# Patient Record
Sex: FEMALE | Race: WHITE | NOT HISPANIC OR LATINO | Employment: PART TIME | URBAN - METROPOLITAN AREA
[De-identification: names, ages, dates, MRNs, and addresses within clinical notes are randomized per-mention and may not be internally consistent; named-entity substitution may affect disease eponyms.]

---

## 2019-10-11 ENCOUNTER — AMBULATORY - HEALTHEAST (OUTPATIENT)
Dept: MULTI SPECIALTY CLINIC | Facility: CLINIC | Age: 54
End: 2019-10-11

## 2019-10-11 ENCOUNTER — RECORDS - HEALTHEAST (OUTPATIENT)
Dept: ADMINISTRATIVE | Facility: OTHER | Age: 54
End: 2019-10-11

## 2019-10-15 ENCOUNTER — RECORDS - HEALTHEAST (OUTPATIENT)
Dept: ADMINISTRATIVE | Facility: OTHER | Age: 54
End: 2019-10-15

## 2020-06-04 ENCOUNTER — RECORDS - HEALTHEAST (OUTPATIENT)
Dept: ADMINISTRATIVE | Facility: OTHER | Age: 55
End: 2020-06-04

## 2020-06-05 ENCOUNTER — RECORDS - HEALTHEAST (OUTPATIENT)
Dept: ADMINISTRATIVE | Facility: OTHER | Age: 55
End: 2020-06-05

## 2020-06-09 ENCOUNTER — OFFICE VISIT - HEALTHEAST (OUTPATIENT)
Dept: INTERNAL MEDICINE | Facility: CLINIC | Age: 55
End: 2020-06-09

## 2020-06-09 DIAGNOSIS — N63.22 BREAST LUMP ON LEFT SIDE AT 11 O'CLOCK POSITION: ICD-10-CM

## 2020-06-09 DIAGNOSIS — N63.20 LEFT BREAST MASS: ICD-10-CM

## 2020-06-15 ENCOUNTER — HOSPITAL ENCOUNTER (OUTPATIENT)
Dept: MAMMOGRAPHY | Facility: CLINIC | Age: 55
Discharge: HOME | End: 2020-06-15

## 2020-06-15 ENCOUNTER — AMBULATORY - HEALTHEAST (OUTPATIENT)
Dept: MAMMOGRAPHY | Facility: CLINIC | Age: 55
End: 2020-06-15

## 2020-06-15 DIAGNOSIS — N63.20 LEFT BREAST MASS: ICD-10-CM

## 2020-06-15 DIAGNOSIS — Z11.59 ENCOUNTER FOR SCREENING FOR OTHER VIRAL DISEASES: ICD-10-CM

## 2020-06-18 ENCOUNTER — RECORDS - HEALTHEAST (OUTPATIENT)
Dept: RADIOLOGY | Facility: CLINIC | Age: 55
End: 2020-06-18

## 2020-06-18 ENCOUNTER — RECORDS - HEALTHEAST (OUTPATIENT)
Dept: ADMINISTRATIVE | Facility: OTHER | Age: 55
End: 2020-06-18

## 2020-06-19 ENCOUNTER — AMBULATORY - HEALTHEAST (OUTPATIENT)
Dept: FAMILY MEDICINE | Facility: CLINIC | Age: 55
End: 2020-06-19

## 2020-06-19 DIAGNOSIS — Z11.59 ENCOUNTER FOR SCREENING FOR OTHER VIRAL DISEASES: ICD-10-CM

## 2020-06-20 ENCOUNTER — COMMUNICATION - HEALTHEAST (OUTPATIENT)
Dept: FAMILY MEDICINE | Facility: CLINIC | Age: 55
End: 2020-06-20

## 2020-06-22 ENCOUNTER — HOSPITAL ENCOUNTER (OUTPATIENT)
Dept: MAMMOGRAPHY | Facility: CLINIC | Age: 55
Discharge: HOME | End: 2020-06-22

## 2020-06-22 ENCOUNTER — AMBULATORY - HEALTHEAST (OUTPATIENT)
Dept: SURGERY | Facility: CLINIC | Age: 55
End: 2020-06-22

## 2020-06-22 DIAGNOSIS — N63.20 LEFT BREAST MASS: ICD-10-CM

## 2020-06-23 ENCOUNTER — COMMUNICATION - HEALTHEAST (OUTPATIENT)
Dept: ONCOLOGY | Facility: HOSPITAL | Age: 55
End: 2020-06-23

## 2020-06-24 ENCOUNTER — HOSPITAL ENCOUNTER (OUTPATIENT)
Dept: SURGERY | Facility: CLINIC | Age: 55
Discharge: HOME | End: 2020-06-24
Attending: SURGERY

## 2020-06-24 ENCOUNTER — COMMUNICATION - HEALTHEAST (OUTPATIENT)
Dept: ONCOLOGY | Facility: HOSPITAL | Age: 55
End: 2020-06-24

## 2020-06-24 ENCOUNTER — SURGERY - HEALTHEAST (OUTPATIENT)
Dept: SURGERY | Facility: CLINIC | Age: 55
End: 2020-06-24

## 2020-06-24 DIAGNOSIS — C50.912 INVASIVE DUCTAL CARCINOMA OF BREAST, LEFT (H): ICD-10-CM

## 2020-06-24 ASSESSMENT — MIFFLIN-ST. JEOR: SCORE: 1563.17

## 2020-06-25 ENCOUNTER — AMBULATORY - HEALTHEAST (OUTPATIENT)
Dept: SURGERY | Facility: CLINIC | Age: 55
End: 2020-06-25

## 2020-06-25 ENCOUNTER — COMMUNICATION - HEALTHEAST (OUTPATIENT)
Dept: SURGERY | Facility: CLINIC | Age: 55
End: 2020-06-25

## 2020-06-25 ENCOUNTER — COMMUNICATION - HEALTHEAST (OUTPATIENT)
Dept: ADMINISTRATIVE | Facility: HOSPITAL | Age: 55
End: 2020-06-25

## 2020-06-25 DIAGNOSIS — Z11.59 ENCOUNTER FOR SCREENING FOR OTHER VIRAL DISEASES: ICD-10-CM

## 2020-06-26 ENCOUNTER — COMMUNICATION - HEALTHEAST (OUTPATIENT)
Dept: ONCOLOGY | Facility: HOSPITAL | Age: 55
End: 2020-06-26

## 2020-06-26 ASSESSMENT — MIFFLIN-ST. JEOR: SCORE: 1563.17

## 2020-06-28 ENCOUNTER — AMBULATORY - HEALTHEAST (OUTPATIENT)
Dept: FAMILY MEDICINE | Facility: CLINIC | Age: 55
End: 2020-06-28

## 2020-06-28 DIAGNOSIS — Z11.59 ENCOUNTER FOR SCREENING FOR OTHER VIRAL DISEASES: ICD-10-CM

## 2020-06-29 ENCOUNTER — ANESTHESIA - HEALTHEAST (OUTPATIENT)
Dept: SURGERY | Facility: AMBULATORY SURGERY CENTER | Age: 55
End: 2020-06-29

## 2020-06-30 ENCOUNTER — COMMUNICATION - HEALTHEAST (OUTPATIENT)
Dept: ONCOLOGY | Facility: HOSPITAL | Age: 55
End: 2020-06-30

## 2020-06-30 ENCOUNTER — SURGERY - HEALTHEAST (OUTPATIENT)
Dept: SURGERY | Facility: AMBULATORY SURGERY CENTER | Age: 55
End: 2020-06-30

## 2020-06-30 ASSESSMENT — MIFFLIN-ST. JEOR: SCORE: 1563.17

## 2020-07-01 ENCOUNTER — AMBULATORY - HEALTHEAST (OUTPATIENT)
Dept: INFUSION THERAPY | Facility: HOSPITAL | Age: 55
End: 2020-07-01

## 2020-07-01 ENCOUNTER — AMBULATORY - HEALTHEAST (OUTPATIENT)
Dept: ONCOLOGY | Facility: HOSPITAL | Age: 55
End: 2020-07-01

## 2020-07-01 ENCOUNTER — OFFICE VISIT - HEALTHEAST (OUTPATIENT)
Dept: ONCOLOGY | Facility: HOSPITAL | Age: 55
End: 2020-07-01

## 2020-07-01 DIAGNOSIS — C50.912 INVASIVE DUCTAL CARCINOMA OF BREAST, LEFT (H): ICD-10-CM

## 2020-07-01 DIAGNOSIS — T45.1X5A CHEMOTHERAPY-INDUCED NEUTROPENIA (H): ICD-10-CM

## 2020-07-01 DIAGNOSIS — D70.1 CHEMOTHERAPY-INDUCED NEUTROPENIA (H): ICD-10-CM

## 2020-07-01 DIAGNOSIS — R79.89 ELEVATED LIVER FUNCTION TESTS: ICD-10-CM

## 2020-07-01 DIAGNOSIS — Z51.12 ENCOUNTER FOR ANTINEOPLASTIC IMMUNOTHERAPY: ICD-10-CM

## 2020-07-01 LAB
ALBUMIN SERPL-MCNC: 3.9 G/DL (ref 3.5–5)
ALP SERPL-CCNC: 104 U/L (ref 45–120)
ALT SERPL W P-5'-P-CCNC: 350 U/L (ref 0–45)
ANION GAP SERPL CALCULATED.3IONS-SCNC: 7 MMOL/L (ref 5–18)
AST SERPL W P-5'-P-CCNC: 225 U/L (ref 0–40)
BASOPHILS # BLD AUTO: 0 THOU/UL (ref 0–0.2)
BASOPHILS NFR BLD AUTO: 1 % (ref 0–2)
BILIRUB SERPL-MCNC: 0.2 MG/DL (ref 0–1)
BUN SERPL-MCNC: 12 MG/DL (ref 8–22)
CALCIUM SERPL-MCNC: 9.1 MG/DL (ref 8.5–10.5)
CEA SERPL-MCNC: 2.8 NG/ML (ref 0–3)
CHLORIDE BLD-SCNC: 104 MMOL/L (ref 98–107)
CO2 SERPL-SCNC: 31 MMOL/L (ref 22–31)
CREAT SERPL-MCNC: 0.72 MG/DL (ref 0.6–1.1)
EOSINOPHIL # BLD AUTO: 0.1 THOU/UL (ref 0–0.4)
EOSINOPHIL NFR BLD AUTO: 1 % (ref 0–6)
ERYTHROCYTE [DISTWIDTH] IN BLOOD BY AUTOMATED COUNT: 12.5 % (ref 11–14.5)
GFR SERPL CREATININE-BSD FRML MDRD: >60 ML/MIN/1.73M2
GLUCOSE BLD-MCNC: 88 MG/DL (ref 70–125)
HCT VFR BLD AUTO: 40.4 % (ref 35–47)
HGB BLD-MCNC: 13.1 G/DL (ref 12–16)
LDH SERPL L TO P-CCNC: 219 U/L (ref 125–220)
LYMPHOCYTES # BLD AUTO: 3.2 THOU/UL (ref 0.8–4.4)
LYMPHOCYTES NFR BLD AUTO: 38 % (ref 20–40)
MCH RBC QN AUTO: 31.1 PG (ref 27–34)
MCHC RBC AUTO-ENTMCNC: 32.4 G/DL (ref 32–36)
MCV RBC AUTO: 96 FL (ref 80–100)
MONOCYTES # BLD AUTO: 0.7 THOU/UL (ref 0–0.9)
MONOCYTES NFR BLD AUTO: 8 % (ref 2–10)
NEUTROPHILS # BLD AUTO: 4.4 THOU/UL (ref 2–7.7)
NEUTROPHILS NFR BLD AUTO: 53 % (ref 50–70)
PLATELET # BLD AUTO: 180 THOU/UL (ref 140–440)
PMV BLD AUTO: 10 FL (ref 8.5–12.5)
POTASSIUM BLD-SCNC: 4 MMOL/L (ref 3.5–5)
PROT SERPL-MCNC: 6.8 G/DL (ref 6–8)
RBC # BLD AUTO: 4.21 MILL/UL (ref 3.8–5.4)
SODIUM SERPL-SCNC: 142 MMOL/L (ref 136–145)
WBC: 8.4 THOU/UL (ref 4–11)

## 2020-07-02 ENCOUNTER — COMMUNICATION - HEALTHEAST (OUTPATIENT)
Dept: ONCOLOGY | Facility: HOSPITAL | Age: 55
End: 2020-07-02

## 2020-07-02 ENCOUNTER — HOSPITAL ENCOUNTER (OUTPATIENT)
Dept: ULTRASOUND IMAGING | Facility: HOSPITAL | Age: 55
Setting detail: RADIATION/ONCOLOGY SERIES
Discharge: STILL ADMITTED OR ADMINISTRATIVE | End: 2020-07-02
Attending: INTERNAL MEDICINE

## 2020-07-02 DIAGNOSIS — C50.912 INVASIVE DUCTAL CARCINOMA OF BREAST, LEFT (H): ICD-10-CM

## 2020-07-02 DIAGNOSIS — R79.89 ELEVATED LIVER FUNCTION TESTS: ICD-10-CM

## 2020-07-02 LAB
CANCER AG27-29 SERPL-ACNC: 20 U/ML (ref 0–39)
LAB AP CHARGES (HE HISTORICAL CONVERSION): ABNORMAL
LAB AP FISH HER2/NEU REPORT,ADDENDUM (HE HISTORICAL CONVERSION): ABNORMAL
LAB AP IHC ER/PR AND HER2/NEU REPORT,ADDENDUM (HE HISTORICAL CONVERSION): ABNORMAL
PATH REPORT.COMMENTS IMP SPEC: ABNORMAL
PATH REPORT.COMMENTS IMP SPEC: ABNORMAL
PATH REPORT.FINAL DX SPEC: ABNORMAL
PATH REPORT.GROSS SPEC: ABNORMAL
PATH REPORT.MICROSCOPIC SPEC OTHER STN: ABNORMAL
PATH REPORT.MICROSCOPIC SPEC OTHER STN: ABNORMAL
PATH REPORT.RELEVANT HX SPEC: ABNORMAL
RESULT FLAG (HE HISTORICAL CONVERSION): ABNORMAL

## 2020-07-07 ENCOUNTER — HOSPITAL ENCOUNTER (OUTPATIENT)
Dept: NUCLEAR MEDICINE | Facility: HOSPITAL | Age: 55
Setting detail: RADIATION/ONCOLOGY SERIES
Discharge: STILL ADMITTED OR ADMINISTRATIVE | End: 2020-07-07
Attending: INTERNAL MEDICINE

## 2020-07-07 DIAGNOSIS — T45.1X5A CHEMOTHERAPY-INDUCED NEUTROPENIA (H): ICD-10-CM

## 2020-07-07 DIAGNOSIS — C50.912 INVASIVE DUCTAL CARCINOMA OF BREAST, LEFT (H): ICD-10-CM

## 2020-07-07 DIAGNOSIS — D70.1 CHEMOTHERAPY-INDUCED NEUTROPENIA (H): ICD-10-CM

## 2020-07-07 DIAGNOSIS — Z51.12 ENCOUNTER FOR ANTINEOPLASTIC IMMUNOTHERAPY: ICD-10-CM

## 2020-07-07 LAB — MUGA LV EJECTION FRACTION: 70.8 %

## 2020-07-07 ASSESSMENT — MIFFLIN-ST. JEOR: SCORE: 1549.56

## 2020-07-09 ENCOUNTER — AMBULATORY - HEALTHEAST (OUTPATIENT)
Dept: INFUSION THERAPY | Facility: HOSPITAL | Age: 55
End: 2020-07-09

## 2020-07-09 ENCOUNTER — INFUSION - HEALTHEAST (OUTPATIENT)
Dept: INFUSION THERAPY | Facility: HOSPITAL | Age: 55
End: 2020-07-09

## 2020-07-09 ENCOUNTER — OFFICE VISIT - HEALTHEAST (OUTPATIENT)
Dept: ONCOLOGY | Facility: HOSPITAL | Age: 55
End: 2020-07-09

## 2020-07-09 DIAGNOSIS — T45.1X5A CHEMOTHERAPY-INDUCED NEUTROPENIA (H): ICD-10-CM

## 2020-07-09 DIAGNOSIS — D70.1 CHEMOTHERAPY-INDUCED NEUTROPENIA (H): ICD-10-CM

## 2020-07-09 DIAGNOSIS — Z51.12 ENCOUNTER FOR ANTINEOPLASTIC IMMUNOTHERAPY: ICD-10-CM

## 2020-07-09 DIAGNOSIS — R79.89 ELEVATED LFTS: ICD-10-CM

## 2020-07-09 DIAGNOSIS — C50.912 INVASIVE DUCTAL CARCINOMA OF BREAST, LEFT (H): ICD-10-CM

## 2020-07-09 LAB
ALBUMIN SERPL-MCNC: 4.1 G/DL (ref 3.5–5)
ALP SERPL-CCNC: 102 U/L (ref 45–120)
ALT SERPL W P-5'-P-CCNC: 113 U/L (ref 0–45)
AST SERPL W P-5'-P-CCNC: 51 U/L (ref 0–40)
BILIRUB DIRECT SERPL-MCNC: 0.2 MG/DL
BILIRUB SERPL-MCNC: 0.3 MG/DL (ref 0–1)
PROT SERPL-MCNC: 6.8 G/DL (ref 6–8)

## 2020-07-09 ASSESSMENT — MIFFLIN-ST. JEOR: SCORE: 1550.92

## 2020-07-13 ENCOUNTER — COMMUNICATION - HEALTHEAST (OUTPATIENT)
Dept: ONCOLOGY | Facility: HOSPITAL | Age: 55
End: 2020-07-13

## 2020-07-13 ENCOUNTER — OFFICE VISIT - HEALTHEAST (OUTPATIENT)
Dept: ONCOLOGY | Facility: HOSPITAL | Age: 55
End: 2020-07-13

## 2020-07-13 ENCOUNTER — RECORDS - HEALTHEAST (OUTPATIENT)
Dept: ADMINISTRATIVE | Facility: OTHER | Age: 55
End: 2020-07-13

## 2020-07-13 DIAGNOSIS — C50.912 INVASIVE DUCTAL CARCINOMA OF BREAST, LEFT (H): ICD-10-CM

## 2020-07-13 DIAGNOSIS — Z71.83 ENCOUNTER FOR NONPROCREATIVE GENETIC COUNSELING: ICD-10-CM

## 2020-07-13 DIAGNOSIS — Z80.3 FAMILY HISTORY OF MALIGNANT NEOPLASM OF BREAST: ICD-10-CM

## 2020-07-16 ENCOUNTER — COMMUNICATION - HEALTHEAST (OUTPATIENT)
Dept: ONCOLOGY | Facility: HOSPITAL | Age: 55
End: 2020-07-16

## 2020-07-23 ENCOUNTER — RECORDS - HEALTHEAST (OUTPATIENT)
Dept: ADMINISTRATIVE | Facility: OTHER | Age: 55
End: 2020-07-23

## 2020-07-23 ENCOUNTER — INFUSION - HEALTHEAST (OUTPATIENT)
Dept: INFUSION THERAPY | Facility: HOSPITAL | Age: 55
End: 2020-07-23

## 2020-07-23 ENCOUNTER — AMBULATORY - HEALTHEAST (OUTPATIENT)
Dept: INFUSION THERAPY | Facility: HOSPITAL | Age: 55
End: 2020-07-23

## 2020-07-23 ENCOUNTER — OFFICE VISIT - HEALTHEAST (OUTPATIENT)
Dept: ONCOLOGY | Facility: HOSPITAL | Age: 55
End: 2020-07-23

## 2020-07-23 DIAGNOSIS — T45.1X5A CHEMOTHERAPY-INDUCED NEUTROPENIA (H): ICD-10-CM

## 2020-07-23 DIAGNOSIS — D70.1 CHEMOTHERAPY-INDUCED NEUTROPENIA (H): ICD-10-CM

## 2020-07-23 DIAGNOSIS — C50.912 INVASIVE DUCTAL CARCINOMA OF BREAST, LEFT (H): ICD-10-CM

## 2020-07-23 DIAGNOSIS — Z51.12 ENCOUNTER FOR ANTINEOPLASTIC IMMUNOTHERAPY: ICD-10-CM

## 2020-07-23 LAB
ALBUMIN SERPL-MCNC: 3.9 G/DL (ref 3.5–5)
ALP SERPL-CCNC: 89 U/L (ref 45–120)
ALT SERPL W P-5'-P-CCNC: 46 U/L (ref 0–45)
ANION GAP SERPL CALCULATED.3IONS-SCNC: 8 MMOL/L (ref 5–18)
AST SERPL W P-5'-P-CCNC: 26 U/L (ref 0–40)
BASOPHILS # BLD AUTO: 0 THOU/UL (ref 0–0.2)
BASOPHILS NFR BLD AUTO: 0 % (ref 0–2)
BILIRUB SERPL-MCNC: 0.1 MG/DL (ref 0–1)
BUN SERPL-MCNC: 12 MG/DL (ref 8–22)
CALCIUM SERPL-MCNC: 9.1 MG/DL (ref 8.5–10.5)
CHLORIDE BLD-SCNC: 106 MMOL/L (ref 98–107)
CO2 SERPL-SCNC: 27 MMOL/L (ref 22–31)
CREAT SERPL-MCNC: 0.7 MG/DL (ref 0.6–1.1)
EOSINOPHIL COUNT (ABSOLUTE): 0.1 THOU/UL (ref 0–0.4)
EOSINOPHIL NFR BLD AUTO: 1 % (ref 0–6)
ERYTHROCYTE [DISTWIDTH] IN BLOOD BY AUTOMATED COUNT: 12 % (ref 11–14.5)
GFR SERPL CREATININE-BSD FRML MDRD: >60 ML/MIN/1.73M2
GLUCOSE BLD-MCNC: 81 MG/DL (ref 70–125)
HCT VFR BLD AUTO: 38.7 % (ref 35–47)
HGB BLD-MCNC: 12.7 G/DL (ref 12–16)
LYMPHOCYTES # BLD AUTO: 1.6 THOU/UL (ref 0.8–4.4)
LYMPHOCYTES NFR BLD AUTO: 24 % (ref 20–40)
MCH RBC QN AUTO: 31.8 PG (ref 27–34)
MCHC RBC AUTO-ENTMCNC: 32.8 G/DL (ref 32–36)
MCV RBC AUTO: 97 FL (ref 80–100)
METAMYELOCYTES (ABSOLUTE): 0.2 THOU/UL
METAMYELOCYTES NFR BLD MANUAL: 3 %
MONOCYTES # BLD AUTO: 0.9 THOU/UL (ref 0–0.9)
MONOCYTES NFR BLD AUTO: 13 % (ref 2–10)
PLAT MORPH BLD: NORMAL
PLATELET # BLD AUTO: 188 THOU/UL (ref 140–440)
PMV BLD AUTO: 9.5 FL (ref 8.5–12.5)
POTASSIUM BLD-SCNC: 4 MMOL/L (ref 3.5–5)
PROT SERPL-MCNC: 6.5 G/DL (ref 6–8)
RBC # BLD AUTO: 3.99 MILL/UL (ref 3.8–5.4)
SODIUM SERPL-SCNC: 141 MMOL/L (ref 136–145)
TOTAL NEUTROPHILS-ABS(DIFF): 4 THOU/UL (ref 2–7.7)
TOTAL NEUTROPHILS-REL(DIFF): 59 % (ref 50–70)
TOXIC GRANULATION: ABNORMAL
WBC: 6.8 THOU/UL (ref 4–11)

## 2020-08-03 ENCOUNTER — COMMUNICATION - HEALTHEAST (OUTPATIENT)
Dept: ONCOLOGY | Facility: HOSPITAL | Age: 55
End: 2020-08-03

## 2020-08-04 ENCOUNTER — COMMUNICATION - HEALTHEAST (OUTPATIENT)
Dept: ONCOLOGY | Facility: HOSPITAL | Age: 55
End: 2020-08-04

## 2020-08-06 ENCOUNTER — OFFICE VISIT - HEALTHEAST (OUTPATIENT)
Dept: ONCOLOGY | Facility: HOSPITAL | Age: 55
End: 2020-08-06

## 2020-08-06 ENCOUNTER — INFUSION - HEALTHEAST (OUTPATIENT)
Dept: INFUSION THERAPY | Facility: HOSPITAL | Age: 55
End: 2020-08-06

## 2020-08-06 ENCOUNTER — AMBULATORY - HEALTHEAST (OUTPATIENT)
Dept: INFUSION THERAPY | Facility: HOSPITAL | Age: 55
End: 2020-08-06

## 2020-08-06 DIAGNOSIS — Z51.12 ENCOUNTER FOR ANTINEOPLASTIC IMMUNOTHERAPY: ICD-10-CM

## 2020-08-06 DIAGNOSIS — D70.1 CHEMOTHERAPY-INDUCED NEUTROPENIA (H): ICD-10-CM

## 2020-08-06 DIAGNOSIS — C50.912 INVASIVE DUCTAL CARCINOMA OF BREAST, LEFT (H): ICD-10-CM

## 2020-08-06 DIAGNOSIS — T45.1X5A CHEMOTHERAPY-INDUCED NEUTROPENIA (H): ICD-10-CM

## 2020-08-06 LAB
ALBUMIN SERPL-MCNC: 4 G/DL (ref 3.5–5)
ALP SERPL-CCNC: 86 U/L (ref 45–120)
ALT SERPL W P-5'-P-CCNC: 35 U/L (ref 0–45)
ANION GAP SERPL CALCULATED.3IONS-SCNC: 6 MMOL/L (ref 5–18)
AST SERPL W P-5'-P-CCNC: 21 U/L (ref 0–40)
BASOPHILS # BLD AUTO: 0.1 THOU/UL (ref 0–0.2)
BASOPHILS NFR BLD AUTO: 1 % (ref 0–2)
BILIRUB SERPL-MCNC: 0.2 MG/DL (ref 0–1)
BUN SERPL-MCNC: 12 MG/DL (ref 8–22)
CALCIUM SERPL-MCNC: 9.2 MG/DL (ref 8.5–10.5)
CHLORIDE BLD-SCNC: 104 MMOL/L (ref 98–107)
CO2 SERPL-SCNC: 30 MMOL/L (ref 22–31)
CREAT SERPL-MCNC: 0.67 MG/DL (ref 0.6–1.1)
EOSINOPHIL # BLD AUTO: 0 THOU/UL (ref 0–0.4)
EOSINOPHIL NFR BLD AUTO: 0 % (ref 0–6)
ERYTHROCYTE [DISTWIDTH] IN BLOOD BY AUTOMATED COUNT: 13 % (ref 11–14.5)
GFR SERPL CREATININE-BSD FRML MDRD: >60 ML/MIN/1.73M2
GLUCOSE BLD-MCNC: 87 MG/DL (ref 70–125)
HCT VFR BLD AUTO: 39.1 % (ref 35–47)
HGB BLD-MCNC: 12.7 G/DL (ref 12–16)
LYMPHOCYTES # BLD AUTO: 1.4 THOU/UL (ref 0.8–4.4)
LYMPHOCYTES NFR BLD AUTO: 25 % (ref 20–40)
MCH RBC QN AUTO: 31.3 PG (ref 27–34)
MCHC RBC AUTO-ENTMCNC: 32.5 G/DL (ref 32–36)
MCV RBC AUTO: 96 FL (ref 80–100)
MONOCYTES # BLD AUTO: 0.8 THOU/UL (ref 0–0.9)
MONOCYTES NFR BLD AUTO: 14 % (ref 2–10)
NEUTROPHILS # BLD AUTO: 3.1 THOU/UL (ref 2–7.7)
NEUTROPHILS NFR BLD AUTO: 56 % (ref 50–70)
PLATELET # BLD AUTO: 199 THOU/UL (ref 140–440)
PMV BLD AUTO: 10.2 FL (ref 8.5–12.5)
POTASSIUM BLD-SCNC: 3.8 MMOL/L (ref 3.5–5)
PROT SERPL-MCNC: 6.6 G/DL (ref 6–8)
RBC # BLD AUTO: 4.06 MILL/UL (ref 3.8–5.4)
SODIUM SERPL-SCNC: 140 MMOL/L (ref 136–145)
WBC: 5.5 THOU/UL (ref 4–11)

## 2020-08-19 ENCOUNTER — AMBULATORY - HEALTHEAST (OUTPATIENT)
Dept: INFUSION THERAPY | Facility: HOSPITAL | Age: 55
End: 2020-08-19

## 2020-08-19 ENCOUNTER — INFUSION - HEALTHEAST (OUTPATIENT)
Dept: INFUSION THERAPY | Facility: HOSPITAL | Age: 55
End: 2020-08-19

## 2020-08-19 ENCOUNTER — OFFICE VISIT - HEALTHEAST (OUTPATIENT)
Dept: ONCOLOGY | Facility: HOSPITAL | Age: 55
End: 2020-08-19

## 2020-08-19 DIAGNOSIS — T45.1X5A CHEMOTHERAPY-INDUCED NEUTROPENIA (H): ICD-10-CM

## 2020-08-19 DIAGNOSIS — Z51.12 ENCOUNTER FOR ANTINEOPLASTIC IMMUNOTHERAPY: ICD-10-CM

## 2020-08-19 DIAGNOSIS — D70.1 CHEMOTHERAPY-INDUCED NEUTROPENIA (H): ICD-10-CM

## 2020-08-19 DIAGNOSIS — C50.912 INVASIVE DUCTAL CARCINOMA OF BREAST, LEFT (H): ICD-10-CM

## 2020-08-19 LAB
ALBUMIN SERPL-MCNC: 3.9 G/DL (ref 3.5–5)
ALP SERPL-CCNC: 84 U/L (ref 45–120)
ALT SERPL W P-5'-P-CCNC: 37 U/L (ref 0–45)
ANION GAP SERPL CALCULATED.3IONS-SCNC: 5 MMOL/L (ref 5–18)
AST SERPL W P-5'-P-CCNC: 26 U/L (ref 0–40)
BASOPHILS # BLD AUTO: 0 THOU/UL (ref 0–0.2)
BASOPHILS NFR BLD AUTO: 1 % (ref 0–2)
BILIRUB SERPL-MCNC: 0.2 MG/DL (ref 0–1)
BUN SERPL-MCNC: 10 MG/DL (ref 8–22)
CALCIUM SERPL-MCNC: 9.3 MG/DL (ref 8.5–10.5)
CHLORIDE BLD-SCNC: 106 MMOL/L (ref 98–107)
CO2 SERPL-SCNC: 30 MMOL/L (ref 22–31)
CREAT SERPL-MCNC: 0.69 MG/DL (ref 0.6–1.1)
EOSINOPHIL # BLD AUTO: 0 THOU/UL (ref 0–0.4)
EOSINOPHIL NFR BLD AUTO: 0 % (ref 0–6)
ERYTHROCYTE [DISTWIDTH] IN BLOOD BY AUTOMATED COUNT: 13.8 % (ref 11–14.5)
GFR SERPL CREATININE-BSD FRML MDRD: >60 ML/MIN/1.73M2
GLUCOSE BLD-MCNC: 87 MG/DL (ref 70–125)
HCT VFR BLD AUTO: 36.9 % (ref 35–47)
HGB BLD-MCNC: 12.2 G/DL (ref 12–16)
IMM GRANULOCYTES # BLD: 0.1 THOU/UL
IMM GRANULOCYTES NFR BLD: 2 %
LYMPHOCYTES # BLD AUTO: 1 THOU/UL (ref 0.8–4.4)
LYMPHOCYTES NFR BLD AUTO: 25 % (ref 20–40)
MCH RBC QN AUTO: 32.2 PG (ref 27–34)
MCHC RBC AUTO-ENTMCNC: 33.1 G/DL (ref 32–36)
MCV RBC AUTO: 97 FL (ref 80–100)
MONOCYTES # BLD AUTO: 0.7 THOU/UL (ref 0–0.9)
MONOCYTES NFR BLD AUTO: 17 % (ref 2–10)
NEUTROPHILS # BLD AUTO: 2.3 THOU/UL (ref 2–7.7)
NEUTROPHILS NFR BLD AUTO: 56 % (ref 50–70)
PLATELET # BLD AUTO: 160 THOU/UL (ref 140–440)
PMV BLD AUTO: 9.7 FL (ref 8.5–12.5)
POTASSIUM BLD-SCNC: 3.8 MMOL/L (ref 3.5–5)
PROT SERPL-MCNC: 6.5 G/DL (ref 6–8)
RBC # BLD AUTO: 3.79 MILL/UL (ref 3.8–5.4)
SODIUM SERPL-SCNC: 141 MMOL/L (ref 136–145)
WBC: 4.1 THOU/UL (ref 4–11)

## 2020-09-02 ENCOUNTER — OFFICE VISIT - HEALTHEAST (OUTPATIENT)
Dept: ONCOLOGY | Facility: HOSPITAL | Age: 55
End: 2020-09-02

## 2020-09-02 ENCOUNTER — AMBULATORY - HEALTHEAST (OUTPATIENT)
Dept: INFUSION THERAPY | Facility: HOSPITAL | Age: 55
End: 2020-09-02

## 2020-09-02 DIAGNOSIS — T45.1X5A CHEMOTHERAPY-INDUCED NEUTROPENIA (H): ICD-10-CM

## 2020-09-02 DIAGNOSIS — D70.1 CHEMOTHERAPY-INDUCED NEUTROPENIA (H): ICD-10-CM

## 2020-09-02 DIAGNOSIS — Z51.12 ENCOUNTER FOR ANTINEOPLASTIC IMMUNOTHERAPY: ICD-10-CM

## 2020-09-02 DIAGNOSIS — C50.912 INVASIVE DUCTAL CARCINOMA OF BREAST, LEFT (H): ICD-10-CM

## 2020-09-02 LAB
ALBUMIN SERPL-MCNC: 3.7 G/DL (ref 3.5–5)
ALP SERPL-CCNC: 87 U/L (ref 45–120)
ALT SERPL W P-5'-P-CCNC: 49 U/L (ref 0–45)
ANION GAP SERPL CALCULATED.3IONS-SCNC: 7 MMOL/L (ref 5–18)
AST SERPL W P-5'-P-CCNC: 30 U/L (ref 0–40)
BASOPHILS # BLD AUTO: 0.1 THOU/UL (ref 0–0.2)
BASOPHILS NFR BLD AUTO: 1 % (ref 0–2)
BILIRUB SERPL-MCNC: 0.2 MG/DL (ref 0–1)
BUN SERPL-MCNC: 7 MG/DL (ref 8–22)
CALCIUM SERPL-MCNC: 9.4 MG/DL (ref 8.5–10.5)
CHLORIDE BLD-SCNC: 103 MMOL/L (ref 98–107)
CO2 SERPL-SCNC: 30 MMOL/L (ref 22–31)
CREAT SERPL-MCNC: 0.64 MG/DL (ref 0.6–1.1)
EOSINOPHIL # BLD AUTO: 0 THOU/UL (ref 0–0.4)
EOSINOPHIL NFR BLD AUTO: 0 % (ref 0–6)
ERYTHROCYTE [DISTWIDTH] IN BLOOD BY AUTOMATED COUNT: 14.2 % (ref 11–14.5)
GFR SERPL CREATININE-BSD FRML MDRD: >60 ML/MIN/1.73M2
GLUCOSE BLD-MCNC: 76 MG/DL (ref 70–125)
HCT VFR BLD AUTO: 36.5 % (ref 35–47)
HGB BLD-MCNC: 11.9 G/DL (ref 12–16)
IMM GRANULOCYTES # BLD: 0.1 THOU/UL
IMM GRANULOCYTES NFR BLD: 3 %
LYMPHOCYTES # BLD AUTO: 1 THOU/UL (ref 0.8–4.4)
LYMPHOCYTES NFR BLD AUTO: 23 % (ref 20–40)
MCH RBC QN AUTO: 31.5 PG (ref 27–34)
MCHC RBC AUTO-ENTMCNC: 32.6 G/DL (ref 32–36)
MCV RBC AUTO: 97 FL (ref 80–100)
MONOCYTES # BLD AUTO: 0.8 THOU/UL (ref 0–0.9)
MONOCYTES NFR BLD AUTO: 18 % (ref 2–10)
NEUTROPHILS # BLD AUTO: 2.3 THOU/UL (ref 2–7.7)
NEUTROPHILS NFR BLD AUTO: 55 % (ref 50–70)
PLATELET # BLD AUTO: 163 THOU/UL (ref 140–440)
PMV BLD AUTO: 9.7 FL (ref 8.5–12.5)
POTASSIUM BLD-SCNC: 3.8 MMOL/L (ref 3.5–5)
PROT SERPL-MCNC: 6.3 G/DL (ref 6–8)
RBC # BLD AUTO: 3.78 MILL/UL (ref 3.8–5.4)
SODIUM SERPL-SCNC: 140 MMOL/L (ref 136–145)
WBC: 4.2 THOU/UL (ref 4–11)

## 2020-09-03 ENCOUNTER — INFUSION - HEALTHEAST (OUTPATIENT)
Dept: INFUSION THERAPY | Facility: HOSPITAL | Age: 55
End: 2020-09-03

## 2020-09-03 DIAGNOSIS — T45.1X5A CHEMOTHERAPY-INDUCED NEUTROPENIA (H): ICD-10-CM

## 2020-09-03 DIAGNOSIS — Z51.12 ENCOUNTER FOR ANTINEOPLASTIC IMMUNOTHERAPY: ICD-10-CM

## 2020-09-03 DIAGNOSIS — D70.1 CHEMOTHERAPY-INDUCED NEUTROPENIA (H): ICD-10-CM

## 2020-09-03 DIAGNOSIS — C50.912 INVASIVE DUCTAL CARCINOMA OF BREAST, LEFT (H): ICD-10-CM

## 2020-09-10 ENCOUNTER — COMMUNICATION - HEALTHEAST (OUTPATIENT)
Dept: ADMINISTRATIVE | Facility: HOSPITAL | Age: 55
End: 2020-09-10

## 2020-09-10 ENCOUNTER — INFUSION - HEALTHEAST (OUTPATIENT)
Dept: INFUSION THERAPY | Facility: HOSPITAL | Age: 55
End: 2020-09-10

## 2020-09-10 DIAGNOSIS — C50.912 INVASIVE DUCTAL CARCINOMA OF BREAST, LEFT (H): ICD-10-CM

## 2020-09-10 DIAGNOSIS — Z51.12 ENCOUNTER FOR ANTINEOPLASTIC IMMUNOTHERAPY: ICD-10-CM

## 2020-09-10 DIAGNOSIS — T45.1X5A CHEMOTHERAPY-INDUCED NEUTROPENIA (H): ICD-10-CM

## 2020-09-10 DIAGNOSIS — D70.1 CHEMOTHERAPY-INDUCED NEUTROPENIA (H): ICD-10-CM

## 2020-09-10 LAB
BASOPHILS # BLD AUTO: 0.1 THOU/UL (ref 0–0.2)
BASOPHILS NFR BLD AUTO: 1 % (ref 0–2)
EOSINOPHIL # BLD AUTO: 0 THOU/UL (ref 0–0.4)
EOSINOPHIL NFR BLD AUTO: 0 % (ref 0–6)
ERYTHROCYTE [DISTWIDTH] IN BLOOD BY AUTOMATED COUNT: 13.6 % (ref 11–14.5)
HCT VFR BLD AUTO: 35.5 % (ref 35–47)
HGB BLD-MCNC: 11.7 G/DL (ref 12–16)
IMM GRANULOCYTES # BLD: 0 THOU/UL
IMM GRANULOCYTES NFR BLD: 1 %
LYMPHOCYTES # BLD AUTO: 1 THOU/UL (ref 0.8–4.4)
LYMPHOCYTES NFR BLD AUTO: 21 % (ref 20–40)
MCH RBC QN AUTO: 31.9 PG (ref 27–34)
MCHC RBC AUTO-ENTMCNC: 33 G/DL (ref 32–36)
MCV RBC AUTO: 97 FL (ref 80–100)
MONOCYTES # BLD AUTO: 0.5 THOU/UL (ref 0–0.9)
MONOCYTES NFR BLD AUTO: 10 % (ref 2–10)
NEUTROPHILS # BLD AUTO: 3.2 THOU/UL (ref 2–7.7)
NEUTROPHILS NFR BLD AUTO: 67 % (ref 50–70)
PLATELET # BLD AUTO: 191 THOU/UL (ref 140–440)
PMV BLD AUTO: 9.5 FL (ref 8.5–12.5)
RBC # BLD AUTO: 3.67 MILL/UL (ref 3.8–5.4)
WBC: 4.7 THOU/UL (ref 4–11)

## 2020-09-14 ENCOUNTER — OFFICE VISIT - HEALTHEAST (OUTPATIENT)
Dept: ONCOLOGY | Facility: HOSPITAL | Age: 55
End: 2020-09-14

## 2020-09-14 DIAGNOSIS — Z71.83 ENCOUNTER FOR NONPROCREATIVE GENETIC COUNSELING: ICD-10-CM

## 2020-09-14 DIAGNOSIS — Z80.3 FAMILY HISTORY OF MALIGNANT NEOPLASM OF BREAST: ICD-10-CM

## 2020-09-14 DIAGNOSIS — C50.912 INVASIVE DUCTAL CARCINOMA OF BREAST, LEFT (H): ICD-10-CM

## 2020-09-17 ENCOUNTER — INFUSION - HEALTHEAST (OUTPATIENT)
Dept: INFUSION THERAPY | Facility: HOSPITAL | Age: 55
End: 2020-09-17

## 2020-09-17 DIAGNOSIS — T45.1X5A CHEMOTHERAPY-INDUCED NEUTROPENIA (H): ICD-10-CM

## 2020-09-17 DIAGNOSIS — C50.912 INVASIVE DUCTAL CARCINOMA OF BREAST, LEFT (H): ICD-10-CM

## 2020-09-17 DIAGNOSIS — Z51.12 ENCOUNTER FOR ANTINEOPLASTIC IMMUNOTHERAPY: ICD-10-CM

## 2020-09-17 DIAGNOSIS — D70.1 CHEMOTHERAPY-INDUCED NEUTROPENIA (H): ICD-10-CM

## 2020-09-17 LAB
BASOPHILS # BLD AUTO: 0 THOU/UL (ref 0–0.2)
BASOPHILS NFR BLD AUTO: 1 % (ref 0–2)
EOSINOPHIL # BLD AUTO: 0 THOU/UL (ref 0–0.4)
EOSINOPHIL NFR BLD AUTO: 1 % (ref 0–6)
ERYTHROCYTE [DISTWIDTH] IN BLOOD BY AUTOMATED COUNT: 14 % (ref 11–14.5)
HCT VFR BLD AUTO: 34.1 % (ref 35–47)
HGB BLD-MCNC: 11.1 G/DL (ref 12–16)
IMM GRANULOCYTES # BLD: 0 THOU/UL
IMM GRANULOCYTES NFR BLD: 1 %
LYMPHOCYTES # BLD AUTO: 0.8 THOU/UL (ref 0.8–4.4)
LYMPHOCYTES NFR BLD AUTO: 19 % (ref 20–40)
MCH RBC QN AUTO: 31.8 PG (ref 27–34)
MCHC RBC AUTO-ENTMCNC: 32.6 G/DL (ref 32–36)
MCV RBC AUTO: 98 FL (ref 80–100)
MONOCYTES # BLD AUTO: 0.3 THOU/UL (ref 0–0.9)
MONOCYTES NFR BLD AUTO: 9 % (ref 2–10)
NEUTROPHILS # BLD AUTO: 2.8 THOU/UL (ref 2–7.7)
NEUTROPHILS NFR BLD AUTO: 70 % (ref 50–70)
PLATELET # BLD AUTO: 200 THOU/UL (ref 140–440)
PMV BLD AUTO: 9.9 FL (ref 8.5–12.5)
RBC # BLD AUTO: 3.49 MILL/UL (ref 3.8–5.4)
WBC: 4 THOU/UL (ref 4–11)

## 2020-09-18 ENCOUNTER — AMBULATORY - HEALTHEAST (OUTPATIENT)
Dept: LAB | Facility: CLINIC | Age: 55
End: 2020-09-18

## 2020-09-18 DIAGNOSIS — Z98.84 S/P LAPAROSCOPIC SLEEVE GASTRECTOMY: ICD-10-CM

## 2020-09-24 ENCOUNTER — INFUSION - HEALTHEAST (OUTPATIENT)
Dept: INFUSION THERAPY | Facility: HOSPITAL | Age: 55
End: 2020-09-24

## 2020-09-24 ENCOUNTER — AMBULATORY - HEALTHEAST (OUTPATIENT)
Dept: INFUSION THERAPY | Facility: HOSPITAL | Age: 55
End: 2020-09-24

## 2020-09-24 ENCOUNTER — OFFICE VISIT - HEALTHEAST (OUTPATIENT)
Dept: ONCOLOGY | Facility: HOSPITAL | Age: 55
End: 2020-09-24

## 2020-09-24 DIAGNOSIS — D70.1 CHEMOTHERAPY-INDUCED NEUTROPENIA (H): ICD-10-CM

## 2020-09-24 DIAGNOSIS — Z51.12 ENCOUNTER FOR ANTINEOPLASTIC IMMUNOTHERAPY: ICD-10-CM

## 2020-09-24 DIAGNOSIS — T45.1X5A CHEMOTHERAPY-INDUCED NEUTROPENIA (H): ICD-10-CM

## 2020-09-24 DIAGNOSIS — C50.912 INVASIVE DUCTAL CARCINOMA OF BREAST, LEFT (H): ICD-10-CM

## 2020-09-24 DIAGNOSIS — Z98.84 S/P LAPAROSCOPIC SLEEVE GASTRECTOMY: ICD-10-CM

## 2020-09-24 LAB
ALBUMIN SERPL-MCNC: 3.8 G/DL (ref 3.5–5)
ALP SERPL-CCNC: 85 U/L (ref 45–120)
ALT SERPL W P-5'-P-CCNC: 45 U/L (ref 0–45)
ANION GAP SERPL CALCULATED.3IONS-SCNC: 9 MMOL/L (ref 5–18)
AST SERPL W P-5'-P-CCNC: 28 U/L (ref 0–40)
BASOPHILS # BLD AUTO: 0 THOU/UL (ref 0–0.2)
BASOPHILS NFR BLD AUTO: 1 % (ref 0–2)
BILIRUB SERPL-MCNC: 0.3 MG/DL (ref 0–1)
BUN SERPL-MCNC: 9 MG/DL (ref 8–22)
CALCIUM SERPL-MCNC: 9 MG/DL (ref 8.5–10.5)
CHLORIDE BLD-SCNC: 107 MMOL/L (ref 98–107)
CO2 SERPL-SCNC: 27 MMOL/L (ref 22–31)
CREAT SERPL-MCNC: 0.7 MG/DL (ref 0.6–1.1)
EOSINOPHIL # BLD AUTO: 0.1 THOU/UL (ref 0–0.4)
EOSINOPHIL NFR BLD AUTO: 1 % (ref 0–6)
ERYTHROCYTE [DISTWIDTH] IN BLOOD BY AUTOMATED COUNT: 14.3 % (ref 11–14.5)
GFR SERPL CREATININE-BSD FRML MDRD: >60 ML/MIN/1.73M2
GLUCOSE BLD-MCNC: 99 MG/DL (ref 70–125)
HCT VFR BLD AUTO: 35.6 % (ref 35–47)
HGB BLD-MCNC: 11.8 G/DL (ref 12–16)
IMM GRANULOCYTES # BLD: 0 THOU/UL
IMM GRANULOCYTES NFR BLD: 1 %
LYMPHOCYTES # BLD AUTO: 0.8 THOU/UL (ref 0.8–4.4)
LYMPHOCYTES NFR BLD AUTO: 22 % (ref 20–40)
MCH RBC QN AUTO: 32.2 PG (ref 27–34)
MCHC RBC AUTO-ENTMCNC: 33.1 G/DL (ref 32–36)
MCV RBC AUTO: 97 FL (ref 80–100)
MONOCYTES # BLD AUTO: 0.4 THOU/UL (ref 0–0.9)
MONOCYTES NFR BLD AUTO: 10 % (ref 2–10)
NEUTROPHILS # BLD AUTO: 2.4 THOU/UL (ref 2–7.7)
NEUTROPHILS NFR BLD AUTO: 64 % (ref 50–70)
PLATELET # BLD AUTO: 222 THOU/UL (ref 140–440)
PMV BLD AUTO: 9.7 FL (ref 8.5–12.5)
POTASSIUM BLD-SCNC: 3.7 MMOL/L (ref 3.5–5)
PROT SERPL-MCNC: 6.8 G/DL (ref 6–8)
RBC # BLD AUTO: 3.67 MILL/UL (ref 3.8–5.4)
SODIUM SERPL-SCNC: 143 MMOL/L (ref 136–145)
VIT B12 SERPL-MCNC: 1879 PG/ML (ref 213–816)
WBC: 3.7 THOU/UL (ref 4–11)

## 2020-09-28 LAB — VIT B1 PYROPHOSHATE BLD-SCNC: 116 NMOL/L (ref 70–180)

## 2020-10-01 ENCOUNTER — INFUSION - HEALTHEAST (OUTPATIENT)
Dept: INFUSION THERAPY | Facility: HOSPITAL | Age: 55
End: 2020-10-01

## 2020-10-01 DIAGNOSIS — C50.912 INVASIVE DUCTAL CARCINOMA OF BREAST, LEFT (H): ICD-10-CM

## 2020-10-01 DIAGNOSIS — Z51.12 ENCOUNTER FOR ANTINEOPLASTIC IMMUNOTHERAPY: ICD-10-CM

## 2020-10-01 DIAGNOSIS — T45.1X5A CHEMOTHERAPY-INDUCED NEUTROPENIA (H): ICD-10-CM

## 2020-10-01 DIAGNOSIS — D70.1 CHEMOTHERAPY-INDUCED NEUTROPENIA (H): ICD-10-CM

## 2020-10-01 LAB
BASOPHILS # BLD AUTO: 0 THOU/UL (ref 0–0.2)
BASOPHILS NFR BLD AUTO: 1 % (ref 0–2)
EOSINOPHIL # BLD AUTO: 0.1 THOU/UL (ref 0–0.4)
EOSINOPHIL NFR BLD AUTO: 2 % (ref 0–6)
ERYTHROCYTE [DISTWIDTH] IN BLOOD BY AUTOMATED COUNT: 14.1 % (ref 11–14.5)
HCT VFR BLD AUTO: 35.3 % (ref 35–47)
HGB BLD-MCNC: 11.5 G/DL (ref 12–16)
IMM GRANULOCYTES # BLD: 0 THOU/UL
IMM GRANULOCYTES NFR BLD: 1 %
LYMPHOCYTES # BLD AUTO: 1 THOU/UL (ref 0.8–4.4)
LYMPHOCYTES NFR BLD AUTO: 24 % (ref 20–40)
MCH RBC QN AUTO: 32.5 PG (ref 27–34)
MCHC RBC AUTO-ENTMCNC: 32.6 G/DL (ref 32–36)
MCV RBC AUTO: 100 FL (ref 80–100)
MONOCYTES # BLD AUTO: 0.3 THOU/UL (ref 0–0.9)
MONOCYTES NFR BLD AUTO: 8 % (ref 2–10)
NEUTROPHILS # BLD AUTO: 2.6 THOU/UL (ref 2–7.7)
NEUTROPHILS NFR BLD AUTO: 65 % (ref 50–70)
PLATELET # BLD AUTO: 201 THOU/UL (ref 140–440)
PMV BLD AUTO: 9.9 FL (ref 8.5–12.5)
RBC # BLD AUTO: 3.54 MILL/UL (ref 3.8–5.4)
WBC: 4 THOU/UL (ref 4–11)

## 2020-10-08 ENCOUNTER — INFUSION - HEALTHEAST (OUTPATIENT)
Dept: INFUSION THERAPY | Facility: HOSPITAL | Age: 55
End: 2020-10-08

## 2020-10-08 DIAGNOSIS — D70.1 CHEMOTHERAPY-INDUCED NEUTROPENIA (H): ICD-10-CM

## 2020-10-08 DIAGNOSIS — Z51.12 ENCOUNTER FOR ANTINEOPLASTIC IMMUNOTHERAPY: ICD-10-CM

## 2020-10-08 DIAGNOSIS — C50.912 INVASIVE DUCTAL CARCINOMA OF BREAST, LEFT (H): ICD-10-CM

## 2020-10-08 DIAGNOSIS — T45.1X5A CHEMOTHERAPY-INDUCED NEUTROPENIA (H): ICD-10-CM

## 2020-10-08 LAB
BASOPHILS # BLD AUTO: 0 THOU/UL (ref 0–0.2)
BASOPHILS NFR BLD AUTO: 1 % (ref 0–2)
EOSINOPHIL # BLD AUTO: 0.1 THOU/UL (ref 0–0.4)
EOSINOPHIL NFR BLD AUTO: 1 % (ref 0–6)
ERYTHROCYTE [DISTWIDTH] IN BLOOD BY AUTOMATED COUNT: 14 % (ref 11–14.5)
HCT VFR BLD AUTO: 34.8 % (ref 35–47)
HGB BLD-MCNC: 11.3 G/DL (ref 12–16)
IMM GRANULOCYTES # BLD: 0 THOU/UL
IMM GRANULOCYTES NFR BLD: 1 %
LYMPHOCYTES # BLD AUTO: 0.9 THOU/UL (ref 0.8–4.4)
LYMPHOCYTES NFR BLD AUTO: 23 % (ref 20–40)
MCH RBC QN AUTO: 32.2 PG (ref 27–34)
MCHC RBC AUTO-ENTMCNC: 32.5 G/DL (ref 32–36)
MCV RBC AUTO: 99 FL (ref 80–100)
MONOCYTES # BLD AUTO: 0.3 THOU/UL (ref 0–0.9)
MONOCYTES NFR BLD AUTO: 9 % (ref 2–10)
NEUTROPHILS # BLD AUTO: 2.5 THOU/UL (ref 2–7.7)
NEUTROPHILS NFR BLD AUTO: 65 % (ref 50–70)
PLATELET # BLD AUTO: 195 THOU/UL (ref 140–440)
PMV BLD AUTO: 9.5 FL (ref 8.5–12.5)
RBC # BLD AUTO: 3.51 MILL/UL (ref 3.8–5.4)
WBC: 3.8 THOU/UL (ref 4–11)

## 2020-10-15 ENCOUNTER — INFUSION - HEALTHEAST (OUTPATIENT)
Dept: INFUSION THERAPY | Facility: HOSPITAL | Age: 55
End: 2020-10-15

## 2020-10-15 ENCOUNTER — AMBULATORY - HEALTHEAST (OUTPATIENT)
Dept: INFUSION THERAPY | Facility: HOSPITAL | Age: 55
End: 2020-10-15

## 2020-10-15 ENCOUNTER — OFFICE VISIT - HEALTHEAST (OUTPATIENT)
Dept: ONCOLOGY | Facility: HOSPITAL | Age: 55
End: 2020-10-15

## 2020-10-15 ENCOUNTER — COMMUNICATION - HEALTHEAST (OUTPATIENT)
Dept: SURGERY | Facility: CLINIC | Age: 55
End: 2020-10-15

## 2020-10-15 DIAGNOSIS — Z51.12 ENCOUNTER FOR ANTINEOPLASTIC IMMUNOTHERAPY: ICD-10-CM

## 2020-10-15 DIAGNOSIS — T45.1X5A CHEMOTHERAPY-INDUCED NEUTROPENIA (H): ICD-10-CM

## 2020-10-15 DIAGNOSIS — D70.1 CHEMOTHERAPY-INDUCED NEUTROPENIA (H): ICD-10-CM

## 2020-10-15 DIAGNOSIS — C50.912 INVASIVE DUCTAL CARCINOMA OF BREAST, LEFT (H): ICD-10-CM

## 2020-10-15 LAB
ALBUMIN SERPL-MCNC: 3.8 G/DL (ref 3.5–5)
ALP SERPL-CCNC: 75 U/L (ref 45–120)
ALT SERPL W P-5'-P-CCNC: 34 U/L (ref 0–45)
ANION GAP SERPL CALCULATED.3IONS-SCNC: 8 MMOL/L (ref 5–18)
AST SERPL W P-5'-P-CCNC: 24 U/L (ref 0–40)
BASOPHILS # BLD AUTO: 0 THOU/UL (ref 0–0.2)
BASOPHILS NFR BLD AUTO: 1 % (ref 0–2)
BILIRUB SERPL-MCNC: 0.2 MG/DL (ref 0–1)
BUN SERPL-MCNC: 14 MG/DL (ref 8–22)
CALCIUM SERPL-MCNC: 9 MG/DL (ref 8.5–10.5)
CHLORIDE BLD-SCNC: 107 MMOL/L (ref 98–107)
CO2 SERPL-SCNC: 28 MMOL/L (ref 22–31)
CREAT SERPL-MCNC: 0.68 MG/DL (ref 0.6–1.1)
EOSINOPHIL # BLD AUTO: 0.1 THOU/UL (ref 0–0.4)
EOSINOPHIL NFR BLD AUTO: 1 % (ref 0–6)
ERYTHROCYTE [DISTWIDTH] IN BLOOD BY AUTOMATED COUNT: 14 % (ref 11–14.5)
GFR SERPL CREATININE-BSD FRML MDRD: >60 ML/MIN/1.73M2
GLUCOSE BLD-MCNC: 79 MG/DL (ref 70–125)
HCT VFR BLD AUTO: 35 % (ref 35–47)
HGB BLD-MCNC: 11.4 G/DL (ref 12–16)
IMM GRANULOCYTES # BLD: 0 THOU/UL
IMM GRANULOCYTES NFR BLD: 1 %
LYMPHOCYTES # BLD AUTO: 0.9 THOU/UL (ref 0.8–4.4)
LYMPHOCYTES NFR BLD AUTO: 22 % (ref 20–40)
MCH RBC QN AUTO: 32.6 PG (ref 27–34)
MCHC RBC AUTO-ENTMCNC: 32.6 G/DL (ref 32–36)
MCV RBC AUTO: 100 FL (ref 80–100)
MONOCYTES # BLD AUTO: 0.4 THOU/UL (ref 0–0.9)
MONOCYTES NFR BLD AUTO: 9 % (ref 2–10)
NEUTROPHILS # BLD AUTO: 2.8 THOU/UL (ref 2–7.7)
NEUTROPHILS NFR BLD AUTO: 67 % (ref 50–70)
PLATELET # BLD AUTO: 208 THOU/UL (ref 140–440)
PMV BLD AUTO: 9.4 FL (ref 8.5–12.5)
POTASSIUM BLD-SCNC: 3.9 MMOL/L (ref 3.5–5)
PROT SERPL-MCNC: 6.6 G/DL (ref 6–8)
RBC # BLD AUTO: 3.5 MILL/UL (ref 3.8–5.4)
SODIUM SERPL-SCNC: 143 MMOL/L (ref 136–145)
WBC: 4.2 THOU/UL (ref 4–11)

## 2020-10-15 ASSESSMENT — MIFFLIN-ST. JEOR: SCORE: 1575.41

## 2020-10-22 ENCOUNTER — INFUSION - HEALTHEAST (OUTPATIENT)
Dept: INFUSION THERAPY | Facility: HOSPITAL | Age: 55
End: 2020-10-22

## 2020-10-22 DIAGNOSIS — T45.1X5A CHEMOTHERAPY-INDUCED NEUTROPENIA (H): ICD-10-CM

## 2020-10-22 DIAGNOSIS — D70.1 CHEMOTHERAPY-INDUCED NEUTROPENIA (H): ICD-10-CM

## 2020-10-22 DIAGNOSIS — C50.912 INVASIVE DUCTAL CARCINOMA OF BREAST, LEFT (H): ICD-10-CM

## 2020-10-22 DIAGNOSIS — Z51.12 ENCOUNTER FOR ANTINEOPLASTIC IMMUNOTHERAPY: ICD-10-CM

## 2020-10-22 LAB
BASOPHILS # BLD AUTO: 0 THOU/UL (ref 0–0.2)
BASOPHILS NFR BLD AUTO: 1 % (ref 0–2)
EOSINOPHIL # BLD AUTO: 0 THOU/UL (ref 0–0.4)
EOSINOPHIL NFR BLD AUTO: 1 % (ref 0–6)
ERYTHROCYTE [DISTWIDTH] IN BLOOD BY AUTOMATED COUNT: 13.6 % (ref 11–14.5)
HCT VFR BLD AUTO: 34.1 % (ref 35–47)
HGB BLD-MCNC: 11 G/DL (ref 12–16)
IMM GRANULOCYTES # BLD: 0 THOU/UL
IMM GRANULOCYTES NFR BLD: 1 %
LYMPHOCYTES # BLD AUTO: 1 THOU/UL (ref 0.8–4.4)
LYMPHOCYTES NFR BLD AUTO: 25 % (ref 20–40)
MCH RBC QN AUTO: 32 PG (ref 27–34)
MCHC RBC AUTO-ENTMCNC: 32.3 G/DL (ref 32–36)
MCV RBC AUTO: 99 FL (ref 80–100)
MONOCYTES # BLD AUTO: 0.3 THOU/UL (ref 0–0.9)
MONOCYTES NFR BLD AUTO: 7 % (ref 2–10)
NEUTROPHILS # BLD AUTO: 2.6 THOU/UL (ref 2–7.7)
NEUTROPHILS NFR BLD AUTO: 66 % (ref 50–70)
PLATELET # BLD AUTO: 208 THOU/UL (ref 140–440)
PMV BLD AUTO: 9.8 FL (ref 8.5–12.5)
RBC # BLD AUTO: 3.44 MILL/UL (ref 3.8–5.4)
WBC: 3.9 THOU/UL (ref 4–11)

## 2020-10-29 ENCOUNTER — INFUSION - HEALTHEAST (OUTPATIENT)
Dept: INFUSION THERAPY | Facility: HOSPITAL | Age: 55
End: 2020-10-29

## 2020-10-29 DIAGNOSIS — Z51.12 ENCOUNTER FOR ANTINEOPLASTIC IMMUNOTHERAPY: ICD-10-CM

## 2020-10-29 DIAGNOSIS — C50.912 INVASIVE DUCTAL CARCINOMA OF BREAST, LEFT (H): ICD-10-CM

## 2020-10-29 DIAGNOSIS — T45.1X5A CHEMOTHERAPY-INDUCED NEUTROPENIA (H): ICD-10-CM

## 2020-10-29 DIAGNOSIS — D70.1 CHEMOTHERAPY-INDUCED NEUTROPENIA (H): ICD-10-CM

## 2020-10-29 LAB
BASOPHILS # BLD AUTO: 0.1 THOU/UL (ref 0–0.2)
BASOPHILS NFR BLD AUTO: 1 % (ref 0–2)
EOSINOPHIL # BLD AUTO: 0 THOU/UL (ref 0–0.4)
EOSINOPHIL NFR BLD AUTO: 1 % (ref 0–6)
ERYTHROCYTE [DISTWIDTH] IN BLOOD BY AUTOMATED COUNT: 13.3 % (ref 11–14.5)
HCT VFR BLD AUTO: 35.7 % (ref 35–47)
HGB BLD-MCNC: 11.6 G/DL (ref 12–16)
IMM GRANULOCYTES # BLD: 0 THOU/UL
IMM GRANULOCYTES NFR BLD: 1 %
LYMPHOCYTES # BLD AUTO: 1.1 THOU/UL (ref 0.8–4.4)
LYMPHOCYTES NFR BLD AUTO: 28 % (ref 20–40)
MCH RBC QN AUTO: 32.5 PG (ref 27–34)
MCHC RBC AUTO-ENTMCNC: 32.5 G/DL (ref 32–36)
MCV RBC AUTO: 100 FL (ref 80–100)
MONOCYTES # BLD AUTO: 0.4 THOU/UL (ref 0–0.9)
MONOCYTES NFR BLD AUTO: 9 % (ref 2–10)
NEUTROPHILS # BLD AUTO: 2.5 THOU/UL (ref 2–7.7)
NEUTROPHILS NFR BLD AUTO: 61 % (ref 50–70)
PLATELET # BLD AUTO: 204 THOU/UL (ref 140–440)
PMV BLD AUTO: 9.5 FL (ref 8.5–12.5)
RBC # BLD AUTO: 3.57 MILL/UL (ref 3.8–5.4)
WBC: 4.1 THOU/UL (ref 4–11)

## 2020-11-05 ENCOUNTER — INFUSION - HEALTHEAST (OUTPATIENT)
Dept: INFUSION THERAPY | Facility: HOSPITAL | Age: 55
End: 2020-11-05

## 2020-11-05 ENCOUNTER — COMMUNICATION - HEALTHEAST (OUTPATIENT)
Dept: ONCOLOGY | Facility: HOSPITAL | Age: 55
End: 2020-11-05

## 2020-11-05 ENCOUNTER — AMBULATORY - HEALTHEAST (OUTPATIENT)
Dept: INFUSION THERAPY | Facility: HOSPITAL | Age: 55
End: 2020-11-05

## 2020-11-05 ENCOUNTER — OFFICE VISIT - HEALTHEAST (OUTPATIENT)
Dept: ONCOLOGY | Facility: HOSPITAL | Age: 55
End: 2020-11-05

## 2020-11-05 DIAGNOSIS — D70.1 CHEMOTHERAPY-INDUCED NEUTROPENIA (H): ICD-10-CM

## 2020-11-05 DIAGNOSIS — Z51.12 ENCOUNTER FOR ANTINEOPLASTIC IMMUNOTHERAPY: ICD-10-CM

## 2020-11-05 DIAGNOSIS — C50.912 INVASIVE DUCTAL CARCINOMA OF BREAST, LEFT (H): ICD-10-CM

## 2020-11-05 DIAGNOSIS — T45.1X5A CHEMOTHERAPY-INDUCED NEUTROPENIA (H): ICD-10-CM

## 2020-11-05 LAB
ALBUMIN SERPL-MCNC: 3.7 G/DL (ref 3.5–5)
ALP SERPL-CCNC: 66 U/L (ref 45–120)
ALT SERPL W P-5'-P-CCNC: 24 U/L (ref 0–45)
ANION GAP SERPL CALCULATED.3IONS-SCNC: 6 MMOL/L (ref 5–18)
AST SERPL W P-5'-P-CCNC: 19 U/L (ref 0–40)
BASOPHILS # BLD AUTO: 0 THOU/UL (ref 0–0.2)
BASOPHILS NFR BLD AUTO: 1 % (ref 0–2)
BILIRUB SERPL-MCNC: 0.3 MG/DL (ref 0–1)
BUN SERPL-MCNC: 11 MG/DL (ref 8–22)
CALCIUM SERPL-MCNC: 8.8 MG/DL (ref 8.5–10.5)
CHLORIDE BLD-SCNC: 107 MMOL/L (ref 98–107)
CO2 SERPL-SCNC: 30 MMOL/L (ref 22–31)
CREAT SERPL-MCNC: 0.65 MG/DL (ref 0.6–1.1)
EOSINOPHIL # BLD AUTO: 0 THOU/UL (ref 0–0.4)
EOSINOPHIL NFR BLD AUTO: 1 % (ref 0–6)
ERYTHROCYTE [DISTWIDTH] IN BLOOD BY AUTOMATED COUNT: 13.2 % (ref 11–14.5)
GFR SERPL CREATININE-BSD FRML MDRD: >60 ML/MIN/1.73M2
GLUCOSE BLD-MCNC: 79 MG/DL (ref 70–125)
HCT VFR BLD AUTO: 36.4 % (ref 35–47)
HGB BLD-MCNC: 11.7 G/DL (ref 12–16)
IMM GRANULOCYTES # BLD: 0 THOU/UL
IMM GRANULOCYTES NFR BLD: 1 %
LYMPHOCYTES # BLD AUTO: 1 THOU/UL (ref 0.8–4.4)
LYMPHOCYTES NFR BLD AUTO: 29 % (ref 20–40)
MCH RBC QN AUTO: 32.5 PG (ref 27–34)
MCHC RBC AUTO-ENTMCNC: 32.1 G/DL (ref 32–36)
MCV RBC AUTO: 101 FL (ref 80–100)
MONOCYTES # BLD AUTO: 0.2 THOU/UL (ref 0–0.9)
MONOCYTES NFR BLD AUTO: 7 % (ref 2–10)
NEUTROPHILS # BLD AUTO: 2.2 THOU/UL (ref 2–7.7)
NEUTROPHILS NFR BLD AUTO: 62 % (ref 50–70)
PLATELET # BLD AUTO: 205 THOU/UL (ref 140–440)
PMV BLD AUTO: 10 FL (ref 8.5–12.5)
POTASSIUM BLD-SCNC: 4 MMOL/L (ref 3.5–5)
PROT SERPL-MCNC: 6.3 G/DL (ref 6–8)
RBC # BLD AUTO: 3.6 MILL/UL (ref 3.8–5.4)
SODIUM SERPL-SCNC: 143 MMOL/L (ref 136–145)
WBC: 3.5 THOU/UL (ref 4–11)

## 2020-11-10 ENCOUNTER — COMMUNICATION - HEALTHEAST (OUTPATIENT)
Dept: ONCOLOGY | Facility: HOSPITAL | Age: 55
End: 2020-11-10

## 2020-11-11 ENCOUNTER — HOSPITAL ENCOUNTER (OUTPATIENT)
Dept: SURGERY | Facility: CLINIC | Age: 55
Discharge: HOME | End: 2020-11-11
Attending: SURGERY

## 2020-11-11 ENCOUNTER — OFFICE VISIT - HEALTHEAST (OUTPATIENT)
Dept: RADIATION ONCOLOGY | Facility: HOSPITAL | Age: 55
End: 2020-11-11

## 2020-11-11 ENCOUNTER — COMMUNICATION - HEALTHEAST (OUTPATIENT)
Dept: SURGERY | Facility: CLINIC | Age: 55
End: 2020-11-11

## 2020-11-11 DIAGNOSIS — C50.912 INVASIVE DUCTAL CARCINOMA OF BREAST, LEFT (H): ICD-10-CM

## 2020-11-11 ASSESSMENT — MIFFLIN-ST. JEOR: SCORE: 1586.98

## 2020-11-12 ENCOUNTER — INFUSION - HEALTHEAST (OUTPATIENT)
Dept: INFUSION THERAPY | Facility: HOSPITAL | Age: 55
End: 2020-11-12

## 2020-11-12 ENCOUNTER — AMBULATORY - HEALTHEAST (OUTPATIENT)
Dept: SURGERY | Facility: AMBULATORY SURGERY CENTER | Age: 55
End: 2020-11-12

## 2020-11-12 ENCOUNTER — SURGERY - HEALTHEAST (OUTPATIENT)
Dept: SURGERY | Facility: CLINIC | Age: 55
End: 2020-11-12

## 2020-11-12 DIAGNOSIS — Z51.12 ENCOUNTER FOR ANTINEOPLASTIC IMMUNOTHERAPY: ICD-10-CM

## 2020-11-12 DIAGNOSIS — D70.1 CHEMOTHERAPY-INDUCED NEUTROPENIA (H): ICD-10-CM

## 2020-11-12 DIAGNOSIS — C50.912 INVASIVE DUCTAL CARCINOMA OF BREAST, LEFT (H): ICD-10-CM

## 2020-11-12 DIAGNOSIS — Z11.59 ENCOUNTER FOR SCREENING FOR OTHER VIRAL DISEASES: ICD-10-CM

## 2020-11-12 DIAGNOSIS — T45.1X5A CHEMOTHERAPY-INDUCED NEUTROPENIA (H): ICD-10-CM

## 2020-11-12 LAB
BASOPHILS # BLD AUTO: 0.1 THOU/UL (ref 0–0.2)
BASOPHILS NFR BLD AUTO: 1 % (ref 0–2)
EOSINOPHIL # BLD AUTO: 0.1 THOU/UL (ref 0–0.4)
EOSINOPHIL NFR BLD AUTO: 1 % (ref 0–6)
ERYTHROCYTE [DISTWIDTH] IN BLOOD BY AUTOMATED COUNT: 12.7 % (ref 11–14.5)
HCT VFR BLD AUTO: 36.5 % (ref 35–47)
HGB BLD-MCNC: 11.8 G/DL (ref 12–16)
IMM GRANULOCYTES # BLD: 0 THOU/UL
IMM GRANULOCYTES NFR BLD: 1 %
LYMPHOCYTES # BLD AUTO: 1.3 THOU/UL (ref 0.8–4.4)
LYMPHOCYTES NFR BLD AUTO: 29 % (ref 20–40)
MCH RBC QN AUTO: 33.1 PG (ref 27–34)
MCHC RBC AUTO-ENTMCNC: 32.3 G/DL (ref 32–36)
MCV RBC AUTO: 102 FL (ref 80–100)
MONOCYTES # BLD AUTO: 0.3 THOU/UL (ref 0–0.9)
MONOCYTES NFR BLD AUTO: 7 % (ref 2–10)
NEUTROPHILS # BLD AUTO: 2.7 THOU/UL (ref 2–7.7)
NEUTROPHILS NFR BLD AUTO: 61 % (ref 50–70)
PLATELET # BLD AUTO: 209 THOU/UL (ref 140–440)
PMV BLD AUTO: 9.8 FL (ref 8.5–12.5)
RBC # BLD AUTO: 3.57 MILL/UL (ref 3.8–5.4)
WBC: 4.4 THOU/UL (ref 4–11)

## 2020-11-18 ENCOUNTER — INFUSION - HEALTHEAST (OUTPATIENT)
Dept: INFUSION THERAPY | Facility: HOSPITAL | Age: 55
End: 2020-11-18

## 2020-11-18 DIAGNOSIS — Z51.12 ENCOUNTER FOR ANTINEOPLASTIC IMMUNOTHERAPY: ICD-10-CM

## 2020-11-18 DIAGNOSIS — D70.1 CHEMOTHERAPY-INDUCED NEUTROPENIA (H): ICD-10-CM

## 2020-11-18 DIAGNOSIS — T45.1X5A CHEMOTHERAPY-INDUCED NEUTROPENIA (H): ICD-10-CM

## 2020-11-18 DIAGNOSIS — C50.912 INVASIVE DUCTAL CARCINOMA OF BREAST, LEFT (H): ICD-10-CM

## 2020-11-18 LAB
BASOPHILS # BLD AUTO: 0.1 THOU/UL (ref 0–0.2)
BASOPHILS NFR BLD AUTO: 1 % (ref 0–2)
EOSINOPHIL # BLD AUTO: 0 THOU/UL (ref 0–0.4)
EOSINOPHIL NFR BLD AUTO: 0 % (ref 0–6)
ERYTHROCYTE [DISTWIDTH] IN BLOOD BY AUTOMATED COUNT: 12.7 % (ref 11–14.5)
HCT VFR BLD AUTO: 36.2 % (ref 35–47)
HGB BLD-MCNC: 11.8 G/DL (ref 12–16)
IMM GRANULOCYTES # BLD: 0 THOU/UL
IMM GRANULOCYTES NFR BLD: 0 %
LYMPHOCYTES # BLD AUTO: 1.2 THOU/UL (ref 0.8–4.4)
LYMPHOCYTES NFR BLD AUTO: 24 % (ref 20–40)
MCH RBC QN AUTO: 33.2 PG (ref 27–34)
MCHC RBC AUTO-ENTMCNC: 32.6 G/DL (ref 32–36)
MCV RBC AUTO: 102 FL (ref 80–100)
MONOCYTES # BLD AUTO: 0.3 THOU/UL (ref 0–0.9)
MONOCYTES NFR BLD AUTO: 6 % (ref 2–10)
NEUTROPHILS # BLD AUTO: 3.4 THOU/UL (ref 2–7.7)
NEUTROPHILS NFR BLD AUTO: 68 % (ref 50–70)
PLATELET # BLD AUTO: 196 THOU/UL (ref 140–440)
PMV BLD AUTO: 9.7 FL (ref 8.5–12.5)
RBC # BLD AUTO: 3.55 MILL/UL (ref 3.8–5.4)
WBC: 5 THOU/UL (ref 4–11)

## 2020-11-19 ENCOUNTER — COMMUNICATION - HEALTHEAST (OUTPATIENT)
Dept: ONCOLOGY | Facility: HOSPITAL | Age: 55
End: 2020-11-19

## 2020-11-20 ENCOUNTER — COMMUNICATION - HEALTHEAST (OUTPATIENT)
Dept: ADMINISTRATIVE | Facility: CLINIC | Age: 55
End: 2020-11-20

## 2020-11-20 DIAGNOSIS — Z20.822 EXPOSURE TO COVID-19 VIRUS: ICD-10-CM

## 2020-11-24 ENCOUNTER — AMBULATORY - HEALTHEAST (OUTPATIENT)
Dept: FAMILY MEDICINE | Facility: CLINIC | Age: 55
End: 2020-11-24

## 2020-11-24 DIAGNOSIS — Z20.822 EXPOSURE TO COVID-19 VIRUS: ICD-10-CM

## 2020-11-25 ENCOUNTER — COMMUNICATION - HEALTHEAST (OUTPATIENT)
Dept: SCHEDULING | Facility: CLINIC | Age: 55
End: 2020-11-25

## 2020-12-07 ENCOUNTER — OFFICE VISIT - HEALTHEAST (OUTPATIENT)
Dept: INTERNAL MEDICINE | Facility: CLINIC | Age: 55
End: 2020-12-07

## 2020-12-07 ENCOUNTER — AMBULATORY - HEALTHEAST (OUTPATIENT)
Dept: LAB | Facility: CLINIC | Age: 55
End: 2020-12-07

## 2020-12-07 DIAGNOSIS — G47.33 OSA (OBSTRUCTIVE SLEEP APNEA): ICD-10-CM

## 2020-12-07 DIAGNOSIS — R73.03 PREDIABETES: ICD-10-CM

## 2020-12-07 DIAGNOSIS — Z98.84 BARIATRIC SURGERY STATUS: ICD-10-CM

## 2020-12-07 DIAGNOSIS — C50.912 INVASIVE DUCTAL CARCINOMA OF BREAST, LEFT (H): ICD-10-CM

## 2020-12-07 DIAGNOSIS — F33.2 SEVERE RECURRENT MAJOR DEPRESSION WITHOUT PSYCHOTIC FEATURES (H): ICD-10-CM

## 2020-12-07 DIAGNOSIS — Z11.59 ENCOUNTER FOR SCREENING FOR OTHER VIRAL DISEASES: ICD-10-CM

## 2020-12-07 DIAGNOSIS — E66.9 OBESITY (BMI 30-39.9): ICD-10-CM

## 2020-12-07 DIAGNOSIS — Z01.818 PRE-OPERATIVE GENERAL PHYSICAL EXAMINATION: ICD-10-CM

## 2020-12-07 LAB
ANION GAP SERPL CALCULATED.3IONS-SCNC: 11 MMOL/L (ref 5–18)
ATRIAL RATE - MUSE: 74 BPM
BUN SERPL-MCNC: 15 MG/DL (ref 8–22)
CALCIUM SERPL-MCNC: 9.1 MG/DL (ref 8.5–10.5)
CHLORIDE BLD-SCNC: 105 MMOL/L (ref 98–107)
CHOLEST SERPL-MCNC: 121 MG/DL
CO2 SERPL-SCNC: 26 MMOL/L (ref 22–31)
CREAT SERPL-MCNC: 0.67 MG/DL (ref 0.6–1.1)
DIASTOLIC BLOOD PRESSURE - MUSE: NORMAL
ERYTHROCYTE [DISTWIDTH] IN BLOOD BY AUTOMATED COUNT: 11.2 % (ref 11–14.5)
FASTING STATUS PATIENT QL REPORTED: ABNORMAL
GFR SERPL CREATININE-BSD FRML MDRD: >60 ML/MIN/1.73M2
GLUCOSE BLD-MCNC: 90 MG/DL (ref 70–125)
HCT VFR BLD AUTO: 39.5 % (ref 35–47)
HDLC SERPL-MCNC: 47 MG/DL
HGB BLD-MCNC: 13.2 G/DL (ref 12–16)
INTERPRETATION ECG - MUSE: NORMAL
LDLC SERPL CALC-MCNC: 55 MG/DL
MCH RBC QN AUTO: 32.9 PG (ref 27–34)
MCHC RBC AUTO-ENTMCNC: 33.6 G/DL (ref 32–36)
MCV RBC AUTO: 98 FL (ref 80–100)
P AXIS - MUSE: 61 DEGREES
PLATELET # BLD AUTO: 243 THOU/UL (ref 140–440)
PMV BLD AUTO: 8.5 FL (ref 7–10)
POTASSIUM BLD-SCNC: 4.4 MMOL/L (ref 3.5–5)
PR INTERVAL - MUSE: 132 MS
QRS DURATION - MUSE: 86 MS
QT - MUSE: 398 MS
QTC - MUSE: 441 MS
R AXIS - MUSE: 50 DEGREES
RBC # BLD AUTO: 4.03 MILL/UL (ref 3.8–5.4)
SODIUM SERPL-SCNC: 142 MMOL/L (ref 136–145)
SYSTOLIC BLOOD PRESSURE - MUSE: NORMAL
T AXIS - MUSE: 17 DEGREES
TRIGL SERPL-MCNC: 95 MG/DL
VENTRICULAR RATE- MUSE: 74 BPM
WBC: 5.6 THOU/UL (ref 4–11)

## 2020-12-12 ENCOUNTER — AMBULATORY - HEALTHEAST (OUTPATIENT)
Dept: FAMILY MEDICINE | Facility: CLINIC | Age: 55
End: 2020-12-12

## 2020-12-12 DIAGNOSIS — Z11.59 ENCOUNTER FOR SCREENING FOR OTHER VIRAL DISEASES: ICD-10-CM

## 2020-12-13 ENCOUNTER — COMMUNICATION - HEALTHEAST (OUTPATIENT)
Dept: SCHEDULING | Facility: CLINIC | Age: 55
End: 2020-12-13

## 2020-12-14 ENCOUNTER — ANESTHESIA - HEALTHEAST (OUTPATIENT)
Dept: SURGERY | Facility: AMBULATORY SURGERY CENTER | Age: 55
End: 2020-12-14

## 2020-12-14 ASSESSMENT — MIFFLIN-ST. JEOR: SCORE: 1572.24

## 2020-12-15 ENCOUNTER — HOSPITAL ENCOUNTER (OUTPATIENT)
Dept: MAMMOGRAPHY | Facility: CLINIC | Age: 55
Discharge: HOME | End: 2020-12-15
Attending: SURGERY

## 2020-12-15 ENCOUNTER — AMBULATORY - HEALTHEAST (OUTPATIENT)
Dept: SURGERY | Facility: CLINIC | Age: 55
End: 2020-12-15

## 2020-12-15 ENCOUNTER — HOSPITAL ENCOUNTER (OUTPATIENT)
Dept: NUCLEAR MEDICINE | Facility: HOSPITAL | Age: 55
Discharge: HOME | End: 2020-12-15
Attending: SURGERY

## 2020-12-15 ENCOUNTER — SURGERY - HEALTHEAST (OUTPATIENT)
Dept: SURGERY | Facility: AMBULATORY SURGERY CENTER | Age: 55
End: 2020-12-15

## 2020-12-15 DIAGNOSIS — C50.912 INVASIVE DUCTAL CARCINOMA OF BREAST, LEFT (H): ICD-10-CM

## 2020-12-16 ENCOUNTER — AMBULATORY - HEALTHEAST (OUTPATIENT)
Dept: OTHER | Facility: CLINIC | Age: 55
End: 2020-12-16

## 2020-12-17 ENCOUNTER — COMMUNICATION - HEALTHEAST (OUTPATIENT)
Dept: SURGERY | Facility: CLINIC | Age: 55
End: 2020-12-17

## 2020-12-18 ENCOUNTER — HOSPITAL ENCOUNTER (OUTPATIENT)
Dept: SURGERY | Facility: CLINIC | Age: 55
Discharge: HOME | End: 2020-12-18

## 2020-12-18 DIAGNOSIS — C50.912 INVASIVE DUCTAL CARCINOMA OF BREAST, LEFT (H): ICD-10-CM

## 2020-12-21 ENCOUNTER — HOSPITAL ENCOUNTER (OUTPATIENT)
Dept: SURGERY | Facility: CLINIC | Age: 55
Discharge: HOME | End: 2020-12-21
Attending: SURGERY

## 2020-12-21 DIAGNOSIS — Z48.89 POSTOPERATIVE VISIT: ICD-10-CM

## 2020-12-23 ENCOUNTER — COMMUNICATION - HEALTHEAST (OUTPATIENT)
Dept: SURGERY | Facility: CLINIC | Age: 55
End: 2020-12-23

## 2020-12-23 ENCOUNTER — COMMUNICATION - HEALTHEAST (OUTPATIENT)
Dept: ONCOLOGY | Facility: HOSPITAL | Age: 55
End: 2020-12-23

## 2020-12-28 ENCOUNTER — HOSPITAL ENCOUNTER (OUTPATIENT)
Dept: SURGERY | Facility: CLINIC | Age: 55
Discharge: HOME | End: 2020-12-28

## 2020-12-28 DIAGNOSIS — C50.912 INVASIVE DUCTAL CARCINOMA OF BREAST, LEFT (H): ICD-10-CM

## 2021-01-04 ENCOUNTER — OFFICE VISIT - HEALTHEAST (OUTPATIENT)
Dept: ONCOLOGY | Facility: HOSPITAL | Age: 56
End: 2021-01-04

## 2021-01-04 DIAGNOSIS — C50.912 INVASIVE DUCTAL CARCINOMA OF BREAST, LEFT (H): ICD-10-CM

## 2021-01-12 ENCOUNTER — AMBULATORY - HEALTHEAST (OUTPATIENT)
Dept: OTHER | Facility: CLINIC | Age: 56
End: 2021-01-12

## 2021-01-15 ENCOUNTER — RECORDS - HEALTHEAST (OUTPATIENT)
Dept: ADMINISTRATIVE | Facility: OTHER | Age: 56
End: 2021-01-15

## 2021-02-10 ENCOUNTER — OFFICE VISIT - HEALTHEAST (OUTPATIENT)
Dept: INTERNAL MEDICINE | Facility: CLINIC | Age: 56
End: 2021-02-10

## 2021-02-10 DIAGNOSIS — M20.41 HAMMER TOE OF RIGHT FOOT: ICD-10-CM

## 2021-02-10 DIAGNOSIS — R73.03 PREDIABETES: ICD-10-CM

## 2021-02-10 DIAGNOSIS — Z12.11 ENCOUNTER FOR SCREENING COLONOSCOPY: ICD-10-CM

## 2021-02-10 DIAGNOSIS — E66.2 CLASS 1 OBESITY WITH ALVEOLAR HYPOVENTILATION, SERIOUS COMORBIDITY, AND BODY MASS INDEX (BMI) OF 33.0 TO 33.9 IN ADULT (H): ICD-10-CM

## 2021-02-10 DIAGNOSIS — E66.811 CLASS 1 OBESITY WITH ALVEOLAR HYPOVENTILATION, SERIOUS COMORBIDITY, AND BODY MASS INDEX (BMI) OF 33.0 TO 33.9 IN ADULT (H): ICD-10-CM

## 2021-02-10 DIAGNOSIS — F33.2 SEVERE RECURRENT MAJOR DEPRESSION WITHOUT PSYCHOTIC FEATURES (H): ICD-10-CM

## 2021-02-10 DIAGNOSIS — G47.33 OSA (OBSTRUCTIVE SLEEP APNEA): ICD-10-CM

## 2021-03-01 ENCOUNTER — AMBULATORY - HEALTHEAST (OUTPATIENT)
Dept: PODIATRY | Facility: CLINIC | Age: 56
End: 2021-03-01

## 2021-03-01 ENCOUNTER — OFFICE VISIT - HEALTHEAST (OUTPATIENT)
Dept: PODIATRY | Facility: CLINIC | Age: 56
End: 2021-03-01

## 2021-03-01 ENCOUNTER — SURGERY - HEALTHEAST (OUTPATIENT)
Dept: PODIATRY | Facility: CLINIC | Age: 56
End: 2021-03-01

## 2021-03-01 DIAGNOSIS — M20.42 HAMMER TOES OF BOTH FEET: ICD-10-CM

## 2021-03-01 DIAGNOSIS — M20.11 VALGUS DEFORMITY OF BOTH GREAT TOES: ICD-10-CM

## 2021-03-01 DIAGNOSIS — M20.41 HAMMERTOE OF RIGHT FOOT: ICD-10-CM

## 2021-03-01 DIAGNOSIS — M20.41 HAMMER TOES OF BOTH FEET: ICD-10-CM

## 2021-03-01 DIAGNOSIS — M20.12 VALGUS DEFORMITY OF BOTH GREAT TOES: ICD-10-CM

## 2021-03-01 DIAGNOSIS — M20.11 HALLUX ABDUCTO VALGUS, RIGHT: ICD-10-CM

## 2021-03-01 ASSESSMENT — MIFFLIN-ST. JEOR: SCORE: 1605.13

## 2021-03-03 ENCOUNTER — COMMUNICATION - HEALTHEAST (OUTPATIENT)
Dept: PODIATRY | Facility: CLINIC | Age: 56
End: 2021-03-03

## 2021-03-04 ENCOUNTER — AMBULATORY - HEALTHEAST (OUTPATIENT)
Dept: VASCULAR SURGERY | Facility: CLINIC | Age: 56
End: 2021-03-04

## 2021-03-04 DIAGNOSIS — Z20.822 COVID-19 RULED OUT: ICD-10-CM

## 2021-03-09 ENCOUNTER — COMMUNICATION - HEALTHEAST (OUTPATIENT)
Dept: INTERNAL MEDICINE | Facility: CLINIC | Age: 56
End: 2021-03-09

## 2021-03-10 ENCOUNTER — AMBULATORY - HEALTHEAST (OUTPATIENT)
Dept: NURSING | Facility: CLINIC | Age: 56
End: 2021-03-10

## 2021-03-10 ENCOUNTER — OFFICE VISIT - HEALTHEAST (OUTPATIENT)
Dept: INTERNAL MEDICINE | Facility: CLINIC | Age: 56
End: 2021-03-10

## 2021-03-10 DIAGNOSIS — M20.11 HALLUX ABDUCTO VALGUS, RIGHT: ICD-10-CM

## 2021-03-10 DIAGNOSIS — M20.41 HAMMERTOE OF RIGHT FOOT: ICD-10-CM

## 2021-03-10 DIAGNOSIS — Z01.818 PREOPERATIVE EXAMINATION: ICD-10-CM

## 2021-03-10 DIAGNOSIS — G47.33 OSA (OBSTRUCTIVE SLEEP APNEA): ICD-10-CM

## 2021-03-10 DIAGNOSIS — F33.2 SEVERE RECURRENT MAJOR DEPRESSION WITHOUT PSYCHOTIC FEATURES (H): ICD-10-CM

## 2021-03-10 LAB
ANION GAP SERPL CALCULATED.3IONS-SCNC: 8 MMOL/L (ref 5–18)
BUN SERPL-MCNC: 17 MG/DL (ref 8–22)
CALCIUM SERPL-MCNC: 9.2 MG/DL (ref 8.5–10.5)
CHLORIDE BLD-SCNC: 105 MMOL/L (ref 98–107)
CO2 SERPL-SCNC: 30 MMOL/L (ref 22–31)
CREAT SERPL-MCNC: 0.72 MG/DL (ref 0.6–1.1)
ERYTHROCYTE [DISTWIDTH] IN BLOOD BY AUTOMATED COUNT: 12.6 % (ref 11–14.5)
GFR SERPL CREATININE-BSD FRML MDRD: >60 ML/MIN/1.73M2
GLUCOSE BLD-MCNC: 85 MG/DL (ref 70–125)
HCT VFR BLD AUTO: 42.2 % (ref 35–47)
HGB BLD-MCNC: 13.7 G/DL (ref 12–16)
MCH RBC QN AUTO: 30.1 PG (ref 27–34)
MCHC RBC AUTO-ENTMCNC: 32.5 G/DL (ref 32–36)
MCV RBC AUTO: 93 FL (ref 80–100)
PLATELET # BLD AUTO: 190 THOU/UL (ref 140–440)
PMV BLD AUTO: 10 FL (ref 7–10)
POTASSIUM BLD-SCNC: 4.2 MMOL/L (ref 3.5–5)
RBC # BLD AUTO: 4.55 MILL/UL (ref 3.8–5.4)
SODIUM SERPL-SCNC: 143 MMOL/L (ref 136–145)
WBC: 6.5 THOU/UL (ref 4–11)

## 2021-03-10 ASSESSMENT — MIFFLIN-ST. JEOR: SCORE: 1600.59

## 2021-03-17 ASSESSMENT — MIFFLIN-ST. JEOR: SCORE: 1586.98

## 2021-03-18 ENCOUNTER — AMBULATORY - HEALTHEAST (OUTPATIENT)
Dept: VASCULAR SURGERY | Facility: CLINIC | Age: 56
End: 2021-03-18

## 2021-03-18 ENCOUNTER — COMMUNICATION - HEALTHEAST (OUTPATIENT)
Dept: PODIATRY | Facility: CLINIC | Age: 56
End: 2021-03-18

## 2021-03-18 DIAGNOSIS — Z98.890 STATUS POST BUNIONECTOMY: ICD-10-CM

## 2021-03-19 ENCOUNTER — ANESTHESIA - HEALTHEAST (OUTPATIENT)
Dept: SURGERY | Facility: AMBULATORY SURGERY CENTER | Age: 56
End: 2021-03-19

## 2021-03-19 ENCOUNTER — AMBULATORY - HEALTHEAST (OUTPATIENT)
Dept: LAB | Facility: CLINIC | Age: 56
End: 2021-03-19

## 2021-03-19 DIAGNOSIS — Z20.822 COVID-19 RULED OUT: ICD-10-CM

## 2021-03-20 LAB
SARS-COV-2 PCR COMMENT: NORMAL
SARS-COV-2 RNA SPEC QL NAA+PROBE: NEGATIVE
SARS-COV-2 VIRUS SPECIMEN SOURCE: NORMAL

## 2021-03-21 ENCOUNTER — COMMUNICATION - HEALTHEAST (OUTPATIENT)
Dept: SCHEDULING | Facility: CLINIC | Age: 56
End: 2021-03-21

## 2021-03-22 ENCOUNTER — SURGERY - HEALTHEAST (OUTPATIENT)
Dept: SURGERY | Facility: AMBULATORY SURGERY CENTER | Age: 56
End: 2021-03-22

## 2021-03-22 ASSESSMENT — MIFFLIN-ST. JEOR: SCORE: 1586.98

## 2021-03-23 ENCOUNTER — COMMUNICATION - HEALTHEAST (OUTPATIENT)
Dept: VASCULAR SURGERY | Facility: CLINIC | Age: 56
End: 2021-03-23

## 2021-03-23 DIAGNOSIS — Z98.890 STATUS POST BUNIONECTOMY: ICD-10-CM

## 2021-03-24 VITALS — HEIGHT: 66 IN | BODY MASS INDEX: 34.07 KG/M2 | WEIGHT: 212 LBS

## 2021-03-24 PROBLEM — D70.1 CHEMOTHERAPY-INDUCED NEUTROPENIA (H): Status: ACTIVE | Noted: 2020-07-01

## 2021-03-24 PROBLEM — L30.9 ECZEMA: Status: ACTIVE | Noted: 2021-03-01

## 2021-03-24 PROBLEM — D12.6 ADENOMATOUS POLYP OF COLON: Status: ACTIVE | Noted: 2018-08-17

## 2021-03-24 PROBLEM — H40.033 NARROW ANGLE GLAUCOMA SUSPECT OF BOTH EYES: Status: ACTIVE | Noted: 2020-02-25

## 2021-03-24 PROBLEM — C50.912 INVASIVE DUCTAL CARCINOMA OF BREAST, LEFT (H): Status: ACTIVE | Noted: 2020-06-25

## 2021-03-24 PROBLEM — T45.1X5A CHEMOTHERAPY-INDUCED NEUTROPENIA (H): Status: ACTIVE | Noted: 2020-07-01

## 2021-03-24 PROBLEM — E66.9 OBESITY: Status: ACTIVE | Noted: 2021-03-24

## 2021-03-24 RX ORDER — DESVENLAFAXINE 50 MG/1
100 TABLET, FILM COATED, EXTENDED RELEASE ORAL
COMMUNITY
Start: 2020-07-17 | End: 2024-07-24

## 2021-03-24 RX ORDER — ARIPIPRAZOLE 5 MG/1
5 TABLET ORAL DAILY
COMMUNITY
Start: 2020-01-03

## 2021-03-24 RX ORDER — TRAZODONE HYDROCHLORIDE 100 MG/1
100 TABLET ORAL
COMMUNITY
Start: 2019-06-17 | End: 2022-03-15 | Stop reason: DRUGHIGH

## 2021-03-24 RX ORDER — CALCIUM CITRATE 1040MG
1 TABLET ORAL 3 TIMES DAILY
COMMUNITY

## 2021-03-24 ASSESSMENT — MIFFLIN-ST. JEOR: SCORE: 1573.38

## 2021-03-24 NOTE — PROGRESS NOTES
Monika is a 55 year old who is being evaluated via a billable video visit.      How would you like to obtain your AVS? MyChart  If the video visit is dropped, the invitation should be resent by: Text to cell phone: [phone]   Will anyone else be joining your video visit? No      Jolly Oconnor, PARMINDER on 3/24/2021 at 3:41 PM    Video Start Time: 11:02AM     [facility]   Outpatient Sleep Medicine Consultation  March 25, 2021      Name: Monika Franz MRN# [id number]   Age: 55 year old YOB: 1965     Date of Consultation: March 25, 2021  Consultation is requested by: No referring provider defined for this encounter. No ref. provider found  Primary care provider: Tanya Morley         Assessment and Plan:   1. REBECCA (obstructive sleep apnea)  2. Bariatric surgery status  3. Obesity (BMI 30.0-34.9)  4. Severe recurrent major depression without psychotic features (H)    Patient presents to clinic today to establish care of her severe obstructive sleep apnea treated with CPAP.  Sleep study completed in 2012 showing AHI of 36 events per hour.  Since her last study, patient has lost ~60 pounds (status post bariatric surgery that was completed in June 2019).  She is curious if she requires continued treatment with CPAP following this weight loss.  Symptoms have improved with weight loss but have not completely resolved.  Continues to snore, have gasp arousals, and daytime tiredness without use of CPAP.  After discussion of options we have agreed that given the significant weight loss we will pursue updated sleep study to determine current disease severity to help us determine if continuation of CPAP is required.  Patient would prefer not to be on CPAP if she does not need it, though if sleep apnea is still seen on repeat sleep study which is somewhat suspected given symptoms, she is happy to continue CPAP therapy.  Orders were placed today for home sleep apnea testing.  Patient knows  "to stop use of her CPAP for 2 or 3 days prior to home test.   - HST-Home Sleep Apnea Test; Future    Follow up 1-2 weeks following her study for review of results and to expedite care. Educational materials provided in instructions.       Chief Complaint / Reason for Sleep Consult:     Chief Complaint   Patient presents with     Consult     wants to know if she can discontinue CPAP due to weight loss          History of Present Illness:     Monika Franz is a 55 year old female who presents to the clinic to establish care of her obstructive sleep apnea. Other past medical history significant for breast cancer, depression, and obesity s/p bariatric surgery.     Patient was previously followed through [facility] system.  Reviewed records from [facility] including split-night sleep study completed 2/9/2012 (269lbs) revealing severe obstructive sleep apnea with an AHI of 36.6, RDI 78.8, lowest oxygen saturation 84%.  Presenting symptoms included snoring, nonrestorative sleep, excessive daytime sleepiness (baseline [place] Sleepiness Scale was 16/24).  She was started on CPAP following this study and has been using ever since.    Primary reason for today's visit is to establish care through [facility] following insurance change and to see if she needs to continue CPAP therapy.  Patient has lost approximately 80 pounds since bariatric surgery 6/24/2019 and questions if she needs to continue on CPAP. Following this significant weight loss she stopped using her CPAP because she felt it was not needed. She was off of CPAP for ~8 months before she re-started regular use last summer due to return of snoring, gasp arousals, and tiredness. Unsure at that time if the tiredness she was experiencing was due to chemotherapy from breast cancer or from return of sleep apnea. Has no problem using the CPAP at night but \"I don't want it if I don't need it\".     Patient uses a full face mask. Mask is comfortable. Pressures are " comfortable. Reviewed download as follows:   Respironics Auto-PAP 13-20 cmH2O download (2/23/2021-3/24/2021):  26 total days of use. 4 nonuse days.  Average use 6 hours 21 minutes per day.  70% days with >4 hours use.  Large leak 0 sec per day average. CPAP 90% pressure 15.9 cm. AHI 5.7    Current sleep schedule includes bedtime around 11:00PM and wake time 6:30AM. On weekends she does delay her sleep schedule with bedtime typically 1:00AM and wake time between 8-8:30AM. Falls asleep in 10 minutes or so. Has sleep number bed that rates her sleep and tells her she tosses and turns a lot but wakes only once that she recalls around 4:00AM most mornings, wakes for unknown reason. Takes trazodone 100mg at bedtime 4-5 days per week, typically on work days, does feel this is helpful.     Naps on weekends for ~1 hour, +/- if feels refreshed on waking. Denies signficant daytime sleepiness. She had a total [place] Sleepiness Scale of 1 (03/24/21 1500), with scores of 10 or higher indicating abnormal levels of sleepiness. Denies any history of falling asleep or dozing while driving. No accidents or near accidents.     Does wear guard at night because pushes her tongue against her teeth at night, no bruxism. Otherwise no other sleep concerns. Denies punching/kicking/dream enactment. No restless legs syndrome symptoms.  No somniloquy.  No somnambulism.  No sleep related eating.    SCALES       INSOMNIA:  Insomnia severity score: 5/28   absence of insomnia (0-7); sub-threshold insomnia (8-14); moderate insomnia (15-21); and severe insomnia (22-28)       SLEEPINESS: [place] sleepiness scale: 1/24 [normal < 11]          Medications:     Current Outpatient Medications   Medication Sig     ARIPiprazole (ABILIFY) 5 MG tablet      Calcium Citrate 1040 MG TABS 1200mg-1500mg daily (separate doses 2-3 times daily)     cholecalciferol 25 MCG (1000 UT) TABS Take 5,000 Units by mouth     desvenlafaxine (PRISTIQ) 50 MG 24 hr tablet Take 100  "mg by mouth     Multiple Vitamins-Minerals (MULTIVITAL PO) Take  by mouth daily.     Pediatric Multiple Vitamins (MULTIVITAMIN CHILDRENS PO) Take 1 tablet by mouth     traZODone (DESYREL) 100 MG tablet Take 100 mg by mouth     venlafaxine (EFFEXOR-ER) 75 MG TB24 Take 75 mg by mouth daily (with breakfast).     vitamin B-12 (CYANOCOBALAMIN) 1000 MCG tablet Take 1,000 mcg by mouth     No current facility-administered medications for this visit.              Allergies:     No Known Allergies         Past Medical History:     Patient Active Problem List   Diagnosis     REBECCA (obstructive sleep apnea)     Bariatric surgery status     Eczema     Drug overdose, intentional (H)     Invasive ductal carcinoma of breast, left (H)     Narrow angle glaucoma suspect of both eyes     Severe recurrent major depression without psychotic features (H)     Adenomatous polyp of colon     Chemotherapy-induced neutropenia (H)     Obesity          Past Surgical History:    Previous upper airway surgery: denies  Past Surgical History:   Procedure Laterality Date     BARIATRIC SURGERY  2019      SECTION      x 3     LAPAROSCOPIC ABLATION ENDOMETRIOSIS       LAPAROSCOPIC HYSTERECTOMY TOTAL            Social History:     Social History     Tobacco Use     Smoking status: Never Smoker     Smokeless tobacco: Never Used   Substance Use Topics     Alcohol use: Yes     Chemical History:  Alcohol use: Denies current use  Tobacco use: Denies  Illicit substances: Denies   Caffeine intake: Drinks 24oz diet Mountain Dew per day. Last caffeine intake is usually before 2:00PM.          Family History:     Family History   Problem Relation Age of Onset     Sleep Apnea Sister      Sleep Family Hx: Sister with REBECCA treated with CPAP. Patient denies any known family history of restless legs syndrome, narcolepsy or other sleep disorders.          Physical Examination:   Ht 1.676 m (5' 6\")   Wt 96.2 kg (212 lb)   BMI 34.22 kg/m    General " appearance: Awake, alert, cooperative. Well groomed. Sitting comfortably in chair. In no apparent distress.  HEENT: Head: Normocephalic, atraumatic. Eyes:Conjunctiva clear. Sclera normal. Nose: External appearance without deformity.   Pulmonary:  Able to speak easily in full sentences. No cough or wheeze.   Skin:  No rashes or significant lesions on visible skin.   Neurologic: Alert, oriented x3.   Psychiatric: Mood euthymic. Affect congruent with full range and intensity.         Data: All pertinent previous laboratory data reviewed     No results found for: PH, PHARTERIAL, PO2, [id number], SAT, PCO2, HCO3, BASEEXCESS, IDA, BEB  No results found for: TSH  No results found for: GLC  No results found for: HGB  No results found for: BUN, CR  No results found for: AST, ALT, GGT, ALKPHOS, BILITOTAL, BILICONJ, BILIDIRECT, MILENA  No results found for: UAMP, UBARB, BENZODIAZEUR, UCANN, UCOC, OPIT, UPCP      Copy to: Tanya Morley PA-C  Mar 25, 2021     [facility]  [address] [place] [address]     [facility]  [address] [address]    Chart documentation was completed, in part, with [employer] voice-recognition software. Even though reviewed, some grammatical, spelling, and word errors may remain.    Video-Visit Details    Type of service:  Video Visit    Video End Time:11:40PM    Originating Location (pt. Location): Home    Distant Location (provider location):  [facility]     Platform used for Video Visit: ServiceMax  56 minutes spent on day of encounter doing chart review, history and exam, documentation, and further activities as noted above

## 2021-03-24 NOTE — PATIENT INSTRUCTIONS
"MY TREATMENT INFORMATION FOR SLEEP APNEA-  Monika Franz    DOCTOR : Ivet Zeng PA-C      Am I having a home sleep study?  --->Watch the video for the device you are using:/drop off device-   https://www.Splice Machine.com/watch?v=yGGFBdELGhk      Frequently asked questions:  1. What is Obstructive Sleep Apnea (REBECCA)? REBECCA is the most common type of sleep apnea. Apnea means, \"without breath.\"  Apnea is most often caused by narrowing or collapse of the upper airway as muscles relax during sleep.   Almost everyone has occasional apneas. Most people with sleep apnea have had brief interruptions at night frequently for many years.  The severity of sleep apnea is related to how frequent and severe the events are.   2. What are the consequences of REBECCA? Symptoms include: feeling sleepy during the day, snoring loudly, gasping or stopping of breathing, trouble sleeping, and occasionally morning headaches or heartburn at night.  Sleepiness can be serious and even increase the risk of falling asleep while driving. Other health consequences may include development of high blood pressure and other cardiovascular disease in persons who are susceptible. Untreated REBECCA  can contribute to heart disease, stroke and diabetes.   3. What are the treatment options? In most situations, sleep apnea is a lifelong disease that must be managed with daily therapy. Medications are not effective for sleep apnea and surgery is generally not considered until other therapies have been tried. Your treatment is your choice . Continuous Positive Airway (CPAP) works right away and is the therapy that is effective in nearly everyone. An oral device to hold your jaw forward is usually the next most reliable option. Other options include postioning devices (to keep you off your back), weight loss, and surgery including a tongue pacing device. There is more detail about some of these options below.  4. Are my sleep studies covered by insurance? " Although we will request verification of coverage, we advise you also check in advance of the study to ensure there is coverage.    Important tips for those choosing CPAP and similar devices   Know your equipment:  CPAP is continuous positive airway pressure that prevents obstructive sleep apnea by keeping the throat from collapsing while you are sleeping. In most cases, the device is  smart  and can slowly self-adjusts if your throat collapses and keeps a record every day of how well you are treated-this information is available to you and your care team.  BPAP is bilevel positive airway pressure that keeps your throat open and also assists each breath with a pressure boost to maintain adequate breathing.  Special kinds of BPAP are used in patients who have inadequate breathing from lung or heart disease. In most cases, the device is  smart  and can slowly self-adjusts to assist breathing. Like CPAP, the device keeps a record of how well you are treated.  Your mask is your connection to the device. You get to choose what feels most comfortable and the staff will help to make sure if fits. Here: are some examples of the different masks that are available:       Key points to remember on your journey with sleep apnea:  1. Sleep study.  PAP devices often need to be adjusted during a sleep study to show that they are effective and adjusted right.  2. Good tips to remember: Try wearing just the mask during a quiet time during the day so your body adapts to wearing it. A humidifier is recommended for comfort in most cases to prevent drying of your nose and throat. Allergy medication from your provider may help you if you are having nasal congestion.  3. Getting settled-in. It takes more than one night for most of us to get used to wearing a mask. Try wearing just the mask during a quiet time during the day so your body adapts to wearing it. A humidifier is recommended for comfort in most cases. Our team will work with  you carefully on the first day and will be in contact within 4 days and again at 2 and 4 weeks for advice and remote device adjustments. Your therapy is evaluated by the device each day.   4. Use it every night. The more you are able to sleep naturally for 7-8 hours, the more likely you will have good sleep and to prevent health risks or symptoms from sleep apnea. Even if you use it 4 hours it helps. Occasionally all of us are unable to use a medical therapy, in sleep apnea, it is not dangerous to miss one night.   5. Communicate. Call our skilled team on the number provided on the first day if your visit for problems that make it difficult to wear the device. Over 2 out of 3 patients can learn to wear the device long-term with help from our team. Remember to call our team or your sleep providers if you are unable to wear the device as we may have other solutions for those who cannot adapt to mask CPAP therapy. It is recommended that you sleep your sleep provider within the first 3 months and yearly after that if you are not having problems.   6. Use it for your health. We encourage use of CPAP masks during daytime quiet periods to allow your face and brain to adapt to the sensation of CPAP so that it will be a more natural sensation to awaken to at night or during naps. This can be very useful during the first few weeks or months of adapting to CPAP though it does not help medically to wear CPAP during wakefulness and  should not be used as a strategy just to meet guidelines.  7. Take care of your equipment. Make sure you clean your mask and tubing using directions every day and that your filter and mask are replaced as recommended or if they are not working.     BESIDES CPAP, WHAT OTHER THERAPIES ARE THERE?    Positioning Device  Positioning devices are generally used when sleep apnea is mild and only occurs on your back.This example shows a pillow that straps around the waist. It may be appropriate for those  whose sleep study shows milder sleep apnea that occurs primarily when lying flat on one's back. Preliminary studies have shown benefit but effectiveness at home may need to be verified by a home sleep test. These devices are generally not covered by medical insurance.  Examples of devices that maintain sleeping on the back to prevent snoring and mild sleep apnea.    Belt type body positioner  http://QA on Request.Invoice2go/    Electronic reminder  http://nightshifttherapy.com/  http://www.Oravel.Invoice2go.au/      Oral Appliance  What is oral appliance therapy?  An oral appliance device fits on your teeth at night like a retainer used after having braces. The device is made by a specialized dentist and requires several visits over 1-2 months before a manufactured device is made to fit your teeth and is adjusted to prevent your sleep apnea. Once an oral device is working properly, snoring should be improved. A home sleep test may be recommended at that time if to determine whether the sleep apnea is adequately treated.       Some things to remember:  -Oral devices are often, but not always, covered by your medical insurance. Be sure to check with your insurance provider.   -If you are referred for oral therapy, you will be given a list of specialized dentists to consider or you may choose to visit the Web site of the American Academy of Dental Sleep Medicine  -Oral devices are less likely to work if you have severe sleep apnea or are extremely overweight.     More detailed information  An oral appliance is a small acrylic device that fits over the upper and lower teeth  (similar to a retainer or a mouth guard). This device slightly moves jaw forward, which moves the base of the tongue forward, opens the airway, improves breathing for effective treat snoring and obstructive sleep apnea in perhaps 7 out of 10 people .  The best working devices are custom-made by a dental device  after a mold is made of the teeth 1, 2,  3.  When is an oral appliance indicated?  Oral appliance therapy is recommended as a first-line treatment for patients with primary snoring, mild sleep apnea, and for patients with moderate sleep apnea who prefer appliance therapy to use of CPAP4, 5. Severity of sleep apnea is determined by sleep testing and is based on the number of respiratory events per hour of sleep.   How successful is oral appliance therapy?  The success rate of oral appliance therapy in patients with mild sleep apnea is 75-80% while in patients with moderate sleep apnea it is 50-70%. The chance of success in patients with severe sleep apnea is 40-50%. The research also shows that oral appliances have a beneficial effect on the cardiovascular health of REBECCA patients at the same magnitude as CPAP therapy7.  Oral appliances should be a second-line treatment in cases of severe sleep apnea, but if not completely successful then a combination therapy utilizing CPAP plus oral appliance therapy may be effective. Oral appliances tend to be effective in a broad range of patients although studies show that the patients who have the highest success are females, younger patients, those with milder disease, and less severe obesity. 3, 6.   Finding a dentist that practices dental sleep medicine  Specific training is available through the American Academy of Dental Sleep Medicine for dentists interested in working in the field of sleep. To find a dentist who is educated in the field of sleep and the use of oral appliances, near you, visit the Web site of the American Academy of Dental Sleep Medicine.    References  1. Christy et al. Objectively measured vs self-reported compliance during oral appliance therapy for sleep-disordered breathing. Chest 2013; 144(5): 3928-7488.  2. Yenni et al. Objective measurement of compliance during oral appliance therapy for sleep-disordered breathing. Thorax 2013; 68(1): 91-96.  3. Rakesh et al. Mandibular  advancement devices in 620 men and women with REBECCA and snoring: tolerability and predictors of treatment success. Chest 2004; 125: 6560-3277.  4. Isabella et al. Oral appliances for snoring and REBECCA: a review. Sleep 2006; 29: 244-262.  5. Bashir et al. Oral appliance treatment for REBECCA: an update. J Clin Sleep Med 2014; 10(2): 215-227.  6. Jb et al. Predictors of OSAH treatment outcome. J Dent Res 2007; 86: 2386-3893.      Weight Loss:    Weight loss is a long-term strategy that may improve sleep apnea in some patients.    Weight management is a personal decision and the decision should be based on your interest and the potential benefits.  If you are interested in exploring weight loss strategies, the following discussion covers the impact on weight loss on sleep apnea and the approaches that may be successful.    Being overweight does not necessarily mean you will have health consequences.  Those who have BMI over 35 or over 27 with existing medical conditions carries greater risk.   Weight loss decreases severity of sleep apnea in most people with obesity. For those with mild obesity who have developed snoring with weight gain, even 15-30 pound weight loss can improve and occasionally eliminate sleep apnea.  Structured and life-long dietary and health habits are necessary to lose weight and keep healthier weight levels.     Though there may be significant health benefits from weight loss, long-term weight loss is very difficult to achieve- studies show success with dietary management in less than 10% of people. In addition, substantial weight loss may require years of dietary control and may be difficult if patients have severe obesity. In these cases, surgical management may be considered.  Finally, older individuals who have tolerated obesity without health complications may be less likely to benefit from weight loss strategies.        Your BMI is Body mass index is 34.22 kg/m .  Weight management is a  personal decision.  If you are interested in exploring weight loss strategies, the following discussion covers the approaches that may be successful. Body mass index (BMI) is one way to tell whether you are at a healthy weight, overweight, or obese. It measures your weight in relation to your height.  A BMI of 18.5 to 24.9 is in the healthy range. A person with a BMI of 25 to 29.9 is considered overweight, and someone with a BMI of 30 or greater is considered obese. More than two-thirds of American adults are considered overweight or obese.  Being overweight or obese increases the risk for further weight gain. Excess weight may lead to heart disease and diabetes.  Creating and following plans for healthy eating and physical activity may help you improve your health.  Weight control is part of healthy lifestyle and includes exercise, emotional health, and healthy eating habits. Careful eating habits lifelong are the mainstay of weight control. Though there are significant health benefits from weight loss, long-term weight loss with diet alone may be very difficult to achieve- studies show long-term success with dietary management in less than 10% of people. Attaining a healthy weight may be especially difficult to achieve in those with severe obesity. In some cases, medications, devices and surgical management might be considered.  What can you do?  If you are overweight or obese and are interested in methods for weight loss, you should discuss this with your provider.     Consider reducing daily calorie intake by 500 calories.     Keep a food journal.     Avoiding skipping meals, consider cutting portions instead.    Diet combined with exercise helps maintain muscle while optimizing fat loss. Strength training is particularly important for building and maintaining muscle mass. Exercise helps reduce stress, increase energy, and improves fitness. Increasing exercise without diet control, however, may not burn enough  calories to loose weight.       Start walking three days a week 10-20 minutes at a time    Work towards walking thirty minutes five days a week     Eventually, increase the speed of your walking for 1-2 minutes at time    In addition, we recommend that you review healthy lifestyles and methods for weight loss available through the National Institutes of Health patient information sites:  http://win.niddk.nih.gov/publications/index.htm    And look into health and wellness programs that may be available through your health insurance provider, employer, local community center, or leonie club.    Weight management plan: Patient was referred to their PCP to discuss a diet and exercise plan.      Surgery:    Surgery for obstructive sleep apnea is considered generally only when other therapies fail to work. Surgery may be discussed with you if you are having a difficult time tolerating CPAP and or when there is an abnormal structure that requires surgical correction.  Nose and throat surgeries often enlarge the airway to prevent collapse.  Most of these surgeries create pain for 1-2 weeks and up to half of the most common surgeries are not effective throughout life.  You should carefully discuss the benefits and drawbacks to surgery with your sleep provider and surgeon to determine if it is the best solution for you.   More information  Surgery for REBECCA is directed at areas that are responsible for narrowing or complete obstruction of the airway during sleep.  There are a wide range of procedures available to enlarge and/or stabilize the airway to prevent blockage of breathing in the three major areas where it can occur: the palate, tongue, and nasal regions.  Successful surgical treatment depends on the accurate identification of the factors responsible for obstructive sleep apnea in each person.  A personalized approach is required because there is no single treatment that works well for everyone.  Because of anatomic  variation, consultation with an examination by a sleep surgeon is a critical first step in determining what surgical options are best for each patient.  In some cases, examination during sedation may be recommended in order to guide the selection of procedures.  Patients will be counseled about risks and benefits as well as the typical recovery course after surgery. Surgery is typically not a cure for a person s REBECCA.  However, surgery will often significantly improve one s REBECCA severity (termed  success rate ).  Even in the absence of a cure, surgery will decrease the cardiovascular risk associated with OSA7; improve overall quality of life8 (sleepiness, functionality, sleep quality, etc).      Palate Procedures:  Patients with REBECCA often have narrowing of their airway in the region of their tonsils and uvula.  The goals of palate procedures are to widen the airway in this region as well as to help the tissues resist collapse.  Modern palate procedure techniques focus on tissue conservation and soft tissue rearrangement, rather than tissue removal.  Often the uvula is preserved in this procedure. Residual sleep apnea is common in patient after pharyngoplasty with an average reduction in sleep apnea events of 33%2.      Tongue Procedures:  ExamWhile patients are awake, the muscles that surround the throat are active and keep this region open for breathing. These muscles relax during sleep, allowing the tongue and other structures to collapse and block breathing.  There are several different tongue procedures available.  Selection of a tongue base procedure depends on characteristics seen on physical exam.  Generally, procedures are aimed at removing bulky tissues in this area or preventing the back of the tongue from falling back during sleep.  Success rates for tongue surgery range from 50-62%3.    Hypoglossal Nerve Stimulation:  Hypoglossal nerve stimulation has recently received approval from the [place] Food  and Drug Administration for the treatment of obstructive sleep apnea.  This is based on research showing that the system was safe and effective in treating sleep apnea6.  Results showed that the median AHI score decreased 68%, from 29.3 to 9.0. This therapy uses an implant system that senses breathing patterns and delivers mild stimulation to airway muscles, which keeps the airway open during sleep.  The system consists of three fully implanted components: a small generator (similar in size to a pacemaker), a breathing sensor, and a stimulation lead.  Using a small handheld remote, a patient turns the therapy on before bed and off upon awakening.    Candidates for this device must be greater than 22 years of age, have moderate to severe REBECCA (AHI between 20-65), BMI less than 32, have tried CPAP/oral appliance without success, and have appropriate upper airway anatomy (determined by a sleep endoscopy performed by Dr. Delgadillo).    Hypoglossal Nerve Stimulation Pathway:    The sleep surgeon s office will work with the patient through the insurance prior-authorization process (including communications and appeals).    Nasal Procedures:  Nasal obstruction can interfere with nasal breathing during the day and night.  Studies have shown that relief of nasal obstruction can improve the ability of some patients to tolerate positive airway pressure therapy for obstructive sleep apnea1.  Treatment options include medications such as nasal saline, topical corticosteroid and antihistamine sprays, and oral medications such as antihistamines or decongestants. Non-surgical treatments can include external nasal dilators for selected patients. If these are not successful by themselves, surgery can improve the nasal airway either alone or in combination with these other options.      Combination Procedures:  Combination of surgical procedures and other treatments may be recommended, particularly if patients have more than one area of  narrowing or persistent positional disease.  The success rate of combination surgery ranges from 66-80%2,3.    References  1. Kerwin BHAKTA. The Role of the Nose in Snoring and Obstructive Sleep Apnoea: An Update.  Eur Arch Otorhinolaryngol. 2011; 268: 1365-73.  2.  Dillan SM; Mila JA; Brett JR; Pallanch JF; Deborah MB; Ashvin SG; Jose EPPERSON. Surgical modifications of the upper airway for obstructive sleep apnea in adults: a systematic review and meta-analysis. SLEEP 2010;33(10):0732-3459. Khai WATERS. Hypopharyngeal surgery in obstructive sleep apnea: an evidence-based medicine review.  Arch Otolaryngol Head Neck Surg. 2006 Feb;132(2):206-13.  3. Jeremias YH1, Joselin Y, Geovany HOWARD. The efficacy of anatomically based multilevel surgery for obstructive sleep apnea. Otolaryngol Head Neck Surg. 2003 Oct;129(4):327-35.  4. Khai WATERS, Goldberg A. Hypopharyngeal Surgery in Obstructive Sleep Apnea: An Evidence-Based Medicine Review. Arch Otolaryngol Head Neck Surg. 2006 Feb;132(2):206-13.  5. Asifo PJ et al. Upper-Airway Stimulation for Obstructive Sleep Apnea.  N Engl J Med. 2014 Jan 9;370(2):139-49.  6. Luna Y et al. Increased Incidence of Cardiovascular Disease in Middle-aged Men with Obstructive Sleep Apnea. Am J Respir Crit Care Med; 2002 166: 159-165  7. Leonidas EM et al. Studying Life Effects and Effectiveness of Palatopharyngoplasty (SLEEP) study: Subjective Outcomes of Isolated Uvulopalatopharyngoplasty. Otolaryngol Head Neck Surg. 2011; 144: 623-631.

## 2021-03-25 ENCOUNTER — AMBULATORY - HEALTHEAST (OUTPATIENT)
Dept: VASCULAR SURGERY | Facility: CLINIC | Age: 56
End: 2021-03-25

## 2021-03-25 ENCOUNTER — VIRTUAL VISIT (OUTPATIENT)
Dept: SLEEP MEDICINE | Facility: CLINIC | Age: 56
End: 2021-03-25
Payer: COMMERCIAL

## 2021-03-25 DIAGNOSIS — Z98.84 BARIATRIC SURGERY STATUS: ICD-10-CM

## 2021-03-25 DIAGNOSIS — G47.33 OSA (OBSTRUCTIVE SLEEP APNEA): Primary | ICD-10-CM

## 2021-03-25 DIAGNOSIS — Z98.890 STATUS POST BUNIONECTOMY: ICD-10-CM

## 2021-03-25 DIAGNOSIS — F33.2 SEVERE RECURRENT MAJOR DEPRESSION WITHOUT PSYCHOTIC FEATURES (H): ICD-10-CM

## 2021-03-25 DIAGNOSIS — E66.811 OBESITY (BMI 30.0-34.9): ICD-10-CM

## 2021-03-25 PROCEDURE — 99204 OFFICE O/P NEW MOD 45 MIN: CPT | Mod: GT | Performed by: PHYSICIAN ASSISTANT

## 2021-03-27 ENCOUNTER — HEALTH MAINTENANCE LETTER (OUTPATIENT)
Age: 56
End: 2021-03-27

## 2021-03-29 ENCOUNTER — OFFICE VISIT - HEALTHEAST (OUTPATIENT)
Dept: PODIATRY | Facility: CLINIC | Age: 56
End: 2021-03-29

## 2021-03-29 DIAGNOSIS — M20.11 HALLUX ABDUCTO VALGUS, RIGHT: ICD-10-CM

## 2021-03-29 DIAGNOSIS — Z98.890 POSTOPERATIVE STATE: ICD-10-CM

## 2021-03-29 DIAGNOSIS — M20.41 HAMMERTOE OF RIGHT FOOT: ICD-10-CM

## 2021-03-29 ASSESSMENT — MIFFLIN-ST. JEOR: SCORE: 1586.98

## 2021-03-31 ENCOUNTER — AMBULATORY - HEALTHEAST (OUTPATIENT)
Dept: NURSING | Facility: CLINIC | Age: 56
End: 2021-03-31

## 2021-04-01 ENCOUNTER — TELEPHONE (OUTPATIENT)
Dept: SLEEP MEDICINE | Facility: CLINIC | Age: 56
End: 2021-04-01

## 2021-04-01 NOTE — TELEPHONE ENCOUNTER
Reason for Call:  Other appointment    Detailed comments: Patient had a consult and now calling to set up her at home sleep study. Please give patient a call to schedule. Thank you.    Phone Number Patient can be reached at: Home number on file [phone] (home)    Best Time: Anytime    Can we leave a detailed message on this number? YES    Call taken on 4/1/2021 at 11:06 AM by Baldomero Byrd     Called pt and scheduled for sleep study  Jolly Oconnor CMA on 4/1/2021 at 1:55 PM

## 2021-04-05 ENCOUNTER — RECORDS - HEALTHEAST (OUTPATIENT)
Dept: ADMINISTRATIVE | Facility: OTHER | Age: 56
End: 2021-04-05

## 2021-04-05 ENCOUNTER — AMBULATORY - HEALTHEAST (OUTPATIENT)
Dept: INFUSION THERAPY | Facility: HOSPITAL | Age: 56
End: 2021-04-05

## 2021-04-05 ENCOUNTER — OFFICE VISIT - HEALTHEAST (OUTPATIENT)
Dept: ONCOLOGY | Facility: HOSPITAL | Age: 56
End: 2021-04-05

## 2021-04-05 DIAGNOSIS — C50.912 INVASIVE DUCTAL CARCINOMA OF BREAST, LEFT (H): ICD-10-CM

## 2021-04-05 LAB
ALBUMIN SERPL-MCNC: 4 G/DL (ref 3.5–5)
ALP SERPL-CCNC: 141 U/L (ref 45–120)
ALT SERPL W P-5'-P-CCNC: 49 U/L (ref 0–45)
ANION GAP SERPL CALCULATED.3IONS-SCNC: 7 MMOL/L (ref 5–18)
AST SERPL W P-5'-P-CCNC: 27 U/L (ref 0–40)
BASOPHILS # BLD AUTO: 0 THOU/UL (ref 0–0.2)
BASOPHILS NFR BLD AUTO: 0 % (ref 0–2)
BILIRUB SERPL-MCNC: 0.2 MG/DL (ref 0–1)
BUN SERPL-MCNC: 16 MG/DL (ref 8–22)
CALCIUM SERPL-MCNC: 9.3 MG/DL (ref 8.5–10.5)
CHLORIDE BLD-SCNC: 103 MMOL/L (ref 98–107)
CO2 SERPL-SCNC: 32 MMOL/L (ref 22–31)
CREAT SERPL-MCNC: 0.72 MG/DL (ref 0.6–1.1)
EOSINOPHIL # BLD AUTO: 0.1 THOU/UL (ref 0–0.4)
EOSINOPHIL NFR BLD AUTO: 2 % (ref 0–6)
ERYTHROCYTE [DISTWIDTH] IN BLOOD BY AUTOMATED COUNT: 12.7 % (ref 11–14.5)
GFR SERPL CREATININE-BSD FRML MDRD: >60 ML/MIN/1.73M2
GLUCOSE BLD-MCNC: 86 MG/DL (ref 70–125)
HCT VFR BLD AUTO: 41.8 % (ref 35–47)
HGB BLD-MCNC: 13.5 G/DL (ref 12–16)
IMM GRANULOCYTES # BLD: 0 THOU/UL
IMM GRANULOCYTES NFR BLD: 0 %
LDH SERPL L TO P-CCNC: 142 U/L (ref 125–220)
LYMPHOCYTES # BLD AUTO: 2 THOU/UL (ref 0.8–4.4)
LYMPHOCYTES NFR BLD AUTO: 29 % (ref 20–40)
MCH RBC QN AUTO: 30.3 PG (ref 27–34)
MCHC RBC AUTO-ENTMCNC: 32.3 G/DL (ref 32–36)
MCV RBC AUTO: 94 FL (ref 80–100)
MONOCYTES # BLD AUTO: 0.6 THOU/UL (ref 0–0.9)
MONOCYTES NFR BLD AUTO: 9 % (ref 2–10)
NEUTROPHILS # BLD AUTO: 4.3 THOU/UL (ref 2–7.7)
NEUTROPHILS NFR BLD AUTO: 60 % (ref 50–70)
PLATELET # BLD AUTO: 269 THOU/UL (ref 140–440)
PMV BLD AUTO: 9.5 FL (ref 8.5–12.5)
POTASSIUM BLD-SCNC: 3.8 MMOL/L (ref 3.5–5)
PROT SERPL-MCNC: 7.2 G/DL (ref 6–8)
RBC # BLD AUTO: 4.45 MILL/UL (ref 3.8–5.4)
SODIUM SERPL-SCNC: 142 MMOL/L (ref 136–145)
WBC: 7.1 THOU/UL (ref 4–11)

## 2021-04-05 ASSESSMENT — MIFFLIN-ST. JEOR: SCORE: 1603.54

## 2021-04-12 ENCOUNTER — AMBULATORY - HEALTHEAST (OUTPATIENT)
Dept: VASCULAR SURGERY | Facility: CLINIC | Age: 56
End: 2021-04-12

## 2021-04-12 ENCOUNTER — COMMUNICATION - HEALTHEAST (OUTPATIENT)
Dept: VASCULAR SURGERY | Facility: CLINIC | Age: 56
End: 2021-04-12

## 2021-04-12 ENCOUNTER — OFFICE VISIT - HEALTHEAST (OUTPATIENT)
Dept: PODIATRY | Facility: CLINIC | Age: 56
End: 2021-04-12

## 2021-04-12 DIAGNOSIS — Z98.890 STATUS POST BUNIONECTOMY: ICD-10-CM

## 2021-04-12 DIAGNOSIS — M20.41 HAMMERTOE OF RIGHT FOOT: ICD-10-CM

## 2021-04-12 DIAGNOSIS — M20.11 HALLUX ABDUCTO VALGUS, RIGHT: ICD-10-CM

## 2021-04-23 ENCOUNTER — OFFICE VISIT (OUTPATIENT)
Dept: SLEEP MEDICINE | Facility: CLINIC | Age: 56
End: 2021-04-23
Payer: COMMERCIAL

## 2021-04-23 DIAGNOSIS — G47.33 OSA (OBSTRUCTIVE SLEEP APNEA): ICD-10-CM

## 2021-04-23 DIAGNOSIS — F33.2 SEVERE RECURRENT MAJOR DEPRESSION WITHOUT PSYCHOTIC FEATURES (H): ICD-10-CM

## 2021-04-23 DIAGNOSIS — Z98.84 BARIATRIC SURGERY STATUS: ICD-10-CM

## 2021-04-23 DIAGNOSIS — E66.811 OBESITY (BMI 30.0-34.9): ICD-10-CM

## 2021-04-23 PROCEDURE — 95800 SLP STDY UNATTENDED: CPT | Performed by: INTERNAL MEDICINE

## 2021-04-26 NOTE — NURSING NOTE
Device has been registered and shipped via [employer] on 4/26/21. Patient was notified that package was mailed out.   Cintia Mcbride on 4/26/2021 at 1:31 PM

## 2021-04-26 NOTE — PROGRESS NOTES
Device has been registered and shipped via [employer] on 4/26/21. Patient was notified that package was mailed out.   Cintia Mcbride on 4/26/2021 at 1:33 PM

## 2021-04-30 NOTE — PROGRESS NOTES
WATCHPAT was scored using 1B 4% hypopnea rule and it is ready for interpretation.     PAT AHI: 6.4.     Pt will follow up with sleep provider to determine appropriate therapy.     Ordering Provider, Ivet Zeng PA-C Charles O., BA, RPSGT, RST 4/30/2021

## 2021-05-04 ENCOUNTER — OFFICE VISIT - HEALTHEAST (OUTPATIENT)
Dept: PODIATRY | Facility: CLINIC | Age: 56
End: 2021-05-04

## 2021-05-04 ENCOUNTER — RECORDS - HEALTHEAST (OUTPATIENT)
Dept: GENERAL RADIOLOGY | Facility: CLINIC | Age: 56
End: 2021-05-04

## 2021-05-04 DIAGNOSIS — Z98.890 OTHER SPECIFIED POSTPROCEDURAL STATES: ICD-10-CM

## 2021-05-04 DIAGNOSIS — M20.11 HALLUX ABDUCTO VALGUS, RIGHT: ICD-10-CM

## 2021-05-05 NOTE — PROCEDURES
"WatchPAT - HOME SLEEP STUDY INTERPRETATION    Patient: Monika Franz  MRN: [id number]  YOB: 1965  Study Date: 4/23/2021  Referring Provider: Donna Holt;   Ordering Provider: FRANCISCO Robertson       Indications for Home Study: Monika Franz is a 55 year old female with history of obstructive sleep apnea with subsequent substantial weight loss who presents for evaluation of current severity of sleep apnea-she had been instructed not to use CPAP for 2 to 3 days prior to testing.    Estimated body mass index is 34.22 kg/m  as calculated from the following:    Height as of 3/24/21: 1.676 m (5' 6\").    Weight as of 3/24/21: 96.2 kg (212 lb).  Total score - [place]: 1 (3/24/2021  3:00 PM)  STOP-BANG: NA    Data: A full night home sleep study was performed recording the standard physiologic parameters including peripheral arterial tonometry (PAT), sound/snoring, body position,  movement, sound, and oxygen saturation by pulse oximetry. Pulse rate was estimated by oximetry recording. Sleep staging (wake, REM, light, and deep sleep) was derived from PAT signal.  This study was considered adequate based on > 4 hours of quality oximetry and respiratory recording. As specified by the AASM Manual for the Scoring of Sleep and Associated events, version 2.3, Rule VIII.D 1B, 4% oxygen desaturation scoring for hypopneas is used as a standard of care on all home sleep apnea testing.    Total Recording Time: 7 hrs, 27 min  Total Sleep Time: 6 hrs, 47 min  % of Sleep Time REM: 22%    Respiratory:  Snoring: Snoring was present for 13 minutes greater than 45 dB.  Respiratory events: The PAT respiratory disturbance index [pRDI] was 11.3 events per hour.  The PAT apnea/hypopnea index [pAHI] was 6.4 events per hour.  DEON was 4.9 events per hour.  During REM sleep the pAHI was 16.7.  Sleep Associated Hypoxemia: sustained hypoxemia was not present. Mean oxygen saturation was 93%.  Minimum was 89%.  Time with saturation " less than 88% was 0 minutes.    Heart Rate: By pulse oximetry normal rate was noted.     Position: Percent of time spent: supine -100%, prone -0%, on right -0%, on left -0%.  pAHI was 6.4 per hour supine, - per hour prone, - per hour on right side, and - per hour on left side.     Assessment:   Mild obstructive sleep apnea during overnight supine recording.  Sleep associated hypoxemia was not present.    Recommendations:  The decision to treat snoring with mild obstructive sleep apnea is based on clinical symptoms and risks and may include the use of mandibular advancement device or continuation of CPAP based on patient preference.  Suggest optimizing sleep hygiene and avoiding sleep deprivation.      Diagnosis Code(s): Obstructive Sleep Apnea G47.33, Snoring R06.83    ANDREA PLASENCIA MD, May 5, 2021   Diplomate, American Board of Internal Medicine, Sleep Medicine

## 2021-05-10 ENCOUNTER — VIRTUAL VISIT (OUTPATIENT)
Dept: SLEEP MEDICINE | Facility: CLINIC | Age: 56
End: 2021-05-10
Payer: COMMERCIAL

## 2021-05-10 VITALS — HEIGHT: 66 IN | WEIGHT: 212 LBS | BODY MASS INDEX: 34.07 KG/M2

## 2021-05-10 DIAGNOSIS — G47.33 OSA (OBSTRUCTIVE SLEEP APNEA): Primary | ICD-10-CM

## 2021-05-10 PROCEDURE — 99213 OFFICE O/P EST LOW 20 MIN: CPT | Mod: GT | Performed by: PHYSICIAN ASSISTANT

## 2021-05-10 ASSESSMENT — MIFFLIN-ST. JEOR: SCORE: 1573.38

## 2021-05-10 NOTE — PROGRESS NOTES
Monika is a 55 year old who is being evaluated via a billable video visit.      How would you like to obtain your AVS? MyChart  If the video visit is dropped, the invitation should be resent by: Text to cell phone: [phone]  Will anyone else be joining your video visit? No      Kassy Harp MA on 5/10/2021 at 9:46 AM    Video Start Time: 10:30 AM     [facility]   Outpatient Sleep Medicine  May 10, 2021       Name: Monika Franz MRN# [id number]   Age: 55 year old YOB: 1965            Assessment and Plan:   1. REBECCA (obstructive sleep apnea)  During this visit, we reviewed the summary of the study including stage, position, event distribution, oximetry and heart rate. Mild obstructive sleep apnea was seen with pAHI 6.4. Sleep associated hypoxemia was not present.    Discussed significant improvement in sleep apnea severity since her 2012 study(AHI was 36.6). Discussed that the decision to treat snoring with mild obstructive sleep apnea is based on clinical symptoms and risks and may include the use of mandibular advancement device or continuation of CPAP based on patient preference. Patient has elected to continue treatment with CPAP. No changes to pressure settings made today but prescription was written for mask/supplies order.    - Comprehensive DME    Patient will follow-up in approximately 1 year for annual CPAP visit, or sooner as needed.        Chief Complaint      Chief Complaint   Patient presents with     Study Results            History of Present Illness:   Monika Franz is a 55 year old female who presents to the clinic for results of recent home sleep study completed on 4/23/2021. Study was completed due to history of obstructive sleep apnea with subsequent substantial weight loss s/p bariatric surgery to determine current severity of sleep apnea-she had been instructed not to use CPAP for 2 to 3 days prior to testing.    Reviewed results of sleep  "study with patient as follows:  Total Recording Time: 7 hrs, 27 min  Total Sleep Time: 6 hrs, 47 min  % of Sleep Time REM: 22%     Respiratory:  Snoring: Snoring was present for 13 minutes greater than 45 dB.  Respiratory events: The PAT respiratory disturbance index [pRDI] was 11.3 events per hour.  The PAT apnea/hypopnea index [pAHI] was 6.4 events per hour.  DEON was 4.9 events per hour.  During REM sleep the pAHI was 16.7.  Sleep Associated Hypoxemia: sustained hypoxemia was not present. Mean oxygen saturation was 93%.  Minimum was 89%.  Time with saturation less than 88% was 0 minutes.     Heart Rate: By pulse oximetry normal rate was noted.      Position: Percent of time spent: supine -100%, prone -0%, on right -0%, on left -0%.  pAHI was 6.4 per hour supine, - per hour prone, - per hour on right side, and - per hour on left side.      Past medical/surgical history, family history, social history, medications and allergies were reviewed.           Physical Examination:   Ht 1.676 m (5' 6\")   Wt 96.2 kg (212 lb)   BMI 34.22 kg/m    General appearance: Awake, alert, cooperative. Well groomed. Sitting comfortably in chair. In no apparent distress.  HEENT: Head: Normocephalic, atraumatic. Eyes:Conjunctiva clear. Sclera normal. Nose: External appearance without deformity.   Pulmonary:  Able to speak easily in full sentences. No cough or wheeze.   Skin:  No rashes or significant lesions on visible skin.   Neurologic: Alert, oriented x3.   Psychiatric: Mood euthymic. Affect congruent with full range and intensity.    CC:  Donna Holt PA-C  May 10, 2021     [facility]  [address] [place] [address]     [facility]  [address] [address]    Chart documentation was completed, in part, with [employer] voice-recognition software. Even though reviewed, some grammatical, spelling, and word errors may " remain.    Video-Visit Details    Type of service:  Video Visit    Video End Time:10:42AM    Originating Location (pt. Location): Home    Distant Location (provider location):  [facility]     Platform used for Video Visit: Aprilage     26 minutes spent on day of encounter doing chart review, history and exam, documentation, and further activities as noted above

## 2021-05-10 NOTE — PATIENT INSTRUCTIONS
"WatchPAT - HOME SLEEP STUDY INTERPRETATION     Patient: Monika Franz  MRN: [id number]  YOB: 1965  Study Date: 4/23/2021  Referring Provider: Donna Holt;   Ordering Provider: FRANCISCO Robertson        Indications for Home Study: Monika Franz is a 55 year old female with history of obstructive sleep apnea with subsequent substantial weight loss who presents for evaluation of current severity of sleep apnea-she had been instructed not to use CPAP for 2 to 3 days prior to testing.     Estimated body mass index is 34.22 kg/m  as calculated from the following:    Height as of 3/24/21: 1.676 m (5' 6\").    Weight as of 3/24/21: 96.2 kg (212 lb).  Total score - [place]: 1 (3/24/2021  3:00 PM)  STOP-BANG: NA     Data: A full night home sleep study was performed recording the standard physiologic parameters including peripheral arterial tonometry (PAT), sound/snoring, body position,  movement, sound, and oxygen saturation by pulse oximetry. Pulse rate was estimated by oximetry recording. Sleep staging (wake, REM, light, and deep sleep) was derived from PAT signal.  This study was considered adequate based on > 4 hours of quality oximetry and respiratory recording. As specified by the AASM Manual for the Scoring of Sleep and Associated events, version 2.3, Rule VIII.D 1B, 4% oxygen desaturation scoring for hypopneas is used as a standard of care on all home sleep apnea testing.     Total Recording Time: 7 hrs, 27 min  Total Sleep Time: 6 hrs, 47 min  % of Sleep Time REM: 22%     Respiratory:  Snoring: Snoring was present for 13 minutes greater than 45 dB.  Respiratory events: The PAT respiratory disturbance index [pRDI] was 11.3 events per hour.  The PAT apnea/hypopnea index [pAHI] was 6.4 events per hour.  DEON was 4.9 events per hour.  During REM sleep the pAHI was 16.7.  Sleep Associated Hypoxemia: sustained hypoxemia was not present. Mean oxygen saturation was 93%.  Minimum was 89%.  Time with " saturation less than 88% was 0 minutes.     Heart Rate: By pulse oximetry normal rate was noted.      Position: Percent of time spent: supine -100%, prone -0%, on right -0%, on left -0%.  pAHI was 6.4 per hour supine, - per hour prone, - per hour on right side, and - per hour on left side.      Assessment:   Mild obstructive sleep apnea during overnight supine recording.  Sleep associated hypoxemia was not present.     Recommendations:  The decision to treat snoring with mild obstructive sleep apnea is based on clinical symptoms and risks and may include the use of mandibular advancement device or continuation of CPAP based on patient preference.  Suggest optimizing sleep hygiene and avoiding sleep deprivation.        Diagnosis Code(s): Obstructive Sleep Apnea G47.33, Snoring R06.83     ANDREA PLASENCIA MD, May 5, 2021   Diplomate, American Board of Internal Medicine, Sleep Medicine  Your BMI is Body mass index is 34.22 kg/m .  Weight management is a personal decision.  If you are interested in exploring weight loss strategies, the following discussion covers the approaches that may be successful. Body mass index (BMI) is one way to tell whether you are at a healthy weight, overweight, or obese. It measures your weight in relation to your height.  A BMI of 18.5 to 24.9 is in the healthy range. A person with a BMI of 25 to 29.9 is considered overweight, and someone with a BMI of 30 or greater is considered obese. More than two-thirds of American adults are considered overweight or obese.  Being overweight or obese increases the risk for further weight gain. Excess weight may lead to heart disease and diabetes.  Creating and following plans for healthy eating and physical activity may help you improve your health.  Weight control is part of healthy lifestyle and includes exercise, emotional health, and healthy eating habits. Careful eating habits lifelong are the mainstay of weight control. Though there are significant  health benefits from weight loss, long-term weight loss with diet alone may be very difficult to achieve- studies show long-term success with dietary management in less than 10% of people. Attaining a healthy weight may be especially difficult to achieve in those with severe obesity. In some cases, medications, devices and surgical management might be considered.  What can you do?  If you are overweight or obese and are interested in methods for weight loss, you should discuss this with your provider.     Consider reducing daily calorie intake by 500 calories.     Keep a food journal.     Avoiding skipping meals, consider cutting portions instead.    Diet combined with exercise helps maintain muscle while optimizing fat loss. Strength training is particularly important for building and maintaining muscle mass. Exercise helps reduce stress, increase energy, and improves fitness. Increasing exercise without diet control, however, may not burn enough calories to loose weight.       Start walking three days a week 10-20 minutes at a time    Work towards walking thirty minutes five days a week     Eventually, increase the speed of your walking for 1-2 minutes at time    In addition, we recommend that you review healthy lifestyles and methods for weight loss available through the National Institutes of Health patient information sites:  http://win.niddk.nih.gov/publications/index.htm    And look into health and wellness programs that may be available through your health insurance provider, employer, local community center, or leonie club.    Weight management plan: Patient was referred to their PCP to discuss a diet and exercise plan.

## 2021-05-13 ENCOUNTER — COMMUNICATION - HEALTHEAST (OUTPATIENT)
Dept: ONCOLOGY | Facility: HOSPITAL | Age: 56
End: 2021-05-13

## 2021-05-19 ENCOUNTER — OFFICE VISIT - HEALTHEAST (OUTPATIENT)
Dept: INTERNAL MEDICINE | Facility: CLINIC | Age: 56
End: 2021-05-19

## 2021-05-19 DIAGNOSIS — E66.01 MORBID OBESITY (H): ICD-10-CM

## 2021-05-19 DIAGNOSIS — Z98.84 BARIATRIC SURGERY STATUS: ICD-10-CM

## 2021-05-24 ENCOUNTER — OFFICE VISIT - HEALTHEAST (OUTPATIENT)
Dept: PODIATRY | Facility: CLINIC | Age: 56
End: 2021-05-24

## 2021-05-24 DIAGNOSIS — M20.41 HAMMERTOE OF RIGHT FOOT: ICD-10-CM

## 2021-05-24 DIAGNOSIS — M20.11 HALLUX ABDUCTO VALGUS, RIGHT: ICD-10-CM

## 2021-05-24 ASSESSMENT — MIFFLIN-ST. JEOR: SCORE: 1609.66

## 2021-05-26 VITALS
DIASTOLIC BLOOD PRESSURE: 57 MMHG | TEMPERATURE: 98.2 F | OXYGEN SATURATION: 95 % | SYSTOLIC BLOOD PRESSURE: 106 MMHG | HEART RATE: 79 BPM

## 2021-05-26 VITALS
HEART RATE: 68 BPM | TEMPERATURE: 98.4 F | DIASTOLIC BLOOD PRESSURE: 64 MMHG | OXYGEN SATURATION: 96 % | SYSTOLIC BLOOD PRESSURE: 116 MMHG

## 2021-05-26 VITALS
TEMPERATURE: 98.5 F | HEART RATE: 77 BPM | SYSTOLIC BLOOD PRESSURE: 118 MMHG | DIASTOLIC BLOOD PRESSURE: 58 MMHG | OXYGEN SATURATION: 96 %

## 2021-05-27 VITALS
HEART RATE: 78 BPM | DIASTOLIC BLOOD PRESSURE: 62 MMHG | TEMPERATURE: 98.6 F | RESPIRATION RATE: 16 BRPM | SYSTOLIC BLOOD PRESSURE: 110 MMHG

## 2021-05-27 VITALS
TEMPERATURE: 98.2 F | OXYGEN SATURATION: 96 % | HEART RATE: 71 BPM | DIASTOLIC BLOOD PRESSURE: 55 MMHG | SYSTOLIC BLOOD PRESSURE: 107 MMHG

## 2021-05-27 VITALS
OXYGEN SATURATION: 96 % | DIASTOLIC BLOOD PRESSURE: 68 MMHG | DIASTOLIC BLOOD PRESSURE: 58 MMHG | HEART RATE: 64 BPM | SYSTOLIC BLOOD PRESSURE: 118 MMHG | TEMPERATURE: 98.1 F | HEART RATE: 70 BPM | SYSTOLIC BLOOD PRESSURE: 113 MMHG | RESPIRATION RATE: 16 BRPM | TEMPERATURE: 98.6 F

## 2021-05-27 VITALS
OXYGEN SATURATION: 98 % | DIASTOLIC BLOOD PRESSURE: 65 MMHG | HEART RATE: 85 BPM | SYSTOLIC BLOOD PRESSURE: 128 MMHG | TEMPERATURE: 98.1 F

## 2021-05-27 VITALS
HEART RATE: 85 BPM | SYSTOLIC BLOOD PRESSURE: 106 MMHG | DIASTOLIC BLOOD PRESSURE: 53 MMHG | OXYGEN SATURATION: 97 % | TEMPERATURE: 98 F

## 2021-05-27 VITALS
RESPIRATION RATE: 18 BRPM | SYSTOLIC BLOOD PRESSURE: 108 MMHG | HEART RATE: 76 BPM | DIASTOLIC BLOOD PRESSURE: 60 MMHG | TEMPERATURE: 97.8 F

## 2021-05-27 VITALS
OXYGEN SATURATION: 95 % | DIASTOLIC BLOOD PRESSURE: 58 MMHG | TEMPERATURE: 98.3 F | SYSTOLIC BLOOD PRESSURE: 123 MMHG | HEART RATE: 79 BPM

## 2021-05-28 ENCOUNTER — COMMUNICATION - HEALTHEAST (OUTPATIENT)
Dept: ONCOLOGY | Facility: HOSPITAL | Age: 56
End: 2021-05-28

## 2021-06-04 VITALS
SYSTOLIC BLOOD PRESSURE: 117 MMHG | DIASTOLIC BLOOD PRESSURE: 68 MMHG | WEIGHT: 214.2 LBS | OXYGEN SATURATION: 95 % | HEART RATE: 81 BPM | HEIGHT: 66 IN | BODY MASS INDEX: 34.42 KG/M2 | TEMPERATURE: 98.2 F

## 2021-06-04 VITALS
BODY MASS INDEX: 32.59 KG/M2 | DIASTOLIC BLOOD PRESSURE: 60 MMHG | SYSTOLIC BLOOD PRESSURE: 110 MMHG | HEART RATE: 82 BPM | WEIGHT: 205 LBS

## 2021-06-04 VITALS
DIASTOLIC BLOOD PRESSURE: 67 MMHG | OXYGEN SATURATION: 95 % | BODY MASS INDEX: 33.97 KG/M2 | TEMPERATURE: 98.1 F | HEART RATE: 76 BPM | WEIGHT: 207.3 LBS | SYSTOLIC BLOOD PRESSURE: 114 MMHG

## 2021-06-04 VITALS
HEART RATE: 86 BPM | OXYGEN SATURATION: 97 % | BODY MASS INDEX: 34.22 KG/M2 | TEMPERATURE: 98.2 F | DIASTOLIC BLOOD PRESSURE: 59 MMHG | SYSTOLIC BLOOD PRESSURE: 127 MMHG | WEIGHT: 208.8 LBS

## 2021-06-04 VITALS
HEIGHT: 66 IN | BODY MASS INDEX: 35.44 KG/M2 | SYSTOLIC BLOOD PRESSURE: 116 MMHG | WEIGHT: 219.6 LBS | DIASTOLIC BLOOD PRESSURE: 55 MMHG | OXYGEN SATURATION: 96 % | TEMPERATURE: 98.6 F | WEIGHT: 215 LBS | HEART RATE: 78 BPM | BODY MASS INDEX: 34.55 KG/M2

## 2021-06-04 VITALS
OXYGEN SATURATION: 97 % | HEART RATE: 65 BPM | WEIGHT: 206.6 LBS | TEMPERATURE: 97.9 F | DIASTOLIC BLOOD PRESSURE: 60 MMHG | SYSTOLIC BLOOD PRESSURE: 118 MMHG | BODY MASS INDEX: 33.86 KG/M2

## 2021-06-04 VITALS
BODY MASS INDEX: 33.69 KG/M2 | TEMPERATURE: 98.6 F | SYSTOLIC BLOOD PRESSURE: 99 MMHG | OXYGEN SATURATION: 96 % | WEIGHT: 205.6 LBS | DIASTOLIC BLOOD PRESSURE: 52 MMHG | HEART RATE: 70 BPM

## 2021-06-04 VITALS
OXYGEN SATURATION: 96 % | HEART RATE: 80 BPM | SYSTOLIC BLOOD PRESSURE: 117 MMHG | TEMPERATURE: 98.1 F | WEIGHT: 206.5 LBS | DIASTOLIC BLOOD PRESSURE: 59 MMHG | BODY MASS INDEX: 33.84 KG/M2

## 2021-06-04 VITALS — HEIGHT: 67 IN | BODY MASS INDEX: 32.65 KG/M2 | WEIGHT: 208 LBS

## 2021-06-04 VITALS
SYSTOLIC BLOOD PRESSURE: 119 MMHG | DIASTOLIC BLOOD PRESSURE: 63 MMHG | BODY MASS INDEX: 33.56 KG/M2 | HEART RATE: 81 BPM | HEIGHT: 66 IN | TEMPERATURE: 98.2 F | OXYGEN SATURATION: 99 % | WEIGHT: 208.8 LBS

## 2021-06-04 VITALS
SYSTOLIC BLOOD PRESSURE: 116 MMHG | OXYGEN SATURATION: 97 % | HEART RATE: 68 BPM | BODY MASS INDEX: 35.28 KG/M2 | DIASTOLIC BLOOD PRESSURE: 81 MMHG | WEIGHT: 215.3 LBS | TEMPERATURE: 98.3 F

## 2021-06-04 VITALS — WEIGHT: 208 LBS | BODY MASS INDEX: 32.65 KG/M2 | HEIGHT: 67 IN

## 2021-06-04 VITALS — HEIGHT: 67 IN | WEIGHT: 205 LBS | BODY MASS INDEX: 32.18 KG/M2

## 2021-06-04 VITALS
SYSTOLIC BLOOD PRESSURE: 120 MMHG | WEIGHT: 213 LBS | BODY MASS INDEX: 33.86 KG/M2 | DIASTOLIC BLOOD PRESSURE: 59 MMHG | HEART RATE: 83 BPM | TEMPERATURE: 98.4 F

## 2021-06-05 VITALS
DIASTOLIC BLOOD PRESSURE: 64 MMHG | BODY MASS INDEX: 35.19 KG/M2 | SYSTOLIC BLOOD PRESSURE: 104 MMHG | WEIGHT: 218 LBS | HEART RATE: 60 BPM

## 2021-06-05 VITALS
BODY MASS INDEX: 35.2 KG/M2 | HEART RATE: 80 BPM | RESPIRATION RATE: 17 BRPM | DIASTOLIC BLOOD PRESSURE: 62 MMHG | TEMPERATURE: 97.9 F | SYSTOLIC BLOOD PRESSURE: 110 MMHG | HEIGHT: 66 IN | WEIGHT: 219 LBS

## 2021-06-05 VITALS
HEART RATE: 79 BPM | WEIGHT: 215 LBS | HEIGHT: 66 IN | BODY MASS INDEX: 34.55 KG/M2 | DIASTOLIC BLOOD PRESSURE: 70 MMHG | SYSTOLIC BLOOD PRESSURE: 116 MMHG | OXYGEN SATURATION: 93 %

## 2021-06-05 VITALS
SYSTOLIC BLOOD PRESSURE: 130 MMHG | TEMPERATURE: 98.7 F | WEIGHT: 214.8 LBS | DIASTOLIC BLOOD PRESSURE: 62 MMHG | HEART RATE: 79 BPM | BODY MASS INDEX: 34.15 KG/M2

## 2021-06-05 VITALS — BODY MASS INDEX: 34.55 KG/M2 | WEIGHT: 215 LBS | HEIGHT: 66 IN

## 2021-06-05 VITALS
DIASTOLIC BLOOD PRESSURE: 61 MMHG | OXYGEN SATURATION: 96 % | TEMPERATURE: 98.2 F | HEART RATE: 78 BPM | WEIGHT: 220.4 LBS | SYSTOLIC BLOOD PRESSURE: 129 MMHG | BODY MASS INDEX: 35.42 KG/M2 | HEIGHT: 66 IN

## 2021-06-05 VITALS
DIASTOLIC BLOOD PRESSURE: 78 MMHG | TEMPERATURE: 97.7 F | HEART RATE: 72 BPM | BODY MASS INDEX: 33.83 KG/M2 | WEIGHT: 212.8 LBS | SYSTOLIC BLOOD PRESSURE: 124 MMHG

## 2021-06-05 VITALS
HEART RATE: 64 BPM | HEIGHT: 66 IN | BODY MASS INDEX: 35.03 KG/M2 | SYSTOLIC BLOOD PRESSURE: 112 MMHG | WEIGHT: 218 LBS | DIASTOLIC BLOOD PRESSURE: 62 MMHG

## 2021-06-05 VITALS — BODY MASS INDEX: 32.96 KG/M2 | HEIGHT: 67 IN | WEIGHT: 210 LBS

## 2021-06-08 NOTE — PROGRESS NOTES
Internal Medicine Office Visit  [facility]   Patient Name: Monika Franz  Patient Age: 54 y.o.  YOB: 1965  MRN: [id number]    Date of Visit: 2020  Reason for Office Visit:   Chief Complaint   Patient presents with     Breast Mass     Lt breast           Assessment / Plan / Medical Decision Makin. Left breast mass  Will order diagnostic mammogram and targeted ultrasound for further evaluation   - Mammo Diagnostic Bilateral; Future  - US Breast Left Limited 1-3 Quadrants; Future        Health Maintenance Review  Health Maintenance   Topic Date Due     HEPATITIS C SCREENING  1965     PREVENTIVE CARE VISIT  1965     MAMMOGRAM  1965     HIV SCREENING  10/10/1980     TD 18+ HE  10/10/1983     TDAP ADULT ONE TIME DOSE  10/10/1983     ADVANCE CARE PLANNING  10/10/1983     COLORECTAL CANCER SCREENING  10/10/1983     PAP SMEAR  10/10/1986     LIPID  10/10/2010     ZOSTER VACCINES (1 of 2) 10/10/2015     INFLUENZA VACCINE RULE BASED  Completed         I am having oMnika Franz maintain her levomilnacipran, ALPRAZolam, busPIRone, and traZODone.      HPI:  Monika Franz is a 54 y.o. year old who presents to the office today for evaluation of a left breast lump. She performs monthly breast exams and last Thursday noted this lump. There is some mild discomfort if she presses directly over the breast lump. No family or personal history of breast cancer. Her last mammogram was in October and normal. There is no nipple discharge, skin changes that she has noticed in the left breast.     Review of Systems:  Pertinent findings as noted in HPI      Current Scheduled Meds:  Outpatient Encounter Medications as of 2020   Medication Sig Dispense Refill     ALPRAZolam (XANAX) 0.5 MG tablet Take 0.5 mg by mouth daily as needed for sleep.       busPIRone (BUSPAR) 15 MG tablet Take 15 mg by mouth 2 (two) times a day.       levomilnacipran (FETZIMA) 80 mg Cs24 ER  capsule Take 1 capsule by mouth.       traZODone (DESYREL) 150 MG tablet Take 150 mg by mouth at bedtime.       No facility-administered encounter medications on file as of 6/9/2020.          History reviewed. No pertinent past medical history.  History reviewed. No pertinent surgical history.  Social History     Tobacco Use     Smoking status: Not on file   Substance Use Topics     Alcohol use: Not on file     Drug use: Not on file       Objective / Physical Examination:  Vitals:    06/09/20 1312   BP: 110/60   Pulse: 82   Weight: 205 lb (93 kg)     Wt Readings from Last 3 Encounters:   06/09/20 205 lb (93 kg)     There is no height or weight on file to calculate BMI.     Constitutional: In no apparent distress  Breasts: non-tender, symmetric appearance, no skin changes, nipple discharge, or axillary LAD. Left breast with a mobile, firm 1 cm mass with well circumscribed borders approximately 6 cm from the areola. No lymphadenopathy bilaterally       Orders Placed This Encounter   Procedures     Mammo Diagnostic Bilateral     US Breast Left Limited 1-3 Quadrants   Followup: Return if symptoms worsen or fail to improve. earlier if needed.        Donna Holt, CNP

## 2021-06-08 NOTE — PROGRESS NOTES
"Monika Franz is a 54 y.o. female who is being evaluated via a billable video visit.      The patient has been notified of following:     \"This video visit will be conducted via a call between you and your physician/provider. We have found that certain health care needs can be provided without the need for an in-person physical exam.  This service lets us provide the care you need with a video conversation.  If a prescription is necessary we can send it directly to your pharmacy.  If lab work is needed we can place an order for that and you can then stop by our lab to have the test done at a later time.    Video visits are billed at different rates depending on your insurance coverage. Please reach out to your insurance provider with any questions.    If during the course of the call the physician/provider feels a video visit is not appropriate, you will not be charged for this service.\"    Patient has given verbal consent to a Video visit? Yes    Patient would like to receive their AVS by AVS Preference: Elinor.    Patient would like the video invitation sent by: Text to cell phone: [phone]    Will anyone else be joining your video visit? No        Video Start Time:8:30am    Additional provider notes:     Internal Medicine Tele Visit  [facility]  Patient Name: Monika Franz  Patient Age: 54 y.o.  YOB: 1965  MRN: [id number]    Date of Visit: 2020  Reason for TeleVisit:   Chief Complaint   Patient presents with     Breast Mass     left breast mass, noticed a week ago. about the size of a marble. No pain or discharge. No redness in site. No change in size. last mammo was 10/2019 done with [facility].            Assessment / Plan / Medical Decision Makin. Breast lump on left side at 11 o'clock position  Patient will be seen in clinic today by Donna Holt CNP for breast exam and possible schedule of imaging.       No orders of the " defined types were placed in this encounter.  Followup: Return in about 1 day (around 6/10/2020). earlier if needed.    Health Maintenance Review  Health Maintenance   Topic Date Due     HEPATITIS C SCREENING  1965     PREVENTIVE CARE VISIT  1965     MAMMOGRAM  1965     HIV SCREENING  10/10/1980     TD 18+ HE  10/10/1983     TDAP ADULT ONE TIME DOSE  10/10/1983     ADVANCE CARE PLANNING  10/10/1983     COLORECTAL CANCER SCREENING  10/10/1983     PAP SMEAR  10/10/1986     LIPID  10/10/2010     ZOSTER VACCINES (1 of 2) 10/10/2015     INFLUENZA VACCINE RULE BASED  Completed   3      I am having Monika Franz maintain her levomilnacipran, ALPRAZolam, busPIRone, and traZODone.      HPI:  Monika Franz is a 54 y.o. year old who presents to virtual visit today.  Patient is new to me.  She presents with a chief concern for left breast mass which she notice approximately one week ago.  Patient reports it is the size of a marble located at 11:00 and denies any pain, redness or discharge.  Patient states her last mammogram was in October 2019 which was at [facility]. She denies any family history breast cancer.     Review of Systems- pertinent positive in bold:  Constitutional: Fever, chills, night sweats, fainting, weight change, fatigue, dizziness, sleeping difficulties, loud snoring/pauses in breathing  Eyes: change in vision, blurred or double vision, redness/eye pain  Ears, nose, mouth, throat: change in hearing, ear pain, hoarseness, difficulty swallowing, sores in the mouth or throat  Respiratory: shortness of breath, cough, bloody sputum, wheezing  Cardiovascular: chest pain, palpitations   Gastrointestinal: abdominal pain, heartburn/indigestion, nausea/vomiting, change in appetite, change in bowel habits, constipation or diarrhea, rectal bleeding/dark stools, difficulty swallowing  Urinary: painful urination, frequent urination, urinary urgency/incontinence, blood in urine/dark urine,  nocturia (new or increasing), muscle cramps, swelling of hands, feet, ankles, leg pain/redness  Skin: change in moles/freckles, rash, nodules  Hematologic/lymphatic: swollen lymph glands, abnormal bruising/bleeding  Endocrine: excessive thirst/urination, cold or heat intolerance  Neurologic/emotional: worrisome memory change, numbness/tingling, anxiety, mood swings      Current Scheduled Meds:  Outpatient Encounter Medications as of 6/9/2020   Medication Sig Dispense Refill     ALPRAZolam (XANAX) 0.5 MG tablet Take 0.5 mg by mouth daily as needed for sleep.       busPIRone (BUSPAR) 15 MG tablet Take 15 mg by mouth 2 (two) times a day.       levomilnacipran (FETZIMA) 80 mg Cs24 ER capsule Take 1 capsule by mouth.       traZODone (DESYREL) 150 MG tablet Take 150 mg by mouth at bedtime.       No facility-administered encounter medications on file as of 6/9/2020.      No past medical history on file.  No past surgical history on file.  Social History     Tobacco Use     Smoking status: Not on file   Substance Use Topics     Alcohol use: Not on file     Drug use: Not on file     No family history on file.  Social History     Social History Narrative     Not on file       Objective / Physical Examination:  There were no vitals filed for this visit.  Wt Readings from Last 3 Encounters:   No data found for Wt     There is no height or weight on file to calculate BMI.     General Appearance: Alert and oriented, cooperative, affect appropriate, speech clear, in no apparent distress  Head: Normocephalic, atraumatic  Eyes:   Conjunctivae clear and sclerae non-icteric  Lungs:  Normal inspiratory and expiratory effort  Neuro: Alert and oriented, follows commands appropriately.     No results found.  No results found for this or any previous visit (from the past 240 hour(s)).        Disha Jade CNP      Video-Visit Details    Type of service:  Video Visit    Video End Time (time video stopped): 8:36 AM  Originating  Location (pt. Location): Home    Distant Location (provider location):  [facility]     Platform used for Video Visit: Kelsy Jade CNP

## 2021-06-09 NOTE — TELEPHONE ENCOUNTER
Telephoned and left generic voice message for Monika at [phone] returning her call from earlier this afternoon. This appears to be her work number.  Amy Palumbo RN

## 2021-06-09 NOTE — PROGRESS NOTES
Per Radiologist request, helped Omnika schedule a surgical consult following her breast biopsy today.  Scheduled her to see Dr. Douglas on Monday, 6-29-20 at 0900/0845 check in at the [facility].  Support provided, invited calls.

## 2021-06-09 NOTE — TELEPHONE ENCOUNTER
Telephoned and spoke with Monika to inform her of HER2 by FISH from her US-guided core biopsies of mass in left breast performed on 6/22/20 at [facility].  This was HER2 NEGATIVE and we discussed the ramifications of that in reference to her discussion with Dr. Monson yesterday.  She was relieved to learn Herceptin would NOT be needed in her treatment.     She recognizes alopecia is an expected side effect of the chemotherapy regimen Dr. Monson has ordered.  Her brother has offered to purchase a wig for her and a wig resources folder will be mailed to her today (and [employer] by [facility] will be e-mailed).  I mentioned internet resources and YouTube videos about scarf tieing as well as Look Good Feel Better virtual classes.      Monika's  is connecting her with a breast cancer survivor from their [employer].  She expressed an interest in connecting and will consider reaching out to [employer] to be connected with a guide.    Emotional support and encouragement provided and calls welcomed. Amy Palumbo RN

## 2021-06-09 NOTE — TELEPHONE ENCOUNTER
Called patient to see how she is doing post chemo treatment. Patient reports she is doing quite well except some constipation. Her last bowel movement was 7/8/2020. I questioned what medications she had tried to help with it and she has been trying to increase her intake of high fiber foods and using Benefiber with no results. I advised patient to go to pharmacy and  a bottle of magnesium citrate and to take 1/2 bottle and see if that helps give some relief, if no relief within 4-6 hours then repeat with the remaining 1/2 bottle of magnesium citrate. Patient verbalized understanding. Patient will call our office tomorrow if she had no success with a bowel movement for further instructions. Patient verbalized appreciation of phone call and check in. Tosha Marie, CMA

## 2021-06-09 NOTE — PROGRESS NOTES
[facility] Hematology and Oncology Progress Note    Patient: Monika Franz  MRN: [id number]  Date of Service: 07/09/2020        Reason for Visit    Chief Complaint   Patient presents with     HE Cancer     Invasive ductal carcinoma of breast, left       Assessment and Plan  Cancer Staging  Invasive ductal carcinoma of breast, left (H)  Staging form: Breast, AJCC 8th Edition  - Clinical stage from 6/22/2020: Stage IIB (cT2, cN0, cM0, G3, ER-, VA-, HER2: Equivocal) - Signed by Sue Dougherty CNP on 7/9/2020    1. Breast cancer, Triple negative, Stage IIB: pt is here today to start neoadjuvant chemo. She is nervous to start. She will get dose dense AC every 2 weeks followed by weekly Taxol for 12 weeks. This is being done before surgery. She can palpate tumor. We signed a consent today. They felt educated. They understands the risks and benefits of treatment.  they understand how to use the post medications for nausea.  they know the side effects include, but are not limited to: alopecia, diarrhea/constipation, nausea, vomiting, fatigue/weakness, myelosuppression, cold sensitivities, peripheral neuropathy, taste alterations, poor appetite. They understand this is with curable intent. She will return in 2 weeks for cycle 2. MUGa normal.     2. Triple negative cancer, some family history: meeting with genetic counselor next week    3. Elevated liver function tests: likely from medication. She is weaning off buspar, which was the most likely medication per pharmacy. Her lft's are better today. She will get full dose today. We will continue to monitor and make adjustments as necessary.       ECOG Performance   ECOG Performance Status: 0     Distress Assessment  Distress Assessment Score: 6(unknowns with first chemo tx): anxiety related to diagnosis and the unknown side effect of treatment. She declined any further assistance at this time and thinks it will get better with chemo. Continue to monitor and refer to  psychologist if needed.       Pain  Currently in Pain: No/denies      Problem List    1. Encounter for antineoplastic immunotherapy  prochlorperazine (COMPAZINE) 10 MG tablet    dexAMETHasone (DECADRON) 4 MG tablet    lidocaine-prilocaine (EMLA) cream   2. Chemotherapy-induced neutropenia (H)  prochlorperazine (COMPAZINE) 10 MG tablet    dexAMETHasone (DECADRON) 4 MG tablet    lidocaine-prilocaine (EMLA) cream   3. Invasive ductal carcinoma of breast, left (H)  prochlorperazine (COMPAZINE) 10 MG tablet    dexAMETHasone (DECADRON) 4 MG tablet    lidocaine-prilocaine (EMLA) cream   4. Elevated LFTs  Hepatic Profile        ______________________________________________________________________________    History of Present Illness    Patient is a very pleasant 54-year-old woman who has been referred to me by Dr. Allyson Douglas for evaluation and treatment of a newly diagnosed breast cancer.  The patient found this lump located in her left breast measuring approximately 2 cm in size presenting with some stretching-like symptoms in her breast in the month of June.  She was then seen by her primary care physician and had a mammogram done which was very suspicious.  A biopsy confirmed the presence of invasive ductal carcinoma ER negative OK negative HER-2/joann 2+ on immunohistochemistry.  It has high-grade features.  The ultrasound measures this mass to be 1.6 cm in size.  It is located 11 cm from the nipple.  No nipple inversion or any other symptoms.     The patient has been seen by Dr. Allyson Douglas.  She had a Port-A-Cath placed as she is a candidate for neoadjuvant chemotherapy.     She has past medical history of gastric sleeve surgery about a year ago.  She is taking some vitamin B12 and other supplements.     She had a hysterectomy few years ago.  She is a little anxious to get started. Was found to have elevated lft's last week. Liver US was negative. Is weaning off buspar, which is what the pharmacist thought would  "be the most likely culprit. Having some headaches from weaning off that. Meeting with psychiatrist.        Treatment: starting neoadjuvant dose dense AC today.         Review of Systems    Oncology Nurse Assessment/CMA Intake: Constitutional  Constitutional (WDL): All constitutional elements are within defined limits  Neurosensory  Neurosensory (WDL): All neurosensory elements are within defined limits  Eye   Eye Disorder (WDL): Exceptions to WDL(glasses; being monitored for neuroangle glaucoma)  Ear  Ear Disorder (WDL): All ear disorder elements are within defined limits  Cardiovascular  Cardiovascular (WDL): All cardiovascular elements are within defined limits  Pulmonary  Respiratory (WDL): Within Defined Limits  Gastrointestinal  Gastrointestinal (WDL): All gastrointestinal elements are within defined limits  Genitourinary  Genitourinary (WDL): All genitourinary elements are within defined limits  Lymphatic  Lymph (WDL): All lymph disorder elements are within defined limits  Musculoskeletal and Connective Tissue  Musculoskeletal and Connetive Tissue Disorders (WDL): Exceptions to WDL  Myalgia: Concerns(occ)  Integumentary  Integumentary (WDL): All integumentary elements are within defined limits  Patient Coping  Patient Coping: Accepting;Open/discussion  Accompanied by  Accompanied by: Alone  Oral Chemo Adherence         Past History  Past Medical History:   Diagnosis Date     Anxiety      Depression      Sleep apnea     No longer use CPAP after Wt loss       PHYSICAL EXAM  /63   Pulse 81   Temp 98.2  F (36.8  C) (Oral)   Ht 5' 5.5\" (1.664 m)   Wt 208 lb 12.8 oz (94.7 kg)   LMP 06/15/2012   SpO2 99%   BMI 34.22 kg/m      GENERAL: no acute distress. Cooperative in conversation. Here alone due to visitor restrictions. Mask on  RESP: Regular respiratory rate. No expiratory wheezes   MUSCULOSKELETAL: no bilateral leg swelling  NEURO: non focal. Alert and oriented x3.   PSYCH: within normal limits. No " depression or anxiety.  SKIN: exposed skin is dry intact. New port is CDI  BREAST: left breast nodule in 10-11 o'clock position. Mobile. Round. Size of olive    Lab Results    Recent Results (from the past 168 hour(s))   NM Muga   Result Value Ref Range    MUGA EF 70.8 %       Imaging    Nm Muga    Result Date: 7/7/2020    1.The left ventricular ejection fraction is 70.8%.   2.Normal study.   3.No prior assessment of left ventricular wall motion available.      Mammo Diagnostic Bilateral    Result Date: 6/15/2020  EXAM: MAMMO DIAGNOSTIC 3D BILATERAL, US BREAST LEFT LIMITED 1-3 QUADRANTS LOCATION: [facility] DATE/TIME: 6/15/2020 2:19 PM INDICATION: 54-year-old female presents with a palpable left breast lump for approximately 1.5 weeks. Patient denies any significant change in size of this palpable lump. Patient denies any associated left nipple discharge. No family history of breast cancer. COMPARISON: Outside prior examinations were unavailable at the time of this interpretation. MAMMOGRAPHIC FINDINGS: Bilateral full-field digital diagnostic mammograms performed. The breasts are heterogeneously dense, which may obscure small masses. Images evaluated with the assistance of CAD. Breast tomosynthesis was used in interpretation. A BB marker was placed on the skin overlying the area of palpable concern within the left breast. There is an underlying, partially-imaged, irregular mass with obscured margins measuring at least 1.2 cm at the 10:00 position, posterior depth. Recommend targeted left breast ultrasound of this palpable mass for further evaluation. There are associated fine, linear and amorphous calcifications in a segmental distribution within the anterior aspect of this mass and extending anteriorly beyond the margins of the mass towards the nipple. These associated calcifications measure approximately 1.0 x 2.1 x 1.3 cm. The anteriormost aspect of these calcifications is located  approximately 1.9 cm anterior to the palpable mass. Additionally, there are multiple oval masses within the left subareolar region. Recommend additional sonographic evaluation of the left subareolar region for further evaluation. There are no suspicious masses, calcifications or architectural distortion within the right breast. ULTRASOUND FINDINGS: Patient directed physical examination of the palpable left breast lump demonstrates an approximately 1.5 cm oval, somewhat firm, fixed mass. Sonographic evaluation of the left breast was performed by both the technologist and the radiologist. Targeted left breast ultrasound at the area of palpable concern at the 10:00 position, 11 cm from the nipple, demonstrates a 1.6 x 1.0 x 1.2 cm irregular, hypoechoic mass with circumscribed margins and associated increased internal vascularity. Tiny echogenic foci within this mass are consistent with associated microcalcifications as demonstrated on mammography. Additional sonographic evaluation of the left subareolar region was performed. Multiple focally dilated ducts are demonstrated within the left subareolar region, correlating with the mammographic findings. There is no associated intraductal mass.     Suspicious, hypoechoic mass within the left breast at the 10:00 position correlates with the palpable area of concern. Recommend ultrasound-guided biopsy of this mass for definitive pathologic diagnosis. Associated suspicious microcalcifications in a segmental distribution extend approximately 1.9 cm beyond the anterior aspect of the palpable mass. If the results of the ultrasound-guided biopsy of the palpable mass are malignant, these suspicious calcifications would likely be included if breast conservation was desired, and therefore a stereotactic-guided biopsy of these calcifications is deferred at this time. Multiple benign, focally dilated ducts without intraductal mass within the left subareolar region. Outside prior  examinations were unavailable at the time of this interpretation. Once outside prior examinations are made available, a comparison read will be performed and an addendum will be issued. ACR BI-RADS Category 5: Highly Suggestive of Malignancy. I personally scanned and discussed the findings and recommendations with the patient at the conclusion of the examination.    Mammo Post Clip Placement Left    Addendum Date: 6/23/2020    Pathology shows invasive ductal carcinoma. This is consistent with imaging findings. A verbal report was given to the patient. Surgical consultation is recommended.    Result Date: 7/7/2020  US Breast Core Biopsy Left Mammo Post Clip Placement Left INDICATION: Left breast mass. PROCEDURE: Informed consent was obtained from the patient. Ultrasound was used to localize the mass at the 10 o'clock position of the left breast, zone 3, 11 cm from the nipple.  The skin was marked, then prepped and draped in a sterile fashion. 1% lidocaine was used for local anesthesia and additional deep anesthesia achieved using lidocaine with epinephrine.  Under direct sonographic guidance, a 14-gauge coaxial needle was used to obtain 4 core biopsies. A biopsy marking clip was then placed. Digital mammograms performed in a separate room, using separate equipment, to document clip placement, demonstrate the clip in appropriate position. The patient tolerated this well; there are no immediate complications. IMPRESSION: 1. Ultrasound-guided biopsy of mass in the left breast. Pathology pending. 2. Post procedure mammogram for marker placement     Us Breast Core Biopsy Left    Addendum Date: 6/23/2020    Pathology shows invasive ductal carcinoma. This is consistent with imaging findings. A verbal report was given to the patient. Surgical consultation is recommended.    Result Date: 6/22/2020  US Breast Core Biopsy Left Mammo Post Clip Placement Left INDICATION: Left breast mass. PROCEDURE: Informed consent was obtained  from the patient. Ultrasound was used to localize the mass at the 10 o'clock position of the left breast, zone 3, 11 cm from the nipple.  The skin was marked, then prepped and draped in a sterile fashion. 1% lidocaine was used for local anesthesia and additional deep anesthesia achieved using lidocaine with epinephrine.  Under direct sonographic guidance, a 14-gauge coaxial needle was used to obtain 4 core biopsies. A biopsy marking clip was then placed. Digital mammograms performed in a separate room, using separate equipment, to document clip placement, demonstrate the clip in appropriate position. The patient tolerated this well; there are no immediate complications. IMPRESSION: 1. Ultrasound-guided biopsy of mass in the left breast. Pathology pending. 2. Post procedure mammogram for marker placement     Us Liver    Result Date: 7/2/2020  EXAM: ULTRASOUND LIVER LOCATION: [facility] DATE/TIME: 7/2/2020 10:35 AM INDICATION: Malignant neoplasm of unspecified site of left female breast. Elevated hepatic enzymes. TECHNIQUE: Limited abdomen, liver ultrasound. COMPARISON: 02/04/2020 ultrasound FINDINGS: Ultrasound visualization score: A: No or minimal limitations. Homogeneous hepatic parenchymal echotexture. Smooth hepatic contour. No significant focal hepatic mass. No ascites. Absent gallbladder. Normal extrahepatic bile duct diameter of 7-10 mm. Antegrade portal venous flow. No ascites.     1.  Normal-appearing liver. 2.  Normal-appearing bile ducts.     Us Breast Left Limited 1-3 Quadrants    Result Date: 6/23/2020  EXAM: MAMMO DIAGNOSTIC 3D BILATERAL, US BREAST LEFT LIMITED 1-3 QUADRANTS LOCATION: [facility] DATE/TIME: 6/15/2020 2:19 PM INDICATION: 54-year-old female presents with a palpable left breast lump for approximately 1.5 weeks. Patient denies any significant change in size of this palpable lump. Patient denies any associated left nipple discharge. No family history of  breast cancer. COMPARISON: Outside prior examinations were unavailable at the time of this interpretation. MAMMOGRAPHIC FINDINGS: Bilateral full-field digital diagnostic mammograms performed. The breasts are heterogeneously dense, which may obscure small masses. Images evaluated with the assistance of CAD. Breast tomosynthesis was used in interpretation. A BB marker was placed on the skin overlying the area of palpable concern within the left breast. There is an underlying, partially-imaged, irregular mass with obscured margins measuring at least 1.2 cm at the 10:00 position, posterior depth. Recommend targeted left breast ultrasound of this palpable mass for further evaluation. There are associated fine, linear and amorphous calcifications in a segmental distribution within the anterior aspect of this mass and extending anteriorly beyond the margins of the mass towards the nipple. These associated calcifications measure approximately 1.0 x 2.1 x 1.3 cm. The anteriormost aspect of these calcifications is located approximately 1.9 cm anterior to the palpable mass. Additionally, there are multiple oval masses within the left subareolar region. Recommend additional sonographic evaluation of the left subareolar region for further evaluation. There are no suspicious masses, calcifications or architectural distortion within the right breast. ULTRASOUND FINDINGS: Patient directed physical examination of the palpable left breast lump demonstrates an approximately 1.5 cm oval, somewhat firm, fixed mass. Sonographic evaluation of the left breast was performed by both the technologist and the radiologist. Targeted left breast ultrasound at the area of palpable concern at the 10:00 position, 11 cm from the nipple, demonstrates a 1.6 x 1.0 x 1.2 cm irregular, hypoechoic mass with circumscribed margins and associated increased internal vascularity. Tiny echogenic foci within this mass are consistent with associated  microcalcifications as demonstrated on mammography. Additional sonographic evaluation of the left subareolar region was performed. Multiple focally dilated ducts are demonstrated within the left subareolar region, correlating with the mammographic findings. There is no associated intraductal mass.     Suspicious, hypoechoic mass within the left breast at the 10:00 position correlates with the palpable area of concern. Recommend ultrasound-guided biopsy of this mass for definitive pathologic diagnosis. Associated suspicious microcalcifications in a segmental distribution extend approximately 1.9 cm beyond the anterior aspect of the palpable mass. If the results of the ultrasound-guided biopsy of the palpable mass are malignant, these suspicious calcifications would likely be included if breast conservation was desired, and therefore a stereotactic-guided biopsy of these calcifications is deferred at this time. Multiple benign, focally dilated ducts without intraductal mass within the left subareolar region. Outside prior examinations were unavailable at the time of this interpretation. Once outside prior examinations are made available, a comparison read will be performed and an addendum will be issued. ACR BI-RADS Category 5: Highly Suggestive of Malignancy. I personally scanned and discussed the findings and recommendations with the patient at the conclusion of the examination.      Total time spent with patient was 40 minutes.  Greater than 50% of that was in counseling and care coordination.    Signed by: Sue Dougherty CNP

## 2021-06-09 NOTE — PROGRESS NOTES
[facility] Cancer Care Progress Note    Patient: Monika Franz  MRN: [id number]  Date of Service: 7/23/2020        Reason for visit      1. Encounter for antineoplastic immunotherapy    2. Chemotherapy-induced neutropenia (H)    3. Invasive ductal carcinoma of breast, left (H)        Assessment     1.  A very pleasant 54-year-old woman with left-sided breast cancer at least T2 N0 M0 ER MD negative HER-2/joann negative. Started on sreekanth adjuvant chemotherapy.  2.  Slightly overweight.  3.  Status post Port-A-Cath placement.  4.  Family history of breast cancer in a maternal cousin.  Mother had lymphoma.  5.  No other risk factors for breast cancer.  6.  Status post gastric sleeve about a year ago.      Plan     1.  Proceed with neck cycle of dose dense Adriamycin and cyclophosphamide with Neulasta support.  2. Symptom management with constipation prophylaxis.  3.  Advised the patient that she might be losing some here after this treatment.  4.  Breast examination before the neck cycle.  We should see some response.    Clinical stage      Cancer Staging  Invasive ductal carcinoma of breast, left (H)  Staging form: Breast, AJCC 8th Edition  - Clinical stage from 6/22/2020: Stage IIB (cT2, cN0, cM0, G3, ER-, MD-, HER2: Equivocal) - Signed by Sue Dougherty CNP on 7/9/2020      History     Moinka Franz is a very pleasant 54 y.o. old female with a history of triple negative left breast cancer.  The patient found this lump located in her left breast measuring approximately 2 cm in size, at 10 o'clock position, presenting with some stretching-like symptoms in her breast in the month of June 2020.  She was then seen by her primary care physician and had a mammogram done which was very suspicious.  A biopsy confirmed the presence of invasive ductal carcinoma ER negative MD negative HER-2/joann 2+ on immunohistochemistry negative on FISH.  It has high-grade features.  The ultrasound measures this mass to be 1.6 cm  in size.  It is located 11 cm from the nipple.  No nipple inversion or any other symptoms.     The patient has been seen by Dr. Allyson Douglas.  She had a Port-A-Cath placed for neoadjuvant chemotherapy.     She has past medical history of gastric sleeve surgery about a year ago.  She is taking some vitamin B12 and other supplements.     She had a hysterectomy few years ago.     As for the breast cancer risk factors are concerned she is G4, P3.  First child at age 29.  Breast-feeding for all 3 of them.  No prior breast biopsies.  No significant family history.    She has been started on neoadjuvant chemotherapy on 9 July 2020 with dose dense adriamysin cyclophosphamide followed by paclitaxel.  She is here for her next treatment.  So far she seems to be doing okay.  Has not noticed any significant response on the breast lump.  She did have some constipation.    Past Medical History     Past Medical History:   Diagnosis Date     Anxiety      Depression      Sleep apnea     No longer use CPAP after Wt loss           Review of Systems   Constitutional  Constitutional (WDL): All constitutional elements are within defined limits  Neurosensory  Neurosensory (WDL): All neurosensory elements are within defined limits  Cardiovascular  Cardiovascular (WDL): All cardiovascular elements are within defined limits  Pulmonary  Respiratory (WDL): Within Defined Limits  Gastrointestinal  Gastrointestinal (WDL): Exceptions to WDL  Constipation: Occasional or intermittent symptoms, occasional use of stool softeners, laxatives, dietary modification, or enema  Genitourinary  Genitourinary (WDL): All genitourinary elements are within defined limits  Integumentary  Integumentary (WDL): All integumentary elements are within defined limits  Patient Coping  Patient Coping: Accepting  Accompanied by  Accompanied by: Alone    ECOG performance status and Distress Assessment      ECOG Performance:    ECOG Performance Status: 0    Distress  Assessment  Distress Assessment Score: No distress:     Pain Status  Currently in Pain: No/denies        Vital Signs     Vitals:    07/23/20 0850   BP: 114/67   Pulse: 76   Temp: 98.1  F (36.7  C)   SpO2: 95%       Physical Exam     GENERAL: No acute distress. Cooperative in conversation.   HEENT: Pupils are equal, round and reactive. Oral mucosa is clean and intact. No ulcerations or mucositis noted. No bleeding noted.  RESP:Chest symmetric lungs are clear bilaterally per auscultation. Regular respiratory rate. No wheezes or rhonchi.  CV: Normal S1 S2 Regular, rate and rhythm. No murmurs.  ABD: Nondistended, soft, nontender. Positive bowel sounds. No organomegaly.   EXTREMITIES: No lower extremity edema.   NEURO: Non- focal. Alert and oriented x3.  Cranial nerves appear intact.  PSYCH: Within normal limits. No depression or anxiety.  SKIN: Warm dry intact.    LYMPH NODES: Bilateral cervical, supraclavicular, axillary lymph node examination was done.  Negative for any palpable adenopathy.      Lab Results     Results for orders placed or performed in visit on 07/23/20   Comprehensive Metabolic Panel   Result Value Ref Range    Sodium 141 136 - 145 mmol/L    Potassium 4.0 3.5 - 5.0 mmol/L    Chloride 106 98 - 107 mmol/L    CO2 27 22 - 31 mmol/L    Anion Gap, Calculation 8 5 - 18 mmol/L    Glucose 81 70 - 125 mg/dL    BUN 12 8 - 22 mg/dL    Creatinine 0.70 0.60 - 1.10 mg/dL    GFR MDRD Af Amer >60 >60 mL/min/1.73m2    GFR MDRD Non Af Amer >60 >60 mL/min/1.73m2    Bilirubin, Total 0.1 0.0 - 1.0 mg/dL    Calcium 9.1 8.5 - 10.5 mg/dL    Protein, Total 6.5 6.0 - 8.0 g/dL    Albumin 3.9 3.5 - 5.0 g/dL    Alkaline Phosphatase 89 45 - 120 U/L    AST 26 0 - 40 U/L    ALT 46 (H) 0 - 45 U/L   HM1 (CBC with Diff)   Result Value Ref Range    WBC 6.8 4.0 - 11.0 thou/uL    RBC 3.99 3.80 - 5.40 mill/uL    Hemoglobin 12.7 12.0 - 16.0 g/dL    Hematocrit 38.7 35.0 - 47.0 %    MCV 97 80 - 100 fL    MCH 31.8 27.0 - 34.0 pg    MCHC 32.8  32.0 - 36.0 g/dL    RDW 12.0 11.0 - 14.5 %    Platelets 188 140 - 440 thou/uL    MPV 9.5 8.5 - 12.5 fL         Imaging Results     Nm Muga    Result Date: 7/7/2020    1.The left ventricular ejection fraction is 70.8%.   2.Normal study.   3.No prior assessment of left ventricular wall motion available.      Us Liver    Result Date: 7/2/2020  EXAM: ULTRASOUND LIVER LOCATION: [facility] DATE/TIME: 7/2/2020 10:35 AM INDICATION: Malignant neoplasm of unspecified site of left female breast. Elevated hepatic enzymes. TECHNIQUE: Limited abdomen, liver ultrasound. COMPARISON: 02/04/2020 ultrasound FINDINGS: Ultrasound visualization score: A: No or minimal limitations. Homogeneous hepatic parenchymal echotexture. Smooth hepatic contour. No significant focal hepatic mass. No ascites. Absent gallbladder. Normal extrahepatic bile duct diameter of 7-10 mm. Antegrade portal venous flow. No ascites.     1.  Normal-appearing liver. 2.  Normal-appearing bile ducts.         Amando Monson MD

## 2021-06-09 NOTE — PROGRESS NOTES
"7/13/2020     Monika Franz is a 54 y.o. female who is being evaluated via a billable telephone visit.      The patient has been notified of following:     \"This telephone visit will be conducted via a call between you and your physician/provider. We have found that certain health care needs can be provided without the need for a physical exam.  This service lets us provide the care you need with a short phone conversation. If lab work is needed we can place an order for that and you can then stop by our lab to have the test done at a later time.    Telephone visits are billed at different rates depending on your insurance coverage. During this emergency period, for some insurers they may be billed the same as an in-person visit.  Please reach out to your insurance provider with any questions.    If during the course of the call the physician/provider feels a telephone visit is not appropriate, you will not be charged for this service.\"    Patient has given verbal consent to a Telephone visit? Yes    What phone number would you like to be contacted at? [phone]    Patient would like to receive their AVS by AVS Preference: Elinor.      Referring Provider: Allyson Douglas DO    Present for Today's Visit: Monika     Presenting Information:   I spoke with Monika Franz over the phone today for genetic counseling to discuss her personal and family history of breast cancer.  She is here today to review this history, cancer screening recommendations, and available genetic testing options.    Personal History:  Monika is a 54 y.o. female. She was diagnosed with a left breast cancer; triple negative in June 2020. She began neoadjuvant chemotherapy on 7/9 and is being followed by Dr. Monson and Sue Dougherty CNP.     She is s/p gastric sleeve surgery, laminectomy, and cholecystectomy.       She had her first menstrual period at age 14, her first child at age 29, and reports that she is premenopausal.  Monika " has her ovaries and fallopian tubes in place and she has had no ovarian cancer screening to date. Monika had a hysterectomy in 2012 due to menstrual issues and anemia.  She reports a 1-year history of oral contraceptive use from ages [age] and that she has never been on hormone replacement therapy.      Her most recent OB-GYN exam and Pap smear on 10/11/2019 were normal.  Her most recent mammogram prior to her diagnosis was on 10/15/2019 and was normal with breast density reported as heterogeneously dense.  She began having colonoscopies at the age of 46. She reports that one colon polyp was removed on her first colonoscopy in 2012. Her most recent colonoscopy in 2016 resulted in the removal of 1 serrated adenoma and follow-up was recommended in 5 years.  She does not regularly do any other cancer screening at this time.  Monika reported no tobacco use, and no alcohol use.    Family History: (Please see scanned pedigree for detailed family history information)  Children/Siblings    Monika has a daughter, age 25, who is reportedly healthy with no cancer history.    Monika has a son, age 22, who is reportedly healthy with no cancer history.     Monika has a son, age 16, who is reportedly healthy with no cancer history.    Monika has a sister, age 59, has a history of an unknown number of colon polyps and no reported cancer history.    Monika has a brother, age 55, with a history of an unknown number of colon polyps and no reported cancer history.  Maternal    Monika's mother, age 79, was recently diagnosed at age 79 and underwent chemotherapy and radiation.    Monika's maternal uncle passed away at age 25 from Hodgkin's lymphoma.     Monika's maternal uncle passed away at age 82 from lung cancer diagnosed at age 77. He was a smoker. This uncle has a daughter who passed away at age 46 from breast cancer diagnosed at age 44.     Monika's maternal aunt passed away in her 80's from a stroke with no reported  cancer history.     Monika's maternal grandmother passed away in her 40's from an unknown chronic disease that was reportedly no cancer-related.     Monika's maternal grandfather passed away at age 65 from a heart attack with no reported cancer history.   Paternal     Monika's father, age 81, has a history of prostate cancer diagnosed at age 67. She reports that she is unsure of the aggressiveness of his prostate cancer. She also reports that her father has a history of an unknown number of colon polyps.     Monika's paternal uncle passed away at age 82 from brain cancer diagnosed at age 82. This uncle had a son who passed away at age 45 from a leukemia.     Monika's paternal uncle passed away at age 81 from Alzheimer's disease with no reported cancer history.    Monika's paternal grandmother passed away at an unknown age from an unknown cause.     Monika's paternal grandfather passed away in his early 80's from strokes with no reported cancer history.     Her maternal ethnicity is [place]. Her paternal ethnicity is [place].  There is no known Ashkenazi Muslim ancestry on either side of her family. There is no reported consanguinity.    Discussion:    Monika's personal and family history of breast cancer is suggestive of a hereditary cancer syndrome.    We reviewed the features of sporadic, familial, and hereditary cancers. In looking at Monika's family history, it is possible that a cancer susceptibility gene is present due to her triple negative breast cancer diagnosed at age 54 and her maternal first-cousin's early-onset breast cancer.    We discussed the natural history and genetics of hereditary breast cancers. A detailed handout regarding the information we discussed will be sent to Monika via [employer]. Topics included: inheritance pattern, cancer risks, cancer screening recommendations, and also risks, benefits and limitations of testing.    We reviewed that the most common cause of hereditary breast  cancer is Hereditary Breast and Ovarian Cancer (HBOC) syndrome, which is caused by mutations in the genes BRCA1 and BRCA2. BRCA1 and BRCA2 are two genes that increase the risk for breast and ovarian cancers, among others. Women who inherit a BRCA mutation have a 50 to 85% lifetime risk of breast cancer and up to a 40% lifetime risk of ovarian cancer. This is higher than the general population lifetime risks of 12% for breast cancer and less than 2% for ovarian cancer. Men with BRCA gene mutations have up to a 7% risk of breast cancer and 20% risk of prostate cancer. Other cancers, such as pancreatic cancer and melanoma, have also been associated with BRCA mutations.    Based on her personal and family history, Monika meets current National Comprehensive Cancer Network (NCCN) criteria for genetic testing of high-penetrance breast and/or ovarian cancer susceptibility genes.      We discussed that there are additional genes that could cause increased risk for breast cancer. As many of these genes present with overlapping features in a family and accurate cancer risk cannot always be established based upon the pedigree analysis alone, it would be reasonable for Monika to consider panel genetic testing to analyze multiple genes at once.    We reviewed genetic testing options for hereditary breast and gynecologic cancers: actionable high/moderate breast and gynecologic cancer risk custom panel (CustomNext-Cancer, 19 genes, a combination of GynPlus and BRCAplus + NBN, STK11, and NF1) and expanded high and moderate risk panel (OvaNext, 25 genes).  Monika expressed an interest in learning as much information as possible from the testing. She opted for the OvaNext panel.  Genetic testing is available for 25 genes associated with hereditary gynecologic, breast, and related cancers: OvaNext (RAISA, BARD1, BRCA1, BRCA2, BRIP1, CDH1, CHEK2, DICER1, EPCAM, MLH1, MRE11A, MSH2, MSH6, MUTYH, NBN, NF1, PALB2, PMS2, PTEN, RAD50,  RAD51C, RAD51D, SMARCA4, STK11, and TP53).  We discussed that some of the genes in the OvaNext panel are associated with specific hereditary cancer syndromes and published management guidelines: Hereditary Breast and Ovarian Cancer syndrome (BRCA1, BRCA2), Campbell syndrome (MLH1, MSH2, MSH6, PMS2, EPCAM), Hereditary Diffuse Gastric Cancer (CDH1), Cowden syndrome (PTEN), Li Fraumeni syndrome (TP53), Peutz-Jeghers syndrome (STK11), MUTYH Associated Polyposis (MUTYH), and Neurofibromatosis type 1 (NF1).    Risk-reducing salpingo-oophorectomy can be considered in women with mutations in BRIP1, RAD51C, or RAD51D. Breast and/or other cancer risk management guidelines are available for RAISA, CHEK2, PALB2, NF1, and NBN.  The remaining genes (BARD1, DICER1, MRE11A, RAD50, and SMARCA4) are associated with increased cancer risk and may allow us to make medical recommendations when mutations are identified.    Consent was obtained over the phone with no witness required due to the current covid19 global pandemic.    Medical Management: For Monika, we reviewed that the information from genetic testing may determine:    surgery to treat Monika's active cancer diagnosis (i.e. lumpectomy versus bilateral mastectomy, partial versus total colectomy, etc.),    additional cancer screening for which Moinka may qualify (i.e. mammogram and breast MRI, more frequent colonoscopies, more frequent dermatologic exams, etc.),    options for risk reducing surgeries Monika could consider (i.e. bilateral mastectomy, surgery to remove her ovaries, etc.),      and targeted chemotherapies for Monika's active cancer, or if she were to develop certain cancers in the future (i.e. immunotherapy for individuals with Campbell syndrome, PARP inhibitors, etc.).     These recommendations and possible targeted chemotherapies will be discussed in detail once genetic testing is completed.    Wendy is scheduled to have an infusion on 7/23/2020 at  [facility]  [facility]). I shared that I will have an [employer] kit ready to go for her blood draw for genetic testing to be drawn during her upcoming infusion appointment.     Plan:  1) Today Monika  elected to proceed with OvaNext genetic testing (25 genes) through [employer]. Her blood will be drawn for this genetic testing at her upcoming infusion appointment on 7/23/2020.  2) This information should be available approximately 3-4 weeks from the time [employer] receives her blood sample.  3) Monika will be contacted by our scheduling department to set up a result disclosure appointment either in person or over the phone.     Time spend over the phone: 55 minutes    Kusum Barraza MS, [facility] [place]  Licensed Genetic Counselor  [facility]  [phone]

## 2021-06-09 NOTE — TELEPHONE ENCOUNTER
Monika telephoned about chemo start and what she can anticipate going forward.  Port was placed today.  She will meet with Dr Monson tomorrow.  Discussed cardiac functioning testing such as echo or MUGA, and chemo class, which is now incorporated into her first infusion.   HER2 by FISH is still pending but consult will take place whether final result it available or not.      She knows I will be touching base with her after her consult and we can discuss [facility] resources at that time. Amy Palumbo RN

## 2021-06-09 NOTE — PROGRESS NOTES
"Pt ambulates to infusion center for lab draw prior to MD visit.  IVAD accessed, labs drawn et sent.  Pt was seen by Dr. Monson and orders were approved.  Treatment administered without difficulty.  Cytoxan infused over 60 minutes due to patient c/o \"headache\" after last treatment.  Neulasta Onpro applied as ordered.  IVAD flushed w/NS et heparin and needle deaccessed.  Pt left clinic stable to lobby.  Plan RTC as scheduled.  "

## 2021-06-09 NOTE — CONSULTS
Consults   [facility] Hematology and Oncology Consult Note    Patient: Monika Franz  MRN: [id number]  Date of Service: 07/01/2020        Reason for Visit     1. Invasive ductal carcinoma of breast, left (H)         Assessment     1.  A very pleasant 54-year-old woman with left-sided breast cancer at least T2 N0 M0 ER IL negative HER-2/joann 2+ on immunohistochemistry.  FISH testing is pending.  2.  Slightly overweight.  3.  Status post Port-A-Cath placement.  4.  Family history of breast cancer in a maternal cousin.  Mother had lymphoma.  5.  No other risk factors for breast cancer.  6.  Status post gastric sleeve about a year ago.    Plan     1.  She needs HER-2/joann by FISH.  2.  Cardiac evaluation prior to chemotherapy.  3.  Neoadjuvant chemotherapy.  If she is HER-2/joann negative then she would be treated with dose dense actomyosin cyclophosphamide given every 2 weeks for 4 cycles.  Following that she will get weekly paclitaxel for total of 12 doses.  Postoperatively she would have radiation therapy.  If she is HER-2 positive then she will have Herceptin given with paclitaxel.  If she has residual disease postoperatively then she will get Kadcyla for 6 months.  If she has no residual disease then she will finish 1 year of Herceptin total.  4.  We will do biomarker testing in her blood for CA 27-29 as well as CEA.  5.  Follow-up with me after she has the blood test done.    Staging History     Cancer Staging  No matching staging information was found for the patient.    History     Patient is a very pleasant 54-year-old woman who has been referred to me by Dr. Allyson Douglas for evaluation and treatment of a newly diagnosed breast cancer.  The patient found this lump located in her left breast measuring approximately 2 cm in size presenting with some stretching-like symptoms in her breast in the month of June.  She was then seen by her primary care physician and had a mammogram done which was very suspicious.  A  biopsy confirmed the presence of invasive ductal carcinoma ER negative IA negative HER-2/joann 2+ on immunohistochemistry.  It has high-grade features.  The ultrasound measures this mass to be 1.6 cm in size.  It is located 11 cm from the nipple.  No nipple inversion or any other symptoms.    The patient has been seen by Dr. Allyson Douglas.  She had a Port-A-Cath placed as she is a candidate for neoadjuvant chemotherapy.    She has past medical history of gastric sleeve surgery about a year ago.  She is taking some vitamin B12 and other supplements.    She had a hysterectomy few years ago.    As for the breast cancer risk factors are concerned she is G4, P3.  First child at age 29.  Breast-feeding for all 3 of them.  No prior breast biopsies.  No significant family history.    Past Medical History     Past Medical History:   Diagnosis Date     Anxiety      Depression      Sleep apnea     No longer use CPAP after Wt loss       Past Social History     Social History     Socioeconomic History     Marital status:      Spouse name: Not on file     Number of children: Not on file     Years of education: Not on file     Highest education level: Not on file   Occupational History     Not on file   Social Needs     Financial resource strain: Not on file     Food insecurity     Worry: Not on file     Inability: Not on file     Transportation needs     Medical: Not on file     Non-medical: Not on file   Tobacco Use     Smoking status: Never Smoker     Smokeless tobacco: Never Used   Substance and Sexual Activity     Alcohol use: Never     Frequency: Never     Drug use: Never     Sexual activity: Never   Lifestyle     Physical activity     Days per week: Not on file     Minutes per session: Not on file     Stress: Not on file   Relationships     Social connections     Talks on phone: Not on file     Gets together: Not on file     Attends Buddhism service: Not on file     Active member of club or organization: Not on file      Attends meetings of clubs or organizations: Not on file     Relationship status: Not on file     Intimate partner violence     Fear of current or ex partner: Not on file     Emotionally abused: Not on file     Physically abused: Not on file     Forced sexual activity: Not on file   Other Topics Concern     Not on file   Social History Narrative     Not on file       Family History     Family History   Problem Relation Age of Onset     Lymphoma Mother 79        Non-Hodgkins     Prostate cancer Father 67     Hodgkin's lymphoma Maternal Uncle 20     Brain cancer Paternal Uncle 82     Lung cancer Maternal Uncle 77     Breast cancer Maternal cousin 46     Leukemia Paternal cousin 45       Medication List     Prior to Admission medications    Medication Sig Start Date End Date Taking? Authorizing Provider   calcium carbonate 1250 MG capsule Take 1,250 mg by mouth 3 (three) times a day.   Yes PROVIDER, HISTORICAL   cholecalciferol, vitamin D3, 1,000 unit (25 mcg) tablet Take 5,000 Units by mouth daily.   Yes PROVIDER, HISTORICAL   cyanocobalamin 1000 MCG tablet Take 1,000 mcg by mouth 3 (three) times a day.   Yes PROVIDER, HISTORICAL   multivitamin (CHILDREN'S VITAMIN ORAL) Take 1 tablet by mouth 2 (two) times a day.   Yes PROVIDER, HISTORICAL   ARIPiprazole (ABILIFY) 5 MG tablet  1/3/20   PROVIDER, HISTORICAL   busPIRone (BUSPAR) 15 MG tablet Take 15 mg by mouth 2 (two) times a day.    PROVIDER, HISTORICAL   levomilnacipran (FETZIMA) 80 mg Cs24 ER capsule Take 1 capsule by mouth. 5/8/20   PROVIDER, HISTORICAL   traMADoL (ULTRAM) 50 mg tablet Take 1 tablet (50 mg total) by mouth every 6 (six) hours as needed for pain. 6/30/20   Allyson Douglas DO   traZODone (DESYREL) 150 MG tablet Take 150 mg by mouth at bedtime.    PROVIDER, HISTORICAL       Allergies     Allergies   Allergen Reactions     Nsaids (Non-Steroidal Anti-Inflammatory Drug) Nausea And Vomiting     After surgery         Review of Systems       General  General (WDL): All general elements are within defined limits  ENT  ENT (WDL): Exceptions to WDL  Glasses or Contacts: Yes - Chronic (Greater than 3 months)  Respiratory  Respiratory (WDL): All respiratory elements are within defined limits  Cardiovascular  Cardiovascular (WDL): All cardiovascular elements are within defined limits  Endocrine  Endocrine (WDL): All endocrine elements are within defined limits  Gastrointestinal  Gastrointestinal (WDL): All gastrointestinal elements are within defined limits  Musculoskeletal  Musculoskeletal (WDL): All musculoskeletal elements are within defined limits  Neurological  Neurological (WDL): Exceptions to WDL  Dominant Hand: Right  Numbness and/or tingling: Yes - Chronic (Greater than 3 months)(wrists when sleeping)  Psychological/Emotional  Psychological/Emotional (WDL): Exceptions to WDL  Depression: Yes - Chronic (Greater than 3 months)  Anxiety: Yes - Chronic (Greater than 3 months)  Hematological/Lymphatic  Hematological/Lymphatic (WDL): All hematological/lymphatic elements are within defined limits  Dermatological  Dermatologic (WDL): Exceptions to WDL  Changes in moles: Yes - Recent (Less than 3 months)(1/8/20-precancerous mole removed)  Genitourinary/Reproductive  Genitourinary/Reproductive (WDL): All genitourinary/reproductive elements are within defined limits  Reproductive (Females only)  Menstrual irritation or increase in discharge: No  Age at start of periods: 14  Last menstural cycle: 11/2010  Number of pregnancies: 4  Age at first pregnancy: 29  Patient Pregnant?: No  Is the patient trying to get pregnant?: No  Is the patient on birth control: No  Pain  Currently in Pain: No/denies        Physical Exam     Recent Vitals 7/1/2020   Height -   Weight 213 lbs   BSA (m2) 2.13 m2   /59   Pulse 83   Temp 98.4   Temp src 1   SpO2 -   Some recent data might be hidden       Constitutional: Oriented to person, place, and time and appears  well-developed.   HEENT:  Normocephalic and atraumatic.  Eyes: Conjunctivae and EOM are normal. Pupils are equal, round, and reactive to light. No discharge.  No scleral icterus. Nose normal. Mouth/Throat: Oropharynx is clear and moist. No oropharyngeal exudate.    NECK: Normal range of motion. Neck supple. No JVD present. No tracheal deviation present. No thyromegaly present.   CARDIOVASCULAR: Normal rate, regular rhythm and intact distal pulses.  Exam reveals no gallop and no friction rub.  Systolic murmur present.  PULMONARY: Effort normal and breath sounds normal. No respiratory distress.No Wheezing or rales.  ABDOMEN: Soft. Bowel sounds are normal. No distension and no mass.  There is no tenderness. There is no rebound and no guarding. No HSM.  MUSCULOSKELETAL: Normal range of motion. No edema and no tenderness. Mild kyphosis, no tenderness.  LYMPH NODES: Has no cervical, supraclavicular, axillary and groin adenopathy.   NEUROLOGICAL: Alert and oriented to person, place, and time. No cranial nerve deficit.  Normal muscle tone. Coordination normal.   GENITOURINARY: Deferred exam.  SKIN: Skin is warm and dry. No rash noted. No erythema. No pallor.   EXTREMITIES: No cyanosis, no clubbing, no edema. No Deformity.  PSYCHIATRIC: Normal mood, affect and behavior.      Lab Results     Recent Results (from the past 240 hour(s))   Surgical Pathology Exam   Result Value Ref Range    Case Report       Surgical Pathology                                Case: [id number]                                    Authorizing Provider:  Donna Holt FNP   Collected:           06/22/2020 1025              Ordering Location:     [facility]   Received:            06/22/2020 7229                                     [place]                                                                    Pathologist:           Verenice Rodriguez MD                                                         Specimen:    Breast, Left                                                                                IHC ER/CA and HER2/joann Report, Addendum       ANCILLARY STUDIES, LEFT BREAST TUMOR, CORE BIOPSIES:     1) ESTROGEN RECEPTOR:  NEGATIVE (0%)    2) PROGESTERONE RECEPTOR:  NEGATIVE (0%)    3) HER2/JOANN:  INDETERMINATE; 2+.  PARAFFIN EMBEDDED TISSUE SUBMITTED FOR REFLEX HER2/JOANN ANALSYSIS BY FISH                                 (SEE SUPPLEMENTAL REPORT)     4) GATA3:  STRONGLY POSITIVE; CONSISTENT WITH BREAST PRIMARY SOURCE     [facility]    COMMENT:   Immunohistochemical controls stain appropriately. There is positive staining for estrogen and progesterone receptor within a few scattered benign breast ductules.     Method for evaluation:  Manual estimation. Dr. Vivi Soria concurs with the stain interpretation.     ADDITIONAL CPT:  89211       Final Diagnosis       BREAST, LEFT, 10 O'CLOCK, 11 CM FROM NIPPLE, ULTRASOUND CORE BIOPSIES OF MASS:    1) INVASIVE DUCTAL CARCINOMA:        a) GRADE: OSMEL GRADE III (of III)        b) SCORE: 8 (OF 9)        c) MAXIMUM LENGTH OF TUMOR WITHIN CORE BIOPSIES: 12 MM    2) DUCTAL CARCINOMA IN SITU (DCIS): NOT DEFINITIVELY IDENTIFIED     3) ADDITIONAL FINDINGS:         a) ASSOCIATED DESMOPLASTIC REACTION        b) PATCHY COMEDONECROSIS     4) ER/CA/HER2 IMMUNOHISTOCHEMISTRY IS PENDING AND WILL BE REPORTED IN AN ADDENDUM                [facility]    Comment       Dr. Vivi Soria has reviewed this case and concurs with the diagnosis.    Black ink is confirmed. Total formalin fixation time: 11:35    Microscopic Description       Microscopic examination performed, substantiating the above diagnosis.    Clinical Information       Clinical history:  Left breast mass  Reason for procedure:  Left breast mass    Core Breast Biopsy  Method: Ultrasound  Breast: Left  Site: 10 o'clock - 11 cm FN  Number of Cores: 4  Indication: Mass  Size:  1.6 cm  Pre-test Suspicion: High  Time of Collection: 10:25 am  Time Placed in Formalin:  10:25 am  Microcalcifications present on radiograph: No  Radiologist: Shayla Vicente MD    Gross Description       Received in formalin, labeled with the patient's name, Monika Franz and left breast core biopsy, 10:00, 11 cm FN, are four, cylindrical, white-pink to yellow-red cores of tissue ranging from 0.3 x 0.1 cm to 1.4 x 0.1 cm. The cores are inked black. TE-1C    NOTE:  The specimen is collected and placed into formalin at 10:25, 06/22/2020. [facility]    Special Stains       Note - Estrogen and Progesterone Receptor Immunoperoxidase Stains:  These stains were done using the following criteria:    Specimen fixative: Formalin-fixed paraffin-embedded sections    Detection system: Biotin-free multimer-based technology detection system ([employer])    Retrieval method: CC1 pretreatment; a blanche-based buffer with a slightly basic PH used at an elevated                                       temperature (95+5 degrees C)    Clone:  Estrogen receptor-SP1, Rabbit monoclonal ([place])     Progesterone receptor-1E2, Rabbit monoclonal ([place])        Scoring method (CAP/ASCO guidelines):                                       Intensity of nuclear stain: strong vs weak vs absent                                       Percentage of cells stained:                                         Indeterminate (Internal control cells present; no immunoreactivity of either                                         tumor cells or internal controls)                                          Negative (Percentage of cells with nuclear positivity is less than 1%)                                         Positive (Percentage of cells with nuclear positivity is equal to or greater than 1%)    REFERENCES:  1) Kylie ME, González JHAVERI, Fiorella M, et al. American Society of Clinical Oncology/College of American Pathologists guideline recommendations for immunohistochemical testing of estrogen and progesterone receptors in breast cancer. Arch Pathol Lab  Med. 2010;134(6):907-922     Note - HER-2/joann Immunoperoxidase Stain:  This stain was done using the following criteria:    Specimen fixative: Formalin-fixed paraffin-embedded sections    Detection system: Biotin-free multimer-based technology detection system ([employer])    Retrieval method: CC1 pretreatment; a blanche-based buffer with a slightly basic PH used at an elevated     temperature (95 plus or minus 5 degrees C)    Clone:  4B5, Rabbit monoclonal ([employer])    Scoring method: IHC 3+ - Positive (Circumferential membrane  staining that is complete, intense, and     within greater than 10% of tumor cells)       IHC 2+ - Equivocal (Circumferential Membrane staining that is incomplete and/or     weak/moderate and within greater than 10% of tumor cells or complete and     circumferential membrane staining that is intense and less than or equal to 10% of tumor     cells)       IHC1+ - Negative (Incomplete membrane staining that is faint/barely perceptible     and within greater than 10% of tumor cells       IHC 0 - Negative (No staining is Observed or Membrane staining that is incomplete and     is faint/barely perceptible and within less than or equal to 10% of tumor cells)    REFERENCES:  1) Debbie QUEZADA et al: Recommendations for Human Epidermal Growth Factor Receptor 2 Testing in Breast Cancer.      American Society of Clinical Oncology/College of American Pathologists Clinical Practice Guideline Update.      Arch Pathol Lab Med. 2014;138: 241-256     * HER-2/joann stain performed using the [employer]'s  FDA approved methodology.    Charges CPT: 45471, 88360 x3  ICD-10: C50.912     Result Flag Malignant (!) Normal   COVID-19 Virus PCR MRF    Specimen: Nasopharyngeal Swab; Respiratory   Result Value Ref Range    COVID-19 VIRUS SPECIMEN SOURCE Nasopharyngeal     2019-nCOV Not Detected            Imaging Results     Mammo Diagnostic Bilateral    Result Date: 6/15/2020  EXAM: MAMMO DIAGNOSTIC 3D BILATERAL, US  BREAST LEFT LIMITED 1-3 QUADRANTS LOCATION: [facility] DATE/TIME: 6/15/2020 2:19 PM INDICATION: 54-year-old female presents with a palpable left breast lump for approximately 1.5 weeks. Patient denies any significant change in size of this palpable lump. Patient denies any associated left nipple discharge. No family history of breast cancer. COMPARISON: Outside prior examinations were unavailable at the time of this interpretation. MAMMOGRAPHIC FINDINGS: Bilateral full-field digital diagnostic mammograms performed. The breasts are heterogeneously dense, which may obscure small masses. Images evaluated with the assistance of CAD. Breast tomosynthesis was used in interpretation. A BB marker was placed on the skin overlying the area of palpable concern within the left breast. There is an underlying, partially-imaged, irregular mass with obscured margins measuring at least 1.2 cm at the 10:00 position, posterior depth. Recommend targeted left breast ultrasound of this palpable mass for further evaluation. There are associated fine, linear and amorphous calcifications in a segmental distribution within the anterior aspect of this mass and extending anteriorly beyond the margins of the mass towards the nipple. These associated calcifications measure approximately 1.0 x 2.1 x 1.3 cm. The anteriormost aspect of these calcifications is located approximately 1.9 cm anterior to the palpable mass. Additionally, there are multiple oval masses within the left subareolar region. Recommend additional sonographic evaluation of the left subareolar region for further evaluation. There are no suspicious masses, calcifications or architectural distortion within the right breast. ULTRASOUND FINDINGS: Patient directed physical examination of the palpable left breast lump demonstrates an approximately 1.5 cm oval, somewhat firm, fixed mass. Sonographic evaluation of the left breast was performed by both the  technologist and the radiologist. Targeted left breast ultrasound at the area of palpable concern at the 10:00 position, 11 cm from the nipple, demonstrates a 1.6 x 1.0 x 1.2 cm irregular, hypoechoic mass with circumscribed margins and associated increased internal vascularity. Tiny echogenic foci within this mass are consistent with associated microcalcifications as demonstrated on mammography. Additional sonographic evaluation of the left subareolar region was performed. Multiple focally dilated ducts are demonstrated within the left subareolar region, correlating with the mammographic findings. There is no associated intraductal mass.     Suspicious, hypoechoic mass within the left breast at the 10:00 position correlates with the palpable area of concern. Recommend ultrasound-guided biopsy of this mass for definitive pathologic diagnosis. Associated suspicious microcalcifications in a segmental distribution extend approximately 1.9 cm beyond the anterior aspect of the palpable mass. If the results of the ultrasound-guided biopsy of the palpable mass are malignant, these suspicious calcifications would likely be included if breast conservation was desired, and therefore a stereotactic-guided biopsy of these calcifications is deferred at this time. Multiple benign, focally dilated ducts without intraductal mass within the left subareolar region. Outside prior examinations were unavailable at the time of this interpretation. Once outside prior examinations are made available, a comparison read will be performed and an addendum will be issued. ACR BI-RADS Category 5: Highly Suggestive of Malignancy. I personally scanned and discussed the findings and recommendations with the patient at the conclusion of the examination.    Mammo Post Clip Placement Left    Addendum Date: 6/23/2020    Pathology shows invasive ductal carcinoma. This is consistent with imaging findings. A verbal report was given to the patient. Surgical  consultation is recommended.    Result Date: 6/22/2020  US Breast Core Biopsy Left Mammo Post Clip Placement Left INDICATION: Left breast mass. PROCEDURE: Informed consent was obtained from the patient. Ultrasound was used to localize the mass at the 10 o'clock position of the left breast, zone 3, 11 cm from the nipple.  The skin was marked, then prepped and draped in a sterile fashion. 1% lidocaine was used for local anesthesia and additional deep anesthesia achieved using lidocaine with epinephrine.  Under direct sonographic guidance, a 14-gauge coaxial needle was used to obtain 4 core biopsies. A biopsy marking clip was then placed. Digital mammograms performed in a separate room, using separate equipment, to document clip placement, demonstrate the clip in appropriate position. The patient tolerated this well; there are no immediate complications. IMPRESSION: 1. Ultrasound-guided biopsy of mass in the left breast. Pathology pending. 2. Post procedure mammogram for marker placement     Us Breast Core Biopsy Left    Addendum Date: 6/23/2020    Pathology shows invasive ductal carcinoma. This is consistent with imaging findings. A verbal report was given to the patient. Surgical consultation is recommended.    Result Date: 6/22/2020  US Breast Core Biopsy Left Mammo Post Clip Placement Left INDICATION: Left breast mass. PROCEDURE: Informed consent was obtained from the patient. Ultrasound was used to localize the mass at the 10 o'clock position of the left breast, zone 3, 11 cm from the nipple.  The skin was marked, then prepped and draped in a sterile fashion. 1% lidocaine was used for local anesthesia and additional deep anesthesia achieved using lidocaine with epinephrine.  Under direct sonographic guidance, a 14-gauge coaxial needle was used to obtain 4 core biopsies. A biopsy marking clip was then placed. Digital mammograms performed in a separate room, using separate equipment, to document clip placement,  demonstrate the clip in appropriate position. The patient tolerated this well; there are no immediate complications. IMPRESSION: 1. Ultrasound-guided biopsy of mass in the left breast. Pathology pending. 2. Post procedure mammogram for marker placement     Us Breast Left Limited 1-3 Quadrants    Result Date: 6/23/2020  EXAM: MAMMO DIAGNOSTIC 3D BILATERAL, US BREAST LEFT LIMITED 1-3 QUADRANTS LOCATION: [facility] DATE/TIME: 6/15/2020 2:19 PM INDICATION: 54-year-old female presents with a palpable left breast lump for approximately 1.5 weeks. Patient denies any significant change in size of this palpable lump. Patient denies any associated left nipple discharge. No family history of breast cancer. COMPARISON: Outside prior examinations were unavailable at the time of this interpretation. MAMMOGRAPHIC FINDINGS: Bilateral full-field digital diagnostic mammograms performed. The breasts are heterogeneously dense, which may obscure small masses. Images evaluated with the assistance of CAD. Breast tomosynthesis was used in interpretation. A BB marker was placed on the skin overlying the area of palpable concern within the left breast. There is an underlying, partially-imaged, irregular mass with obscured margins measuring at least 1.2 cm at the 10:00 position, posterior depth. Recommend targeted left breast ultrasound of this palpable mass for further evaluation. There are associated fine, linear and amorphous calcifications in a segmental distribution within the anterior aspect of this mass and extending anteriorly beyond the margins of the mass towards the nipple. These associated calcifications measure approximately 1.0 x 2.1 x 1.3 cm. The anteriormost aspect of these calcifications is located approximately 1.9 cm anterior to the palpable mass. Additionally, there are multiple oval masses within the left subareolar region. Recommend additional sonographic evaluation of the left subareolar  region for further evaluation. There are no suspicious masses, calcifications or architectural distortion within the right breast. ULTRASOUND FINDINGS: Patient directed physical examination of the palpable left breast lump demonstrates an approximately 1.5 cm oval, somewhat firm, fixed mass. Sonographic evaluation of the left breast was performed by both the technologist and the radiologist. Targeted left breast ultrasound at the area of palpable concern at the 10:00 position, 11 cm from the nipple, demonstrates a 1.6 x 1.0 x 1.2 cm irregular, hypoechoic mass with circumscribed margins and associated increased internal vascularity. Tiny echogenic foci within this mass are consistent with associated microcalcifications as demonstrated on mammography. Additional sonographic evaluation of the left subareolar region was performed. Multiple focally dilated ducts are demonstrated within the left subareolar region, correlating with the mammographic findings. There is no associated intraductal mass.     Suspicious, hypoechoic mass within the left breast at the 10:00 position correlates with the palpable area of concern. Recommend ultrasound-guided biopsy of this mass for definitive pathologic diagnosis. Associated suspicious microcalcifications in a segmental distribution extend approximately 1.9 cm beyond the anterior aspect of the palpable mass. If the results of the ultrasound-guided biopsy of the palpable mass are malignant, these suspicious calcifications would likely be included if breast conservation was desired, and therefore a stereotactic-guided biopsy of these calcifications is deferred at this time. Multiple benign, focally dilated ducts without intraductal mass within the left subareolar region. Outside prior examinations were unavailable at the time of this interpretation. Once outside prior examinations are made available, a comparison read will be performed and an addendum will be issued. ACR BI-RADS  Category 5: Highly Suggestive of Malignancy. I personally scanned and discussed the findings and recommendations with the patient at the conclusion of the examination.      I have personally reviewed the images with the patient and compared them to the prior scans and appreciated the difference.  All the patient's questions were answered      Total time spent was 60 minutes, more than half of it was in face-to-face counseling regarding disease state, review of available images, laboratory values, pathological reports, treatment, options, their side effects and management.    Amando Monson MD

## 2021-06-09 NOTE — TELEPHONE ENCOUNTER
----- Message from Tosha Marie CMA sent at 7/9/2020 10:39 AM CDT -----  Regarding: Post chemo check in  Call patient after receiving first dose DD AC+T 7/9/2020

## 2021-06-09 NOTE — TELEPHONE ENCOUNTER
Monika called to report she had a dark colored stool today.  Per Dr. Monson, patient to continue to monitor and report back if this continues.  Patient has an appointment on 7/23/20.

## 2021-06-09 NOTE — TELEPHONE ENCOUNTER
All records/images in Epic/Nil.    Epic - Notes  6/9/20 - Progress note, BRODIE Delacruz. (new pt visit)  6/24/20 - Consult, Dr. Lechuga.    Pathology  6/22/20 - Breast core bx.    Radiology - Images in Nil.  6/15/20 - Mammo & U/S left breast.  6/22/20 - U/S Breast bx & clip placement.      [facility] Media or Care Everywhere/[facility]  6/4/2020 - Progress Note - Alena Scherer/Jammie Sawyer. (seen for breast lump)  10/15/2019 - Mammogram @ [facility].  Images in Nil.      Care Everywhere   [facility] - primary healthcare.  Patient transferring care to , insurance changed.

## 2021-06-09 NOTE — TELEPHONE ENCOUNTER
New Patient Oncology Nurse Navigator Note     Referring provider: Allyson Douglas MD     Referring Clinic/Organization: [facility]     Referred to (specialty): Medical Oncology    Requested provider (if applicable): NA     Date Referral Received: 6/24/2020     Evaluation for : Breast cancer      Clinical History (per Nurse review of records provided):    US-guided core biopsies of mass in left breast performed on 6/22/20 at [facility] showing   BREAST, LEFT, 10 O'CLOCK, 11 CM FROM NIPPLE, ULTRASOUND CORE BIOPSIES OF MASS:     1) INVASIVE DUCTAL CARCINOMA:         a) GRADE: OSMEL GRADE III (of III)         b) SCORE: 8 (OF 9)         c) MAXIMUM LENGTH OF TUMOR WITHIN CORE BIOPSIES: 12 MM     2) DUCTAL CARCINOMA IN SITU (DCIS): NOT DEFINITIVELY IDENTIFIED     3) ADDITIONAL FINDINGS:         a) ASSOCIATED DESMOPLASTIC REACTION         b) PATCHY COMEDONECROSIS   ANCILLARY STUDIES, LEFT BREAST TUMOR, CORE BIOPSIES:     1) ESTROGEN RECEPTOR:  NEGATIVE (0%)     2) PROGESTERONE RECEPTOR:  NEGATIVE (0%)     3) HER2/NIK:  INDETERMINATE; 2+.  PARAFFIN EMBEDDED TISSUE SUBMITTED FOR REFLEX HER2/NIK ANALSYSIS BY FISH     Patient met with Dr. Douglas on 6/24 and port placement is anticipated next week.      Clinical Assessment / Barriers to Care (Per Nurse): NA    Records Location (Care Everywhere, Media, etc.): All records are here in [facility]     Records Needed: HER2 by FISH results pending     Additional testing needed prior to consult: Results of HER2 by FISH    Telephoned and spoke with Monika to inform her of medical oncology consult with Dr. Monson on 7/1/2020 at 1:00pm (12:30 arrival).  Welcome letter and Patient History/Initial Nursing Assessment will be e-mailed to Monika today.  She was driving when we spoke and I invited her to call me at her convenience so I may answer her further questions. Amy Palumbo RN

## 2021-06-09 NOTE — ANESTHESIA CARE TRANSFER NOTE
Last vitals:   Vitals:    06/30/20 1032   BP: 101/58   Pulse: 80   Resp: 16   Temp: 36.9  C (98.4  F)   SpO2: 98%     Patient's level of consciousness is drowsy  Spontaneous respirations: yes  Maintains airway independently: yes  Dentition unchanged: yes  Oropharynx: oropharynx clear of all foreign objects    QCDR Measures:  ASA# 20 - Surgical Safety Checklist: WHO surgical safety checklist completed prior to induction    PQRS# 430 - Adult PONV Prevention: 4558F - Pt received => 2 anti-emetic agents (different classes) preop & intraop  ASA# 8 - Peds PONV Prevention: NA - Not pediatric patient, not GA or 2 or more risk factors NOT present  PQRS# 424 - Maritza-op Temp Management: 4559F - At least one body temp DOCUMENTED => 35.5C or 95.9F within required timeframe  PQRS# 426 - PACU Transfer Protocol: - Transfer of care checklist used  ASA# 14 - Acute Post-op Pain: ASA14B - Patient did NOT experience pain >= 7 out of 10

## 2021-06-09 NOTE — TELEPHONE ENCOUNTER
Telephoned and spoke with patient to share pathology results from US-guided core biopsies of mass in left breast performed on 6/22/20 at [facility].    We reviewed breast anatomy, histologic type, and next steps.  Prognostic markers are pending and Monika knows I will call her when those are available.     She is currently scheduled to meet with Dr. Allyson Douglas for surgical consult on 6/29/20 at 9:00am (8:40 arrival).  I let her know what to expect at that appointment and she verbalized understanding of appointment location and details.  We discussed the possibility of moving this appointment ahead if possible after ER/MT are known.      Monika gave permission for an e-mail including links to reputable websites about breast cancer be sent to her and that will go out today.    Emotional support and reassurance provided and calls welcomed.  I remain a resource to Monika as needed. Amy Palumbo RN

## 2021-06-09 NOTE — PROGRESS NOTES
"Monika presents to [facility] today for a surgical consult with Dr. Douglas regarding her newly diagnoses left breast cancer.  She is accompanied by her mother, Tanya.  RN assessment and EMR update. Resp 16   Ht 5' 6.5\" (1.689 m)   Wt 208 lb (94.3 kg)   LMP 06/15/2012   Breastfeeding No   BMI 33.07 kg/m  .  Patient given a breast cancer packet, contents reviewed. She met with Dr. Douglas, see dictation for details of visit. She will plan neoadjuvant chemotherapy.  She will plan to return to see Dr. Douglas towards the end of her chemo for surgery planning appointment.  Support and encouragement provided, invited calls.  RN time 15 mins  "

## 2021-06-09 NOTE — ANESTHESIA PREPROCEDURE EVALUATION
Anesthesia Evaluation      Patient summary reviewed   No history of anesthetic complications     Airway   Mallampati: II  Neck ROM: full   Pulmonary     breath sounds clear to auscultation  (+) sleep apnea on other, ,   (-) asthma, shortness of breath, not a smoker    ROS comment: No longer using CPAP, REBECCA resolved post weight loss                         Cardiovascular   Exercise tolerance: > or = 4 METS  (-) hypertension  Rhythm: regular  Rate: normal,         Neuro/Psych    (+) depression, anxiety/panic attacks,   (-) no seizures    Comments: Hx of lumbar laminectomy, denies any current symptoms of numbness, tingling, weakness in lower extremities     Endo/Other    (+) obesity,   (-) no diabetes, hypothyroidism     Comments: BMI 33    Left breast cancer    Endometriosis    GI/Hepatic/Renal    (-) GERD     Other findings:     Covid negative 6/28      Dental      Comment: Fair dentition, no loose or removable teeth                       Anesthesia Plan  Planned anesthetic: MAC    Of note, hx of easy airway, DL Mil 2, 7.0 ETT    Propofol gtt  Fentanyl/ketamine prn  Acetaminophen 1 g (pre-op)  Dexamethasone 4 mg, ondansetron 4 mg  ASA 3     Anesthetic plan and risks discussed with: patient  Anesthesia plan special considerations: antiemetics,   Post-op plan: routine recovery

## 2021-06-09 NOTE — ANESTHESIA POSTPROCEDURE EVALUATION
Patient: Monika Franz  Procedure(s):  INSERTION VASCULAR ACCESS PORT UNDER ULTRASOUND AND FLUOROSCOPIC GUIDANCE RIGHT JUGULAR (Right)  Anesthesia type: MAC    Patient location: PACU  Last vitals:   Vitals Value Taken Time   /64 6/30/2020 11:15 AM   Temp 36.9  C (98.4  F) 6/30/2020 10:32 AM   Pulse 87 6/30/2020 11:15 AM   Resp 16 6/30/2020 11:15 AM   SpO2 98 % 6/30/2020 11:15 AM     Post vital signs: stable  Level of consciousness: awake and responds to simple questions  Post-anesthesia pain: pain controlled  Post-anesthesia nausea and vomiting: no  Pulmonary: unassisted, return to baseline  Cardiovascular: stable and blood pressure at baseline  Hydration: adequate  Anesthetic events: no    QCDR Measures:  ASA# 11 - Maritza-op Cardiac Arrest: ASA11B - Patient did NOT experience unanticipated cardiac arrest  ASA# 12 - Maritza-op Mortality Rate: ASA12B - Patient did NOT die  ASA# 13 - PACU Re-Intubation Rate: ASA13B - Patient did NOT require a new airway mgmt  ASA# 10 - Composite Anes Safety: ASA10A - No serious adverse event    Additional Notes:

## 2021-06-09 NOTE — TELEPHONE ENCOUNTER
Per Dr. Monson spoke with pt yesterday to let her know her LFT's were elevated. Pt states she had this issue in the past but does not remember the reason. She says she does not drink ETOH. She has not had an alcoholic drink in over 7 years. Pt was informed that Dr. Monson would like her to have a liver ultrasound and that our pharmacist will be reviewing her meds to see if any of her mediations could be causing this. Pt states she uses OTC Tylenol occassionally (once every couple of weeks).   Pt was agreeable to this plan.    Called pt back today to let her know her per Dr. Monson, her liver ultrasound is normal. The pharmacist did find that Bupropion should be used with caution in pt's with hepatic impairment. Dr. Monson would like her to stop this medication. Pt was informed of this. Pt was told she may want to contact the prescribing MD to see if they want to taper this off or substitute a different medication. Pt verbalized understanding and said she will do this.   Pt also asked if she could work 7/10 (day after her 1st chemo). She works at the  in a dental office. Per  this is ok as long as pt is feeling well and takes extra precaution. Pt was informed of this.

## 2021-06-09 NOTE — PROGRESS NOTES
Pt here for D1C1 treatment after seeing NP. Port accessed easily and LFT rechecked. Pt received full dose without incident. Positive blood return throughout. neulasta injector reviewed and applied. Pt aware of care. Upon completion port flushed and deaccessed. Pt aware of future appointments.    Pt given new patient folder and thermometer and has phone numbers to call with questions.

## 2021-06-09 NOTE — TELEPHONE ENCOUNTER
Telephoned and left voice message for Monika requesting call back to share HER2 by FISH and check in after her consult with Dr. Monson yesterday. Amy Palumbo RN

## 2021-06-09 NOTE — TELEPHONE ENCOUNTER
Telephoned and spoke with patient to inform her we will have the estrogen and progesterone receptors tomorrow so her appointment can be moved to 6/24 at 3:10pm if she wishes.  She agreed to this and appointment has been moved up.  Amy Palumbo RN

## 2021-06-09 NOTE — PROGRESS NOTES
History:  This is a 54 y.o. female who I'm asked to see for evaluation of a left breast cancer.  She presents with her mother, Tanya.  Her , Khang, is also on the phone.  This was picked up by the patient about 3 weeks ago.  She was doing some stretching and all of a sudden felt a pulling sensation in her upper left breast.  She then went to palpate that area and noticed a mass.  Since she noticed the mass, she denies any changes to it.  Her last mammogram prior to her most recent work-up was October 2019.  She then underwent further imaging.  A needle biopsy was done which shows invasive ductal carcinoma.  It is estrogen receptor negative, progesterone receptor negative, and HER-2 pending FISH.    Past medical history:  Anxiety  Depression  Endometriosis    Past surgical history:  Hysterectomy.  Still has ovaries.  Laparoscopic sleeve gastrectomy  Cholecystectomy  Lumbar laminectomy  Laparoscopy for endometriosis    Medications:     ARIPiprazole (ABILIFY) 5 MG tablet, , Disp: , Rfl:      busPIRone (BUSPAR) 15 MG tablet, Take 15 mg by mouth 2 (two) times a day., Disp: , Rfl:      levomilnacipran (FETZIMA) 80 mg Cs24 ER capsule, Take 1 capsule by mouth., Disp: , Rfl:      traZODone (DESYREL) 150 MG tablet, Take 150 mg by mouth at bedtime., Disp: , Rfl:     Allergies:  NSAIDs    Social History:  Reports that she has never smoked. She has never used smokeless tobacco.  Denies alcohol and illicit drug use.    Family History:  She has no family history of breast cancer.    Review of systems:  General ROS: No complaints or constitutional symptoms  Skin: No complaints or symptoms   Hematologic/Lymphatic: No symptoms or complaints  Psychiatric: No symptoms or complaints  Endocrine: No excessive fatigue, no hypermetabolic symptoms reported  Respiratory ROS: No cough, shortness of breath, or wheezing  Cardiovascular ROS: No chest pain or dyspnea on exertion  Breast ROS: Palpable left breast mass.  Denies skin changes,  "nipple discharge, or skin retraction.  Gastrointestinal ROS: No abdominal pain, nausea, diarrhea, or constipation  Musculoskeletal ROS: No recent injuries reported  Neurological ROS: No focal neurologic defects reported.      Exam:  Resp 16   Ht 5' 6.5\" (1.689 m)   Wt 208 lb (94.3 kg)   LMP 06/15/2012   Breastfeeding No   BMI 33.07 kg/m    General: Alert, cooperative, appears stated age   Skin: Skin color, texture, turgor normal, no rashes or lesions   Lymphatic: No obvious adenopathy, no swelling   Eyes: No scleral icterus, pupils equal  HENT: No traumatic injury to the head or face, no gross abnormalities  Lungs: Normal respiratory effort, breath sounds equal bilaterally  Heart: Regular rate and rhythm  Breasts: When sitting, there is a visual fullness to the left upper breast.  When supine, there is no palpable abnormality on the right.  There is ecchymosis overlying the left breast biopsy site.  This is located within zone 3 at 11:00.  The palpable fullness is not horribly discrete, but measures 4 x 2 cm.  Abdomen: Soft, non-distended and non-tender to palpation  Neurologic: Grossly intact    Imaging:  Pertinent images personally reviewed by myself and discussed with the patient.  Radiology reports:    EXAM: MAMMO DIAGNOSTIC 3D BILATERAL, US BREAST LEFT LIMITED 1-3 QUADRANTS  LOCATION: [facility]  DATE/TIME: 6/15/2020 2:19 PM     INDICATION: 54-year-old female presents with a palpable left breast lump for approximately 1.5 weeks. Patient denies any significant change in size of this palpable lump. Patient denies any associated left nipple discharge. No family history of breast cancer.  COMPARISON: Outside prior examinations were unavailable at the time of this interpretation.     MAMMOGRAPHIC FINDINGS: Bilateral full-field digital diagnostic mammograms performed. The breasts are heterogeneously dense, which may obscure small masses. Images evaluated with the assistance of CAD. " Breast tomosynthesis was used in interpretation.      A BB marker was placed on the skin overlying the area of palpable concern within the left breast. There is an underlying, partially-imaged, irregular mass with obscured margins measuring at least 1.2 cm at the 10:00 position, posterior depth. Recommend targeted left breast ultrasound of this palpable mass for further evaluation. There are associated fine, linear and amorphous calcifications in a segmental distribution within the anterior aspect of this mass and extending anteriorly beyond the margins of the mass towards the nipple. These associated calcifications measure approximately 1.0 x 2.1 x 1.3 cm. The anteriormost aspect of these calcifications is located approximately 1.9 cm anterior to the palpable mass. Additionally, there are multiple oval masses within the left subareolar region. Recommend additional sonographic evaluation of the left subareolar region for further evaluation.     There are no suspicious masses, calcifications or architectural distortion within the right breast.     ULTRASOUND FINDINGS: Patient directed physical examination of the palpable left breast lump demonstrates an approximately 1.5 cm oval, somewhat firm, fixed mass.     Sonographic evaluation of the left breast was performed by both the technologist and the radiologist.     Targeted left breast ultrasound at the area of palpable concern at the 10:00 position, 11 cm from the nipple, demonstrates a 1.6 x 1.0 x 1.2 cm irregular, hypoechoic mass with circumscribed margins and associated increased internal vascularity. Tiny echogenic foci within this mass are consistent with associated microcalcifications as demonstrated on mammography.     Additional sonographic evaluation of the left subareolar region was performed. Multiple focally dilated ducts are demonstrated within the left subareolar region, correlating with the mammographic findings. There is no associated intraductal  mass.     IMPRESSION:   1. Suspicious, hypoechoic mass within the left breast at the 10:00 position correlates with the palpable area of concern. Recommend ultrasound-guided biopsy of this mass for definitive pathologic diagnosis.  2. Associated suspicious microcalcifications in a segmental distribution extend approximately 1.9 cm beyond the anterior aspect of the palpable mass. If the results of the ultrasound-guided biopsy of the palpable mass are malignant, these suspicious calcifications would likely be included if breast conservation was desired, and therefore a stereotactic-guided biopsy of these calcifications is deferred at this time.  3. Multiple benign, focally dilated ducts without intraductal mass within the left subareolar region.  4. Outside prior examinations were unavailable at the time of this interpretation. Once outside prior examinations are made available, a comparison read will be performed and an addendum will be issued.     ACR BI-RADS Category 5: Highly Suggestive of Malignancy.    Pathology:  BREAST, LEFT, 10 O'CLOCK, 11 CM FROM NIPPLE, ULTRASOUND CORE BIOPSIES OF MASS:     1) INVASIVE DUCTAL CARCINOMA:         a) GRADE: OSMEL GRADE III (of III)         b) SCORE: 8 (OF 9)         c) MAXIMUM LENGTH OF TUMOR WITHIN CORE BIOPSIES: 12 MM     2) DUCTAL CARCINOMA IN SITU (DCIS): NOT DEFINITIVELY IDENTIFIED     3) ADDITIONAL FINDINGS:         a) ASSOCIATED DESMOPLASTIC REACTION         b) PATCHY COMEDONECROSIS     4) ANCILLARY STUDIES, LEFT BREAST TUMOR, CORE BIOPSIES:        a) ESTROGEN RECEPTOR:  NEGATIVE (0%)        b) PROGESTERONE RECEPTOR:  NEGATIVE (0%)        c) HER2/NIK:  INDETERMINATE; 2+.  PARAFFIN EMBEDDED TISSUE SUBMITTED FOR REFLEX HER2/NIK ANALSYSIS BY FISH        d) GATA3:  STRONGLY POSITIVE; CONSISTENT WITH BREAST PRIMARY SOURCE     IMPRESSION:  Left breast grade III ER/DE negative invasive ductal carcinoma    - HER2 pending FISH     PLAN:   Discussed the surgical options for  treatment of breast cancer which generally are a lumpectomy (partial mastectomy) combined with radiation versus a mastectomy.  Explained that the survival benefit is the same for both.  The difference is in local recurrence risk.  The patient is a good candidate for a lumpectomy based off of the size of the tumor in relationship to her breast size.  Discussed SLN biopsy.  The procedure and rationale were explained.   Explained that lumpectomy is an outpatient procedure under MAC anesthetic.  Mastectomy (unilateral/bilateral w/wo reconstruction) is done in the hospital setting under general anesthesia.  Discussed common postoperative recovery and restrictions for both processes.     Since her tumor is estrogen and progesterone receptor negative, chemotherapy will also be part of her treatment plan.  Explained that chemotherapy can be administered in the neoadjuvant or adjuvant setting.  Chemotherapy tends to be administered through a port.  I explained the role of the port and how it is surgically placed.  Discussed that NAC has the benefit of seeing how her tumor responds to the treatment before surgery.  It can turn poor lumpectomy candidates into better lumpectomy candidates.  It also allows time for genetic testing to be performed if desired.  The genetic testing results could impact her ultimate surgical decision.  Lastly, we talked about the role of endocrine therapy in estrogen receptor positive tumors.  This will not be part of her treatment plan.     After our discussion, their questions were answered to satisfaction.  A referral has been placed for medical oncology to discuss neoadjuvant chemotherapy.  A spot is currently on hold for July 1 with Dr. Monson. A referral has also been placed for genetics.  We will also schedule her for a port placement at her earliest convenience.    We should be able to get this done next week.  She will need a negative COVID-19 test within 72 hours of the planned surgery  date.  [place] COVID team will notify her of her testing date.  Once she gets near the end of her neoadjuvant chemotherapy, she is to return back to the clinic to further discuss and plan the breast surgery.  At this time, she is strongly considering bilateral mastectomy without reconstruction.    Allyson Douglas DO  General Surgeon  [facility]  Breast [address] [address]  Office: [phone]  Employed by - [employer]

## 2021-06-10 NOTE — PROGRESS NOTES
[facility] Hematology and Oncology Progress Note    Patient: Monika Franz  MRN: [id number]  Date of Service: 08/19/2020        Reason for Visit    Chief Complaint   Patient presents with     HE Cancer       Assessment and Plan  Cancer Staging  Invasive ductal carcinoma of breast, left (H)  Staging form: Breast, AJCC 8th Edition  - Clinical stage from 6/22/2020: Stage IIB (cT2, cN0, cM0, G3, ER-, WI-, HER2: Equivocal) - Signed by Sue Dougherty CNP on 7/9/2020    1. Breast cancer, Triple negative, Stage IIB: pt has started neoadjuvant chemo. She is getting dose dense AC every 2 weeks. She will get cycle 4 today, which will be her final cycle. Will return in 2 weeks to start Taxol. Clinically, I can no longer feel tumor.     2. Triple negative cancer, some family history: met with genetic counselor. I do not see results yet    3. Mild mucositis: using salt/baking soda water rinses. Call if having issues with po intake and we will give magic mouthwash.       ECOG Performance   ECOG Performance Status: 0     Distress Assessment  Distress Assessment Score: 2      Pain  Currently in Pain: No/denies      Problem List    1. Encounter for antineoplastic immunotherapy  CC OFFICE VISIT LONG   2. Chemotherapy-induced neutropenia (H)  CC OFFICE VISIT LONG   3. Invasive ductal carcinoma of breast, left (H)  CC OFFICE VISIT LONG        ______________________________________________________________________________    History of Present Illness    Patient is a very pleasant 54-year-old woman who has been referred to me by Dr. Allyson Douglas for evaluation and treatment of a newly diagnosed breast cancer.  The patient found this lump located in her left breast measuring approximately 2 cm in size presenting with some stretching-like symptoms in her breast in the month of June.  She was then seen by her primary care physician and had a mammogram done which was very suspicious.  A biopsy confirmed the presence of invasive  ductal carcinoma ER negative [place] negative HER-2/joann 2+ on immunohistochemistry.  It has high-grade features.  The ultrasound measures this mass to be 1.6 cm in size.  It is located 11 cm from the nipple.  No nipple inversion or any other symptoms.     The patient has been seen by Dr. Allyson Douglas.  She had a Port-A-Cath placed as she is a candidate for neoadjuvant chemotherapy.     She has past medical history of gastric sleeve surgery about a year ago.  She is taking some vitamin B12 and other supplements.     She had a hysterectomy few years ago. Did have elevated LFT's initially and it was thought to be related to buspar so that has been stopped and her liver function has improved.   She started dose dense AC in July. She is doing well. Mucositis with her last cycle. Now healed. Appetite ok. No fevers, infectious complaints. Cannot feel tumor anymore.        Treatment: started neoadjuvant dose dense AC on 7/9/20. Today is cycle 4.         Review of Systems    Constitutional  Constitutional (WDL): Exceptions to WDL  Fatigue: Fatigue relieved by rest  Neurosensory  Neurosensory (WDL): All neurosensory elements are within defined limits  Eye   Eye Disorder (WDL): All eye disorder elements are within defined limits  Ear  Ear Disorder (WDL): All ear disorder elements are within defined limits  Cardiovascular  Cardiovascular (WDL): All cardiovascular elements are within defined limits  Pulmonary  Respiratory (WDL): Within Defined Limits  Gastrointestinal  Constipation: Occasional or intermittent symptoms, occasional use of stool softeners, laxatives, dietary modification, or enema  Dysgeusia: Altered taste but no change in diet  Genitourinary  Genitourinary (WDL): All genitourinary elements are within defined limits  Lymphatic  Lymph (WDL): All lymph disorder elements are within defined limits  Musculoskeletal and Connective Tissue  Musculoskeletal and Connetive Tissue Disorders (WDL): All Musculoskeletal and Connetive  Tissue Disorder elements are within defined limits  Integumentary  Integumentary (WDL): Exceptions to WDL  Alopecia: Hair loss of >50% normal for that individual that is readily apparent to others, a wig or hair piece is necessary if the patient desires to completely camouflage the hair loss, associated with psychosocial impact  Patient Coping  Patient Coping: Open/discussion  Distress Assessment  Distress Assessment Score: 2  Accompanied by  Accompanied by: Alone  Oral Chemo Adherence         Past History  Past Medical History:   Diagnosis Date     Anxiety      Breast cancer (H)      Depression      Sleep apnea     No longer use CPAP after Wt loss       PHYSICAL EXAM  /59   Pulse 80   Temp 98.1  F (36.7  C) (Oral)   Wt 206 lb 8 oz (93.7 kg)   LMP 06/15/2012   SpO2 96%   BMI 33.84 kg/m      GENERAL: no acute distress. Cooperative in conversation. Here alone due to visitor restrictions. Mask on  HEENT: I cannot see any mucocitis  RESP: Regular respiratory rate. No expiratory wheezes   MUSCULOSKELETAL: no bilateral leg swelling  NEURO: non focal. Alert and oriented x3.   PSYCH: within normal limits. No depression or anxiety.  SKIN: exposed skin is dry intact. New port is CDI  BREAST: can no longer feel tumor.     Lab Results    Recent Results (from the past 168 hour(s))   Comprehensive Metabolic Panel   Result Value Ref Range    Sodium 141 136 - 145 mmol/L    Potassium 3.8 3.5 - 5.0 mmol/L    Chloride 106 98 - 107 mmol/L    CO2 30 22 - 31 mmol/L    Anion Gap, Calculation 5 5 - 18 mmol/L    Glucose 87 70 - 125 mg/dL    BUN 10 8 - 22 mg/dL    Creatinine 0.69 0.60 - 1.10 mg/dL    GFR MDRD Af Amer >60 >60 mL/min/1.73m2    GFR MDRD Non Af Amer >60 >60 mL/min/1.73m2    Bilirubin, Total 0.2 0.0 - 1.0 mg/dL    Calcium 9.3 8.5 - 10.5 mg/dL    Protein, Total 6.5 6.0 - 8.0 g/dL    Albumin 3.9 3.5 - 5.0 g/dL    Alkaline Phosphatase 84 45 - 120 U/L    AST 26 0 - 40 U/L    ALT 37 0 - 45 U/L   HM1 (CBC with Diff)    Result Value Ref Range    WBC 4.1 4.0 - 11.0 thou/uL    RBC 3.79 (L) 3.80 - 5.40 mill/uL    Hemoglobin 12.2 12.0 - 16.0 g/dL    Hematocrit 36.9 35.0 - 47.0 %    MCV 97 80 - 100 fL    MCH 32.2 27.0 - 34.0 pg    MCHC 33.1 32.0 - 36.0 g/dL    RDW 13.8 11.0 - 14.5 %    Platelets 160 140 - 440 thou/uL    MPV 9.7 8.5 - 12.5 fL    Neutrophils % 56 50 - 70 %    Lymphocytes % 25 20 - 40 %    Monocytes % 17 (H) 2 - 10 %    Eosinophils % 0 0 - 6 %    Basophils % 1 0 - 2 %    Immature Granulocyte % 2 (H) <=0 %    Neutrophils Absolute 2.3 2.0 - 7.7 thou/uL    Lymphocytes Absolute 1.0 0.8 - 4.4 thou/uL    Monocytes Absolute 0.7 0.0 - 0.9 thou/uL    Eosinophils Absolute 0.0 0.0 - 0.4 thou/uL    Basophils Absolute 0.0 0.0 - 0.2 thou/uL    Immature Granulocyte Absolute 0.1 (H) <=0.0 thou/uL       Imaging    No results found.        Signed by: Sue Dougherty, CNP

## 2021-06-10 NOTE — TELEPHONE ENCOUNTER
Telephoned and spoke with Monika to check in and inquire about her needs.  She is tolerating treatment well so far, although has expected loss of appetite and nausea.  These side effects have lessened now, but she goes in for the next cycle in a day or two.  She has been using antiemetics as directed.  Encouraged her to continue communicating with the team if antiemetics were not sufficient in treating these side effects and additional measures could be added.    She is coping emotionally.  She has her wig but hasn't worn it much due to it feeling itchy.  She's hopeful that has decreased now, and she's utilizing other head coverings as well. Support and encouragement provided and calls welcomed. Amy Palumbo RN

## 2021-06-10 NOTE — PROGRESS NOTES
[facility] Cancer Care Progress Note    Patient: Monika Franz  MRN: [id number]  Date of Service: 8/6/2020        Reason for visit      1. Encounter for antineoplastic immunotherapy    2. Chemotherapy-induced neutropenia (H)    3. Invasive ductal carcinoma of breast, left (H)        Assessment     1.  A very pleasant 54-year-old woman with left-sided breast cancer at least T2 N0 M0 ER DC negative HER-2/joann negative. Started on sreekanth adjuvant chemotherapy. Responding to treatment.  2.  Slightly overweight.  3.  Status post gastric sleeve about a year ago.      Plan     1.  Proceed with neck cycle of dose dense Adriamycin and cyclophosphamide with Neulasta support.  2. Symptom management with constipation prophylaxis.  3.  Continue other supportive care.    Clinical stage      Cancer Staging  Invasive ductal carcinoma of breast, left (H)  Staging form: Breast, AJCC 8th Edition  - Clinical stage from 6/22/2020: Stage IIB (cT2, cN0, cM0, G3, ER-, DC-, HER2: Equivocal) - Signed by Sue Dougherty CNP on 7/9/2020      History     Monika Franz is a very pleasant 54 y.o. old female with a history of triple negative left breast cancer.  The patient found this lump located in her left breast measuring approximately 2 cm in size, at 10 o'clock position, presenting with some stretching-like symptoms in her breast in the month of June 2020.  She was then seen by her primary care physician and had a mammogram done which was very suspicious.  A biopsy confirmed the presence of invasive ductal carcinoma ER negative [place] negative HER-2/joann 2+ on immunohistochemistry negative on FISH.  It has high-grade features.  The ultrasound measures this mass to be 1.6 cm in size.  It is located 11 cm from the nipple.  No nipple inversion or any other symptoms.     The patient has been seen by Dr. Allyson Douglas.  She had a Port-A-Cath placed for neoadjuvant chemotherapy.     She has past medical history of gastric sleeve surgery  about a year ago.  She is taking some vitamin B12 and other supplements.     She had a hysterectomy few years ago.     As for the breast cancer risk factors are concerned she is G4, P3.  First child at age 29.  Breast-feeding for all 3 of them.  No prior breast biopsies.  No significant family history.    She has been started on neoadjuvant chemotherapy on 9 July 2020 with dose dense adriamysin cyclophosphamide followed by paclitaxel.  She is here for her next treatment.  After second cycle she noticed decrease in the size of the breast lesion.  It is hardly palpable.  Cascara side effects are concerned she did have some constipation.  Minimal nausea as well.  Otherwise doing quite well.    Past Medical History     Past Medical History:   Diagnosis Date     Anxiety      Breast cancer (H)      Depression      Sleep apnea     No longer use CPAP after Wt loss           Review of Systems   Constitutional  Constitutional (WDL): All constitutional elements are within defined limits  Neurosensory  Neurosensory (WDL): All neurosensory elements are within defined limits  Cardiovascular  Cardiovascular (WDL): All cardiovascular elements are within defined limits  Pulmonary  Respiratory (WDL): Within Defined Limits  Gastrointestinal  Gastrointestinal (WDL): Exceptions to WDL  Constipation: Occasional or intermittent symptoms, occasional use of stool softeners, laxatives, dietary modification, or enema  Dysgeusia: Altered taste but no change in diet  Genitourinary  Genitourinary (WDL): All genitourinary elements are within defined limits  Integumentary  Integumentary (WDL): Exceptions to WDL  Alopecia: Hair loss of >50% normal for that individual that is readily apparent to others, a wig or hair piece is necessary if the patient desires to completely camouflage the hair loss, associated with psychosocial impact  Patient Coping  Patient Coping: Accepting  Accompanied by  Accompanied by: Alone    ECOG performance status and  Distress Assessment      ECOG Performance:    ECOG Performance Status: 0    Distress Assessment  Distress Assessment Score: No distress:     Pain Status  Currently in Pain: No/denies        Vital Signs     Vitals:    08/06/20 0847   BP: 118/60   Pulse: 65   Temp: 97.9  F (36.6  C)   SpO2: 97%       Physical Exam     GENERAL: No acute distress. Cooperative in conversation.   HEENT: Pupils are equal, round and reactive. Oral mucosa is clean and intact. No ulcerations or mucositis noted. No bleeding noted.  RESP:Chest symmetric lungs are clear bilaterally per auscultation. Regular respiratory rate. No wheezes or rhonchi.  CV: Normal S1 S2 Regular, rate and rhythm. No murmurs.  ABD: Nondistended, soft, nontender. Positive bowel sounds. No organomegaly.   EXTREMITIES: No lower extremity edema.   NEURO: Non- focal. Alert and oriented x3.  Cranial nerves appear intact.  PSYCH: Within normal limits. No depression or anxiety.  SKIN: Warm dry intact.    LYMPH NODES: Bilateral cervical, supraclavicular, axillary lymph node examination was done.  Negative for any palpable adenopathy.    Lab Results     Results for orders placed or performed in visit on 07/23/20   Comprehensive Metabolic Panel   Result Value Ref Range    Sodium 141 136 - 145 mmol/L    Potassium 4.0 3.5 - 5.0 mmol/L    Chloride 106 98 - 107 mmol/L    CO2 27 22 - 31 mmol/L    Anion Gap, Calculation 8 5 - 18 mmol/L    Glucose 81 70 - 125 mg/dL    BUN 12 8 - 22 mg/dL    Creatinine 0.70 0.60 - 1.10 mg/dL    GFR MDRD Af Amer >60 >60 mL/min/1.73m2    GFR MDRD Non Af Amer >60 >60 mL/min/1.73m2    Bilirubin, Total 0.1 0.0 - 1.0 mg/dL    Calcium 9.1 8.5 - 10.5 mg/dL    Protein, Total 6.5 6.0 - 8.0 g/dL    Albumin 3.9 3.5 - 5.0 g/dL    Alkaline Phosphatase 89 45 - 120 U/L    AST 26 0 - 40 U/L    ALT 46 (H) 0 - 45 U/L   HM1 (CBC with Diff)   Result Value Ref Range    WBC 6.8 4.0 - 11.0 thou/uL    RBC 3.99 3.80 - 5.40 mill/uL    Hemoglobin 12.7 12.0 - 16.0 g/dL    Hematocrit  38.7 35.0 - 47.0 %    MCV 97 80 - 100 fL    MCH 31.8 27.0 - 34.0 pg    MCHC 32.8 32.0 - 36.0 g/dL    RDW 12.0 11.0 - 14.5 %    Platelets 188 140 - 440 thou/uL    MPV 9.5 8.5 - 12.5 fL   Manual Differential   Result Value Ref Range    Total Neutrophils % 59 50 - 70 %    Lymphocytes % 24 20 - 40 %    Monocytes % 13 (H) 2 - 10 %    Eosinophils %  1 0 - 6 %    Basophils % 0 0 - 2 %    Metamyelocytes % 3 (H) <=1 %    Total Neutrophils Absolute 4.0 2.0 - 7.7 thou/ul    Lymphocytes Absolute 1.6 0.8 - 4.4 thou/uL    Monocytes Absolute 0.9 0.0 - 0.9 thou/uL    Eosinophils Absolute 0.1 0.0 - 0.4 thou/uL    Basophils Absolute 0.0 0.0 - 0.2 thou/uL    Metamyelocytes Absolute 0.2 (H) <=0.1 thou/uL    Toxic Granulation 1+ (!) Negative    Platelet Estimate Normal Normal         Imaging Results     Nm Muga    Result Date: 7/7/2020    1.The left ventricular ejection fraction is 70.8%.   2.Normal study.   3.No prior assessment of left ventricular wall motion available.          Amando Monson MD

## 2021-06-10 NOTE — PROGRESS NOTES
Pt arrived for labs, visit with Dr Monson and for infusion. Labs within parameters for treatment,so D1 C3 infusions given. Pt requested to get her Cytoxan infusion over and hour instead of 30 mins as she got a headache when it was only 30 mins and when it was hour she did not get a headache.  I did give it over an hour today and she did not get a headache. Neulasta injector placed on her right upper abdomen. Pt did not have any questions on this as she has had this before.  Pt left ambulatory after infusions.

## 2021-06-10 NOTE — PROGRESS NOTES
Pt here for last AC after seeing NP. Port accessed easily and no problems with infusion as directed.  Positive blood return throughout. neulasta injector applied without incident and pt aware of care. Upon completion port flushed and deaccessed. Pt d/c ambulatory to lobby alone.

## 2021-06-11 NOTE — PROGRESS NOTES
"9/14/2020    Referring Provider: Dr. Douglas    Presenting Information:  I spoke to Monika by phone today to discuss her genetic testing results. Her blood was drawn on 7/23/2020. BRCANext-Expanded testing was ordered  from [employer]. This testing was done because of Monika's personal and family history of breast cancer.    Genetic Testing Result: NEGATIVE  Monika is negative for mutations in the RAISA, BARD1, BRCA1, BRCA2, BRIP1, CDH1, CHEK2, DICER1, EPCAM, MLH1, MSH2, MSH6, NBN, NF1, PALB2, PMS2, PTEN, RAD51C, RAD51D, RECQL, SMARCA4, STK11, and TP53 genes. No mutations were found in any of the 23 genes analyzed. This test involved sequencing and deletion/duplication analysis of all genes with the exceptions of EPCAM (deletions/duplications only).    Testing did not detect an identifiable mutation associated with Hereditary Breast and Ovarian Cancer syndrome (BRCA1, BRCA2), Campbell syndrome (MLH1, MSH2, MSH6, PMS2, EPCAM), Hereditary Diffuse Gastric Cancer (CDH1), Cowden syndrome (PTEN), Li Fraumeni syndrome (TP53), Peutz-Jeghers syndrome (STK11), or Neurofibromatosis type 1 (NF1).    A copy of the test report can be found in the Media tab and named \"Genetics Scan-[employer]\". The report is scanned in as a linked document.    Interpretation:  We discussed several different interpretations of this negative test result.    1. One explanation may be that there is a different gene or combination of genes and environment that are associated with the cancers in Monika and/or her relatives.    2. It is possible that her maternal first-cousins did have a mutation in one of the genes that Monika was tested for, and she did not inherit it.  3. There is also a small possibility that there is a mutation in one of these genes, and we could not find it with our current testing methods.       Screening:  Based on this negative test result, it is important for Monika and her relatives to refer back to the family history for " appropriate cancer screening.      Monika should continue to follow her oncology team's recommendations for the treatment and follow-up for her breast cancer.    Monika s close female relatives remain at increased risk for breast cancer given their family history. Breast cancer screening is generally recommended to begin approximately 10 years younger than the earliest age of breast cancer diagnosis in the family (out to second-degree relatives), or at age 40, whichever comes first. In this family, screening may begin at age 40. This would apply to Monika's daughter, sister, and niece. Breast screening options should be discussed with an individual's primary care provider and a genetic counselor, to determine at what age to begin screening, what screening is appropriate, and if additional screening (such as breast MRI) is necessary based on personal/family history factors.     Monika should continue to follow her GI providers' recommendations for colon cancer screening given her personal history of colon polyps. She is currently on a 5-year follow-up plan.  We discussed that Monika likely has some increased risk for lymphoma given her mother's and maternal aunt's histories, but routine screening is typically not recommended.  Monika is encouraged to discuss this history with her care providers. Monika should ensure that her primary care physician is aware of the family history and that lymph node examination and review of signs/symptoms is part of annual physicals.  I have encouraged Monika to report any persistent high fever, unexplained weight loss, night sweats, enlarged lymph nodes, or frequent infections to a healthcare provider.    Other population cancer screening options, such as those recommended by the American Cancer Society and the National Comprehensive Cancer Network (NCCN), are also appropriate for Monika and her family. These screening recommendations may change if there are changes to  Monika's personal and/or family history. Final screening recommendations should be made by each individual's managing physician.    Inheritance:  We reviewed the autosomal dominant inheritance of mutations in these 23 genes.  We discussed that Monika cannot/did not pass on an identifiable mutation in these genes to her children based on this test result.  Mutations in these genes do not skip generations.      Additional Testing Considerations:  No further genetic is recommended for Monika or her family at this time. Monika was encouraged to contact me should her personal or family history change as this may change genetic testing recommendations.    Summary:  We do not have an explanation for Monika's personal and family history of breast cancer.  Because of that, it is important that she continue with cancer screening based on her personal and family history as discussed above.    Genetic testing is rapidly advancing, and new cancer susceptibility genes will most likely be identified in the future.  Therefore, I encouraged Monika to contact me annually or if there are changes in her personal or family history.  This may change how we assess her cancer risk, screening, and the testing we would offer.    Plan:  1. A copy of the test results will be mailed to Monika.  2. She plans to follow-up with her oncology care team.  3. She should contact me annually, or sooner if her family history changes.    If Monika has any further questions, I encouraged her to contact me at [phone].    Time spent on the phone: 15 minutes.    Kusum Barraza MS, [facility] [place], [place]  Licensed Genetic Counselor  [facility]  [phone]

## 2021-06-11 NOTE — PROGRESS NOTES
Monika Franz, 54 y.o., female arrived ambulatory to clinic at 1255 for Cycle #5 Day #8 of her chemotherapy regimen. Port was accessed using aseptic technique without difficulties with excellent blood return. Lab drawn and reviewed. Administered premedications and chemotherapy per provider order. She tolerated infusion well, no s/s of infusion reaction. Port was flushed with NS and Heparin then de-accessed using 2x2 and papertape. Monika Franz verbalized understanding of plan of care and return to clinic. Discharged to [facility] at 1505 alert and ambulatory.

## 2021-06-11 NOTE — PROGRESS NOTES
Pt came into infusion clinic for her treatment. Labs Reviewed. Medications explained to pt who verbalized understanding. Port patent throughout infusion. Pt tolerated infusion with no complications. Pt left infusion clinic via ambulatory and will RTC as sched.

## 2021-06-11 NOTE — PROGRESS NOTES
"Monika Franz is a 54 y.o. female who is being evaluated via a billable video visit.      The patient has been notified of following:     \"This video visit will be conducted via a call between you and your physician/provider. We have found that certain health care needs can be provided without the need for an in-person physical exam.  This service lets us provide the care you need with a video conversation.  If a prescription is necessary we can send it directly to your pharmacy.  If lab work is needed we can place an order for that and you can then stop by our lab to have the test done at a later time.    Video visits are billed at different rates depending on your insurance coverage. Please reach out to your insurance provider with any questions.    If during the course of the call the physician/provider feels a video visit is not appropriate, you will not be charged for this service.\"    Patient has given verbal consent to a Video visit? Yes  How would you like to obtain your AVS? AVS Preference: MyChart.  If dropped by the video visit, the video invitation should be sent to: [phone]  Will anyone else be joining your video visit? No              Video-Visit Details    Type of service:  Video Visit    Start: 09/02/2020 04:29 pm   Stop: 09/02/2020 04:40 pm    Total time 15 minutes    Originating Location (pt. Location): Home    Distant Location (provider location):  [facility]     Platform used for Video Visit: [facility] Cancer Care Progress Note    Patient: Monika Franz  MRN: [id number]  Date of Service: 9/2/2020        Reason for visit      1. Encounter for antineoplastic immunotherapy    2. Chemotherapy-induced neutropenia (H)    3. Invasive ductal carcinoma of breast, left (H)        Assessment     1.  A very pleasant 54-year-old woman with left-sided breast cancer at least T2 N0 M0 ER [place] negative HER-2/joann negative. Started on sreekanth adjuvant chemotherapy. " Responding to treatment.  She has finished 4 cycles of dose dense Adriamycin Cytoxan.  4.  Minimal nausea.  2.  Slightly overweight.  3.  Status post gastric sleeve about a year ago.      Plan     1.  Proceed with next cycle paclitaxel weekly.  This is going to be her first paclitaxel tomorrow..  2.  Discussed with the patient about symptom management with weekly paclitaxel therapy.  3.  Continue other supportive care.    Clinical stage      Cancer Staging  Invasive ductal carcinoma of breast, left (H)  Staging form: Breast, AJCC 8th Edition  - Clinical stage from 6/22/2020: Stage IIB (cT2, cN0, cM0, G3, ER-, HI-, HER2: Equivocal) - Signed by Sue Dougherty CNP on 7/9/2020      History     Monika Franz is a very pleasant 54 y.o. old female with a history of triple negative left breast cancer.  The patient found this lump located in her left breast measuring approximately 2 cm in size, at 10 o'clock position, presenting with some stretching-like symptoms in her breast in the month of June 2020.  She was then seen by her primary care physician and had a mammogram done which was very suspicious.  A biopsy confirmed the presence of invasive ductal carcinoma ER negative [place] negative HER-2/joann 2+ on immunohistochemistry negative on FISH.  It has high-grade features.  The ultrasound measures this mass to be 1.6 cm in size.  It is located 11 cm from the nipple.  No nipple inversion or any other symptoms.     The patient has been seen by Dr. Allyson Douglas.  She had a Port-A-Cath placed for neoadjuvant chemotherapy.     She has past medical history of gastric sleeve surgery about a year ago.  She is taking some vitamin B12 and other supplements.     She had a hysterectomy few years ago.     As for the breast cancer risk factors are concerned she is G4, P3.  First child at age 29.  Breast-feeding for all 3 of them.  No prior breast biopsies.  No significant family history.    She has been started on neoadjuvant  chemotherapy on 9 July 2020 with dose dense adriamysin cyclophosphamide followed by paclitaxel.   After second cycle she noticed decrease in the size of the breast lesion.  Now it is not palpable.   Minimal nausea as well.  Otherwise doing quite well.    We did a virtual visit today.  She had labs drawn this morning.  Overall doing fine.    Past Medical History     Past Medical History:   Diagnosis Date     Anxiety      Breast cancer (H)      Depression      Sleep apnea     No longer use CPAP after Wt loss           Review of Systems   Constitutional  Constitutional (WDL): All constitutional elements are within defined limits  Neurosensory  Neurosensory (WDL): All neurosensory elements are within defined limits  Cardiovascular  Cardiovascular (WDL): All cardiovascular elements are within defined limits  Pulmonary  Respiratory (WDL): Within Defined Limits  Gastrointestinal  Gastrointestinal (WDL): Exceptions to WDL  Constipation: Occasional or intermittent symptoms, occasional use of stool softeners, laxatives, dietary modification, or enema  Dysgeusia: Altered taste but no change in diet  Genitourinary  Genitourinary (WDL): All genitourinary elements are within defined limits  Integumentary  Integumentary (WDL): Exceptions to WDL  Alopecia: Hair loss of >50% normal for that individual that is readily apparent to others, a wig or hair piece is necessary if the patient desires to completely camouflage the hair loss, associated with psychosocial impact  Pruritus: Mild or localized, topical intervention indicated  Urticaria: Urticarial lesions covering <10% BSA, topical intervention indicated  Patient Coping  Patient Coping: Accepting  Accompanied by       ECOG performance status and Distress Assessment      ECOG Performance:    ECOG Performance Status: 0    Distress Assessment  Distress Assessment Score: No distress:     Pain Status  Currently in Pain: No/denies        Vital Signs     There were no vitals filed for  this visit.    Physical Exam     GENERAL: no acute distress. Cooperative in conversation.   HEENT: Facial symmetry preserved.  Oral mucosa is moist and intact.  NECK: No visible thyromegaly.  No deformity.  RESP: Regular respiratory rate. No stridor.  No coughing.  EXTREMITIES: No visible upper extremity edema.   NEURO: non focal. Alert and oriented x3.  Cranial nerves II through XI appear intact clinically.  PSYCH: within normal limits.   SKIN: Facial skin appears warm dry intact       Lab Results     Results for orders placed or performed in visit on 09/02/20   Comprehensive Metabolic Panel   Result Value Ref Range    Sodium 140 136 - 145 mmol/L    Potassium 3.8 3.5 - 5.0 mmol/L    Chloride 103 98 - 107 mmol/L    CO2 30 22 - 31 mmol/L    Anion Gap, Calculation 7 5 - 18 mmol/L    Glucose 76 70 - 125 mg/dL    BUN 7 (L) 8 - 22 mg/dL    Creatinine 0.64 0.60 - 1.10 mg/dL    GFR MDRD Af Amer >60 >60 mL/min/1.73m2    GFR MDRD Non Af Amer >60 >60 mL/min/1.73m2    Bilirubin, Total 0.2 0.0 - 1.0 mg/dL    Calcium 9.4 8.5 - 10.5 mg/dL    Protein, Total 6.3 6.0 - 8.0 g/dL    Albumin 3.7 3.5 - 5.0 g/dL    Alkaline Phosphatase 87 45 - 120 U/L    AST 30 0 - 40 U/L    ALT 49 (H) 0 - 45 U/L   HM1 (CBC with Diff)   Result Value Ref Range    WBC 4.2 4.0 - 11.0 thou/uL    RBC 3.78 (L) 3.80 - 5.40 mill/uL    Hemoglobin 11.9 (L) 12.0 - 16.0 g/dL    Hematocrit 36.5 35.0 - 47.0 %    MCV 97 80 - 100 fL    MCH 31.5 27.0 - 34.0 pg    MCHC 32.6 32.0 - 36.0 g/dL    RDW 14.2 11.0 - 14.5 %    Platelets 163 140 - 440 thou/uL    MPV 9.7 8.5 - 12.5 fL    Neutrophils % 55 50 - 70 %    Lymphocytes % 23 20 - 40 %    Monocytes % 18 (H) 2 - 10 %    Eosinophils % 0 0 - 6 %    Basophils % 1 0 - 2 %    Immature Granulocyte % 3 (H) <=0 %    Neutrophils Absolute 2.3 2.0 - 7.7 thou/uL    Lymphocytes Absolute 1.0 0.8 - 4.4 thou/uL    Monocytes Absolute 0.8 0.0 - 0.9 thou/uL    Eosinophils Absolute 0.0 0.0 - 0.4 thou/uL    Basophils Absolute 0.1 0.0 - 0.2  thou/uL    Immature Granulocyte Absolute 0.1 (H) <=0.0 thou/uL         Imaging Results     No results found.      MD Amando Cardozo MD

## 2021-06-11 NOTE — PROGRESS NOTES
Pt here ambulatory for txt after exam with Np. Lab results approved for txt. txt reviewed and administered as ordered. PT tolerated txt without any problems. txt completed and port flushed/deaccessed with 2x2 to site. Follow up reviewed and pt dc'd steady gait

## 2021-06-11 NOTE — PROGRESS NOTES
[facility] Hematology and Oncology Progress Note    Patient: Monika Franz  MRN: [id number]  Date of Service: 09/24/2020        Reason for Visit    Chief Complaint   Patient presents with     HE Cancer       Assessment and Plan  Cancer Staging  Invasive ductal carcinoma of breast, left (H)  Staging form: Breast, AJCC 8th Edition  - Clinical stage from 6/22/2020: Stage IIB (cT2, cN0, cM0, G3, ER-, [place]-, HER2: Equivocal) - Signed by Sue Dougherty CNP on 7/9/2020    1. Breast cancer, Triple negative, Stage IIB: pt has started neoadjuvant chemo. She is getting dose dense AC every 2 weeks. She has completed 4 cycles of that. Has started weekly Taxol. Today is week 4. Doing well.  Clinically, I can no longer feel tumor.     2. Triple negative cancer, some family history: met with genetic counselor. Testing is negative.     3. Mild nail changes. Soak in epsom salt. Keep nails clean, short.     4. Anemia: chemo induced and usually cumulative. Better with Taxol. Overall asymptomatic except fatigue. Continue to monitor.         ECOG Performance   ECOG Performance Status: 1     Distress Assessment  Distress Assessment Score: No distress      Pain  Currently in Pain: No/denies      Problem List    1. Encounter for antineoplastic immunotherapy  CC OFFICE VISIT LONG    DISCONTINUED: dexAMETHasone injection 10 mg (DECADRON)   2. Chemotherapy-induced neutropenia (H)  CC OFFICE VISIT LONG    DISCONTINUED: dexAMETHasone injection 10 mg (DECADRON)   3. Invasive ductal carcinoma of breast, left (H)  CC OFFICE VISIT LONG    DISCONTINUED: dexAMETHasone injection 10 mg (DECADRON)        ______________________________________________________________________________    History of Present Illness    Patient is a very pleasant 54-year-old woman who has been referred to me by Dr. Alylson Douglas for evaluation and treatment of a newly diagnosed breast cancer.  The patient found this lump located in her left breast measuring  approximately 2 cm in size presenting with some stretching-like symptoms in her breast in the month of June.  She was then seen by her primary care physician and had a mammogram done which was very suspicious.  A biopsy confirmed the presence of invasive ductal carcinoma ER negative [place] negative HER-2/joann 2+ on immunohistochemistry.  It has high-grade features.  The ultrasound measures this mass to be 1.6 cm in size.  It is located 11 cm from the nipple.  No nipple inversion or any other symptoms.     The patient has been seen by Dr. Allyson Douglas.  She had a Port-A-Cath placed as she is a candidate for neoadjuvant chemotherapy.     She has past medical history of gastric sleeve surgery about a year ago.  She is taking some vitamin B12 and other supplements.     She had a hysterectomy few years ago. Did have elevated LFT's initially and it was thought to be related to buspar so that has been stopped and her liver function has improved.   She started dose dense AC in July. She is doing well. Mild nail changes.  Appetite ok. No fevers, infectious complaints. Cannot feel tumor anymore.        Treatment: started neoadjuvant dose dense AC on 7/9/20. Cycle 4 was on 8/19/20.   Started weekly Taxol 9/3/20. Today is week 4.         Review of Systems    Constitutional  Constitutional (WDL): Exceptions to WDL  Fatigue: Fatigue relieved by rest  Neurosensory  Neurosensory (WDL): All neurosensory elements are within defined limits  Eye   Eye Disorder (WDL): Assessment not pertinent to visit  Ear  Ear Disorder (WDL): Assessment not pertinent to visit  Cardiovascular  Cardiovascular (WDL): All cardiovascular elements are within defined limits  Pulmonary  Respiratory (WDL): Within Defined Limits  Gastrointestinal  Gastrointestinal (WDL): Exceptions to WDL  Constipation: Persistent symptoms with regular use of laxatives or enemas, limiting instrumental ADL  Genitourinary  Genitourinary (WDL): All genitourinary elements are within  defined limits  Lymphatic  Lymph (WDL): Assessment not pertinent to visit  Musculoskeletal and Connective Tissue  Musculoskeletal and Connetive Tissue Disorders (WDL): All Musculoskeletal and Connetive Tissue Disorder elements are within defined limits  Integumentary  Integumentary (WDL): Exceptions to WDL  Alopecia: Hair loss of >50% normal for that individual that is readily apparent to others, a wig or hair piece is necessary if the patient desires to completely camouflage the hair loss, associated with psychosocial impact  Patient Coping  Patient Coping: Accepting  Distress Assessment  Distress Assessment Score: No distress  Accompanied by  Accompanied by: Alone  Oral Chemo Adherence         Past History  Past Medical History:   Diagnosis Date     Anxiety      Breast cancer (H)      Depression      Sleep apnea     No longer use CPAP after Wt loss       PHYSICAL EXAM  /59   Pulse 86   Temp 98.2  F (36.8  C)   Wt 208 lb 12.8 oz (94.7 kg)   LMP 06/15/2012   SpO2 97%   BMI 34.22 kg/m      GENERAL: no acute distress. Cooperative in conversation. Here alone due to visitor restrictions. Mask on  HEENT: I cannot see any mucocitis  RESP: Regular respiratory rate. No expiratory wheezes   MUSCULOSKELETAL: no bilateral leg swelling  NEURO: non focal. Alert and oriented x3.   PSYCH: within normal limits. No depression or anxiety.  SKIN: exposed skin is dry intact. New port is CDI  BREAST: can no longer feel tumor.     Lab Results    Recent Results (from the past 168 hour(s))   Comprehensive Metabolic Panel   Result Value Ref Range    Sodium 143 136 - 145 mmol/L    Potassium 3.7 3.5 - 5.0 mmol/L    Chloride 107 98 - 107 mmol/L    CO2 27 22 - 31 mmol/L    Anion Gap, Calculation 9 5 - 18 mmol/L    Glucose 99 70 - 125 mg/dL    BUN 9 8 - 22 mg/dL    Creatinine 0.70 0.60 - 1.10 mg/dL    GFR MDRD Af Amer >60 >60 mL/min/1.73m2    GFR MDRD Non Af Amer >60 >60 mL/min/1.73m2    Bilirubin, Total 0.3 0.0 - 1.0 mg/dL     Calcium 9.0 8.5 - 10.5 mg/dL    Protein, Total 6.8 6.0 - 8.0 g/dL    Albumin 3.8 3.5 - 5.0 g/dL    Alkaline Phosphatase 85 45 - 120 U/L    AST 28 0 - 40 U/L    ALT 45 0 - 45 U/L   HM1 (CBC with Diff)   Result Value Ref Range    WBC 3.7 (L) 4.0 - 11.0 thou/uL    RBC 3.67 (L) 3.80 - 5.40 mill/uL    Hemoglobin 11.8 (L) 12.0 - 16.0 g/dL    Hematocrit 35.6 35.0 - 47.0 %    MCV 97 80 - 100 fL    MCH 32.2 27.0 - 34.0 pg    MCHC 33.1 32.0 - 36.0 g/dL    RDW 14.3 11.0 - 14.5 %    Platelets 222 140 - 440 thou/uL    MPV 9.7 8.5 - 12.5 fL    Neutrophils % 64 50 - 70 %    Lymphocytes % 22 20 - 40 %    Monocytes % 10 2 - 10 %    Eosinophils % 1 0 - 6 %    Basophils % 1 0 - 2 %    Immature Granulocyte % 1 (H) <=0 %    Neutrophils Absolute 2.4 2.0 - 7.7 thou/uL    Lymphocytes Absolute 0.8 0.8 - 4.4 thou/uL    Monocytes Absolute 0.4 0.0 - 0.9 thou/uL    Eosinophils Absolute 0.1 0.0 - 0.4 thou/uL    Basophils Absolute 0.0 0.0 - 0.2 thou/uL    Immature Granulocyte Absolute 0.0 <=0.0 thou/uL       Imaging    No results found.        Signed by: Sue Dougherty, CNP

## 2021-06-11 NOTE — PROGRESS NOTES
Pt ambulates to infusion center for treatment.  Labs were drawn on 9/2/2020 and patient had virtual visit with Dr. Monson who approved treatment orders.  Pt voices no new concerns today.  IVAD accessed, flushes easily w/good blood return noted.  Treatment administered as ordered without difficulty.  IVAD flushed w/NS et heparin and needle deaccessed.  Pt left clinic stable to lobby.  Plan RTC as scheduled.

## 2021-06-12 NOTE — PROGRESS NOTES
Monika came to chemo infusion following port lab draw and MD visit for start of cycle 8 using Paclitaxel.  She has been well educated on her treatment plan and each medication given today was reviewed prior to administration.  Monika received treatment as ordered and tolerated it well while in clinic today.  Port was flushed with ns, heparinized then de-accessed and site covered.  Monika left clinic ambulatory and unaccompanied.

## 2021-06-12 NOTE — PROGRESS NOTES
Patient is here for labs, provider, and treatment for Invasive ductal carcinoma of breast, left (H).

## 2021-06-12 NOTE — TELEPHONE ENCOUNTER
Ailyn telephoned to request list of plastic surgeons and I have e-mailed her that list.  Amy Palumbo RN

## 2021-06-12 NOTE — PROGRESS NOTES
"Pt arrives amb[facility] to [facility]. Pt in chair #4. Pt reports feeling \"good\". Port accessed with good blood return, labs drawn, and flushed. Labs within treatment parameters and proceeding with treatment. Pt given pre-medications, and tolerated Paclitaxel well. Port flushed, heparinized, de-accessed and covered. Pt aware of next appt and left dept ambulatory.   "

## 2021-06-12 NOTE — PROGRESS NOTES
[facility] Cancer Care Progress Note    Patient: Monika Franz  MRN: [id number]  Date of Service: 11/5/2020        Reason for visit      1. Invasive ductal carcinoma of breast, left (H)        Assessment     1.  A very pleasant 54-year-old woman with left-sided breast cancer at least T2 N0 M0 ER [place] negative HER-2/joann negative. Started on sreekanth adjuvant chemotherapy. Responding to treatment.  She has finished 4 cycles of dose dense Adriamycin Cytoxan and currently has finished 9 doses of weekly paclitaxel.  4.  Minimal nausea.  2.  Slightly overweight.  3.  Status post gastric sleeve about a year ago.      Plan     1.  Proceed with next cycle paclitaxel weekly.  This is her last treatment.  After that she will need to have surgery.  She will schedule with Dr. Allyson Douglas.  We will then see her back after the surgery.  I also discussed with her that since she received neoadjuvant chemotherapy she should consider postop radiation therapy.  The patient is contemplating bilateral mastectomy with reconstruction.  I suggested that she should meet with the radiation oncologist and the plastic surgeon in the next few weeks.  She is going to see Dr. Allyson Douglas soon.  I anticipate that Dr. Allyson Douglas will coordinate surgical care with the plastic surgeons.  2.  Discussed with the patient about symptom management with weekly paclitaxel therapy.  3.  Continue other supportive care.  4.  We will see her back after she has had surgery.    Clinical stage      Cancer Staging  Invasive ductal carcinoma of breast, left (H)  Staging form: Breast, AJCC 8th Edition  - Clinical stage from 6/22/2020: Stage IIB (cT2, cN0, cM0, G3, ER-, GA-, HER2: Equivocal) - Signed by Sue Dougherty CNP on 7/9/2020      History     Monika Franz is a very pleasant 55 y.o. old female with a history of triple negative left breast cancer.  The patient found this lump located in her left breast measuring approximately 2 cm in size, at  10 o'clock position, presenting with some stretching-like symptoms in her breast in the month of June 2020.  She was then seen by her primary care physician and had a mammogram done which was very suspicious.  A biopsy confirmed the presence of invasive ductal carcinoma ER negative [place] negative HER-2/joann 2+ on immunohistochemistry negative on FISH.  It has high-grade features.  The ultrasound measures this mass to be 1.6 cm in size.  It is located 11 cm from the nipple.  No nipple inversion or any other symptoms.    The patient was seen by Dr. Allyson Douglas.  She had a Port-A-Cath placed for neoadjuvant chemotherapy.     She has past medical history of gastric sleeve surgery about a year ago.  She is taking some vitamin B12 and other supplements.     She had a hysterectomy few years ago.     As for the breast cancer risk factors are concerned she is G4, P3.  First child at age 29.  Breast-feeding for all 3 of them.  No prior breast biopsies.  No significant family history.    She has been started on neoadjuvant chemotherapy on 9 July 2020 with dose dense adriamysin cyclophosphamide followed by paclitaxel.   After second cycle she noticed decrease in the size of the breast lesion.  In fact this is not palpable anymore.  She has finished 4 cycles of dose dense Adriamycin and cyclophosphamide.  Currently on weekly paclitaxel.      Past Medical History     Past Medical History:   Diagnosis Date     Anxiety      Breast cancer (H)      Depression      Sleep apnea     No longer use CPAP after Wt loss           Review of Systems   Constitutional  Constitutional (WDL): Exceptions to WDL  Fatigue: Fatigue not relieved by rest - Limiting instrumental ADL  Neurosensory  Neurosensory (WDL): All neurosensory elements are within defined limits  Cardiovascular  Cardiovascular (WDL): All cardiovascular elements are within defined limits  Pulmonary  Respiratory (WDL): Within Defined Limits  Gastrointestinal  Gastrointestinal (WDL):  Exceptions to WDL  Constipation: Persistent symptoms with regular use of laxatives or enemas, limiting instrumental ADL  Dysgeusia: Altered taste but no change in diet  Genitourinary  Genitourinary (WDL): All genitourinary elements are within defined limits  Integumentary  Integumentary (WDL): Exceptions to WDL(nailbeds tender and color change, right jaw swollen)  Alopecia: Hair loss of >50% normal for that individual that is readily apparent to others, a wig or hair piece is necessary if the patient desires to completely camouflage the hair loss, associated with psychosocial impact  Rash Maculo-Papular: Macules/papules covering <10% BSA with or without symptoms (e.g., pruritus, burning, tightness)(rash on right hand)  Pruritus: Mild or localized, topical intervention indicated  Patient Coping  Patient Coping: Accepting  Accompanied by  Accompanied by: Alone    ECOG performance status and Distress Assessment      ECOG Performance:    ECOG Performance Status: 1    Distress Assessment  Distress Assessment Score: No distress:     Pain Status  Currently in Pain: No/denies        Vital Signs     Vitals:    11/05/20 0928   BP: 116/81   Pulse: 68   Temp: 98.3  F (36.8  C)   SpO2: 97%       Physical Exam     HEENT: Examination reveals pupils are equal.Oral mucosa moist and intact.   Neck is supple, no adenopathy,  no JVD.  Chest is symmetrical. Good air entry bilaterally, no rhonchi. No wheezing.   Cardiac examination: Normal S1, S2. No S3. Very soft systolic murmur.   Abdomen: Soft, nontender. No palpable hepatosplenomegaly.  Extremities: No edema, cynosis,or clubbing.  Lymph node examination: Bilateral axillary lymph nodes are negative.  Supraclavicular as well as cervical lymph nodes are also examined and were negative.    Lab Results     Results for orders placed or performed in visit on 10/29/20   HM1 (CBC with Diff)   Result Value Ref Range    WBC 4.1 4.0 - 11.0 thou/uL    RBC 3.57 (L) 3.80 - 5.40 mill/uL     Hemoglobin 11.6 (L) 12.0 - 16.0 g/dL    Hematocrit 35.7 35.0 - 47.0 %     80 - 100 fL    MCH 32.5 27.0 - 34.0 pg    MCHC 32.5 32.0 - 36.0 g/dL    RDW 13.3 11.0 - 14.5 %    Platelets 204 140 - 440 thou/uL    MPV 9.5 8.5 - 12.5 fL    Neutrophils % 61 50 - 70 %    Lymphocytes % 28 20 - 40 %    Monocytes % 9 2 - 10 %    Eosinophils % 1 0 - 6 %    Basophils % 1 0 - 2 %    Immature Granulocyte % 1 (H) <=0 %    Neutrophils Absolute 2.5 2.0 - 7.7 thou/uL    Lymphocytes Absolute 1.1 0.8 - 4.4 thou/uL    Monocytes Absolute 0.4 0.0 - 0.9 thou/uL    Eosinophils Absolute 0.0 0.0 - 0.4 thou/uL    Basophils Absolute 0.1 0.0 - 0.2 thou/uL    Immature Granulocyte Absolute 0.0 <=0.0 thou/uL         Imaging Results     No results found.          Amando Monson MD

## 2021-06-12 NOTE — PROGRESS NOTES
Cycle 7 Day 8 -- Patient arrived ambulatory at 08:00 - seated in chair 4. Reviewed plan of care. Accessed the port a cath, and labs were done. The results were discussed with the patient, and a copy was given. Used NS as the primary IV solution - premeds were administered as ordered. Infused paclitaxel over 1 hour. At completion the IV line was flushed with NS 50 mls. The port a cath was flushed, heparinized, and deaccessed. Patient tolerated the treatment well. Left the unit ambulatory in stable condition at 11:25, and plans to return in one week. Instructed the patient to call with any questions or concerns.    Jolene Shin RN

## 2021-06-12 NOTE — PROGRESS NOTES
[facility] Hematology and Oncology Progress Note    Patient: Monika Franz  MRN: [id number]  Date of Service: 10/15/2020        Reason for Visit    Chief Complaint   Patient presents with     HE Cancer     Invasive ductal carcinoma of breast, left (H)       Assessment and Plan  Cancer Staging  Invasive ductal carcinoma of breast, left (H)  Staging form: Breast, AJCC 8th Edition  - Clinical stage from 6/22/2020: Stage IIB (cT2, cN0, cM0, G3, ER-, DE-, HER2: Equivocal) - Signed by Sue Dougherty CNP on 7/9/2020    1. Breast cancer, Triple negative, Stage IIB: pt has started neoadjuvant chemo. She received dose dense AC every 2 weeks. She has completed 4 cycles of that. Has started weekly Taxol. Today is week 7. Doing well.  Clinically, I can no longer feel tumor. She will continue weekly dosing. Will return to see Dr. rojo in 3 weeks. She will make an appointment to see Dr. Douglas around the middle of November to talk about surgery. Last dose of chemo should be around 11/19/20    2. Triple negative cancer, some family history: met with genetic counselor. Testing is negative.     3. Mild nail changes. Soak in epsom salt. Keep nails clean, short.     4. Anemia: chemo induced and usually cumulative. Better with Taxol. Overall asymptomatic except fatigue. Continue to monitor.         ECOG Performance   ECOG Performance Status: 1     Distress Assessment  Distress Assessment Score: No distress      Pain  Currently in Pain: No/denies      Problem List    1. Encounter for antineoplastic immunotherapy  CC OFFICE VISIT LONG    Infusion Appointment    Infusion Appointment    Infusion Appointment    CC OFFICE VISIT LONG   2. Chemotherapy-induced neutropenia (H)  CC OFFICE VISIT LONG    Infusion Appointment    Infusion Appointment    Infusion Appointment    CC OFFICE VISIT LONG   3. Invasive ductal carcinoma of breast, left (H)  CC OFFICE VISIT LONG    Infusion Appointment    Infusion Appointment    Infusion  Appointment    CC OFFICE VISIT LONG        ______________________________________________________________________________    History of Present Illness    Patient is a very pleasant 54-year-old woman who has been referred to me by Dr. Allyson Douglas for evaluation and treatment of a newly diagnosed breast cancer.  The patient found this lump located in her left breast measuring approximately 2 cm in size presenting with some stretching-like symptoms in her breast in the month of June.  She was then seen by her primary care physician and had a mammogram done which was very suspicious.  A biopsy confirmed the presence of invasive ductal carcinoma ER negative MS negative HER-2/joann 2+ on immunohistochemistry.  It has high-grade features.  The ultrasound measures this mass to be 1.6 cm in size.  It is located 11 cm from the nipple.  No nipple inversion or any other symptoms.     The patient has been seen by Dr. Allyson Douglas.  She had a Port-A-Cath placed as she is a candidate for neoadjuvant chemotherapy.     She has past medical history of gastric sleeve surgery about a year ago.  She is taking some vitamin B12 and other supplements.     She had a hysterectomy few years ago. Did have elevated LFT's initially and it was thought to be related to buspar so that has been stopped and her liver function has improved.   She started dose dense AC in July. Completed 4 cycles and then started weekly Taxol in September. She is doing well. Mild nail changes.  Appetite ok. No fevers, infectious complaints. Cannot feel tumor anymore.        Treatment: started neoadjuvant dose dense AC on 7/9/20. Cycle 4 was on 8/19/20.   Started weekly Taxol 9/3/20. Today is week 7        Review of Systems    Constitutional  Constitutional (WDL): Exceptions to WDL  Fatigue: Concerns(mildly improving)  Neurosensory  Neurosensory (WDL): All neurosensory elements are within defined limits  Eye   Eye Disorder (WDL): All eye disorder elements are within  "defined limits  Ear  Ear Disorder (WDL): All ear disorder elements are within defined limits  Cardiovascular  Cardiovascular (WDL): All cardiovascular elements are within defined limits  Pulmonary  Respiratory (WDL): Within Defined Limits  Gastrointestinal  Gastrointestinal (WDL): Exceptions to WDL  Constipation: Concerns(controlled with senna)  Genitourinary  Genitourinary (WDL): All genitourinary elements are within defined limits  Lymphatic  Lymph (WDL): All lymph disorder elements are within defined limits  Musculoskeletal and Connective Tissue  Musculoskeletal and Connetive Tissue Disorders (WDL): All Musculoskeletal and Connetive Tissue Disorder elements are within defined limits  Integumentary  Integumentary (WDL): Exceptions to WDL(rash on right hand; cortisone cream)  Patient Coping  Patient Coping: Accepting;Open/discussion  Accompanied by  Accompanied by: Alone  Oral Chemo Adherence           Past History  Past Medical History:   Diagnosis Date     Anxiety      Breast cancer (H)      Depression      Sleep apnea     No longer use CPAP after Wt loss       PHYSICAL EXAM  /68   Pulse 81   Temp 98.2  F (36.8  C) (Oral)   Ht 5' 5.5\" (1.664 m)   Wt 214 lb 3.2 oz (97.2 kg)   LMP 06/15/2012   SpO2 95%   BMI 35.10 kg/m      GENERAL: no acute distress. Cooperative in conversation. Here alone due to visitor restrictions. Mask on  RESP: Regular respiratory rate. No expiratory wheezes   MUSCULOSKELETAL: no bilateral leg swelling  NEURO: non focal. Alert and oriented x3.   PSYCH: within normal limits. No depression or anxiety.  SKIN: exposed skin is dry intact. New port is CDI  BREAST: can no longer feel tumor.     Lab Results    Recent Results (from the past 168 hour(s))   Comprehensive Metabolic Panel   Result Value Ref Range    Sodium 143 136 - 145 mmol/L    Potassium 3.9 3.5 - 5.0 mmol/L    Chloride 107 98 - 107 mmol/L    CO2 28 22 - 31 mmol/L    Anion Gap, Calculation 8 5 - 18 mmol/L    Glucose 79 70 - " 125 mg/dL    BUN 14 8 - 22 mg/dL    Creatinine 0.68 0.60 - 1.10 mg/dL    GFR MDRD Af Amer >60 >60 mL/min/1.73m2    GFR MDRD Non Af Amer >60 >60 mL/min/1.73m2    Bilirubin, Total 0.2 0.0 - 1.0 mg/dL    Calcium 9.0 8.5 - 10.5 mg/dL    Protein, Total 6.6 6.0 - 8.0 g/dL    Albumin 3.8 3.5 - 5.0 g/dL    Alkaline Phosphatase 75 45 - 120 U/L    AST 24 0 - 40 U/L    ALT 34 0 - 45 U/L   HM1 (CBC with Diff)   Result Value Ref Range    WBC 4.2 4.0 - 11.0 thou/uL    RBC 3.50 (L) 3.80 - 5.40 mill/uL    Hemoglobin 11.4 (L) 12.0 - 16.0 g/dL    Hematocrit 35.0 35.0 - 47.0 %     80 - 100 fL    MCH 32.6 27.0 - 34.0 pg    MCHC 32.6 32.0 - 36.0 g/dL    RDW 14.0 11.0 - 14.5 %    Platelets 208 140 - 440 thou/uL    MPV 9.4 8.5 - 12.5 fL    Neutrophils % 67 50 - 70 %    Lymphocytes % 22 20 - 40 %    Monocytes % 9 2 - 10 %    Eosinophils % 1 0 - 6 %    Basophils % 1 0 - 2 %    Immature Granulocyte % 1 (H) <=0 %    Neutrophils Absolute 2.8 2.0 - 7.7 thou/uL    Lymphocytes Absolute 0.9 0.8 - 4.4 thou/uL    Monocytes Absolute 0.4 0.0 - 0.9 thou/uL    Eosinophils Absolute 0.1 0.0 - 0.4 thou/uL    Basophils Absolute 0.0 0.0 - 0.2 thou/uL    Immature Granulocyte Absolute 0.0 <=0.0 thou/uL       Imaging    No results found.        Signed by: Sue Dougherty, CNP

## 2021-06-12 NOTE — TELEPHONE ENCOUNTER
Patient called, was told by Verenice to make a follow up appointment with Dr. Douglas to review surgery plan.  She has almost completed her NAC. Called her back, lmom for her with appointment to see Dr. Douglas on Weds, 11-11-20 at 1300/1245 check in at the [facility].  Asked her to call me back to confirm appointment.     yes/warm blanket given

## 2021-06-12 NOTE — PROGRESS NOTES
Pt [facility] for C7 D15 of treatment. Pt in chair #4. Port accessed with good blood return, labs drawn, and flushed. Labs within treatment parameters and proceeding with treatment. Pt given copy of lab results. Pt given pre-medications and tolerated Paclitaxel well. Pt was able to rest during most of infusion. Port shows good blood return, flushed, heparinized, de-accessed and covered. Pt left dept ambulatory.

## 2021-06-12 NOTE — PROGRESS NOTES
"History:  This is a 55 y.o. female who presents for follow-up of her breast cancer.  She has been undergoing neoadjuvant chemotherapy under the care of Dr. Monson for her triple negative invasive ductal carcinoma.  She is tolerating this remarkably well.  She has 2 rounds of Taxol left.  Her main side effect has been feeling really tired.  She also had genetic testing completed.  That came back negative.  She presents today to discuss the next step of surgery.    Physical exam:  Resp 16   Ht 5' 6\" (1.676 m)   Wt 215 lb (97.5 kg)   LMP 06/15/2012   BMI 34.70 kg/m    General: Alert, cooperative, appears stated age   Breasts: Pendulous without palpable lesions    IMPRESSION:  Left breast invasive ductal carcinoma.  Triple negative.    PLAN:   Discussed the surgical options for treatment of breast cancer which generally are a lumpectomy (partial mastectomy) combined with radiation versus a mastectomy.  The patient is a good candidate for a lumpectomy.  Since it is nonpalpable since her neoadjuvant chemotherapy, it would require wire localization.  Discussed SLN biopsy.  The procedure and rationale were explained.   Discussed that at this point we do not know yet whether or not she will need further chemotherapy.  This will be dependent upon the final pathologic report.    After our discussion, all questions were answered to satisfaction.  She is interested in pursuing bilateral mastectomy without reconstruction.  Therefore, we will plan to schedule a bilateral mastectomy with left sentinel lymph node biopsy and port removal.  This is becoming an outpatient procedure under general anesthesia.  The risks and benefits of surgery were explained.  Also talked about expected recovery time and drain management.  She would then follow-up with myself about 1 week after surgery to review final pathology and next steps.  Preoperative orders have been placed for bilateral mastectomy with left sentinel lymph node biopsy and port " placement at [facility].  We would schedule the surgery on a day where there was staff available for 23-hour observation.    Alylson Douglas DO  General Surgeon  [address] [address]  Office: [phone]  Employed by - [facility]

## 2021-06-12 NOTE — PROGRESS NOTES
Monika came to chemo infusion following port lab draw and NP visit for start of cycle 7 using Paclitaxel.  She has been well educated on her treatment plan and each medication given today was reviewed prior to administration.  Monika received treatment as ordered and tolerated it well while in clinic today.  Port was flushed with ns, heparinized then de-accessed and site covered.  Monika left clinic ambulatory and unaccompanied.

## 2021-06-13 NOTE — PROGRESS NOTES
"Monika presents to [facility] today for a surgical consult with Dr. Douglas  regarding her triple negative breast cancer.  She has been receiving Neoadjuvant chemo with Dr. Monson. She is accompanied by her mother for consult.  RN assessment and EMR update. Resp 16   Ht 5' 6\" (1.676 m)   Wt 215 lb (97.5 kg)   LMP 06/15/2012   BMI 34.70 kg/m    She met with Dr. Douglas  see dictation for details of visit. She will plan bilateral mastectomy with sentinel node biopsy.  Pre and post op teaching complete.  Walked her to [facility] for surgery scheduling.  Support provided, invited calls.  RN time 18 mins  "

## 2021-06-13 NOTE — ANESTHESIA PREPROCEDURE EVALUATION
Anesthesia Evaluation        Airway   Mallampati: II  Neck ROM: full   Pulmonary - normal exam   (+) sleep apnea,                          Cardiovascular - normal exam   Neuro/Psych    (+) depression,     Endo/Other    (+) obesity,      GI/Hepatic/Renal            Dental - normal exam                        Anesthesia Plan  Planned anesthetic: general LMA  ketamine  mag sulfate    ASA 3   Induction: intravenous   Anesthetic plan and risks discussed with: patient    Post-op plan: routine recovery

## 2021-06-13 NOTE — PROGRESS NOTES
[facility] Radiation Oncology Follow Up Note    Patient: Monika Franz  MRN: [id number]  Date of Service: 11/11/2020    Assessment / Impression     1. Invasive ductal carcinoma of breast, left (H)  Radiation oncology evaluation      Cancer Staging  Invasive ductal carcinoma of breast, left (H)  Staging form: Breast, AJCC 8th Edition  - Clinical stage from 6/22/2020: Stage IIB (cT2, cN0, cM0, G3, ER-, KS-, HER2: Equivocal) - Signed by Sue Dougherty CNP on 7/9/2020    ECOG Peformance Status  ECOG Performance Status: 1  Distress Assessment Score  Distress Assessment Score: 2  Interventions  Distress Assessment Intervention: Provider Notified  Body site: Breast     Plan:   Triple negative breast CR to chemotherapy, wants bilateral mastectomy without reconstruction.     F/u prn       Face to face time 10 minutes with > 75% spent on consultation, education and coordination of care.    Subjective:      HPI: Monika Franz is a 55 y.o. female with   Chief Complaint   Patient presents with     HE Cancer     Radiation   .    Current Outpatient Medications   Medication Sig Dispense Refill     ARIPiprazole (ABILIFY) 5 MG tablet        calcium carbonate 1250 MG capsule Take 1,250 mg by mouth 3 (three) times a day.       cholecalciferol, vitamin D3, 1,000 unit (25 mcg) tablet Take 5,000 Units by mouth daily.       cyanocobalamin 1000 MCG tablet Take 1,000 mcg by mouth 3 (three) times a day.       desvenlafaxine succinate (PRISTIQ) 50 MG 24 hr tablet Take 100 mg by mouth daily.        multivitamin (CHILDREN'S VITAMIN ORAL) Take 1 tablet by mouth 2 (two) times a day.       prochlorperazine (COMPAZINE) 10 MG tablet Take 1 tablet (10 mg total) by mouth every 6 (six) hours as needed (For breakthrough nausea/vomiting). 30 tablet 1     traZODone (DESYREL) 150 MG tablet Take 150 mg by mouth at bedtime.       No current facility-administered medications for this visit.        The following portions of the patient's  history were reviewed and updated as appropriate: allergies, current medications, past family history, past medical history, past social history, past surgical history and problem list.    Review of Systems    General  Constitutional (WDL): Exceptions to WDL  Fatigue: Fatigue not relieved by rest - Limiting instrumental ADL  EENT  Eye Disorder (WDL): All eye disorder elements are within defined limits  Ear Disorder (WDL): All ear disorder elements are within defined limits  Respiratory       Respiratory (WDL): Within Defined Limits  Cardiovascular  Cardiovascular (WDL): All cardiovascular elements are within defined limits  Endocrine     Gastrointestinal  Gastrointestinal (WDL): Exceptions to WDL  Constipation: Persistent symptoms with regular use of laxatives or enemas, limiting instrumental ADL  Musculoskeletal  Musculoskeletal and Connetive Tissue Disorders (WDL): Exceptions to WDL  Muscle Weakness : Symptomatic, perceived by patient but not evident on physical exam  Integumentary               Integumentary (WDL): Exceptions to WDL  Alopecia: Hair loss of >50% normal for that individual that is readily apparent to others, a wig or hair piece is necessary if the patient desires to completely camouflage the hair loss, associated with psychosocial impact  Rash Maculo-Papular: Macules/papules covering <10% BSA with or without symptoms (e.g., pruritus, burning, tightness)  Pruritus: Mild or localized, topical intervention indicated  Neurological  Neurosensory (WDL): All neurosensory elements are within defined limits  Psychological/Emotional   Patient Coping: Open/discussion  Hematological/Lymphatic  Lymph (WDL): All lymph disorder elements are within defined limits  Dermatologic     Genitourinary/Reproductive  Genitourinary (WDL): All genitourinary elements are within defined limits  Reproductive     Pain              Currently in Pain: No/denies  AUA Assessment                                  Accompanied  by  Accompanied by: Alone      Objective:     Physical Exam    Vitals:    11/11/20 1429   BP: 116/55   Pulse: 78   Temp: 98.6  F (37  C)   TempSrc: Oral   SpO2: 96%   Weight: 219 lb 9.6 oz (99.6 kg)        Mask on   No respiratory distress     Recent Labs:   Recent Results (from the past 168 hour(s))   HM1 (CBC with Diff)   Result Value Ref Range    WBC 4.4 4.0 - 11.0 thou/uL    RBC 3.57 (L) 3.80 - 5.40 mill/uL    Hemoglobin 11.8 (L) 12.0 - 16.0 g/dL    Hematocrit 36.5 35.0 - 47.0 %     (H) 80 - 100 fL    MCH 33.1 27.0 - 34.0 pg    MCHC 32.3 32.0 - 36.0 g/dL    RDW 12.7 11.0 - 14.5 %    Platelets 209 140 - 440 thou/uL    MPV 9.8 8.5 - 12.5 fL    Neutrophils % 61 50 - 70 %    Lymphocytes % 29 20 - 40 %    Monocytes % 7 2 - 10 %    Eosinophils % 1 0 - 6 %    Basophils % 1 0 - 2 %    Immature Granulocyte % 1 (H) <=0 %    Neutrophils Absolute 2.7 2.0 - 7.7 thou/uL    Lymphocytes Absolute 1.3 0.8 - 4.4 thou/uL    Monocytes Absolute 0.3 0.0 - 0.9 thou/uL    Eosinophils Absolute 0.1 0.0 - 0.4 thou/uL    Basophils Absolute 0.1 0.0 - 0.2 thou/uL    Immature Granulocyte Absolute 0.0 <=0.0 thou/uL       Signed by: Maria Elena Dolan MD

## 2021-06-13 NOTE — TELEPHONE ENCOUNTER
Let her know order is placed and she will receive a call to schedule. If she develops symptoms, I would like her to do a VV

## 2021-06-13 NOTE — PATIENT INSTRUCTIONS - HE
Procedure: Bilateral Mastectomies  Surgery Date: Tuesday December 15th    Location: [facility]                 3rd Floor, [facility]                  [address] [place] [id number]     Arrival Time: 7:15 AM to [address] (unless instructed otherwise by the pre-op nurse)    Prep:     1. Please schedule a preop physical with your primary care doctor. This may be virtual or face-to-face depending on your doctors preference. Call them right away to schedule this.    2. COVID19 testing is required within 4 days of surgery. We have [facility] scheduled [facility] at [facility], [address] [place] [address] at 9:30 AM on Saturday Dec. 12th. Follow the specific instructions you receive by Elinor. If your test is positive, your surgery will be canceled.     3. Nothing to eat or drink for 8 hours before surgery unless instructed differently by the preop nurse.    4. No blood thinners including aspirin for one week prior to surgery. Verify this is safe for you with your primary care doctor before stopping.     5. You need an adult to drive you home and stay with you 24 hours after surgery. Because of COVID19 related visitor restrictions, adult surgical patients are allowed one family member  (pediatric surgery patients are allowed two) in the waiting area.     6. When you arrive to the surgery center, you will be screened for COVID19 symptoms. If you screen positive, your surgery will need to be postponed for your safety.    7. If the community sees a new surge in COVID19 hospital admissions, your procedure may need to be postponed. We will contact you if this happens.    8. We always encourage you to notify your insurance any time you have something scheduled including surgery. The number is usually right on the back of your insurance card.     Post op appointment on Monday January 4th at 9:00 AM. [address] [place] [address]. [address].     Call our office if you  have any questions! Thank you!     Samina Minor  Surgery Scheduler  [facility]  Surgery [facility]  Direct line: [phone]   Main Line: [phone]

## 2021-06-13 NOTE — TELEPHONE ENCOUNTER
Donna, your patient is looking to get tested for COVID due to exposure, but is asymptomatic.  Her daughter tested positive for COVID recently.  Patient attempted a virtual visit with [facility] but was stopped due to her underlying health conditions.  [facility] recommends that orders be placed by PCP as she has no symptoms.

## 2021-06-13 NOTE — TELEPHONE ENCOUNTER
Patient calls back in after getting my message regarding how to proceed with recommendations for Covid.  She states that she was at the dentist today due to some pain she had over the weekend.  They recommend a root canal.  She wants to know if she needs an antibiotic or when she should do this.  Patient states that the dentist told her today that this area started to drain so she states that there is no pain at this time.  I let her know that if she continues to be asymptomatic from this, Dr Monson would like her to wait 4 weeks after her last chemotherapy which will be on 11/19.  If she starts having symptoms again he states that she can do this as early as 2 weeks after her last radiation which would put her at 12/3 to be the earliest date to have the root canal.  She verbalized understanding and was very appreciative.    Allyson Tomlin RN

## 2021-06-13 NOTE — PROGRESS NOTES
S: Dr. Douglas entered in an order for Monika to receive a post-mastectomy camisole. Her [facility] was yesterday at the  [facility] and she was discharged yesterday.    A: No assessment was made. Monika received a call and discussed how she would like to have a #[id number] ABC Post-mastectomy camisole (size XL) shipped out to her address (which was verified). Instructions were provided over the phone and she knows to call back within a week if the fit isn't good and she needs to exchange for a different size.    P: She is to call with any further needs. She would like the calendar to be marked to call her in four weeks so a six-week out appnt can be set up for a bilateral breast prostheses/post-meatotomy bra evaluation.  Goal is to maintain a home program.

## 2021-06-13 NOTE — PROGRESS NOTES
Monika Franz, 54 y.o., female arrived ambulatory to clinic at 0815 for Cycle #8 Day #15 of her chemotherapy regimen. Port was accessed using aseptic technique without difficulties with excellent blood return. Lab drawn and reviewed. Patient assessed and vital signs stable. Administered premedications and chemotherapy per provider order. She tolerated infusion well, no s/s of infusion reaction. Port was flushed with NS and Heparin then de-accessed using 2x2 and papertape. Monika Franz verbalized understanding of plan of care and return to clinic. Discharged to [facility] at 1050 alert and ambulatory.

## 2021-06-13 NOTE — ANESTHESIA CARE TRANSFER NOTE
Last vitals:   Vitals:    12/15/20 1111   BP: 169/72   Pulse: 97   Resp: 16   Temp: 36.6  C (97.9  F)   SpO2: 96%     Patient's level of consciousness is drowsy  Spontaneous respirations: yes  Maintains airway independently: yes  Dentition unchanged: yes  Oropharynx: oropharynx clear of all foreign objects    QCDR Measures:  ASA# 20 - Surgical Safety Checklist: WHO surgical safety checklist completed prior to induction    PQRS# 430 - Adult PONV Prevention: 4558F - Pt received => 2 anti-emetic agents (different classes) preop & intraop  ASA# 8 - Peds PONV Prevention: NA - Not pediatric patient, not GA or 2 or more risk factors NOT present  PQRS# 424 - [place]-op Temp Management: 4559F - At least one body temp DOCUMENTED => 35.5C or 95.9F within required timeframe  PQRS# 426 - PACU Transfer Protocol: - Transfer of care checklist used  ASA# 14 - Acute Post-op Pain: ASA14B - Patient did NOT experience pain >= 7 out of 10

## 2021-06-13 NOTE — PROGRESS NOTES
History:  Monika Franz is s/p bilateral mastectomy on December 15.  She is doing well.  Her right drain still is not putting out any fluid though.  The left side is putting out about 50 cc/day.    Physical exam:  BREAST: Bilateral chest incisions healing well.  Left drain output serosanguineous.  There is mild fluctuance deep to the right chest incision.    Pathology:  A) BREAST, LEFT, MASTECTOMY:   - FOUR MICROSCOPIC FOCI OF DUCTAL CARCINOMA IN SITU, NUCLEAR GRADE III,   (0.5 TO 6 MM IN GREATEST DIMENSION) IN 3 OF 18 BLOCKS OF TISSUE   SUBMITTED OVER A 32 x 26 x 17 MM FIBROTIC TREATMENT BED   - NEGATIVE FOR RESIDUAL INVASIVE CARCINOMA   - BACKGROUND BREAST TISSUE WITH FIBROADENOMA, COLUMNAR CELL CHANGE,   NON-PROLIFERATIVE FIBROCYSTIC CHANGE, FOCAL ATYPICAL LOBULAR HYPERPLASIA   AND SCLEROSING ADENOSIS   - UNREMARKABLE SKIN AND NIPPLE   - PREVIOUS BIOPSY SITE PRESENT   - MARGINS NEGATIVE (CLOSEST MARGIN: 20 MM, DEEP)        - SEE SYNOPTIC REPORT BELOW     B) LYMPH NODE (x 3), LEFT AXILLA, SENTINEL NODE BIOPSY:        - THREE LYMPH NODES NEGATIVE FOR METASTATIC CARCINOMA (0/3)     C) BREAST, RIGHT, MASTECTOMY:   - BENIGN BREAST TISSUE WITH DENSE STROMAL FIBROSIS, FIBROADENOMA AND   FOCAL SCLEROSING ADENOSIS        - UNREMARKABLE SKIN AND NIPPLE        - MARGINS NEGATIVE     PATHOLOGIC STAGE: ypTis, ypN0(sn)     ASSESSMENT:  Monika Franz is s/p bilateral mastectomy for left breast triple negative invasive ductal carcinoma.    PLAN:  Right drain removed.  60 cc of serosanguineous blood was aspirated from the right side.  Pathology was discussed.  Will follow up with Dr. Monson and prosthetics  Discontinue yearly mammograms.  Will screen for breast cancer recurrence through physical exams.  Return to the breast clinic on December 30 for left drain removal.  If that drain puts out less than 30 cc for 2 days in a row, then she will call the office sooner to have the drain removed.   Follow-up with myself in 1  year.    Allyson Douglas DO  General Surgeon  [address] [address]  Office: [phone]  Employed by - [facility] [employer]

## 2021-06-13 NOTE — PROGRESS NOTES
Pt here ambulatory for txt. Pt states feeling good, denies any neuropathy and states only notices tenderness in nailbeds post infusion. Port accessed and labs drawn. Labs approved for txt. txt reviewed and administered as ordered. PT tolerated txt without any problems. txt completed and port flushed/deaccessed with 2x2 to site. Follow up reviewed and pt dc'd steady gait.

## 2021-06-13 NOTE — ANESTHESIA POSTPROCEDURE EVALUATION
Patient: Monika Franz  Procedure(s):  BILATERAL MASTECTOMY WITH LEFT SENTINEL LYMPH NODE BIOPSY, PORT REMOVAL  PORT REMOVAL  Anesthesia type: general    Patient location: PACU  Last vitals:   Vitals Value Taken Time   /70 12/15/20 1200   Temp 36.6  C (97.9  F) 12/15/20 1111   Pulse 115 12/15/20 1211   Resp 16 12/15/20 1200   SpO2 96 % 12/15/20 1211   Vitals shown include unvalidated device data.  Post vital signs: stable  Level of consciousness: awake and responds to simple questions  Post-anesthesia pain: pain controlled  Post-anesthesia nausea and vomiting: no  Pulmonary: unassisted, return to baseline  Cardiovascular: stable and blood pressure at baseline  Hydration: adequate  Anesthetic events: no    QCDR Measures:  ASA# 11 - Maritza-op Cardiac Arrest: ASA11B - Patient did NOT experience unanticipated cardiac arrest  ASA# 12 - Maritza-op Mortality Rate: ASA12B - Patient did NOT die  ASA# 13 - PACU Re-Intubation Rate: ASA13B - Patient did NOT require a new airway mgmt  ASA# 10 - Composite Anes Safety: ASA10A - No serious adverse event    Additional Notes:

## 2021-06-13 NOTE — PROGRESS NOTES
Monika presents to [facility] for a post op appointment with Dr. Douglas .  She is accompanied by herself for appointment.  She states she is doing well, minimal pain.  She has good ROM, reviewed ROM exercises with her.  Patient met with Dr. Douglas .  See dictation for details of visit. One drain removed by MD. She will plan to come for RN drain removal on 12-30-20 at 1300 and  will plan to follow up with Dr. Douglas  in one year.  Invited calls sooner if she has any questions.  RN time 12 mins

## 2021-06-13 NOTE — TELEPHONE ENCOUNTER
Patient calls in today and leaves message on nurse triage line.  She tells me that a coworker was exposed to Covid and is being tested later today.  She is wondering what her role in getting tested will be.  Because the patient did not have direct exposure to the person that was positive, we will wait and see what her coworker Covid results are.  If her coworker comes back positive then she will need to be tested or if she comes down with symptoms, she will need to be tested.  I called to let her know the above information and she did not answer.  A detailed message was left with the above information.  I did ask her to call back if she has further questions or concerns.    Allyson Tomlin RN

## 2021-06-13 NOTE — TELEPHONE ENCOUNTER
Patient called, said she is doing well post op but one of her two drains is leaking around the insertion site and not putting out as much fluid as the other. Told her probably blocked, talked her through how to strip the drain to see if that extra suction will get it working again.  Told her if she is unable to get it to open up to call me and I will have her come in and see if I can get it functioning.  She agrees.  Support provided, invited calls.

## 2021-06-13 NOTE — ANESTHESIA CARE TRANSFER NOTE
Last vitals:   Vitals:    12/15/20 0701   BP: 121/58   Pulse: 75   Resp: 18   Temp: 36.7  C (98.1  F)   SpO2: 96%     Patient's level of consciousness is drowsy  Spontaneous respirations: yes  Maintains airway independently: yes  Dentition unchanged: yes  Oropharynx: oropharynx clear of all foreign objects    QCDR Measures:  ASA# 20 - Surgical Safety Checklist: WHO surgical safety checklist completed prior to induction    PQRS# 430 - Adult PONV Prevention: 4558F - Pt received => 2 anti-emetic agents (different classes) preop & intraop  ASA# 8 - Peds PONV Prevention: NA - Not pediatric patient, not GA or 2 or more risk factors NOT present  PQRS# 424 - Maritza-op Temp Management: 4559F - At least one body temp DOCUMENTED => 35.5C or 95.9F within required timeframe  PQRS# 426 - PACU Transfer Protocol: - Transfer of care checklist used  ASA# 14 - Acute Post-op Pain: ASA14B - Patient did NOT experience pain >= 7 out of 10

## 2021-06-13 NOTE — TELEPHONE ENCOUNTER
I called the patient and let her  Know that an order has been placed and to wait to be scheduled. The patient will notify the office if she does develop any symptoms.

## 2021-06-13 NOTE — PROGRESS NOTES
Patient presents to [facility] today for an RN visit for JEFFREY Drain trouble shooting.  Patient states her left drain has not been putting out much at all and the right drain suddenly stopped working.  Drains stripped and flushed with sterile NS.  Left drain continues to leak around insertion site.  Little output from both drains post flushing.  Dr. Douglas notified, attempted to get drains working.  Small amount of fluid coming out drains.  Told patient to change dressing of the left one when wet.  Dressings changed over both drain sites.  Gave patient a drain apron and knitted knockers.  Made her a follow up appointment for Monday, 12-21-20.  Gave her the oncall number to use over the weekend if she has issues.  RN time 30 mins

## 2021-06-13 NOTE — PROGRESS NOTES
Pt ambulatory to radiation clinic for initial radiation consult. RN spent 15 minutes with pt discussing RT, RT planning, and RT side effects. ACS RT, Krames RT, breast RT and breast exercise hand outs given with explanation. Further recommendations and orders per provider.

## 2021-06-14 NOTE — TELEPHONE ENCOUNTER
Telephoned and spoke with Monika to check in and inquire about her needs.  Dr. Douglas performed bilateral mastectomies on 12/15 and Romana feels she is recovering well from surgery.  One drain has been pulled and one remains for possible pull on 12/30.    She verbalized understanding of her follow up appointment on 1/4 with Dr. Monson. Support and encouragement provided and calls welcomed. Amy Palumbo RN

## 2021-06-14 NOTE — PROGRESS NOTES
Monika presents to [facility] for an RN visit for her final drain removal.  Patient states her drain put out only 15 ml or so for the last few days.  Drain removed, dark, watery drainage came out the tube insertion site once drain was removed.  Covered with 4 x 4, told her to change as needed for drainage.  Her right mastectomy site appeared to have a seroma.  She said it was bothering her, after a alcohol prep, aspirated 120 ml of dark, watery seroma drainage.  Told her if this returns and is bothering her again, she can call for reaspiration.  Support provided, invited calls.  RN time 30 mins

## 2021-06-14 NOTE — PROGRESS NOTES
I called Monika and left a VM with the scheduling line for her to call back and set up an evaluation two weeks out from now if she wishes to look at bras and breast forms.

## 2021-06-14 NOTE — TELEPHONE ENCOUNTER
Patient called, said her drain levels have come down to 20 ml yesterday and 35 ml the day before that. She wondered if she could move up her RN visit for drain removal to Monday, 12-28-20.  Dr. Douglas consulted, she is fine with that.  Scheduled Monika for Monday, 12-28-20 at 1000.  Told her to call if anything changes with her outputs.

## 2021-06-14 NOTE — PROGRESS NOTES
[facility] Progress Note    Patient: Monika Franz  MRN: [id number]  Date of Service: 1/4/2021        Reason for visit      1. Invasive ductal carcinoma of breast, left (H)        Assessment     1.  A very pleasant 54-year-old woman with left-sided breast cancer at least T2 N0 M0 ER CT negative HER-2/joann negative. Started on sreekanth adjuvant chemotherapy. Responding to treatment.  She has finished 4 cycles of dose dense Adriamycin Cytoxan allowed by 12 doses of of weekly paclitaxel.  Last chemotherapy in November 2020.  Had surgery in December 2020.  Complete pathological remission obtained in the invasive tumor.  There was some DCIS present however.  Lymph nodes negative.  4.  Minimal nausea.  2.  Slightly overweight.  3.  Status post gastric sleeve about a year ago.      Plan     1.  Since she has complete remission and the size of the tumor was less than 3 cm in size, I do not think the patient will benefit from any adjuvant radiation therapy.  Since it is a triple negative tumor she does not need any hormonal therapy either.  2.  Follow-up with me in 3 months for chest wall checkup and history physical etc.  3.  Discussed with the patient about diet exercise and proper rest.  4.  Follow-up with her primary care physician.    Clinical stage      Cancer Staging  Invasive ductal carcinoma of breast, left (H)  Staging form: Breast, AJCC 8th Edition  - Clinical stage from 6/22/2020: Stage IIB (cT2, cN0, cM0, G3, ER-, CT-, HER2: Equivocal) - Signed by Sue Dougherty CNP on 7/9/2020      History     Monika Franz is a very pleasant 55 y.o. old female with a history of triple negative left breast cancer.  The patient found this lump located in her left breast measuring approximately 2 cm in size, at 10 o'clock position, presenting with some stretching-like symptoms in her breast in the month of June 2020.  She was then seen by her primary care physician and had a mammogram done which was very  suspicious.  A biopsy confirmed the presence of invasive ductal carcinoma ER negative DE negative HER-2/joann 2+ on immunohistochemistry negative on FISH.  It has high-grade features.  The ultrasound measures this mass to be 1.6 cm in size.  It is located 11 cm from the nipple.  No nipple inversion or any other symptoms.    The patient was seen by Dr. Allyson Douglas.  She had a Port-A-Cath placed for neoadjuvant chemotherapy.     She has past medical history of gastric sleeve surgery about a year ago.  She is taking some vitamin B12 and other supplements.     She had a hysterectomy few years ago.     As for the breast cancer risk factors are concerned she is G4, P3.  First child at age 29.  Breast-feeding for all 3 of them.  No prior breast biopsies.  No significant family history.    She has been started on neoadjuvant chemotherapy on 9 July 2020 with dose dense adriamysin cyclophosphamide followed by paclitaxel.   After second cycle she noticed decrease in the size of the breast lesion.  In fact it was not palpable anymore.  She has finished 4 cycles of dose dense Adriamycin and cyclophosphamide and then finished 12 doses of weekly paclitaxel. Last treatment was on the 18th of November.     She had surgery in the middle of December 2020.  She underwent bilateral mastectomy.  She did not have reconstruction but she had initially thought of doing.  Fortunately she had a complete remission noted on the invasive tumor.  There was some DCIS present however.  The 3 sentinel lymph nodes were negative for any invasive tumor.  There was no evidence of any scarring in them either.      Past Medical History     Past Medical History:   Diagnosis Date     Anxiety      Breast cancer (H)      Depression      Sleep apnea     Uses CPAP           Review of Systems   Constitutional  Constitutional (WDL): Exceptions to WDL  Fatigue: Fatigue relieved by rest  Weight Loss: to <10% from baseline, intervention not indicated(down 5  lbs)  Neurosensory  Neurosensory (WDL): All neurosensory elements are within defined limits  Cardiovascular  Cardiovascular (WDL): All cardiovascular elements are within defined limits  Pulmonary  Respiratory (WDL): Within Defined Limits  Gastrointestinal  Gastrointestinal (WDL): All gastrointestinal elements are within defined limits  Genitourinary  Genitourinary (WDL): All genitourinary elements are within defined limits  Integumentary  Integumentary (WDL): Exceptions to WDL(bilateral mastectomies 3 weeks ago)  Alopecia: Hair loss of >50% normal for that individual that is readily apparent to others, a wig or hair piece is necessary if the patient desires to completely camouflage the hair loss, associated with psychosocial impact  Patient Coping  Patient Coping: Accepting  Accompanied by  Accompanied by: Alone    ECOG performance status and Distress Assessment      ECOG Performance:    ECOG Performance Status: 0    Distress Assessment  Distress Assessment Score: No distress:     Pain Status  Currently in Pain: No/denies        Vital Signs     Vitals:    01/04/21 1008   BP: 130/62   Pulse: 79   Temp: 98.7  F (37.1  C)       Physical Exam     HEENT: Examination reveals pupils are equal.Oral mucosa moist and intact.   Neck is supple, no adenopathy,  no JVD.  Chest is symmetrical. Good air entry bilaterally, no rhonchi. No wheezing.   Cardiac examination: Normal S1, S2. No S3. Very soft systolic murmur.   Abdomen: Soft, nontender. No palpable hepatosplenomegaly.  Extremities: No edema, cynosis,or clubbing.  Lymph node examination: Bilateral axillary lymph nodes are negative.  Supraclavicular as well as cervical lymph nodes are also examined and were negative.    Lab Results     Results for orders placed or performed in visit on 12/12/20   COVID-19 Virus PCR MRF    Specimen: Respiratory   Result Value Ref Range    COVID-19 VIRUS SPECIMEN SOURCE Nasopharyngeal     2019-nCOV Not Detected      Reviewed the path report  with the patient.    Imaging Results     Telferner Lymph Node Injection    Result Date: 12/15/2020  INDICATION: Breast Carcinoma. Pre-operative identification of the left sentinel lymph node. PROCEDURE: Informed consent was obtained from the patient. The skin was prepped with ChloraPrep. 525uCi  microcuries of technetium-99m Lymphoseek was injected subdermally along the upper outer aspect of the areolar margin. The patient tolerated this well.     Telferner lymph node injection.          Amando Monson MD

## 2021-06-15 NOTE — TELEPHONE ENCOUNTER
Patient is calling to schedule her surgery that was discussed at her apt on Monday. Writer let her know a message would be sent and someone will reach out to her.

## 2021-06-15 NOTE — PATIENT INSTRUCTIONS - HE
Please stop and get xrays on your way out.     Surgery Information    Surgery Date TBD    Surgery Time TBD    COVID TEST TBD    PRE OP PHYSICAL     ( Arrive at the hospital one and a half hours prior to your surgery and 1 hour prior at the surgery center. Check in at the . The only exception is if you are scheduled for surgery at 7am, you should arrive at 5:30am) The nurse will call you a couple of day before surgery go with the time the nurse tells you.  All surgery times are estimated please go with what the nurse tells you when she calls.  Emergency's do happen and surgery times do get changed the nurse will let you know.  We give you the best estimate of time that we can at the time.      **ALL [facility] PAPERWORK IS TO BE FAXED -[phone], IT WILL BE PROCESSED AFTER SURGERY IS COMPLETE.    IF YOU NEED TO RESCHEDULE OR CANCEL YOUR SURGERY FOR ANY REASON PLEASE CALL THE CLINIC AS SOON AS POSSIBLE -[phone].    Admission Type: Outpatient    Surgeon:  Dr. Talbert    Surgery Procedure:     Surgery Location: [facility],[address] [place] [address], FAX [phone]    Additional Information: If you use a CPAP machine for sleep apnea please bring it with you for surgery. We will want to monitor your breathing using your normal equipment.     HOSPITAL AND SURGERY CENTER INFORMATION    We need to know a lot of information about you before surgery.     1-5 Days Before:     A nurse will call you for a pre-screening interview. The phone call with the nurse will be much faster and easier if you  Have organized your information prior to the call.   This is when you will be told when to show up and what to bring.    Please have the following information available:    Preoperative Exam completed and faxed to our office  has to be within 30 days will not except 31 days.    Primary care provider's and any specialty providers' contact information available. .    If requested by your primary care  provider, have any heart and lung exams at least 3 days before surgery.    Have a complete and accurate list of medications available.     Have a list of your allergies/sensitivities and reactions    Know your health history, surgical history, and medical problems    Know any anesthesia issues with you or within your family.     BE SURE TO NOTIFY US IF YOU ARE TAKING ANY BLOOD THINNING AGENTS: Coumadin, Plavix, Aspirin, Xarelto, Eliquis    Someone plan to bring you and stay with you after the surgery for 24 hours    Day Before Surgery    1. STOP Smoking and Drinking: It is important to stop smoking and drinking at least 24 hours before surgery.  Smoking and drinking may cause complications in your recovery from anesthesia and may lengthen the healing process.    Please bring with you the day of surgery      List of medication    Eyeglass case or contact case with solution. You cannot wear contacts during surgery    Copy of Health Care Directives, Living Will or Power of     Insurance Cards    Photo ID    3. NOTHING BY MOUTH 8 HOURS BEFORE. This includes gum, hard candy, water and mints. The only exception is if you have been instructed by your doctor to take your medications with sips of water. You may rinse your mouth or brush your teeth, but do not swallow water.     4. Remove Nail Polish on fingers as well as toes  Day of Surgery    1. Medications- Take as indicated with sips of water     2. Wear comfortable loose fitting clothes. Wear your glasses-Not contacts. Do not wear jewelry and remove body piercing's. Surgery may be cancelled if they are not removed.     3. If same day surgery-Have a someone come with you to surgery that can help you understand the surgeon's instructions, drive you home and stay with you overnight the first night.     4. A nurse will call you at home within 72 hours following surgery to see how you are doing. Our nurses and doctors will discuss recovery with you.

## 2021-06-15 NOTE — TELEPHONE ENCOUNTER
Surgery Scheduled      Surgery/Procedure: Bunionectomy with Arthroplasty    Special Equipment: TBD    Location: [facility]    Date: 3/22    Time: 1pm    Surgeon: Dr. Talbert    OR Confirmed/ :  Yes with Bitty on 3/4/2021    Orders In:  Yes    Entered on [employer] [facility] [employer] Calendar:  Yes    Post Op: 3/29    Covid Scheduled: 3/19    Blood Thinners Addressed:  Yes- n/a

## 2021-06-15 NOTE — PROGRESS NOTES
Internal Medicine Office Visit  [facility]   Patient Name: Monika Franz  Patient Age: 55 y.o.  YOB: 1965  MRN: [id number]    Date of Visit: 2/10/2021  Reason for Office Visit:   Chief Complaint   Patient presents with     Hammer Toe           Assessment / Plan / Medical Decision Making:      Hammer toe of right foot  - reviewed exercises that she can try to restore muscle balance   - Ambulatory referral to Podiatry    Severe recurrent major depression without psychotic features (H)  - followed by psychiatry     REBECCA (obstructive sleep apnea)\  BMI 33.0-33.9 with comorbidity  - Ambulatory referral to Sleep Medicine  - Recommended a physical with post-op bariatric labs in June     Encounter for screening colonoscopy  - Ambulatory referral for Colonoscopy       I am having Monika Franz maintain her traZODone, ARIPiprazole, multivitamin (CHILDREN'S VITAMIN ORAL), cyanocobalamin, cholecalciferol (vitamin D3), calcium carbonate, and desvenlafaxine succinate.          Orders Placed This Encounter   Procedures     Ambulatory referral to Podiatry     Ambulatory referral for Colonoscopy     Ambulatory referral to Sleep Medicine   Followup: Return in about 4 months (around 6/10/2021) for Annual physical. earlier if needed.        Donna Holt, MIGUEL        HPI:  Monika Franz is a 55 y.o. year old who presents to the office today for right foot pain. She has a family history of bunions and hammer toes with her mother. She has noted increasing pain in the right foot and deformities of the toes. She has tried wider shoes and a brace but still has pain.     She has a history of REBECCA which was diagnosed prior to bariatric surgery. She has not had a re-evaluation since her surgery in June 2019.     H/o prediabetes prior to bariatric surgery.     Breast cancer is followed by oncology and she has been doing well in this regard without signs of recurrence.     Depression is followed by  psychiatry and she continues to do well with this as well.       Health Maintenance Review  Health Maintenance   Topic Date Due     DEPRESSION ACTION PLAN  1965     HEPATITIS C SCREENING  1965     PREVENTIVE CARE VISIT  1965     Pneumococcal Vaccine: Pediatrics (0 to 5 Years) and At-Risk Patients (6 to 64 Years) (1 of 3 - PCV13) 10/10/1971     HIV SCREENING  10/10/1980     ADVANCE CARE PLANNING  10/10/1983     PAP SMEAR  10/10/1986     COLORECTAL CANCER SCREENING  01/31/2021     TD 18+ HE  12/30/2021     MAMMOGRAM  06/15/2022     LIPID  12/07/2025     INFLUENZA VACCINE RULE BASED  Completed     TDAP ADULT ONE TIME DOSE  Completed     ZOSTER VACCINES  Completed     HEPATITIS B VACCINES  Aged Out       Current Scheduled Meds:  Outpatient Encounter Medications as of 2/10/2021   Medication Sig Dispense Refill     ARIPiprazole (ABILIFY) 5 MG tablet        calcium carbonate 1250 MG capsule Take 1,250 mg by mouth 3 (three) times a day.       cholecalciferol, vitamin D3, 1,000 unit (25 mcg) tablet Take 5,000 Units by mouth daily.       cyanocobalamin 1000 MCG tablet Take 1,000 mcg by mouth 3 (three) times a day.       desvenlafaxine succinate (PRISTIQ) 50 MG 24 hr tablet Take 100 mg by mouth daily.        multivitamin (CHILDREN'S VITAMIN ORAL) Take 1 tablet by mouth 2 (two) times a day.       traZODone (DESYREL) 150 MG tablet Take 150 mg by mouth at bedtime.       No facility-administered encounter medications on file as of 2/10/2021.            Objective / Physical Examination:  Vitals:    02/10/21 1445   BP: 124/78   Pulse: 72   Temp: 97.7  F (36.5  C)   TempSrc: Oral   Weight: 212 lb 12.8 oz (96.5 kg)     Wt Readings from Last 3 Encounters:   02/10/21 212 lb 12.8 oz (96.5 kg)   01/04/21 214 lb 12.8 oz (97.4 kg)   12/14/20 210 lb (95.3 kg)     Body mass index is 33.83 kg/m .     Constitutional: In no apparent distress  Psych: Alert and oriented x3.   MSK: right foot 2nd and 3rd toe with flexion at the  PIP joint. Bunion noted as well

## 2021-06-15 NOTE — PROGRESS NOTES
FOOT AND ANKLE SURGERY/PODIATRY CONSULT NOTE         ASSESSMENT:   HAV bilateral  Hammertoe bilateral      TREATMENT:  -The patient has a mild bunion deformity on both feet with rigid/semi-rigid hammertoe deformities on both feet. We reviewed conservative treatment options to include padding and offloading with shoes with increased toe box.     -Surgical options include arthroplasty of hammertoes with absorbable pin for stabilization. Distal osteotomy of the 1st metatarsal with screw fixation. Post-op course reviewed to include limited walking in a CAM boot for minimum of 6-8 weeks. Risks include infection, non-union, need for additional surgery and recurrence. All questions invited and answered.     -She would like to move forward with surgery at this time. Referred for weight bearing x-rays which indicate moderate bunion deformity with contracted digits.     -I will ask my office to coordinate bunion/hammertoe surgery at [facility]. Pre-op physical with Donna Holt's office.    Narinder Talbert DPM  [facility] Podiatry/Foot & Ankle Surgery      HPI: I was asked to see Monika Franz today by Donna Holt CNP for painful bunion and hammertoes on her right foot. The patient states she has noticed increasing pain with walking over the past several months. She has tried padding for the hammertoes with no relief. PMH significant for breast cancer and recently finished course of treatment.        Past Medical History:   Diagnosis Date     Anxiety      Breast cancer (H)      Depression      Sleep apnea     Uses CPAP       Past Surgical History:   Procedure Laterality Date     BACK SURGERY        SECTION, CLASSIC       CHOLECYSTECTOMY       HYSTERECTOMY  2012     KNEE ARTHROSCOPY       MASTECTOMY Bilateral 12/15/2020     MD INSJ TUNNELED CTR VAD W/SUBQ PORT AGE 5 YR/> Right 2020    Procedure: INSERTION VASCULAR ACCESS PORT UNDER ULTRASOUND AND FLUOROSCOPIC GUIDANCE RIGHT JUGULAR;  Surgeon:  Allyson Douglas DO;  Location: [facility];  Service: General     CA MASTECTOMY, SIMPLE, COMPLETE N/A 12/15/2020    Procedure: BILATERAL MASTECTOMY WITH LEFT SENTINEL LYMPH NODE BIOPSY, PORT REMOVAL;  Surgeon: Allyson Douglas DO;  Location: [facility];  Service: General     CA RMVL DARRYL CTR VAD W/SUBQ PORT/ CTR/PRPH INSJ N/A 12/15/2020    Procedure: PORT REMOVAL;  Surgeon: Allyson Douglas DO;  Location: [facility];  Service: General     STOMACH SURGERY      Gastric Sleeve     US BREAST CORE BIOPSY LEFT Left 6/22/2020      SENTINEL NODE INJECTION  12/15/2020       Allergies   Allergen Reactions     Nsaids (Non-Steroidal Anti-Inflammatory Drug) Nausea And Vomiting     After surgery         Current Outpatient Medications:      ARIPiprazole (ABILIFY) 5 MG tablet, , Disp: , Rfl:      calcium carbonate 1250 MG capsule, Take 1,250 mg by mouth 3 (three) times a day., Disp: , Rfl:      cholecalciferol, vitamin D3, 1,000 unit (25 mcg) tablet, Take 5,000 Units by mouth daily., Disp: , Rfl:      cyanocobalamin 1000 MCG tablet, Take 1,000 mcg by mouth 3 (three) times a day., Disp: , Rfl:      desvenlafaxine succinate (PRISTIQ) 50 MG 24 hr tablet, Take 100 mg by mouth daily. , Disp: , Rfl:      multivitamin (CHILDREN'S VITAMIN ORAL), Take 1 tablet by mouth 2 (two) times a day., Disp: , Rfl:      traZODone (DESYREL) 150 MG tablet, Take 150 mg by mouth at bedtime., Disp: , Rfl:     Family History   Problem Relation Age of Onset     Lymphoma Mother 79        Non-Hodgkins     Cancer Mother      Prostate cancer Father 67     Cancer Father      Hodgkin's lymphoma Maternal Uncle 20     Brain cancer Paternal Uncle 82     Lung cancer Maternal Uncle 77     Breast cancer Maternal cousin 46     Leukemia Paternal cousin 45     No Medical Problems Sister      No Medical Problems Brother      No Medical Problems Son      No Medical Problems Son      No Medical Problems Daughter        Social History     Socioeconomic  History     Marital status:      Spouse name: Not on file     Number of children: Not on file     Years of education: Not on file     Highest education level: Not on file   Occupational History     Not on file   Social Needs     Financial resource strain: Not on file     Food insecurity     Worry: Not on file     Inability: Not on file     Transportation needs     Medical: Not on file     Non-medical: Not on file   Tobacco Use     Smoking status: Never Smoker     Smokeless tobacco: Never Used   Substance and Sexual Activity     Alcohol use: Not Currently     Frequency: Never     Drug use: Never     Sexual activity: Never   Lifestyle     Physical activity     Days per week: Not on file     Minutes per session: Not on file     Stress: Not on file   Relationships     Social connections     Talks on phone: Not on file     Gets together: Not on file     Attends Hoahaoism service: Not on file     Active member of club or organization: Not on file     Attends meetings of clubs or organizations: Not on file     Relationship status: Not on file     Intimate partner violence     Fear of current or ex partner: Not on file     Emotionally abused: Not on file     Physically abused: Not on file     Forced sexual activity: Not on file   Other Topics Concern     Not on file   Social History Narrative     Not on file       Review of Systems - 10 point Review of Systems is negative except for foot pain which is noted in HPI.    OBJECTIVE:  Appearance: alert, well appearing, and in no distress.    Vitals:    03/01/21 0758   BP: 110/62   Pulse: 80   Resp: 17   Temp: 97.9  F (36.6  C)       BMI= Body mass index is 35.35 kg/m .    General appearance: Patient is alert and fully cooperative with history & exam.  No sign of distress is noted during the visit.     Psychiatric: Affect is pleasant & appropriate.  Patient appears motivated to improve health.     Respiratory: Breathing is regular & unlabored while sitting.     HEENT:  Hearing is intact to spoken word.  Speech is clear.  No gross evidence of visual impairment that would impact ambulation.      Vascular: Dorsalis pedis and posterior tibial pulses are palpable. There is pedal hair growth bilateral.  CFT < 3 sec from anterior tibial surface to distal digits bilateral. There is no appreciable edema noted.  Dermatologic: Turgor and texture are within normal limits. No coloration or temperature changes. No primary or secondary lesions noted.  Neurologic: All epicritic and proprioceptive sensations are grossly intact bilateral.  Musculoskeletal: Mild bunion deformity bilateral feet, R>L. Rigid contracture digits 2,3 right, adductovarus digits 4,5 right. Contracted digits 2-5 left foot.

## 2021-06-16 NOTE — ANESTHESIA POSTPROCEDURE EVALUATION
Patient: Monika Franz  Procedure(s):  BUNIONECTOMY right foot. Arthroplasty digits two, three, four and five right foot. (Right)  Anesthesia type: general    Patient location: Phase II Recovery  Last vitals:   Vitals Value Taken Time   /59 03/22/21 1545   Temp 36.5  C (97.7  F) 03/22/21 1449   Pulse 92 03/22/21 1553   Resp 16 03/22/21 1449   SpO2 96 % 03/22/21 1553   Vitals shown include unvalidated device data.  Post vital signs: stable  Level of consciousness: awake and responds to simple questions  Post-anesthesia pain: pain controlled  Post-anesthesia nausea and vomiting: no  Pulmonary: unassisted, return to baseline  Cardiovascular: stable and blood pressure at baseline  Hydration: adequate  Anesthetic events: no    QCDR Measures:  ASA# 11 - Maritza-op Cardiac Arrest: ASA11B - Patient did NOT experience unanticipated cardiac arrest  ASA# 12 - Maritza-op Mortality Rate: ASA12B - Patient did NOT die  ASA# 13 - PACU Re-Intubation Rate: ASA13B - Patient did NOT require a new airway mgmt  ASA# 10 - Composite Anes Safety: ASA10A - No serious adverse event    Additional Notes:

## 2021-06-16 NOTE — PROGRESS NOTES
[facility] [facility], [address] [place]  Phone: [phone]  Fax [phone]    Physical Therapy Daily Treatment Note  Date:  2018    Patient Name:  Hortencia Sheehan    :  1953  MRN: [id number]  Restrictions/Precautions:    Medical/Treatment Diagnosis Information:  · Diagnosis: Left heel pain (P43.741) s/p ORIF DOS 10/30/17  · Treatment Diagnosis: Left heel pain (Q75.457)  Insurance/Certification information:  PT Insurance Information: [place]  Physician Information:  Referring Practitioner: Grecia Taylor of care signed (Y/N): Yes  Date of Patient follow up with Physician:     G-Code (if applicable):      Date G-Code Applied:    PT G-Codes  Functional Assessment Tool Used: LEFS  Score: 68%  Functional Limitation: Mobility: Walking and moving around  Mobility: Walking and Moving Around Current Status (): At least 60 percent but less than 80 percent impaired, limited or restricted  Mobility: Walking and Moving Around Goal Status (): At least 40 percent but less than 60 percent impaired, limited or restricted    Progress Note: [x]  Yes  []  No  Next due by: Visit #10       Latex Allergy:  [x]NO      []YES  Preferred Language for Healthcare:   [x]English       []other:    Visit # Insurance Allowable Requires auth   8 40    [x]no        []yes:       Pain level:  10     SUBJECTIVE:  Patient reports his foot does not hurt except when he tries to squat. Has been doing exercises and icing, but it is hard since he has been back at work. OBJECTIVE:   Observation: TTP plantar fascia  Test measurements:  NT this date.     RESTRICTIONS/PRECAUTIONS: None    Exercises/Interventions:     Therapeutic Ex Sets/sec Reps Notes   Gastroc belt knee straight and bent 30\" 3 ea    HS stretch  30\" 3    HEP         TB PF 3 10 GTB   TB EV/INV 3 10 GTB            Seated 1/2 foam roll PF/DF  Seated 1/2 foam roll INV/EV 5\"  5\" 20x ea  20x ea Podiatry Progress Note        ASSESSMENT: S/P Bunionectomy with arthroplasty digits 2-5 right       TREATMENT:  -Surgical sites on the right foot are progressing well. I reviewed the patient's x-rays which indicate intact screw fixation x2 distal 1st metatarsal with rectus 1st MPJ, arthroplasty procedure at PIPJ digits 2-5.     -Gauze dressing applied today which she will keep intact. Continue limited to non-weight bearing on the right foot with CAM boot and knee walker.     -She will follow-up with me in 2 weeks for suture removal.     Narinder Talbert DPM  [facility] Podiatry/Foot & Ankle Surgery      HPI: Monika Franz was seen again today s/p bunionectomy with arthroplasty digits 2-5 right foot. Doing well today. Mild to moderate foot pain which has been better controlled with vistaril.     Past Medical History:   Diagnosis Date     Anxiety      Breast cancer (H)      Depression      Sleep apnea     Uses CPAP       Allergies   Allergen Reactions     Nsaids (Non-Steroidal Anti-Inflammatory Drug) Nausea And Vomiting     After surgery         Current Outpatient Medications:      ARIPiprazole (ABILIFY) 5 MG tablet, , Disp: , Rfl:      calcium carbonate 1250 MG capsule, Take 1,250 mg by mouth 3 (three) times a day., Disp: , Rfl:      calcium citrate 250 mg calcium Tab, 1200mg-1500mg daily (separate doses 2-3 times daily), Disp: , Rfl:      cholecalciferol, vitamin D3, 1,000 unit (25 mcg) tablet, Take 5,000 Units by mouth daily., Disp: , Rfl:      cyanocobalamin 1000 MCG tablet, Take 1,000 mcg by mouth 3 (three) times a day., Disp: , Rfl:      desvenlafaxine succinate (PRISTIQ) 50 MG 24 hr tablet, Take 100 mg by mouth daily. , Disp: , Rfl:      hydrOXYzine pamoate (VISTARIL) 25 MG capsule, Take 1 capsule (25 mg total) by mouth every 6 (six) hours as needed for other (breakthrough pain). Take 2 hours after oxycodone as needed for breakthrough pain., Disp: 30 capsule, Rfl: 0     multivitamin (CHILDREN'S VITAMIN  ORAL), Take 1 tablet by mouth 2 (two) times a day., Disp: , Rfl:      oxyCODONE (ROXICODONE) 5 MG immediate release tablet, Take 1 tablet (5 mg total) by mouth every 6 (six) hours as needed., Disp: 30 tablet, Rfl: 0     pediatric multivitamin-iron (POLY-VI-SOL WITH IRON) chewable tablet, Chew 1 tablet., Disp: , Rfl:     Review of Systems - Negative       OBJECTIVE:  Appearance: alert, well appearing, and in no distress.    Vitals:    03/29/21 1220   BP: 116/70   Pulse: 79   SpO2: 93%       Surgical sites on the right foot have intact sutures with skin edges well approximated along 1st MPJ and digits 2-5, no gapping noted. No erythema right foot. Neurovascular status unchanged right foot.      navigation      Manual Treatments:  PROM / STM / Oscillations-Mobs:  G-I, II, III, IV (PA's, Inf., Post.)  [x] (44355) Provided manual therapy to mobilize LE, proximal hip and/or LS spine soft tissue/joints for the purpose of modulating pain, promoting relaxation,  increasing ROM, reducing/eliminating soft tissue swelling/inflammation/restriction, improving soft tissue extensibility and allowing for proper ROM for normal function with self care, mobility, lifting and ambulation. Modalities:  HV/CPx 15' while seated    Charges:  Timed Code Treatment Minutes: 37'   Total Treatment Minutes: 62'     [] EVAL (LOW) 06594 (typically 20 minutes face-to-face)  [] EVAL (MOD) 16647 (typically 30 minutes face-to-face)  [] EVAL (HIGH) 76399 (typically 45 minutes face-to-face)  [] RE-EVAL     [x] OL(35072) x  2   [] IONTO  [] NMR (54887) x      [x] VASO  [x] Manual (01788) x  1    [] Other:  [] TA x       [] Mech Traction (36736)  [] ES(attended) (22107)      [x] ES (un) (53102):     GOALS:   Therapist goals for Patient:   Short Term Goals: To be achieved in: 2 weeks  1. Independent in HEP and progression per patient tolerance, in order to prevent re-injury. 2. Patient will have a decrease in pain to facilitate improvement in movement, function, and ADLs as indicated by Functional Deficits.     Long Term Goals: To be achieved in: 12 weeks  1. Disability index score of 35% or less for the LEFS to assist with reaching prior level of function. 2. Patient will demonstrate increased AROM DF to 5 degrees, INV to 25 degrees, EV to 10 degrees to allow for proper joint functioning as indicated by patients Functional Deficits. 3. Patient will demonstrate an increase in Strength in the left ankle DF/EV/INV to 4+/5 and PF to 4/5 to allow for proper functional mobility as indicated by patients Functional Deficits. 4. Patient will be able to ascend/descend a flight of stairs reciprocally without increased symptoms or restrictions. 5. Patient will be able to safely ambulate with no AD with only mild antalgia (patient specific functional goal)              Progression Towards Functional goals:  [x] Patient is progressing as expected towards functional goals listed. [] Progression is slowed due to complexities listed. [] Progression has been slowed due to co-morbidities. [] Plan just implemented, too soon to assess goals progression  [] Other:     ASSESSMENT: Patient with improved gait pattern without boot and 2 crutches this date. Demonstrates improved confidence in weightbearing and heel strike. Continues to demonstrate edema and stiffness as he is back to work and on his feet more.      Treatment/Activity Tolerance:  [x] Patient tolerated treatment well [] Patient limited by fatique  [] Patient limited by pain  [] Patient limited by other medical complications  [] Other:     Prognosis: [] Good [x] Fair  [] Poor    Patient Requires Follow-up: [x] Yes  [] No    PLAN: See eval  [x] Continue per plan of care [] Alter current plan (see comments)  [] Plan of care initiated [] Hold pending MD visit [] Discharge    Electronically signed by: Catherine Joseph, CHEO [id number]

## 2021-06-16 NOTE — TELEPHONE ENCOUNTER
Patient called regarding uncontrolled pain.  She cannot take ibuprofen due to previous gastric sleeve surgery.  She is elevating and icing.  Dr. Talbert to order vistaril to take 2 hours after oxycodone.

## 2021-06-16 NOTE — TELEPHONE ENCOUNTER
Patient needs an extension for the knee roller, it currently expires on 4/21 and her next apt with  Dr. Talbert is on 5/3  Please updated order to [phone]

## 2021-06-16 NOTE — PATIENT INSTRUCTIONS - HE
Non-weight bearing right foot with CAM boot and rolling knee walker    Return in 3 weeks for repeat xrays and follow up with Dr. Talbert.    Do not pull off the steri strips-allow them to fall off on their own. You may apply a gauze dressing to protect the incisions.

## 2021-06-16 NOTE — TELEPHONE ENCOUNTER
Informed Monika that I did place another order stating she needs it though 5/4 after her next visit with Dr. Talbert and that they can update at that time if needed.

## 2021-06-16 NOTE — PATIENT INSTRUCTIONS - HE
Important Instructions     Continue to wear your CAM boot & use your rolling knee walker to ambulate                      How to care for your wound      Leave all dressings intact until your follow up in 2 weeks.      Do not get your surgical site wet when you shower.  You can cover it with a cast protector. Cast protectors are available for purchase at [facility] [employer]. You may also purchase a cast protector at your local pharmacy.    Call our clinic nurse at [phone] if there is an increase in drainage, pain, redness, or the wound size increases.  This maybe a sign of infection and require attention prior to the next appointment

## 2021-06-16 NOTE — ANESTHESIA CARE TRANSFER NOTE
Last vitals:   Vitals:    03/22/21 1449   BP: 113/53   Pulse: 91   Resp: 16   Temp: 36.5  C (97.7  F)   SpO2: 98%     Patient's level of consciousness is drowsy  Spontaneous respirations: yes  Maintains airway independently: yes  Dentition unchanged: yes  Oropharynx: oropharynx clear of all foreign objects    QCDR Measures:  ASA# 20 - Surgical Safety Checklist: WHO surgical safety checklist completed prior to induction    PQRS# 430 - Adult PONV Prevention: 4558F - Pt received => 2 anti-emetic agents (different classes) preop & intraop  ASA# 8 - Peds PONV Prevention: 4558F - Pt received => 2 anti-emetic agents (different classes) preop & intraop  PQRS# 424 - Maritza-op Temp Management: 4559F - At least one body temp DOCUMENTED => 35.5C or 95.9F within required timeframe  PQRS# 426 - PACU Transfer Protocol: - Transfer of care checklist used  ASA# 14 - Acute Post-op Pain: ASA14B - Patient did NOT experience pain >= 7 out of 10

## 2021-06-16 NOTE — ANESTHESIA PROCEDURE NOTES
Peripheral Block    Patient location during procedure: pre-op  Start time: 3/22/2021 12:28 PM  End time: 3/22/2021 12:29 PM  post-op analgesia per surgeon order as noted in medical record  Staffing:  Performing  Anesthesiologist: Nilton Dao MD  Preanesthetic Checklist  Completed: patient identified, site marked, risks, benefits, and alternatives discussed, timeout performed, consent obtained, airway assessed, oxygen available, suction available, emergency drugs available and hand hygiene performed  Peripheral Block  Block type: saphenous, adductor canal block  Prep: ChloraPrep  Patient position: supine  Patient monitoring: cardiac monitor, continuous pulse oximetry, heart rate and blood pressure  Laterality: right  Injection technique: ultrasound guided    Ultrasound used to visualize needle placement in proximity to nerve being blocked: yes   US used to visualize anesthetic spread  Visualized anatomic structures normal  No Pathological Findings  Permanent ultrasound image captured (UNABLE TO IMPORT IMAGE, BUT IMAGE IN CHART AND WILL BE SCANNED) for medical record  Sterile gel and probe cover used for ultrasound.  Needle  Needle type: Stimuplex   Needle gauge: 20G  Needle length: 4 in  no peripheral nerve catheter placed  Assessment  Injection assessment: no difficulty with injection, negative aspiration for heme, no paresthesia on injection and incremental injection  Additional Notes  UNABLE TO IMPORT IMAGE, BUT IMAGE IN CHART AND WILL BE SCANNED

## 2021-06-16 NOTE — PROGRESS NOTES
Podiatry Progress Note        ASSESSMENT: S/P Bunionectomy with arthroplasty digits 2-5 right       TREATMENT:  -Surgical sites on the right foot are progressing well. There is superficial gapping along the medial incision on the 5th digit. I reviewed the patient's x-rays which indicate intact screw fixation x2 distal 1st metatarsal with rectus 1st MPJ, arthroplasty procedure at PIPJ digits 2-5 with bony healing distal 1st metatarsal.     -Sutures removed today, steri-strips applied. We discussed that she should avoid movement of the 5th digit to allow for healing of the superficial dehiscence. Continue limited to non-weight bearing on the right foot with CAM boot and knee walker.     -She will follow-up with me in 3 weeks for repeat x-rays.     Narinder Talbert DPM  [facility] Podiatry/Foot & Ankle Surgery      HPI: Monika Franz was seen again today s/p bunionectomy with arthroplasty digits 2-5 right foot. Doing well today. Mild to moderate foot pain which has been better controlled with vistaril.     Past Medical History:   Diagnosis Date     Anxiety      Breast cancer (H)      Depression      Sleep apnea     Uses CPAP       Allergies   Allergen Reactions     Nsaids (Non-Steroidal Anti-Inflammatory Drug) Nausea And Vomiting     After surgery         Current Outpatient Medications:      ARIPiprazole (ABILIFY) 5 MG tablet, , Disp: , Rfl:      calcium carbonate 1250 MG capsule, Take 1,250 mg by mouth 3 (three) times a day., Disp: , Rfl:      calcium citrate 250 mg calcium Tab, 1200mg-1500mg daily (separate doses 2-3 times daily), Disp: , Rfl:      cholecalciferol, vitamin D3, 1,000 unit (25 mcg) tablet, Take 5,000 Units by mouth daily., Disp: , Rfl:      cyanocobalamin 1000 MCG tablet, Take 1,000 mcg by mouth 3 (three) times a day., Disp: , Rfl:      desvenlafaxine succinate (PRISTIQ) 50 MG 24 hr tablet, Take 100 mg by mouth daily. , Disp: , Rfl:      pediatric multivitamin-iron (POLY-VI-SOL WITH IRON)  chewable tablet, Chew 1 tablet., Disp: , Rfl:     Review of Systems - Negative       OBJECTIVE:  Appearance: alert, well appearing, and in no distress.    Vitals:    04/12/21 1208   BP: 108/60   Resp: 18   Temp: 97.8  F (36.6  C)       Surgical sites on the right foot have intact sutures with skin edges well coapted along 1st MPJ and digits 2-5, there is superficial gapping along the 5th digit medial incision. No erythema right foot. Neurovascular status unchanged right foot.

## 2021-06-16 NOTE — ANESTHESIA PREPROCEDURE EVALUATION
Anesthesia Evaluation      Patient summary reviewed   No history of anesthetic complications     Airway   Mallampati: II  Neck ROM: full   Pulmonary     breath sounds clear to auscultation  (+) sleep apnea,   (-) asthma, shortness of breath, not a smoker    ROS comment: No longer using CPAP, REBECCA resolved post weight loss                         Cardiovascular   Exercise tolerance: > or = 4 METS  (-) hypertension  Rhythm: regular  Rate: normal,         Neuro/Psych    (+) depression, anxiety/panic attacks,   (-) no seizures    Comments: Hx of lumbar laminectomy, denies any current symptoms of numbness, tingling, weakness in lower extremities     Endo/Other    (+) obesity,   (-) no diabetes, hypothyroidism     Comments: BMI 33    Left breast cancer    Endometriosis    GI/Hepatic/Renal    (-) GERD     Other findings:     Covid negative 6/28      Dental      Comment: Fair dentition, no loose or removable teeth                         Anesthesia Plan  Planned anesthetic: general mask  Propofol gtt  Fentanyl/ketamine prn  Standard antiemetics  Pop/saph block for POP relief  ASA 3   Induction: intravenous   Anesthetic plan and risks discussed with: patient  Anesthesia plan special considerations: antiemetics,   Post-op plan: routine recovery

## 2021-06-16 NOTE — PROGRESS NOTES
[facility] Cancer Care Progress Note    Patient: Monika Franz  MRN: [id number]  Date of Service: 4/5/2021        Reason for visit      1. Invasive ductal carcinoma of breast, left (H)        Assessment     1.  A very pleasant 54-year-old woman with left-sided breast cancer at least T2 N0 M0 ER MI negative HER-2/joann negative. Started on sreekanth adjuvant chemotherapy. Responding to treatment.  She has finished 4 cycles of dose dense Adriamycin Cytoxan allowed by 12 doses of of weekly paclitaxel.  Last chemotherapy in November 2020.  Had surgery in December 2020.  Complete pathological remission obtained in the invasive tumor.  There was some DCIS present however.  Lymph nodes negative.  4.  Minimal elevated Alk phos. Most likely from the toe surgery that she had couple of weeks ago.  2.  Slightly overweight.  3.  Status post gastric sleeve in 2019.      Plan     1.  She will continue with surveillance.  2.  Follow-up with me in 3 months for chest wall checkup and history physical etc.  3.  Discussed with the patient about diet exercise and proper rest.  4.  Follow-up with her primary care physician.    Clinical stage      Cancer Staging  Invasive ductal carcinoma of breast, left (H)  Staging form: Breast, AJCC 8th Edition  - Clinical stage from 6/22/2020: Stage IIB (cT2, cN0, cM0, G3, ER-, MI-, HER2: Equivocal) - Signed by Sue Dougherty CNP on 7/9/2020      History     Monika Franz is a very pleasant 55 y.o. old female with a history of triple negative left breast cancer.  The patient found this lump located in her left breast measuring approximately 2 cm in size, at 10 o'clock position, presenting with some stretching-like symptoms in her breast in the month of June 2020.  She was then seen by her primary care physician and had a mammogram done which was very suspicious.  A biopsy confirmed the presence of invasive ductal carcinoma ER negative MI negative HER-2/joann 2+ on immunohistochemistry  negative on FISH.  It has high-grade features.  The ultrasound measures this mass to be 1.6 cm in size.  It is located 11 cm from the nipple.  No nipple inversion or any other symptoms.    The patient was seen by Dr. Allyson Douglas.  She had a Port-A-Cath placed for neoadjuvant chemotherapy.     She has past medical history of gastric sleeve surgery about a year ago.  She is taking some vitamin B12 and other supplements.     She had a hysterectomy few years ago.     As for the breast cancer risk factors are concerned she is G4, P3.  First child at age 29.  Breast-feeding for all 3 of them.  No prior breast biopsies.  No significant family history.    She has been started on neoadjuvant chemotherapy on 9 July 2020 with dose dense adriamysin cyclophosphamide followed by paclitaxel.   After second cycle she noticed decrease in the size of the breast lesion.  In fact it was not palpable anymore.  She has finished 4 cycles of dose dense Adriamycin and cyclophosphamide and then finished 12 doses of weekly paclitaxel. Last treatment was on the 18th of November.     She had surgery in the middle of December 2020.  She underwent bilateral mastectomy.  She did not have reconstruction but she had initially thought of doing.  Fortunately she had a complete remission noted on the invasive tumor.  There was some DCIS present however.  The 3 sentinel lymph nodes were negative for any invasive tumor.  There was no evidence of any scarring in them either.    Now she is on surveillance. Seems to be doing well. Had bunion surgery on her right foot in March along with hammer toe repair.      Past Medical History     Past Medical History:   Diagnosis Date     Anxiety      Breast cancer (H)      Depression      Sleep apnea     Uses CPAP       Review of Systems   Constitutional  Constitutional (WDL): All constitutional elements are within defined limits  Neurosensory  Neurosensory (WDL): Exceptions to WDL  Ataxia: Concerns(using scooter  for post surgery)  Eye   Eye Disorder (WDL): All eye disorder elements are within defined limits  Ear  Ear Disorder (WDL): All ear disorder elements are within defined limits  Cardiovascular  Cardiovascular (WDL): All cardiovascular elements are within defined limits  Pulmonary  Respiratory (WDL): Within Defined Limits  Gastrointestinal  Gastrointestinal (WDL): All gastrointestinal elements are within defined limits  Genitourinary  Genitourinary (WDL): All genitourinary elements are within defined limits  Lymphatic  Lymph (WDL): All lymph disorder elements are within defined limits  Musculoskeletal and Connective Tissue  Musculoskeletal and Connetive Tissue Disorders (WDL): Exceptions to WDL(right hammer toe surgery)  Integumentary  Integumentary (WDL): All integumentary elements are within defined limits  Patient Coping  Patient Coping: Accepting;Open/discussion  Accompanied by  Accompanied by: Alone  Oral Chemo Adherence         ECOG performance status and Distress Assessment      ECOG Performance:    ECOG Performance Status: 1    Distress Assessment  Distress Assessment Score: No distress:     Pain Status  Currently in Pain: No/denies        Vital Signs     Vitals:    04/05/21 1416   BP: 129/61   Pulse: 78   Temp: 98.2  F (36.8  C)   SpO2: 96%       Physical Exam     HEENT: Examination reveals pupils are equal.Oral mucosa moist and intact.   Neck is supple, no adenopathy,  no JVD.  Chest is symmetrical. Good air entry bilaterally, no rhonchi. No wheezing.   Cardiac examination: Normal S1, S2. No S3. Very soft systolic murmur.   Abdomen: Soft, nontender. No palpable hepatosplenomegaly.  Extremities: No edema, cynosis,or clubbing. Right leg in a Boot.  Lymph node examination: Bilateral axillary lymph nodes are negative.  Supraclavicular as well as cervical lymph nodes are also examined and were negative.    Lab Results     Results for orders placed or performed in visit on 04/05/21   HM1 (CBC with Diff)   Result  "Value Ref Range    WBC 7.1 4.0 - 11.0 thou/uL    RBC 4.45 3.80 - 5.40 mill/uL    Hemoglobin 13.5 12.0 - 16.0 g/dL    Hematocrit 41.8 35.0 - 47.0 %    MCV 94 80 - 100 fL    MCH 30.3 27.0 - 34.0 pg    MCHC 32.3 32.0 - 36.0 g/dL    RDW 12.7 11.0 - 14.5 %    Platelets 269 140 - 440 thou/uL    MPV 9.5 8.5 - 12.5 fL    Neutrophils % 60 50 - 70 %    Lymphocytes % 29 20 - 40 %    Monocytes % 9 2 - 10 %    Eosinophils % 2 0 - 6 %    Basophils % 0 0 - 2 %    Immature Granulocyte % 0 <=0 %    Neutrophils Absolute 4.3 2.0 - 7.7 thou/uL    Lymphocytes Absolute 2.0 0.8 - 4.4 thou/uL    Monocytes Absolute 0.6 0.0 - 0.9 thou/uL    Eosinophils Absolute 0.1 0.0 - 0.4 thou/uL    Basophils Absolute 0.0 0.0 - 0.2 thou/uL    Immature Granulocyte Absolute 0.0 <=0.0 thou/uL     Reviewed the path report with the patient.    Imaging Results     Xr Foot Right 3 Or More Vws Standing    Result Date: 3/29/2021  Interval bunionectomy with rectus 1st MPJ, intact screw fixation distal 1st metatarsal. Arthroplasty PIPJ digits 2-5.     Poc Us Guidance Needle Placement    Result Date: 3/22/2021  Ultrasound was performed as guidance to an anesthesia procedure.  Click \"PACS images\" hyperlink below to view any stored images.  For specific procedure details, view procedure note authored by anesthesia.          Amando Monson MD  "

## 2021-06-16 NOTE — ANESTHESIA PROCEDURE NOTES
Peripheral Block    Patient location during procedure: pre-op  Start time: 3/22/2021 12:21 PM  End time: 3/22/2021 12:26 PM  post-op analgesia per surgeon order as noted in medical record  Staffing:  Performing  Anesthesiologist: Nilton Dao MD  Preanesthetic Checklist  Completed: patient identified, site marked, risks, benefits, and alternatives discussed, timeout performed, consent obtained, airway assessed, oxygen available, suction available, emergency drugs available and hand hygiene performed  Peripheral Block  Block type: sciatic, popliteal  Prep: ChloraPrep  Patient position: supine  Patient monitoring: cardiac monitor, continuous pulse oximetry, heart rate and blood pressure  Laterality: right  Injection technique: ultrasound guided    Ultrasound used to visualize needle placement in proximity to nerve being blocked: yes   US used to visualize anesthetic spread  Visualized anatomic structures normal  No Pathological Findings  Permanent ultrasound image captured (UNABLE TO IMPORT IMAGE, BUT IMAGE IN CHART AND WILL BE SCANNED) for medical record  Sterile gel and probe cover used for ultrasound.  Needle  Needle type: Stimuplex   Needle gauge: 20G  Needle length: 4 in  no peripheral nerve catheter placed  Assessment  Injection assessment: no difficulty with injection, negative aspiration for heme, no paresthesia on injection and incremental injection  Additional Notes  UNABLE TO IMPORT IMAGE, BUT IMAGE IN CHART AND WILL BE SCANNED

## 2021-06-17 NOTE — PATIENT INSTRUCTIONS - HE
Stop in [address] to get an x-ray 30 minutes before your follow-up appointment.    Ok to ambulate with Cam boot on right foot with limited use of rolling knee walker.

## 2021-06-17 NOTE — TELEPHONE ENCOUNTER
[facility]  Will postpone this message for f/u with phone call to patient.  Annia CAMACHO RN Survivorship Coordinator

## 2021-06-17 NOTE — PROGRESS NOTES
Podiatry Progress Note        ASSESSMENT: S/P Bunionectomy with arthroplasty digits 2-5 right       TREATMENT:  -Surgical sites on the right foot are progressing well. No pain on exam today or with ambulation in the CAM boot. I reviewed the patient's x-rays which indicate intact screw fixation x2 distal 1st metatarsal with rectus 1st MPJ and bony consolidation, small area of gapping on the proximal dorsal chevron, arthroplasty procedure at PIPJ digits 2-5.     -I recommend she continue with the CAM boot x1 week, then ween back into regular shoes in week #2. Monitor for discomfort and return to the CAM boot should this occur.    -She is discharged from my care and will follow-up with me as concerns develop.    Narinder Talbert DPM  [facility] Podiatry/Foot & Ankle Surgery      HPI: Monika Franz was seen again today s/p bunionectomy with arthroplasty digits 2-5 right foot. Doing well today. She has been walking in the CAM boot without pain.     Past Medical History:   Diagnosis Date     Anxiety      Breast cancer (H)      Depression      Sleep apnea     Uses CPAP       Allergies   Allergen Reactions     Nsaids (Non-Steroidal Anti-Inflammatory Drug) Nausea And Vomiting     After surgery         Current Outpatient Medications:      ARIPiprazole (ABILIFY) 5 MG tablet, , Disp: , Rfl:      calcium carbonate 1250 MG capsule, Take 1,250 mg by mouth 3 (three) times a day., Disp: , Rfl:      calcium citrate 250 mg calcium Tab, 1200mg-1500mg daily (separate doses 2-3 times daily), Disp: , Rfl:      cholecalciferol, vitamin D3, 1,000 unit (25 mcg) tablet, Take 5,000 Units by mouth daily., Disp: , Rfl:      cyanocobalamin 1000 MCG tablet, Take 1,000 mcg by mouth 3 (three) times a day., Disp: , Rfl:      desvenlafaxine succinate (PRISTIQ) 50 MG 24 hr tablet, Take 100 mg by mouth daily. , Disp: , Rfl:      pediatric multivitamin-iron (POLY-VI-SOL WITH IRON) chewable tablet, Chew 1 tablet., Disp: , Rfl:     Review of  Systems - Negative       OBJECTIVE:  Appearance: alert, well appearing, and in no distress.    Vitals:    05/24/21 0953   Pulse: 80   Resp: 16   Temp: 98.3  F (36.8  C)       Surgical sites on the right foot are well coapted along 1st MPJ and digits 2-5, no pain to palpation. Available ROM 1st MPJ right. No erythema right foot. Neurovascular status unchanged right foot.

## 2021-06-17 NOTE — PATIENT INSTRUCTIONS - HE
Please continue to wear your CAM boot for one more week. After that, slowly introduce regular shoes. Feel free to call the clinic with any questions.

## 2021-06-17 NOTE — PROGRESS NOTES
Podiatry Progress Note        ASSESSMENT: S/P Bunionectomy with arthroplasty digits 2-5 right       TREATMENT:  -Surgical sites on the right foot are progressing well. No pain on exam today. I reviewed the patient's x-rays which indicate intact screw fixation x2 distal 1st metatarsal with rectus 1st MPJ, arthroplasty procedure at PIPJ digits 2-5 with bony healing distal 1st metatarsal.     -Progressive weight bearing on the right foot with CAM boot and knee walker.     -She will follow-up with me in 3 weeks for repeat x-rays.     Narinder Talbert DPM  [facility] Podiatry/Foot & Ankle Surgery      HPI: Monika Franz was seen again today s/p bunionectomy with arthroplasty digits 2-5 right foot. Doing well today. No new concerns today.     Past Medical History:   Diagnosis Date     Anxiety      Breast cancer (H)      Depression      Sleep apnea     Uses CPAP       Allergies   Allergen Reactions     Nsaids (Non-Steroidal Anti-Inflammatory Drug) Nausea And Vomiting     After surgery         Current Outpatient Medications:      ARIPiprazole (ABILIFY) 5 MG tablet, , Disp: , Rfl:      calcium carbonate 1250 MG capsule, Take 1,250 mg by mouth 3 (three) times a day., Disp: , Rfl:      calcium citrate 250 mg calcium Tab, 1200mg-1500mg daily (separate doses 2-3 times daily), Disp: , Rfl:      cholecalciferol, vitamin D3, 1,000 unit (25 mcg) tablet, Take 5,000 Units by mouth daily., Disp: , Rfl:      cyanocobalamin 1000 MCG tablet, Take 1,000 mcg by mouth 3 (three) times a day., Disp: , Rfl:      desvenlafaxine succinate (PRISTIQ) 50 MG 24 hr tablet, Take 100 mg by mouth daily. , Disp: , Rfl:      pediatric multivitamin-iron (POLY-VI-SOL WITH IRON) chewable tablet, Chew 1 tablet., Disp: , Rfl:     Review of Systems - Negative       OBJECTIVE:  Appearance: alert, well appearing, and in no distress.    Vitals:    05/04/21 0928   BP: 110/62   Pulse: 78   Resp: 16   Temp: 98.6  F (37  C)       Surgical sites on the right  foot have intact sutures with skin edges well coapted along 1st MPJ and digits 2-5, no pain to palpation. No erythema right foot. Neurovascular status unchanged right foot.

## 2021-06-17 NOTE — TELEPHONE ENCOUNTER
"Called patient to review SCP sent to [employer].  Romana stated she has reviewed summary. Stated she does not have any questions regarding summary. Stated \"nice to have\" to be able to keep for personal med records and if needing for other providers.   I explained to Romana about other available resources. Landon interested in [employer] [facility].   Link sent to [employer].   Will also send Monika my phone number if needing any other resources.   All questions answered.  Annia CAMACHORN Survivorship Coordinator    "

## 2021-06-17 NOTE — PROGRESS NOTES
Monika Franz is a 55 y.o. female who is being evaluated via a billable telephone visit.      What phone number would you like to be contacted at? [phone]   How would you like to obtain your AVS? AVS Preference: Elinor.      Internal Medicine Office Visit- VIDEO VISIT  [facility]   Patient Name: Monika Franz  Patient Age: 55 y.o.  YOB: 1965  MRN: [id number]      Video-Visit Details    Type of service:  Video Visit  Video Start Time: 4:03 PM  Video End Time (time video stopped): 4:14 PM  Originating Location (pt. Location): Home    Distant Location (provider location):  [place] [facility] MEDICINE     Mode of Communication:  Video Conference via [employer]      Date of Visit: 5/19/2021  Reason for Video Visit:   Chief Complaint   Patient presents with     Weight Loss       Assessment / Plan / Medical Decision Making:    Problem List Items Addressed This Visit     Bariatric surgery status    Obesity (BMI 35.0-39.9) with comorbidity (H) - Primary    Relevant Orders    Ambulatory referral to Bariatric Care: Surgical and Non-Surgical         She is scheduled for a colonoscopy   Pap smear was last done in 2019, will abstract the record so we have this available on her chart   Physical in December recommended, will boost tetanus at that time     Video visit duration: 11 minutes     Health Maintenance Review  Health Maintenance   Topic Date Due     DEPRESSION ACTION PLAN  Never done     HEPATITIS C SCREENING  Never done     PREVENTIVE CARE VISIT  Never done     Pneumococcal Vaccine: Pediatrics (0 to 5 Years) and At-Risk Patients (6 to 64 Years) (1 of 4 - PCV13) Never done     HIV SCREENING  Never done     ADVANCE CARE PLANNING  Never done     PAP SMEAR  Never done     COLORECTAL CANCER SCREENING  01/31/2021     TD 18+ HE  12/30/2021     MAMMOGRAM  06/15/2022     LIPID  12/07/2025     INFLUENZA VACCINE RULE BASED  Completed     TDAP ADULT ONE TIME DOSE  Completed      ZOSTER VACCINES  Completed     COVID-19 Vaccine  Completed     HEPATITIS B VACCINES  Aged Out         I am having Monika Franz maintain her ARIPiprazole, cyanocobalamin, cholecalciferol (vitamin D3), calcium carbonate, desvenlafaxine succinate, pediatric multivitamin-iron, and calcium citrate.    Orders Placed This Encounter   Procedures     Ambulatory referral to Bariatric Care: Surgical and Non-Surgical   Followup: Return in about 7 months (around 12/19/2021) for Annual physical. earlier if needed.  Donna Holt CNP    HPI:  Monika Franz is 55 y.o. year old and was contacted today for a video visit. She has a history of laparoscopic sleeve gastrectomy 6/24/2019. Since her foot surgery in March she has gained weight. She is interesting in talking to the weight loss clinic about lifestyle intervention to get back on track with her weight. She is not interested in medications to help with weight loss.         Current Scheduled Meds:  Outpatient Encounter Medications as of 5/19/2021   Medication Sig Dispense Refill     ARIPiprazole (ABILIFY) 5 MG tablet        calcium carbonate 1250 MG capsule Take 1,250 mg by mouth 3 (three) times a day.       calcium citrate 250 mg calcium Tab 1200mg-1500mg daily (separate doses 2-3 times daily)       cholecalciferol, vitamin D3, 1,000 unit (25 mcg) tablet Take 5,000 Units by mouth daily.       cyanocobalamin 1000 MCG tablet Take 1,000 mcg by mouth 3 (three) times a day.       desvenlafaxine succinate (PRISTIQ) 50 MG 24 hr tablet Take 100 mg by mouth daily.        pediatric multivitamin-iron (POLY-VI-SOL WITH IRON) chewable tablet Chew 1 tablet.       No facility-administered encounter medications on file as of 5/19/2021.        Past Medical History:   Diagnosis Date     Anxiety      Breast cancer (H)      Depression      Sleep apnea     Uses CPAP     Past Surgical History:   Procedure Laterality Date     BACK SURGERY       BUNIONECTOMY Right 3/22/2021    Procedure:  BUNIONECTOMY right foot. Arthroplasty digits two, three, four and five right foot.;  Surgeon: Narinder Talbert DPM;  Location: [facility];  Service: Podiatry      SECTION, CLASSIC       CHOLECYSTECTOMY       HYSTERECTOMY  2012     KNEE ARTHROSCOPY       MASTECTOMY Bilateral 12/15/2020     OH INSJ TUNNELED CTR VAD W/SUBQ PORT AGE 5 YR/> Right 2020    Procedure: INSERTION VASCULAR ACCESS PORT UNDER ULTRASOUND AND FLUOROSCOPIC GUIDANCE RIGHT JUGULAR;  Surgeon: Allyson Douglas DO;  Location: [facility];  Service: General     OH MASTECTOMY, SIMPLE, COMPLETE N/A 12/15/2020    Procedure: BILATERAL MASTECTOMY WITH LEFT SENTINEL LYMPH NODE BIOPSY, PORT REMOVAL;  Surgeon: Allyson Douglas DO;  Location: [facility];  Service: General     OH RMVL DARRYL CTR VAD W/SUBQ PORT/ CTR/PRPH INSJ N/A 12/15/2020    Procedure: PORT REMOVAL;  Surgeon: Allyson Douglas DO;  Location: [facility];  Service: General     STOMACH SURGERY      Gastric Sleeve     US BREAST CORE BIOPSY LEFT Left 2020     US SENTINEL NODE INJECTION  12/15/2020     Social History     Tobacco Use     Smoking status: Never Smoker     Smokeless tobacco: Never Used   Substance Use Topics     Alcohol use: Not Currently     Frequency: Never     Drug use: Never     Wt Readings from Last 3 Encounters:   21 220 lb 6.4 oz (100 kg)   21 215 lb (97.5 kg)   21 215 lb (97.5 kg)         Objective / Physical Examination:  Insight is good. Thought process is logical.

## 2021-06-20 NOTE — LETTER
Letter by Milli Gonzalez RN at      Author: Milli Gonzalez RN Service: -- Author Type: --    Filed:  Encounter Date: 6/20/2020 Status: (Other)       6/20/2020        Monika Franz  [address] [facility]  [place] [address]    This letter provides a written record that you were tested for COVID-19 on 6/19/20.     Your result was negative. This means that we didnt find the virus that causes COVID-19 in your sample. A test may show negative when you do actually have the virus. This can happen when the virus is in the early stages of infection, before you feel illness symptoms.    If you have symptoms   Stay home and away from others (self-isolate) until you meet ALL of the guidelines below:    Youve had no fever--and no medicine that reduces fever--for 3 full days (72 hours). And ?    Your other symptoms have gotten better. For example, your cough or breathing has improved. And?    At least 10 days have passed since your symptoms started.    During this time:    Stay home. Dont go to work, school or anywhere else.     Stay in your own room, including for meals. Use your own bathroom if you can.    Stay away from others in your home. No hugging, kissing or shaking hands. No visitors.    Clean high touch surfaces often (doorknobs, counters, handles, etc.). Use a household cleaning spray or wipes. You can find a full list on the EPA website at www.epa.gov/pesticide-registration/list-n-disinfectants-use-against-sars-cov-2.    Cover your mouth and nose with a mask, tissue or washcloth to avoid spreading germs.    Wash your hands and face often with soap and water.    Going back to work  Check with your employer for any guidelines to follow for going back to work.    Employers: This document serves as formal notice that your employee tested negative for COVID-19, as of the testing date shown above.

## 2021-06-20 NOTE — LETTER
Letter by Kusum Barraza, Genetic Counselor at      Author: Kusum Barraza, Genetic Counselor Service: -- Author Type: --    Filed:  Encounter Date: 7/13/2020 Status: (Other)         Monika Franz  [address] [place] [address]      July 14, 2020      Dear Ms. Franz,    It was a pleasure speaking with you on the phone on 07/13/2020.  Here is a copy of the progress note from our discussion.  If you have any additional questions, please feel free to call.    Referring Provider: Allyson Douglas DO    Present for Today's Visit: Monika     Presenting Information:   I spoke with Monika Franz over the phone today for genetic counseling to discuss her personal and family history of breast cancer.  She is here today to review this history, cancer screening recommendations, and available genetic testing options.    Personal History:  Monika is a 54 y.o. female. She was diagnosed with a left breast cancer; triple negative in June 2020. She began neoadjuvant chemotherapy on 7/9 and is being followed by Dr. Monson and Sue Dougherty CNP.     She is s/p gastric sleeve surgery, laminectomy, and cholecystectomy.       She had her first menstrual period at age 14, her first child at age 29, and reports that she is premenopausal.  Monika has her ovaries and fallopian tubes in place and she has had no ovarian cancer screening to date. Monika had a hysterectomy in 2012 due to menstrual issues and anemia.  She reports a 1-year history of oral contraceptive use from ages [age] and that she has never been on hormone replacement therapy.      Her most recent OB-GYN exam and Pap smear on 10/11/2019 were normal.  Her most recent mammogram prior to her diagnosis was on 10/15/2019 and was normal with breast density reported as heterogeneously dense.  She began having colonoscopies at the age of 46. She reports that one colon polyp was removed on her first colonoscopy in 2012. Her most recent colonoscopy in 2016 resulted in  the removal of 1 serrated adenoma and follow-up was recommended in 5 years.  She does not regularly do any other cancer screening at this time.  Monika reported no tobacco use, and no alcohol use.    Family History: (Please see scanned pedigree for detailed family history information)  Children/Siblings    Monika has a daughter, age 25, who is reportedly healthy with no cancer history.    Monika has a son, age 22, who is reportedly healthy with no cancer history.     Monika has a son, age 16, who is reportedly healthy with no cancer history.    Monika has a sister, age 59, has a history of an unknown number of colon polyps and no reported cancer history.    Monika has a brother, age 55, with a history of an unknown number of colon polyps and no reported cancer history.  Maternal    Monika's mother, age 79, was recently diagnosed at age 79 and underwent chemotherapy and radiation.    Monika's maternal uncle passed away at age 25 from Hodgkin's lymphoma.     Monika's maternal uncle passed away at age 82 from lung cancer diagnosed at age 77. He was a smoker. This uncle has a daughter who passed away at age 46 from breast cancer diagnosed at age 44.     Monika's maternal aunt passed away in her 80's from a stroke with no reported cancer history.     Monika's maternal grandmother passed away in her 40's from an unknown chronic disease that was reportedly no cancer-related.     Monika's maternal grandfather passed away at age 65 from a heart attack with no reported cancer history.   Paternal     Monika's father, age 81, has a history of prostate cancer diagnosed at age 67. She reports that she is unsure of the aggressiveness of his prostate cancer. She also reports that her father has a history of an unknown number of colon polyps.     Monika's paternal uncle passed away at age 82 from brain cancer diagnosed at age 82. This uncle had a son who passed away at age 45 from a leukemia.     Monika's paternal  uncle passed away at age 81 from Alzheimer's disease with no reported cancer history.    Monika's paternal grandmother passed away at an unknown age from an unknown cause.     Monika's paternal grandfather passed away in his early 80's from strokes with no reported cancer history.     Her maternal ethnicity is [place]. Her paternal ethnicity is Ur[place].  There is no known Ashkenazi Moravian ancestry on either side of her family. There is no reported consanguinity.    Discussion:    Monika's personal and family history of breast cancer is suggestive of a hereditary cancer syndrome.    We reviewed the features of sporadic, familial, and hereditary cancers. In looking at Monika's family history, it is possible that a cancer susceptibility gene is present due to her triple negative breast cancer diagnosed at age 54 and her maternal first-cousin's early-onset breast cancer.    We discussed the natural history and genetics of hereditary breast cancers. A detailed handout regarding the information we discussed will be sent to Monika via [employer]. Topics included: inheritance pattern, cancer risks, cancer screening recommendations, and also risks, benefits and limitations of testing.    We reviewed that the most common cause of hereditary breast cancer is Hereditary Breast and Ovarian Cancer (HBOC) syndrome, which is caused by mutations in the genes BRCA1 and BRCA2. BRCA1 and BRCA2 are two genes that increase the risk for breast and ovarian cancers, among others. Women who inherit a BRCA mutation have a 50 to 85% lifetime risk of breast cancer and up to a 40% lifetime risk of ovarian cancer. This is higher than the general population lifetime risks of 12% for breast cancer and less than 2% for ovarian cancer. Men with BRCA gene mutations have up to a 7% risk of breast cancer and 20% risk of prostate cancer. Other cancers, such as pancreatic cancer and melanoma, have also been associated with BRCA mutations.    Based on her  personal and family history, Monika meets current National Comprehensive Cancer Network (NCCN) criteria for genetic testing of high-penetrance breast and/or ovarian cancer susceptibility genes.      We discussed that there are additional genes that could cause increased risk for breast cancer. As many of these genes present with overlapping features in a family and accurate cancer risk cannot always be established based upon the pedigree analysis alone, it would be reasonable for Monika to consider panel genetic testing to analyze multiple genes at once.    We reviewed genetic testing options for hereditary breast and gynecologic cancers: actionable high/moderate breast and gynecologic cancer risk custom panel (CustomNext-Cancer, 19 genes, a combination of GynPlus and BRCAplus + NBN, STK11, and NF1) and expanded high and moderate risk panel (OvaNext, 25 genes).  Monika expressed an interest in learning as much information as possible from the testing. She opted for the OvaNext panel.  Genetic testing is available for 25 genes associated with hereditary gynecologic, breast, and related cancers: OvaNext (RAISA, BARD1, BRCA1, BRCA2, BRIP1, CDH1, CHEK2, DICER1, EPCAM, MLH1, MRE11A, MSH2, MSH6, MUTYH, NBN, NF1, PALB2, PMS2, PTEN, RAD50, RAD51C, RAD51D, SMARCA4, STK11, and TP53).  We discussed that some of the genes in the OvaNext panel are associated with specific hereditary cancer syndromes and published management guidelines: Hereditary Breast and Ovarian Cancer syndrome (BRCA1, BRCA2), Campbell syndrome (MLH1, MSH2, MSH6, PMS2, EPCAM), Hereditary Diffuse Gastric Cancer (CDH1), Cowden syndrome (PTEN), Li Fraumeni syndrome (TP53), Peutz-Jeghers syndrome (STK11), MUTYH Associated Polyposis (MUTYH), and Neurofibromatosis type 1 (NF1).    Risk-reducing salpingo-oophorectomy can be considered in women with mutations in BRIP1, RAD51C, or RAD51D. Breast and/or other cancer risk management guidelines are available for RAISA,  CHEK2, PALB2, NF1, and NBN.  The remaining genes (BARD1, DICER1, MRE11A, RAD50, and SMARCA4) are associated with increased cancer risk and may allow us to make medical recommendations when mutations are identified.    Consent was obtained over the phone with no witness required due to the current covid19 global pandemic.    Medical Management: For Monika, we reviewed that the information from genetic testing may determine:    surgery to treat Monika's active cancer diagnosis (i.e. lumpectomy versus bilateral mastectomy, partial versus total colectomy, etc.),    additional cancer screening for which Monika may qualify (i.e. mammogram and breast MRI, more frequent colonoscopies, more frequent dermatologic exams, etc.),    options for risk reducing surgeries Monika could consider (i.e. bilateral mastectomy, surgery to remove her ovaries, etc.),      and targeted chemotherapies for Monika's active cancer, or if she were to develop certain cancers in the future (i.e. immunotherapy for individuals with Campbell syndrome, PARP inhibitors, etc.).     These recommendations and possible targeted chemotherapies will be discussed in detail once genetic testing is completed.    Wendy is scheduled to have an infusion on 7/23/2020 at [facility] I shared that I will have an [employer] kit ready to go for her blood draw for genetic testing to be drawn during her upcoming infusion appointment.     Plan:  1) Today Monika  elected to proceed with OvaNext genetic testing (25 genes) through [employer]. Her blood will be drawn for this genetic testing at her upcoming infusion appointment on 7/23/2020.  2) This information should be available approximately 3-4 weeks from the time [facility] receives her blood sample.  3) Monika will be contacted by our scheduling department to set up a result disclosure appointment either in person or over the phone.     Kusum Barraza MS,  [facility]  Licensed Genetic Counselor  [facility]  [place]  [phone]        [facility]  Hereditary Breast and Gynecologic Cancers  Assessing Cancer Risk  Only about 5-10% of cancers are thought to be due to an inherited cancer susceptibility gene.    These families often have:    Several people with the same or related types of cancer    Cancers diagnosed at a young age (before age 50)    Individuals with more than one primary cancer    Multiple generations of the family affected with cancer    Some people may be candidates for genetic testing of more than one gene.  For these families, genetic testing using a cancer panel may be offered.  These panels will test different genes known to increase the risk for breast, ovarian, uterine, and/or other cancers. All of the genes discussed below have published clinical management guidelines for individuals who are found to carry a mutation. The purpose of this handout is to serve as a brief summary of the genes analyzed by the panels used to inquire about hereditary breast and gynecologic cancer:  RAISA, BRCA1, BRCA2, BRIP1, CDH1, CHEK2, MLH1, MSH2, MSH6, PMS2, EPCAM, PTEN, PALB2, RAD51C, RAD51D, and TP53.  ______________________________________________________________________________  Hereditary Breast and Ovarian Cancer Syndrome   (BRCA1 and BRCA2)  A single mutation in one of the copies of BRCA1 or BRCA2 increases the risk for breast and ovarian cancer, among others.  The risk for pancreatic cancer and melanoma may also be slightly increased in some families.  The chart below shows the chance that someone with a BRCA mutation would develop cancer in his or her lifetime1,2,3,4.      Lifetime Cancer Risks    General Population BRCA   Breast 12% ~80%   Ovarian 1-2% 11-40%   Male Breast <1% 7-8%   Prostate 16% 20%       A persons ethnic background is also important to consider, as individuals of Ashkenazi Alevism ancestry  have a higher chance of having a BRCA gene mutation.  There are three BRCA mutations that occur more frequently in this population.    Campbell Syndrome   (MLH1, MSH2, MSH6, PMS2, and EPCAM)  Currently five genes are known to cause Campbell Syndrome: MLH1, MSH2, MSH6, PMS2, and EPCAM.  A single mutation in one of the Campbell Syndrome genes increases the risk for colon, endometrial, ovarian, and stomach cancers.  Other cancers that occur less commonly in Campbell Syndrome include urinary tract, skin, and brain cancers.  The chart below shows the chance that a person with Campbell syndrome would develop cancer in his or her lifetime5.      Lifetime Cancer Risks    General Population Campbell Syndrome   Colon 5.5% ~80%   Endometrial 2.7% 15-60%   Stomach <1% 1-13%   Ovarian 1-2% 4-24%       Cowden Syndrome   (PTEN)  Cowden syndrome is a hereditary condition that increases the risk for breast, thyroid, endometrial, colon, and kidney cancer.  Cowden syndrome is caused by a mutation in the PTEN gene.  A single mutation in one of the copies of PTEN causes Cowden syndrome and increases cancer risk.  The chart below shows the chance that someone with a PTEN mutation would develop cancer in their lifetime6,7.  Other benign features seen in some individuals with Cowden syndrome include benign skin lesions (facial papules, keratoses, lipomas), learning disability, autism, thyroid nodules, colon polyps, and larger head size.      Lifetime Cancer Risks    General Population Cowden Synrome   Breast 12% 25-50%   Thyroid 1% Up to 35%   Renal 1-2% Up to 35%   Endometrial 2.7% Up to 28%   Colon  5.5% 9%   Melanoma 2-3% 6%   ** One recent study found breast cancer risk to be increased to 85%     Li-Fraumeni Syndrome   (TP53)  Li-Fraumeni Syndrome (LFS) is a cancer predisposition syndrome caused by a mutation in the TP53 gene. A single mutation in one of the copies of TP53 increases the risk for multiple cancers. Individuals with LFS are at an increased  risk for developing cancer at a young age. The lifetime risk for development of a LFS-associated cancer is 50% by age 30 and 90% by age 60.   Core Cancers: Sarcomas, Breast, Brain, Lung, Leukemias/Lymphomas, Adrenocortical carcinomas  Other Cancers: Gastrointestinal, Thyroid, Skin, Genitourinary    Hereditary Diffuse Gastric Cancer   (CDH1)  Currently, one gene is known to cause hereditary diffuse gastric cancer (HDGC): CDH1.  Individuals with HDGC are at increased risk for diffuse gastric cancer and lobular breast cancer. Of people diagnosed with HDGC, 30-50% have a mutation in the CDH1 gene.  This suggests there are likely other genes that may cause HDGC that have not been identified yet.      Lifetime Cancer Risks    General Population HDGC    Diffuse Gastric  <1% ~80%   Breast 12% 39-52%         Additional Genes  RAISA  RAISA is a moderate-risk breast cancer gene. Women who have a mutation in RAISA can have between a 2-4 fold increased risk for breast cancer compared to the general population8. RAISA mutations have also been associated with increased risk for pancreatic cancer, however an estimate of this cancer risk is not well understood9. Individuals who inherit two RAISA mutations have a condition called ataxia-telangiectasia (AT).  This rare autosomal recessive condition affects the nervous system and immune system, and is associated with progressive cerebellar ataxia beginning in childhood.  Individuals with ataxia-telangiectasia often have a weakened immune system and have an increased risk for childhood cancers.    PALB2  Mutations in PALB2 have been shown to increase the risk of breast cancer up to 33-58% in some families; where individuals fall within this risk range is dependent upon family [id number]. PALB2 mutations have also been associated with increased risk for pancreatic cancer, although this risk has not been quantified yet.  Individuals who inherit two PALB2 mutations--one from their mother and one from  their father--have a condition called Fanconi Anemia.  This rare autosomal recessive condition is associated with short stature, developmental delay, bone marrow failure, and increased risk for childhood cancers.    CHEK2   CHEK2 is a moderate-risk breast cancer gene.  Women who have a mutation in CHEK2 have around a 2-fold increased risk for breast cancer compared to the general population, and this risk may be higher depending upon family history.11,12,13 Mutations in CHEK2 have also been shown to increase the risk of a number of other cancers, including colon and prostate, however these cancer risks are currently not well understood.    BRIP1, RAD51C and RAD51D  Mutations in BRIP1, RAD51C, and RAD51D have been shown to increase the risk of ovarian cancer and possibly female breast cancer as well14,15 .       Lifetime Cancer Risk    General Population BRIP1 RAD51C RAD51D   Ovarian 1-2% ~5-8% ~5-9% ~7-15%         Inheritance  All of the cancer syndromes reviewed above are inherited in an autosomal dominant pattern.  This means that if a parent has a mutation, each of his or her children will have a 50% chance of inheriting that same mutation.  Therefore, each child--male or female--would have a 50% chance of being at increased risk for developing cancer.    Mutations in some genes can occur de marisel, which means that a persons mutation occurred for the first time in them and was not inherited from a parent.  Now that they have the mutation, however, it can be passed on to future generations.    Genetic Testing  Genetic testing involves a blood test and will look at the genetic information in the RAISA, BRCA1, BRCA2, BRIP1, CDH1, CHEK2, MLH1, MSH2, MSH6, PMS2, EPCAM, PTEN, PALB2, RAD51C, RAD51D, and TP53 genes for any harmful mutations that are associated with increased cancer risk.  If possible, it is recommended that the person(s) who has had cancer be tested before other family members.  That person will give us the  most useful information about whether or not a specific gene is associated with the cancer in the family.    Results  There are three possible results of genetic testing:    Positive--a harmful mutation was identified in one or more of the genes    Negative--no mutation was identified in any of the genes on this panel    Variant of unknown significance--a variation in one of the genes was identified, but it is unclear how this impacts cancer risk in the family    Advantages and Disadvantages   There are advantages and disadvantages to genetic testing.    Advantages    May clarify your cancer risk    Can help you make medical decisions    May explain the cancers in your family    May give useful information to your family members (if you share your results)    Disadvantages    Possible negative emotional impact of learning about inherited cancer risk    Uncertainty in interpreting a negative test result in some situations    Possible genetic discrimination concerns (see below)    Genetic Information Nondiscrimination Act (HILDA)  HILDA is a federal law that protects individuals from health insurance or employment discrimination based on a genetic test result alone.  Although rare, there are currently no legal discrimination protections in terms of life insurance, long term care, or disability insurances.  Visit the National Human [employer] [place] website to learn more.    Reducing Cancer Risk  All of the genes described above have nationally recognized cancer screening guidelines that would be recommended for individuals who test positive.  In addition to increased cancer screening, surgeries may be offered or recommended to reduce cancer risk.  Recommendations are based upon an individuals genetic test result as well as their personal and family history of cancer.    Questions to Think About Regarding Genetic Testing:    What effect will the test result have on me and my relationship with my family members if  I have an inherited gene mutation?  If I dont have a gene mutation?    Should I share my test results, and how will my family react to this news, which may also affect them?    Are my children ready to learn new information that may one day affect their own health?    Hereditary Cancer Resources    FORCE: Facing Our Risk of Cancer Empowered facingourrisk.org   Bright Pink bebrightpink.org   Li-Fraumeni Syndrome Association lfsassociation.org   PTEN World PTENworld.com   No stomach for cancer, Inc. nostomachforcancer.org   Stomach cancer relief network Scrnet.org   Collaborative Group of the Americas on Inherited Colorectal Cancer (CGA) cgaicc.com    Cancer Care cancercare.org   American Cancer Society (ACS) cancer.org   National Cancer [place] (NCI) cancer.gov     Please call us if you have any questions or concerns.   Cancer Risk Management Program  q Doc Blackwell, MS, [facility] [phone]  q Kim Rosario, MS, [facility] [phone]  q Mirlande Barron, MS, [facility] [phone]  q Bailey Parekh, MS, [facility] [phone]  q Kusum Barraza, MS, [facility] [phone]  q Jaz Armenta, MS, [facility] [phone]  q Pricilla Gann, MS, [facility] [phone]      References  1. Osvaldo Cleary PDP, Bia S, Carrie RAYMUNDO, Carrie JE, Lexie JL, Nena N, Gerald H, Kely O, Morteza A, William B, Efren P, Bentley S, Naveed DM, Osiel N, Abi E, Calvin H, Jorge Luis E, Manny J, Semaj J, Nolan B, oHwie H, Thorlaci S, Eerola H, Karin H, Maxim K, Piotr OP. Average risks of breast and ovarian cancer associated with BRCA1 or BRCA2 mutations detected in case series unselected for family history: a combined analysis of 222 studies. Am J Hum Ivy. 2003;72:1117-30.  2. Sean FIGUEROA, Aria CAMACHO, Srinivasa KONG.  BRCA1 and BRCA2 Hereditary Breast and Ovarian Cancer. Gene Reviews online. 2013.  3. Rusty YC, Kyle S, Christophe G, Mckeon S. Breast cancer risk among male BRCA1 and BRCA2 mutation carriers. J Natl Cancer Inst. 2007;99:1811-4.  4. Coni Madera  I, Dionte J, Sohan E, Rakan ER, Brennen F. Risk of breast cancer in male BRCA2 carriers. J Med Ivy. 2010;47:710-1.  5. National Comprehensive Cancer Network. Clinical practice guidelines in oncology, colorectal cancer screening. Available online (registration required). 2015.  6. Cirilo MH, Mikael J, Neha J, Tim LA, Kiki MS, Angeles C. Lifetime cancer risks in individuals with germline PTEN mutations. Clin Cancer Res. 2012;18:400-7.  7. Nicolle ALVARADO. Cowden Syndrome: A Critical Review of the Clinical Literature. J Ivy . 2009:18:13-27.  8. Kyaw A, Camilo D, Erich S, Roxie P, Chirag T, Kirt M, Myron B, Georgiana H, Estrella R, Archie K, Mariana L, William DG, Naveed D, Cullen DF, Carlitos MR, The Breast Cancer Susceptibility Collaboration (UK) & Ivette FIGUEROA. RAISA mutations that cause ataxia-telangiectasia are breast cancer susceptibility alleles. Nature Genetics. 2006;38:873-875  9. Get N , Franklin Y, Bianca J, Jim L, Valente GM , Marnie ML, Gallinger S, Hernandez AG, Syngal S, Dean ML, Dennis J , Sona R, Dominguez SZ, Riccardo JR, Jorge VE, Kemi M, Vogelstein B, Michelle N, Ochoa RH, Kanu KW, and Alcantar AP. RAISA mutations in patients with hereditary pancreatic cancer. Cancer Discover. 2012;2:41-46  10. Alan JOHNSON, et al. Breast-Cancer Risk in Families with Mutations in PALB2. NEJM. 2014; 371(6):497-506.  11. CHEK2 Breast Cancer Case-Control Consortium. CHEK2*1100delC and susceptibility to breast cancer: A collaborative analysis involving 10,860 breast cancer cases and 9,065 controls from 10 studies. Am J Hum Ivy, 74 (2004), pp. 1370-9690  12. Marva T, Keyanna S, Stephanie K, et al. Spectrum of Mutations in BRCA1, BRCA2, CHEK2, and TP53 in Families at High Risk of Breast Cancer. HERBERT. 2006;295(12):1222-7291.   13. Crystal C, Luciana D, Noah MUJICA, et al. Risk of breast cancer in women with a CHEK2 mutation with and without a family history of breast cancer. J Clin Oncol.  2011;29:9940-7560.  14. Carl H, Vick E, Tc SJ, et al. Contribution of germline mutations in the RAD51B, RAD51C, and RAD51D genes to ovarian cancer in the population. J Clin Oncol. 2015;33(26):1806-1683. Doi:10.1200/JCO.2015.61.2408.  15. Brittani T, Marisabel DF, Marilu P, et al. Mutations in BRIP1 confer high risk of ovarian cancer. Devora Ivy. 2011;43(11):7005-7095. doi:10.1038/ng.955.

## 2021-06-20 NOTE — LETTER
"Letter by Kusum Barraza, Genetic Counselor at      Author: Kusum Barraza, Genetic Counselor Service: -- Author Type: --    Filed:  Encounter Date: 9/14/2020 Status: (Other)         Monika Franz  [address] [place] [address]      September 14, 2020      Dear Ms. Franz,    It was a pleasure speaking with you on the phone on 9/14/2020.  Here is a copy of the progress note from our discussion.  If you have any additional questions, please feel free to call.    Referring Provider: Dr. Douglas    Presenting Information:  I spoke to Monika by phone today to discuss her genetic testing results. Her blood was drawn on 7/23/2020. BRCANext-Expanded testing was ordered  from [facility]. This testing was done because of Monika's personal and family history of breast cancer.    Genetic Testing Result: NEGATIVE  Monika is negative for mutations in the RAISA, BARD1, BRCA1, BRCA2, BRIP1, CDH1, CHEK2, DICER1, EPCAM, MLH1, MSH2, MSH6, NBN, NF1, PALB2, PMS2, PTEN, RAD51C, RAD51D, RECQL, SMARCA4, STK11, and TP53 genes. No mutations were found in any of the 23 genes analyzed. This test involved sequencing and deletion/duplication analysis of all genes with the exceptions of EPCAM (deletions/duplications only).    Testing did not detect an identifiable mutation associated with Hereditary Breast and Ovarian Cancer syndrome (BRCA1, BRCA2), Campbell syndrome (MLH1, MSH2, MSH6, PMS2, EPCAM), Hereditary Diffuse Gastric Cancer (CDH1), Cowden syndrome (PTEN), Li Fraumeni syndrome (TP53), Peutz-Jeghers syndrome (STK11), or Neurofibromatosis type 1 (NF1).    A copy of the test report can be found in the Media tab and named \"Genetics Scan[facility]\". The report is scanned in as a linked document.    Interpretation:  We discussed several different interpretations of this negative test result.    1. One explanation may be that there is a different gene or combination of genes and environment that are associated with the cancers in " Monika and/or her relatives.    2. It is possible that her maternal first-cousins did have a mutation in one of the genes that Monika was tested for, and she did not inherit it.  3. There is also a small possibility that there is a mutation in one of these genes, and we could not find it with our current testing methods.       Screening:  Based on this negative test result, it is important for Monika and her relatives to refer back to the family history for appropriate cancer screening.      Monika should continue to follow her oncology team's recommendations for the treatment and follow-up for her breast cancer.    Monikas close female relatives remain at increased risk for breast cancer given their family history. Breast cancer screening is generally recommended to begin approximately 10 years younger than the earliest age of breast cancer diagnosis in the family (out to second-degree relatives), or at age 40, whichever comes first. In this family, screening may begin at age 40. This would apply to Monika's daughter, sister, and niece. Breast screening options should be discussed with an individual's primary care provider and a genetic counselor, to determine at what age to begin screening, what screening is appropriate, and if additional screening (such as breast MRI) is necessary based on personal/family history factors.     Monika should continue to follow her GI providers' recommendations for colon cancer screening given her personal history of colon polyps. She is currently on a 5-year follow-up plan.  We discussed that Monika likely has some increased risk for lymphoma given her mother's and maternal aunt's histories, but routine screening is typically not recommended.  Monika is encouraged to discuss this history with her care providers. Monika should ensure that her primary care physician is aware of the family history and that lymph node examination and review of signs/symptoms is part of annual  physicals.  I have encouraged Monika to report any persistent high fever, unexplained weight loss, night sweats, enlarged lymph nodes, or frequent infections to a healthcare provider.    Other population cancer screening options, such as those recommended by the American Cancer Society and the National Comprehensive Cancer Network (NCCN), are also appropriate for Monika and her family. These screening recommendations may change if there are changes to Monika's personal and/or family history. Final screening recommendations should be made by each individual's managing physician.    Inheritance:  We reviewed the autosomal dominant inheritance of mutations in these 23 genes.  We discussed that Monika cannot/did not pass on an identifiable mutation in these genes to her children based on this test result.  Mutations in these genes do not skip generations.      Additional Testing Considerations:  No further genetic is recommended for Monika or her family at this time. Monika was encouraged to contact me should her personal or family history change as this may change genetic testing recommendations.    Summary:  We do not have an explanation for Monika's personal and family history of breast cancer.  Because of that, it is important that she continue with cancer screening based on her personal and family history as discussed above.    Genetic testing is rapidly advancing, and new cancer susceptibility genes will most likely be identified in the future.  Therefore, I encouraged Monika to contact me annually or if there are changes in her personal or family history.  This may change how we assess her cancer risk, screening, and the testing we would offer.    Plan:  1. A copy of the test results will be mailed to Monika.  2. She plans to follow-up with her oncology care team.  3. She should contact me annually, or sooner if her family history changes.    If Monika has any further questions, I encouraged her to  contact me at [phone].    Kusum Barraza MS, [facility]  Licensed Genetic Counselor  [facility]  [phone]

## 2021-06-20 NOTE — LETTER
Letter by Kusum Barraza, Genetic Counselor at      Author: Kusum Barraza, Genetic Counselor Service: -- Author Type: --    Filed:  Encounter Date: 9/14/2020 Status: (Other)       [facility]    Negative Genetic Test Result    Genetic Testing  You had a blood test that looked at the genetic information in one or more genes associated with increased cancer risk.  The testing looked for any harmful changes that would stop this particular gene from working like it should. If an individual does not have any harmful changes or variants of unknown significance found from their blood test, their genetic test result is reported as negative.       Results  The genetic test did not identify any pathogenic (harmful) changes in the genes that were tested. There are several possible explanations for a negative test result. Without knowing the gene mutation in your family, the cause of the cancer in you or your relatives is still unknown. Your genetic counselor can help interpret the result for you and your relatives. In this case, there are several reasons that may explain the negative test result:    There may be a gene mutation in the family that you did not inherit.     You may have a gene mutation in a different gene that was not included in the test, or has not yet been discovered.     The cancers in you or your family may be due to a combination of genetic factors and environment (multifactorial/familial).    The cancers in you or your family may be sporadic/random cancers.    There is very small chance that a mutation was not found by current testing methods.  As testing technology evolves over time, it may still be possible to identify a mutation in a gene that was not found on this test.    It is important to note which genes were included in your test. A list of these genes can be found on your test result.    Screening Recommendations  Due to this negative test result, cancer screening  recommendations should be based on your personal and family history. This may include increased cancer screening for you and/or your family members. Your genetic counselor and health care provider can help make appropriate recommendations.      Please call us if you have any questions or concerns.   [facility] Kusum Barraza MS, [facility]  [phone]

## 2021-06-20 NOTE — LETTER
Letter by Amy Palumbo RN at      Author: Amy Palumbo RN Service: -- Author Type: --    Filed:  Encounter Date: 6/24/2020 Status: (Other)       Dear Monika:    Thank you for choosing [facility] for your care.  We are committed to providing you with the highest quality and compassionate healthcare services.  The following information pertains to your first appointment with our clinic.    Date/Time of appointment:  Wednesday, July 1, 2020--please arrive no later than 12:30pm for your 1:00pm consult with Dr. Monson    Name of your Physician: Amando Monson MD (Medical Oncology, 2nd floor)    What to bring to your appointment:    Completed Patient History/Initial Nursing Assessment    Your current insurance card(s).    Parking:    Please refer to the map included to direct you.  The Cancer Care Center is located at the [place] end of [facility] in [place], [place].      After turning onto [address] from [facility], take a right turn at the first stop sign.  We have designated parking on the left, identified as parking for Cancer Care patients (Lot D).     The Code to Enter Lot D is: 0701. This code changes monthly and will always coincide with the current month followed by 01. For example August will be 0801.  The month will continue to change but the 01 will remain constant.  If lot D is full please use Parking Lot A, directly across the street.    Please enter the [facility] on the north end of the hospitals.  You will see a sign on the building.        For Medical Oncology appointments, please take the elevator to the second floor to check in.      Also please note appointments can last 1.5-2 hours.      We hope these instructions are helpful to you.  If you have any questions or concerns, please call us at [phone].  It is our pleasure to assist you.    Warm Regards,      Amy Palumbo RN, BSN, OCN, CBCN  Cancer Care  Navigator  [phone]

## 2021-06-21 ENCOUNTER — RECORDS - HEALTHEAST (OUTPATIENT)
Dept: ADMINISTRATIVE | Facility: OTHER | Age: 56
End: 2021-06-21

## 2021-06-21 NOTE — LETTER
Letter by Samina Minor CMA at      Author: Samina Minor CMA Service: -- Author Type: --    Filed:  Encounter Date: 11/11/2020 Status: (Other)       Procedure: Bilateral Mastectomies   Surgery Date: Tuesday December 15th   Location: [facility]   3rd Floor, [facility]   [address] [place] [id number]   Arrival Time: 7:15 AM to [address] (unless instructed otherwise by the pre-op nurse)   Prep:   1. Please schedule a preop physical with your primary care doctor. This may be virtual or face-to-face depending on your doctors preference. Call them right away to schedule this.  2. COVID19 testing is required within 4 days of surgery. We have this scheduled for you at [facility], [address] [place] [address] at 9:30 AM on Saturday Dec. 12th. Follow the specific instructions you receive by Elinor. If your test is positive, your surgery will be canceled.   3. Nothing to eat or drink for 8 hours before surgery unless instructed differently by the preop nurse.  4. No blood thinners including aspirin for one week prior to surgery. Verify this is safe for you with your primary care doctor before stopping.   5. You need an adult to drive you home and stay with you 24 hours after surgery. Because of COVID19 related visitor restrictions, adult surgical patients are allowed one family member (pediatric surgery patients are allowed two) in the waiting area.   6. When you arrive to the surgery center, you will be screened for COVID19 symptoms. If you screen positive, your surgery will need to be postponed for your safety.  7. If the community sees a new surge in COVID19 hospital admissions, your procedure may need to be postponed. We will contact you if this happens.  8. We always encourage you to notify your insurance any time you have something scheduled including surgery. The number is usually right on the back of your insurance card.   Post op appointment on Monday  January 4th at 9:00 AM. [address] [facility] [address].   Call our office if you have any questions! Thank you!

## 2021-06-21 NOTE — LETTER
Letter by Donna Holt FNP at      Author: Donna Holt FNP Service: -- Author Type: --    Filed:  Encounter Date: 3/10/2021 Status: (Other)         March 10, 2021     Patient: Monika Franz   YOB: 1965   Date of Visit: 3/10/2021       To Whom It May Concern:    Monika Franz was seen in the clinic today. She does not have any COVID symptoms and does not meet [facility] COVID testing criteria. She can return to work without restriction.     If you have any questions or concerns, please don't hesitate to call.    Sincerely,        Electronically signed by BRODIE Gibbons

## 2021-06-25 NOTE — TELEPHONE ENCOUNTER
Monika called to clarify her breast cancer is a triple negative.   She had not registered this before but is gaining an understanding from a social media page in which she participates.  We discussed the significance.  She is reassured that she has received the appropriate treatment.      Monika also inquired about a project her son Damian is working on for [employer] .  He has asked if our facility will accept donations of tie-blankets for patients.  They will drop these off to [facility] ATTN: Janet Caba before August. Amy Palumbo RN

## 2021-06-30 NOTE — PROGRESS NOTES
Progress Notes by Donna Holt FNP at 3/10/2021 12:25 PM     Author: Donna Holt FNP Service: -- Author Type: Nurse Practitioner    Filed: 3/12/2021 12:15 PM Encounter Date: 3/10/2021 Status: Signed    : Donna Holt FNP (Nurse Practitioner)       [address], [address]  [facility] [address]  Dept: [phone]  Dept Fax: [phone]  Primary Provider: Donna Holt FNP        Today's date: 3/10/2021    Monika Franz is a 55 y.o. female who presents for a preoperative evaluation.    Surgical Information:  Surgery/Procedure: BUNIONECTOMY right foot. Arthroplasty digits two, three, four and five right foot.  Surgery Location: [facility]  Surgeon: Dr Talbert  Surgery Date: 3/22/2021  Time of Surgery: 1 PM  Where patient plans to recover: At home with family  Fax number for surgical facility: Note does not need to be faxed, will be available electronically in Epic.    Assessment & Plan     The proposed surgical procedure is considered INTERMEDIATE risk.    Problem List Items Addressed This Visit     Hallux abducto valgus, right    Hammertoe of right foot    REBECCA (obstructive sleep apnea), CPAP therapy     - Continue CPAP therapy         Severe recurrent major depression without psychotic features (H)     - stable and followed by psychiatry   - Okay to take morning medications with a small sip of water the day of procedure            Other Visit Diagnoses     Preoperative examination    -  Primary    Relevant Orders    Basic Metabolic Panel (Completed)    HM2(CBC w/o Differential) (Completed)          She is scheduled for colonoscopy and plans to do a pap smear at a \[facility]\"" in June      RECOMMENDATION:  APPROVAL GIVEN to proceed with proposed procedure, without further diagnostic evaluation.    Subjective     HPI related to upcoming procedure: Monika Franz is a 54 y/o female who presents to the office today for a preoperative  physical.    Her son was recently ill but tested negative for COVID. She is feeling well. Her work suggested that she test for COVID as well.       Preop Questions 3/5/2021   Have you ever had a heart attack or stroke? No   Have you ever had surgery on your heart or blood vessels, such as a stent placement, a coronary artery bypass, or surgery on an artery in your head, neck, heart, or legs? No   Do you have chest pain with activity? No   Do you have a history of  heart failure? No   Do you currently have a cold, bronchitis or symptoms of other infection? No   Do you have a cough, shortness of breath, or wheezing? No   Do you or anyone in your family have previous history of blood clots? No   Do you or does anyone in your family have a serious bleeding problem such as prolonged bleeding following surgeries or cuts? No   Have you ever had problems with anemia or been told to take iron pills? YES - most recent hgb normal 12/2020   Have you had any abnormal blood loss such as black, tarry or bloody stools, or abnormal vaginal bleeding? No   Have you ever had a blood transfusion? No   Are you willing to have a blood transfusion if it is medically needed before, during, or after your surgery? Yes   Have you or any of your relatives ever had problems with anesthesia? No   Do you have sleep apnea, excessive snoring or daytime drowsiness? YES - CPAP therapy    Do you have a CPAP machine? Yes   Do you have any artifical heart valves or other implanted medical devices like a pacemaker, defibrillator, or continuous glucose monitor? No   Do you have artificial joints? No   Are you allergic to latex? No   Is there any chance that you may be pregnant? No     Health Care Directive:  Patient does not have a Health Care Directive or Living Will: Patient states has Advance Directive and will bring in a copy to clinic.    Preoperative Review of :    reviewed - she was prescribed tramadol and Tylenol #3 in association with  surgery 6/30/21, 11/29/20, 12/15/20    Review of Systems  CONSTITUTIONAL: NEGATIVE for fever, chills, change in weight  INTEGUMENTARY/SKIN: NEGATIVE for worrisome rashes, moles or lesions  EYES: NEGATIVE for vision changes or irritation  ENT/MOUTH: NEGATIVE for ear, mouth and throat problems  RESP: NEGATIVE for significant cough or SOB  BREAST: NEGATIVE for masses, tenderness or discharge  CV: NEGATIVE for chest pain, palpitations or peripheral edema  GI: NEGATIVE for nausea, abdominal pain, heartburn, or change in bowel habits  : NEGATIVE for frequency, dysuria, or hematuria  MUSCULOSKELETAL: NEGATIVE for significant arthralgias or myalgia  NEURO: NEGATIVE for weakness, dizziness or paresthesias  ENDOCRINE: NEGATIVE for temperature intolerance, skin/hair changes  HEME: NEGATIVE for bleeding problems  PSYCHIATRIC: NEGATIVE for changes in mood or affect    Patient Active Problem List    Diagnosis Date Noted   ? Hallux abducto valgus, right 03/04/2021   ? Hammertoe of right foot 03/04/2021   ? Eczema 03/01/2021   ? Valgus deformity of both great toes 03/01/2021   ? Hammer toes of both feet 03/01/2021   ? Severe recurrent major depression without psychotic features (H) 12/07/2020   ? Obesity (BMI 35.0-39.9) with comorbidity (H) 12/07/2020   ? Encounter for antineoplastic immunotherapy 07/01/2020   ? Chemotherapy-induced neutropenia (H) 07/01/2020   ? Invasive ductal carcinoma of breast, left (H) 06/25/2020   ? Narrow angle glaucoma suspect of both eyes 02/25/2020   ? Bariatric surgery status 06/24/2019   ? Adenomatous polyp of colon 08/17/2018   ? Prediabetes 05/27/2016   ? Drug overdose, intentional (H) 01/05/2013   ? REBECCA (obstructive sleep apnea), CPAP therapy 02/13/2012   ? Other and unspecified alcohol dependence, episodic drinking behavior 09/16/2011     Past Medical History:   Diagnosis Date   ? Anxiety    ? Breast cancer (H)    ? Depression    ? Sleep apnea     Uses CPAP     Past Surgical History:   Procedure  Laterality Date   ? BACK SURGERY     ?  SECTION, CLASSIC     ? CHOLECYSTECTOMY     ? HYSTERECTOMY     ? KNEE ARTHROSCOPY     ? MASTECTOMY Bilateral 12/15/2020   ? IA INSJ TUNNELED CTR VAD W/SUBQ PORT AGE 5 YR/> Right 2020    Procedure: INSERTION VASCULAR ACCESS PORT UNDER ULTRASOUND AND FLUOROSCOPIC GUIDANCE RIGHT JUGULAR;  Surgeon: Allyson Douglas DO;  Location: [facility];  Service: General   ? IA MASTECTOMY, SIMPLE, COMPLETE N/A 12/15/2020    Procedure: BILATERAL MASTECTOMY WITH LEFT SENTINEL LYMPH NODE BIOPSY, PORT REMOVAL;  Surgeon: Allyson Douglas DO;  Location: [facility];  Service: General   ? IA RMVL DARRYL CTR VAD W/SUBQ PORT/ CTR/PRPH INSJ N/A 12/15/2020    Procedure: PORT REMOVAL;  Surgeon: Allyson Douglas DO;  Location: [facility];  Service: General   ? STOMACH SURGERY      Gastric Sleeve   ? US BREAST CORE BIOPSY LEFT Left 2020   ? US SENTINEL NODE INJECTION  12/15/2020     Current Outpatient Medications   Medication Sig Dispense Refill   ? ARIPiprazole (ABILIFY) 5 MG tablet      ? calcium carbonate 1250 MG capsule Take 1,250 mg by mouth 3 (three) times a day.     ? cholecalciferol, vitamin D3, 1,000 unit (25 mcg) tablet Take 5,000 Units by mouth daily.     ? cyanocobalamin 1000 MCG tablet Take 1,000 mcg by mouth 3 (three) times a day.     ? desvenlafaxine succinate (PRISTIQ) 50 MG 24 hr tablet Take 100 mg by mouth daily.      ? multivitamin (CHILDREN'S VITAMIN ORAL) Take 1 tablet by mouth 2 (two) times a day.     ? traZODone (DESYREL) 150 MG tablet Take 150 mg by mouth at bedtime.       No current facility-administered medications for this visit.        Allergies   Allergen Reactions   ? Nsaids (Non-Steroidal Anti-Inflammatory Drug) Nausea And Vomiting     After surgery       Social History     Tobacco Use   ? Smoking status: Never Smoker   ? Smokeless tobacco: Never Used   Substance Use Topics   ? Alcohol use: Not Currently     Frequency: Never      Family  "History   Problem Relation Age of Onset   ? Lymphoma Mother 79        Non-Hodgkins   ? Cancer Mother    ? Prostate cancer Father 67   ? Cancer Father    ? Hodgkin's lymphoma Maternal Uncle 20   ? Brain cancer Paternal Uncle 82   ? Lung cancer Maternal Uncle 77   ? Breast cancer Maternal cousin 46   ? Leukemia Paternal cousin 45   ? No Medical Problems Sister    ? No Medical Problems Brother    ? No Medical Problems Son    ? No Medical Problems Son    ? No Medical Problems Daughter      Social History     Substance and Sexual Activity   Drug Use Never        Objective     /62   Pulse 64   Ht 5' 6\" (1.676 m)   Wt 218 lb (98.9 kg)   LMP 06/15/2012   BMI 35.19 kg/m    Physical Exam    GENERAL APPEARANCE: healthy, alert and no distress     EYES: EOMI     HENT: ear canals and TM's normal and nose and mouth without ulcers or lesions       RESP: lungs clear to auscultation - no rales, rhonchi or wheezes     CV: regular rates and rhythm, normal S1 S2     ABDOMEN:  soft, nontender, no HSM or masses and bowel sounds normal     MS: extremities normal- no gross deformities noted     SKIN: no suspicious lesions or rashes     NEURO: Normal strength and tone, sensory exam grossly normal, mentation intact and speech normal     PSYCH: mentation appears normal. and affect normal/bright     LYMPHATICS: No cervical adenopathy    Recent Labs   Lab Test 03/10/21  1259 12/07/20  1155   HGB 13.7 13.2    243    142   K 4.2 4.4   CREATININE 0.72 0.67        PRE-OP Diagnostics:   Recent Results (from the past 168 hour(s))   Basic Metabolic Panel    Collection Time: 03/10/21 12:59 PM   Result Value Ref Range    Sodium 143 136 - 145 mmol/L    Potassium 4.2 3.5 - 5.0 mmol/L    Chloride 105 98 - 107 mmol/L    CO2 30 22 - 31 mmol/L    Anion Gap, Calculation 8 5 - 18 mmol/L    Glucose 85 70 - 125 mg/dL    Calcium 9.2 8.5 - 10.5 mg/dL    BUN 17 8 - 22 mg/dL    Creatinine 0.72 0.60 - 1.10 mg/dL    GFR MDRD Af Amer >60 >60 " mL/min/1.73m2    GFR MDRD Non Af Amer >60 >60 mL/min/1.73m2   HM2(CBC w/o Differential)    Collection Time: 03/10/21 12:59 PM   Result Value Ref Range    WBC 6.5 4.0 - 11.0 thou/uL    RBC 4.55 3.80 - 5.40 mill/uL    Hemoglobin 13.7 12.0 - 16.0 g/dL    Hematocrit 42.2 35.0 - 47.0 %    MCV 93 80 - 100 fL    MCH 30.1 27.0 - 34.0 pg    MCHC 32.5 32.0 - 36.0 g/dL    RDW 12.6 11.0 - 14.5 %    Platelets 190 140 - 440 thou/uL    MPV 10.0 7.0 - 10.0 fL       REVISED CARDIAC RISK INDEX (RCRI)   The patient has the following serious cardiovascular risks for perioperative complications:   - No serious cardiac risks = 0 points    RCRI INTERPRETATION: 0 points: Class I (very low risk - 0.4% complication rate)         Signed Electronically by: BRODIE Gibbons    Copy of this evaluation report is provided to requesting physician.

## 2021-06-30 NOTE — PROGRESS NOTES
Progress Notes by Donna Holt FNP at 12/7/2020 10:45 AM     Author: Donna Holt FNP Service: -- Author Type: Nurse Practitioner    Filed: 12/9/2020  8:19 AM Encounter Date: 12/7/2020 Status: Signed    : Donna Holt FNP (Nurse Practitioner)       [address], [address]  [facility] [address]  Dept: [phone]  Dept Fax: [phone]  Primary Provider: Donna Holt FNP      PREOPERATIVE EVALUATION:  Today's date: 12/7/2020    Monika Franz is a 55 y.o. female who presents for a preoperative evaluation.    Surgical Information:  Surgery/Procedure: Bilateral Masectomy  Surgery Location: [facility]  Surgeon: Dr Douglas  Surgery Date: 12/15/2020  Time of Surgery:   Where patient plans to recover: At home with family  Fax number for surgical facility: Note does not need to be faxed, will be available electronically in Epic.    Type of Anesthesia Anticipated: General    Subjective     HPI related to upcoming procedure: Bilateral Mastectomy next Tuesday (12/15).  Patient would like to know if she can take her medications the day of her surgery before 7am (which is her scheduled surgical time).     H/o bariatric surgery. She continues vitamins in regard to malabsorptive issues and has been doing well in this regard.     She has REBECCA which is treated with a CPAP.     Doing well regarding mood. She sees a psychiatrist.     Preop Questions 12/7/2020   Have you ever had a heart attack or stroke? No   Have you ever had surgery on your heart or blood vessels, such as a stent placement, a coronary artery bypass, or surgery on an artery in your head, neck, heart, or legs? No   Do you have chest pain with activity? No   Do you have a history of  heart failure? No   Do you currently have a cold, bronchitis or symptoms of other infection? No   Do you have a cough, shortness of breath, or wheezing? No   Do you or anyone in your family have  "previous history of blood clots? No   Do you or does anyone in your family have a serious bleeding problem such as prolonged bleeding following surgeries or cuts? No   Have you ever had problems with anemia or been told to take iron pills? YES - patient reports that this was \"years ago\".  Patient indicates that she takes a MVI with iron.  Patient reports that this is no longer a current diagnosis.    Have you had any abnormal blood loss such as black, tarry or bloody stools, or abnormal vaginal bleeding? No   Have you ever had a blood transfusion? No   Are you willing to have a blood transfusion if it is medically needed before, during, or after your surgery? Yes   Have you or any of your relatives ever had problems with anesthesia? No   Do you have sleep apnea, excessive snoring or daytime drowsiness? YES - patient uses a CPAP for sleeping.  Patient also reports loosing 80 lbs. And doesn't think she needs the CPAP anymore but still uses it because she states \"I sleep better.\"   Do you have a CPAP machine? Yes   Do you have any artifical heart valves or other implanted medical devices like a pacemaker, defibrillator, or continuous glucose monitor? No   Do you have artificial joints? No   Are you allergic to latex? No   Is there any chance that you may be pregnant? No     Health Care Directive:  Patient does not have a Health Care Directive or Living Will: Discussed advance care planning with patient; information given to patient to review.      Review of Systems  CONSTITUTIONAL: NEGATIVE for fever, chills, change in weight  INTEGUMENTARY/SKIN: NEGATIVE for worrisome rashes, moles or lesions  EYES: NEGATIVE for vision changes or irritation  ENT/MOUTH: NEGATIVE for ear, mouth and throat problems  RESP: NEGATIVE for significant cough or SOB  BREAST: NEGATIVE for masses, tenderness or discharge  CV: NEGATIVE for chest pain, palpitations or peripheral edema  GI: NEGATIVE for nausea, abdominal pain, heartburn, or change " in bowel habits  : NEGATIVE for frequency, dysuria, or hematuria  MUSCULOSKELETAL: NEGATIVE for significant arthralgias or myalgia  NEURO: NEGATIVE for weakness, dizziness or paresthesias  ENDOCRINE: NEGATIVE for temperature intolerance, skin/hair changes  HEME: NEGATIVE for bleeding problems  PSYCHIATRIC: NEGATIVE for changes in mood or affect    Patient Active Problem List    Diagnosis Date Noted   ? Severe recurrent major depression without psychotic features (H) 2020   ? Obesity (BMI 35.0-39.9) with comorbidity (H) 2020   ? Encounter for antineoplastic immunotherapy 2020   ? Chemotherapy-induced neutropenia (H) 2020   ? Invasive ductal carcinoma of breast, left (H) 2020   ? Bariatric surgery status 2019   ? Prediabetes 2016   ? REBECCA (obstructive sleep apnea) 2012     Past Medical History:   Diagnosis Date   ? Anxiety    ? Breast cancer (H)    ? Depression    ? Sleep apnea     No longer use CPAP after Wt loss     Past Surgical History:   Procedure Laterality Date   ? BACK SURGERY     ?  SECTION, CLASSIC     ? CHOLECYSTECTOMY     ? HYSTERECTOMY     ? KNEE ARTHROSCOPY     ? UT INSJ TUNNELED CTR VAD W/SUBQ PORT AGE 5 YR/> Right 2020    Procedure: INSERTION VASCULAR ACCESS PORT UNDER ULTRASOUND AND FLUOROSCOPIC GUIDANCE RIGHT JUGULAR;  Surgeon: Allyson Douglas DO;  Location: [facility];  Service: General   ? STOMACH SURGERY      Gastric Sleeve   ? US BREAST CORE BIOPSY LEFT Left 2020     Current Outpatient Medications   Medication Sig Dispense Refill   ? AMOXICILLIN ORAL Take by mouth.     ? ARIPiprazole (ABILIFY) 5 MG tablet      ? calcium carbonate 1250 MG capsule Take 1,250 mg by mouth 3 (three) times a day.     ? cholecalciferol, vitamin D3, 1,000 unit (25 mcg) tablet Take 5,000 Units by mouth daily.     ? cyanocobalamin 1000 MCG tablet Take 1,000 mcg by mouth 3 (three) times a day.     ? desvenlafaxine succinate (PRISTIQ) 50 MG 24 hr  tablet Take 100 mg by mouth daily.      ? multivitamin (CHILDREN'S VITAMIN ORAL) Take 1 tablet by mouth 2 (two) times a day.     ? prochlorperazine (COMPAZINE) 10 MG tablet Take 1 tablet (10 mg total) by mouth every 6 (six) hours as needed (For breakthrough nausea/vomiting). 30 tablet 1   ? traZODone (DESYREL) 150 MG tablet Take 150 mg by mouth at bedtime.       No current facility-administered medications for this visit.        Allergies   Allergen Reactions   ? Nsaids (Non-Steroidal Anti-Inflammatory Drug) Nausea And Vomiting     After surgery       Social History     Tobacco Use   ? Smoking status: Never Smoker   ? Smokeless tobacco: Never Used   Substance Use Topics   ? Alcohol use: Never     Frequency: Never      Family History   Problem Relation Age of Onset   ? Lymphoma Mother 79        Non-Hodgkins   ? Cancer Mother    ? Prostate cancer Father 67   ? Cancer Father    ? Hodgkin's lymphoma Maternal Uncle 20   ? Brain cancer Paternal Uncle 82   ? Lung cancer Maternal Uncle 77   ? Breast cancer Maternal cousin 46   ? Leukemia Paternal cousin 45   ? No Medical Problems Sister    ? No Medical Problems Brother    ? No Medical Problems Son    ? No Medical Problems Son    ? No Medical Problems Daughter      Social History     Substance and Sexual Activity   Drug Use Never        Objective     /64   Pulse 60   Wt 218 lb (98.9 kg)   LMP 06/15/2012   BMI 35.19 kg/m    Physical Exam    GENERAL APPEARANCE: healthy, alert and no distress     NECK: no adenopathy, no asymmetry, masses, or scars and thyroid normal to palpation     RESP: lungs clear to auscultation - no rales, rhonchi or wheezes     CV: regular rates and rhythm, normal S1 S2, no S3 or S4 and no murmur, click or rub     MS: extremities normal- no gross deformities noted, no evidence of inflammation in joints, FROM in all extremities.     SKIN: no suspicious lesions or rashes     NEURO: Normal strength and tone, sensory exam grossly normal, mentation  intact and speech normal     PSYCH: mentation appears normal. and affect normal/bright     LYMPHATICS: No cervical adenopathy    Recent Labs   Lab Test 12/07/20  1155 11/18/20  0826 11/05/20  0857 11/05/20  0857   HGB 13.2 11.8*   < > 11.7*    196   < > 205     --   --  143   K 4.4  --   --  4.0   CREATININE 0.67  --   --  0.65    < > = values in this interval not displayed.        PRE-OP Diagnostics:   Recent Results (from the past 240 hour(s))   Electrocardiogram Perform and Read    Collection Time: 12/07/20 10:45 AM   Result Value Ref Range    SYSTOLIC BLOOD PRESSURE      DIASTOLIC BLOOD PRESSURE      VENTRICULAR RATE 74 BPM    ATRIAL RATE 74 BPM    P-R INTERVAL 132 ms    QRS DURATION 86 ms    Q-T INTERVAL 398 ms    QTC CALCULATION (BEZET) 441 ms    P Axis 61 degrees    R AXIS 50 degrees    T AXIS 17 degrees    MUSE DIAGNOSIS       Normal sinus rhythm  Normal ECG  No previous ECGs available  Confirmed by CROW NJ MD LOC:WW ([id number]) on 12/7/2020 4:24:17 PM     Lipid Profile    Collection Time: 12/07/20 11:55 AM   Result Value Ref Range    Triglycerides 95 <=149 mg/dL    Cholesterol 121 <=199 mg/dL    LDL Calculated 55 <=129 mg/dL    HDL Cholesterol 47 (L) >=50 mg/dL    Patient Fasting > 8hrs? Unknown    Basic Metabolic Panel    Collection Time: 12/07/20 11:55 AM   Result Value Ref Range    Sodium 142 136 - 145 mmol/L    Potassium 4.4 3.5 - 5.0 mmol/L    Chloride 105 98 - 107 mmol/L    CO2 26 22 - 31 mmol/L    Anion Gap, Calculation 11 5 - 18 mmol/L    Glucose 90 70 - 125 mg/dL    Calcium 9.1 8.5 - 10.5 mg/dL    BUN 15 8 - 22 mg/dL    Creatinine 0.67 0.60 - 1.10 mg/dL    GFR MDRD Af Amer >60 >60 mL/min/1.73m2    GFR MDRD Non Af Amer >60 >60 mL/min/1.73m2   HM2(CBC w/o Differential)    Collection Time: 12/07/20 11:55 AM   Result Value Ref Range    WBC 5.6 4.0 - 11.0 thou/uL    RBC 4.03 3.80 - 5.40 mill/uL    Hemoglobin 13.2 12.0 - 16.0 g/dL    Hematocrit 39.5 35.0 - 47.0 %    MCV 98 80 - 100 fL     MCH 32.9 27.0 - 34.0 pg    MCHC 33.6 32.0 - 36.0 g/dL    RDW 11.2 11.0 - 14.5 %    Platelets 243 140 - 440 thou/uL    MPV 8.5 7.0 - 10.0 fL         REVISED CARDIAC RISK INDEX (RCRI)   The patient has the following serious cardiovascular risks for perioperative complications:   - No serious cardiac risks = 0 points    RCRI INTERPRETATION: 0 points: Class I (very low risk - 0.4% complication rate)       Assessment & Plan      The proposed surgical procedure is considered INTERMEDIATE risk.    Pre-operative general physical examination  Invasive ductal carcinoma of breast, left (H)    Prediabetes  Recent glucose readings normal     Obesity (BMI 30-39.90)  Bariatric surgery status  Continue supplements until 7 days prior to procedure then discontinue until after surgery     REBECCA (obstructive sleep apnea)  Bring CPAP to hospital but planned to be outpatient surgery     Severe recurrent major depression without psychotic features (H)  Okay to take usual morning aripiprazole and desvenlafaxine the morning of surgery with a small sip of water          RECOMMENDATION:  APPROVAL GIVEN to proceed with proposed procedure, without further diagnostic evaluation.    Signed Electronically by: BRODIE Gibbons    Copy of this evaluation report is provided to requesting physician.

## 2021-07-03 NOTE — ADDENDUM NOTE
Addendum Note by Samina Minor CMA at 11/11/2020  1:00 PM     Author: Samina Minor CMA Service: -- Author Type: Medical Assistant    Filed: 11/11/2020  1:46 PM Date of Service: 11/11/2020  1:00 PM Status: Signed    : Samina Minor CMA (Medical Assistant)    Encounter addended by: Samina Minor CMA on: 11/11/2020  1:46 PM      Actions taken: Clinical Note Signed

## 2021-07-03 NOTE — ADDENDUM NOTE
Addendum Note by Walker Davis RN at 6/15/2020  2:48 PM     Author: Walker Davis RN Service: -- Author Type: Registered Nurse    Filed: 6/15/2020  3:10 PM Encounter Date: 6/15/2020 Status: Signed    : Walker Davis RN (Registered Nurse)    Addended by: WALKER DAVIS on: 6/15/2020 03:10 PM        Modules accepted: Orders

## 2021-07-03 NOTE — ADDENDUM NOTE
Addendum Note by Lombardi, Susan L, RN at 6/24/2020  3:10 PM     Author: Lombardi, Susan L, RN Service: -- Author Type: RN, Care Manager    Filed: 6/24/2020  4:52 PM Date of Service: 6/24/2020  3:10 PM Status: Signed    : Lombardi, Susan L, RN (RN, Care Manager)    Encounter addended by: Lombardi, Susan L, RN on: 6/24/2020  4:52 PM      Actions taken: Charge Capture section accepted, Clinical Note Signed

## 2021-07-03 NOTE — ADDENDUM NOTE
Addendum Note by Lombardi, Susan L, RN at 11/11/2020  1:00 PM     Author: Lombardi, Susan L, RN Service: -- Author Type: RN, Care Manager    Filed: 11/11/2020  4:18 PM Date of Service: 11/11/2020  1:00 PM Status: Signed    : Lombardi, Susan L, RN (RN, Care Manager)    Encounter addended by: Lombardi, Susan L, RN on: 11/11/2020  4:18 PM      Actions taken: Clinical Note Signed, Charge Capture section accepted

## 2021-07-03 NOTE — ADDENDUM NOTE
Addendum Note by Lombardi, Susan L, RN at 12/21/2020 11:20 AM     Author: Lombardi, Susan L, RN Service: -- Author Type: RN, Care Manager    Filed: 12/21/2020 12:32 PM Date of Service: 12/21/2020 11:20 AM Status: Signed    : Lombardi, Susan L, RN (RN, Care Manager)    Encounter addended by: Lombardi, Susan L, RN on: 12/21/2020 12:32 PM      Actions taken: Chief Complaint modified, Visit Navigator SmartForm   Flowsheet section accepted, Order Reconciliation Section accessed,   Clinical Note Signed, Charge Capture section accepted

## 2021-07-03 NOTE — ADDENDUM NOTE
Addendum Note by Samina Minor CMA at 11/11/2020  1:00 PM     Author: Samina Minor CMA Service: -- Author Type: Medical Assistant    Filed: 11/11/2020  3:03 PM Date of Service: 11/11/2020  1:00 PM Status: Signed    : Samina Minor CMA (Medical Assistant)    Encounter addended by: Samina Minor CMA on: 11/11/2020  3:03 PM      Actions taken: Order list changed, Diagnosis association updated

## 2021-07-06 VITALS
HEIGHT: 66 IN | HEART RATE: 80 BPM | RESPIRATION RATE: 16 BRPM | TEMPERATURE: 98.3 F | WEIGHT: 220 LBS | BODY MASS INDEX: 35.36 KG/M2

## 2021-07-09 NOTE — TELEPHONE ENCOUNTER
Monika telephones today to ask about medications appearing on her insurance correspondence.  We identified those as pegfilgrastim (Neulasta) and palonosetron (Aloxi) and discussed what those were used for.    She is happy to be done with neoadjuvant chemotherapy! Amy Palumbo RN     P[facility]ocin

## 2021-07-14 ENCOUNTER — LAB (OUTPATIENT)
Dept: INFUSION THERAPY | Facility: HOSPITAL | Age: 56
End: 2021-07-14
Payer: COMMERCIAL

## 2021-07-14 ENCOUNTER — ONCOLOGY VISIT (OUTPATIENT)
Dept: ONCOLOGY | Facility: HOSPITAL | Age: 56
End: 2021-07-14
Attending: INTERNAL MEDICINE
Payer: COMMERCIAL

## 2021-07-14 ENCOUNTER — PATIENT OUTREACH (OUTPATIENT)
Dept: CARE COORDINATION | Facility: CLINIC | Age: 56
End: 2021-07-14

## 2021-07-14 VITALS
TEMPERATURE: 99.1 F | HEIGHT: 66 IN | RESPIRATION RATE: 18 BRPM | HEART RATE: 76 BPM | OXYGEN SATURATION: 96 % | DIASTOLIC BLOOD PRESSURE: 71 MMHG | BODY MASS INDEX: 35.29 KG/M2 | SYSTOLIC BLOOD PRESSURE: 125 MMHG | WEIGHT: 219.6 LBS

## 2021-07-14 DIAGNOSIS — Z17.1 MALIGNANT NEOPLASM OF UPPER-INNER QUADRANT OF LEFT BREAST IN FEMALE, ESTROGEN RECEPTOR NEGATIVE (H): Primary | ICD-10-CM

## 2021-07-14 DIAGNOSIS — C50.912 INVASIVE DUCTAL CARCINOMA OF BREAST, LEFT (H): ICD-10-CM

## 2021-07-14 DIAGNOSIS — C50.212 MALIGNANT NEOPLASM OF UPPER-INNER QUADRANT OF LEFT BREAST IN FEMALE, ESTROGEN RECEPTOR NEGATIVE (H): Primary | ICD-10-CM

## 2021-07-14 LAB
ALBUMIN SERPL-MCNC: 4.4 G/DL (ref 3.5–5)
ALP SERPL-CCNC: 75 U/L (ref 45–120)
ALT SERPL W P-5'-P-CCNC: 32 U/L (ref 0–45)
ANION GAP SERPL CALCULATED.3IONS-SCNC: 10 MMOL/L (ref 5–18)
AST SERPL W P-5'-P-CCNC: 27 U/L (ref 0–40)
BASOPHILS # BLD AUTO: 0 10E3/UL (ref 0–0.2)
BASOPHILS NFR BLD AUTO: 0 %
BILIRUB SERPL-MCNC: 0.5 MG/DL (ref 0–1)
BUN SERPL-MCNC: 13 MG/DL (ref 8–22)
CALCIUM SERPL-MCNC: 9.7 MG/DL (ref 8.5–10.5)
CHLORIDE BLD-SCNC: 101 MMOL/L (ref 98–107)
CO2 SERPL-SCNC: 26 MMOL/L (ref 22–31)
CREAT SERPL-MCNC: 0.67 MG/DL (ref 0.6–1.1)
EOSINOPHIL # BLD AUTO: 0.1 10E3/UL (ref 0–0.7)
EOSINOPHIL NFR BLD AUTO: 1 %
ERYTHROCYTE [DISTWIDTH] IN BLOOD BY AUTOMATED COUNT: 12.8 % (ref 10–15)
GFR SERPL CREATININE-BSD FRML MDRD: >90 ML/MIN/1.73M2
GLUCOSE BLD-MCNC: 87 MG/DL (ref 70–125)
HCT VFR BLD AUTO: 41.9 % (ref 35–47)
HGB BLD-MCNC: 13.5 G/DL (ref 11.7–15.7)
IMM GRANULOCYTES # BLD: 0 10E3/UL
IMM GRANULOCYTES NFR BLD: 0 %
LYMPHOCYTES # BLD AUTO: 1.9 10E3/UL (ref 0.8–5.3)
LYMPHOCYTES NFR BLD AUTO: 22 %
MCH RBC QN AUTO: 30.1 PG (ref 26.5–33)
MCHC RBC AUTO-ENTMCNC: 32.2 G/DL (ref 31.5–36.5)
MCV RBC AUTO: 93 FL (ref 78–100)
MONOCYTES # BLD AUTO: 0.7 10E3/UL (ref 0–1.3)
MONOCYTES NFR BLD AUTO: 8 %
NEUTROPHILS # BLD AUTO: 6.1 10E3/UL (ref 1.6–8.3)
NEUTROPHILS NFR BLD AUTO: 69 %
NRBC # BLD AUTO: 0 10E3/UL
NRBC BLD AUTO-RTO: 0 /100
PLATELET # BLD AUTO: 181 10E3/UL (ref 150–450)
POTASSIUM BLD-SCNC: 4.1 MMOL/L (ref 3.5–5)
PROT SERPL-MCNC: 7.3 G/DL (ref 6–8)
RBC # BLD AUTO: 4.49 10E6/UL (ref 3.8–5.2)
SODIUM SERPL-SCNC: 137 MMOL/L (ref 136–145)
WBC # BLD AUTO: 8.8 10E3/UL (ref 4–11)

## 2021-07-14 PROCEDURE — 36415 COLL VENOUS BLD VENIPUNCTURE: CPT

## 2021-07-14 PROCEDURE — 85025 COMPLETE CBC W/AUTO DIFF WBC: CPT

## 2021-07-14 PROCEDURE — 80053 COMPREHEN METABOLIC PANEL: CPT

## 2021-07-14 PROCEDURE — G0463 HOSPITAL OUTPT CLINIC VISIT: HCPCS

## 2021-07-14 PROCEDURE — 99213 OFFICE O/P EST LOW 20 MIN: CPT | Performed by: NURSE PRACTITIONER

## 2021-07-14 ASSESSMENT — PAIN SCALES - GENERAL: PAINLEVEL: NO PAIN (0)

## 2021-07-14 ASSESSMENT — MIFFLIN-ST. JEOR: SCORE: 1599.91

## 2021-07-14 NOTE — LETTER
"    7/14/2021         RE: Monika Franz  [address] [id number]        Dear Colleague,    Thank you for referring your patient, Monika Franz, to the [facility] [place]. Please see a copy of my visit note below.    Oncology Rooming Note    July 14, 2021 1:53 PM   Monika Franz is a 55 year old female who presents for:    Chief Complaint   Patient presents with     Oncology Clinic Visit     Invasive ductal carcinoma of breast, left     Initial Vitals: /71 (BP Location: Right arm, Patient Position: Sitting, Cuff Size: Adult Regular)   Pulse 76   Temp 99.1  F (37.3  C) (Oral)   Resp 18   Ht 1.664 m (5' 5.5\")   Wt 99.6 kg (219 lb 9.6 oz)   SpO2 96%   BMI 35.99 kg/m   Estimated body mass index is 35.99 kg/m  as calculated from the following:    Height as of this encounter: 1.664 m (5' 5.5\").    Weight as of this encounter: 99.6 kg (219 lb 9.6 oz). Body surface area is 2.15 meters squared.  No Pain (0) Comment: Data Unavailable   No LMP recorded. Patient has had a hysterectomy.  Allergies reviewed: Yes  Medications reviewed: Yes    Medications: Medication refills not needed today.  Pharmacy name entered into [employer]: [id number] [place] [address]    Clinical concerns: Left breast tissue swelling and redness. Noticed this morning. Temp 99.1 no chills / fever.       Araceli Gramajo [facility] Hematology and Oncology Progress Note    Patient: Monika Franz  MRN: [id number]  Date of Service: 07/14/2021        Reason for Visit    Chief Complaint   Patient presents with     Oncology Clinic Visit     Invasive ductal carcinoma of breast, left       Assessment and Plan    Cancer Staging  Malignant neoplasm of upper-inner quadrant of left breast in female, estrogen receptor negative (H)  Staging form: Breast, AJCC 8th Edition  - Clinical stage from 6/22/2020: Stage IIB (cT2, cN0, cM0, G3, ER-, RI-, HER2-) - Signed by " Sue Dougherty, APRN CNP on 7/14/2021    1. Breast cancer, stage IIB, triple negative: completed neoadjuvant chemo in November 2020. Had surgery in December with complete response except for DCIS. Has been on observation since. Doing well. Will return in 3 months to see Dr. Monson.     2. Mild swelling above mastectomy incision: very mild. Encourage her to monitor closely and call if worsens.     ECOG Performance    0 - Independent    Distress Screening (within last 30 days)    1. How concerned are you about your ability to eat? : 0  2. How concerned are you about unintended weight loss or your current weight? : (!) 10 (on Optiva)  3. How concerned are you about feeling depressed or very sad? : 0  4. How concerned are you about feeling anxious or very scared? : 0  5. Do you struggle with the loss of meaning and rosemarie in your life? : Not at all  6. How concerned are you about work and home life issues that may be affected by your cancer? : 0  7. How concerned are you about knowing what resources are available to help you? : (!) 10  8. Do you currently have what you would describe as Holiness or spiritual struggles?            : Not at all       Pain  Pain Score: No Pain (0)    Problem List    Patient Active Problem List   Diagnosis     REBECCA (obstructive sleep apnea)     Bariatric surgery status     Eczema     Drug overdose, intentional (H)     Malignant neoplasm of upper-inner quadrant of left breast in female, estrogen receptor negative (H)     Narrow angle glaucoma suspect of both eyes     Severe recurrent major depression without psychotic features (H)     Adenomatous polyp of colon     Chemotherapy-induced neutropenia (H)     Obesity        ______________________________________________________________________________    History of Present Illness    Monika Franz is a very pleasant 55 y.o. old female with a history of triple negative left breast cancer.  The patient found this lump located in her left  breast measuring approximately 2 cm in size, at 10 o'clock position, presenting with some stretching-like symptoms in her breast in the month of June 2020.  She was then seen by her primary care physician and had a mammogram done which was very suspicious.  A biopsy confirmed the presence of invasive ductal carcinoma ER negative ID negative HER-2/joann 2+ on immunohistochemistry negative on FISH.  It has high-grade features.  The ultrasound measures this mass to be 1.6 cm in size.  It is located 11 cm from the nipple.  No nipple inversion or any other symptoms.     The patient was seen by Dr. Allyson Douglas.  She had a Port-A-Cath placed for neoadjuvant chemotherapy.     She has past medical history of gastric sleeve surgery about a year ago.  She is taking some vitamin B12 and other supplements.     She had a hysterectomy few years ago.     As for the breast cancer risk factors are concerned she is G4, P3.  First child at age 29.  Breast-feeding for all 3 of them.  No prior breast biopsies.  No significant family history.     She started on neoadjuvant chemotherapy on 9 July 2020 with dose dense adriamysin cyclophosphamide followed by paclitaxel.   After second cycle she noticed decrease in the size of the breast lesion.  In fact it was not palpable anymore.  She has finished 4 cycles of dose dense Adriamycin and cyclophosphamide and then finished 12 doses of weekly paclitaxel. Last treatment was on the 18th of November 2020.      She had surgery in the middle of December 2020.  She underwent bilateral mastectomy.  She did not have reconstruction but she had initially thought of doing.  Fortunately she had a complete remission noted on the invasive tumor.  There was some DCIS present however.  The 3 sentinel lymph nodes were negative for any invasive tumor.  There was no evidence of any scarring in them either.     Now she is on surveillance. Seems to be doing well. Did notice a little swelling around left mastectomy  "site this am. Also feels like it is a little red. No pain. No fevers/chills.     Review of Systems    ROS: As above in the history, otherwise the remainder of the 10point ROS is negative and not contributory to current reason for visit.       Past History    Past Medical History:   Diagnosis Date     Anxiety      Breast cancer (H)      Depression      Depressive disorder      Sleep apnea     Uses CPAP       PHYSICAL EXAM  /71 (BP Location: Right arm, Patient Position: Sitting, Cuff Size: Adult Regular)   Pulse 76   Temp 99.1  F (37.3  C) (Oral)   Resp 18   Ht 1.664 m (5' 5.5\")   Wt 99.6 kg (219 lb 9.6 oz)   SpO2 96%   BMI 35.99 kg/m      GENERAL: no acute distress. Cooperative in conversation. Here alone. Mask on  RESP: Regular respiratory rate. No expiratory wheezes   MUSCULOSKELETAL: no bilateral leg swelling  NEURO: non focal. Alert and oriented x3.   PSYCH: within normal limits. No depression or anxiety.  SKIN: exposed skin is dry intact.   BREAST: s/p bilateral mastectomies. No reconstruction. Mild swelling above incision on left side. Not really red/no warmth. No nodules, rashes.     Lab Results    Recent Results (from the past 168 hour(s))   Comprehensive metabolic panel   Result Value Ref Range    Sodium 137 136 - 145 mmol/L    Potassium 4.1 3.5 - 5.0 mmol/L    Chloride 101 98 - 107 mmol/L    Carbon Dioxide (CO2) 26 22 - 31 mmol/L    Anion Gap 10 5 - 18 mmol/L    Urea Nitrogen 13 8 - 22 mg/dL    Creatinine 0.67 0.60 - 1.10 mg/dL    Calcium 9.7 8.5 - 10.5 mg/dL    Glucose 87 70 - 125 mg/dL    Alkaline Phosphatase 75 45 - 120 U/L    AST 27 0 - 40 U/L    ALT 32 0 - 45 U/L    Protein Total 7.3 6.0 - 8.0 g/dL    Albumin 4.4 3.5 - 5.0 g/dL    Bilirubin Total 0.5 0.0 - 1.0 mg/dL    GFR Estimate >90 >60 mL/min/1.73m2   CBC with platelets and differential   Result Value Ref Range    WBC Count 8.8 4.0 - 11.0 10e3/uL    RBC Count 4.49 3.80 - 5.20 10e6/uL    Hemoglobin 13.5 11.7 - 15.7 g/dL    Hematocrit " 41.9 35.0 - 47.0 %    MCV 93 78 - 100 fL    MCH 30.1 26.5 - 33.0 pg    MCHC 32.2 31.5 - 36.5 g/dL    RDW 12.8 10.0 - 15.0 %    Platelet Count 181 150 - 450 10e3/uL    % Neutrophils 69 %    % Lymphocytes 22 %    % Monocytes 8 %    % Eosinophils 1 %    % Basophils 0 %    % Immature Granulocytes 0 %    NRBCs per 100 WBC 0 <1 /100    Absolute Neutrophils 6.1 1.6 - 8.3 10e3/uL    Absolute Lymphocytes 1.9 0.8 - 5.3 10e3/uL    Absolute Monocytes 0.7 0.0 - 1.3 10e3/uL    Absolute Eosinophils 0.1 0.0 - 0.7 10e3/uL    Absolute Basophils 0.0 0.0 - 0.2 10e3/uL    Absolute Immature Granulocytes 0.0 <=0.0 10e3/uL    Absolute NRBCs 0.0 10e3/uL       Imaging    No results found.      Signed by: FALGUNI Escobedo CNP      Again, thank you for allowing me to participate in the care of your patient.        Sincerely,        FALGUNI Escobedo CNP

## 2021-07-14 NOTE — PROGRESS NOTES
"Oncology Rooming Note    July 14, 2021 1:53 PM   Monika Franz is a 55 year old female who presents for:    Chief Complaint   Patient presents with     Oncology Clinic Visit     Invasive ductal carcinoma of breast, left     Initial Vitals: /71 (BP Location: Right arm, Patient Position: Sitting, Cuff Size: Adult Regular)   Pulse 76   Temp 99.1  F (37.3  C) (Oral)   Resp 18   Ht 1.664 m (5' 5.5\")   Wt 99.6 kg (219 lb 9.6 oz)   SpO2 96%   BMI 35.99 kg/m   Estimated body mass index is 35.99 kg/m  as calculated from the following:    Height as of this encounter: 1.664 m (5' 5.5\").    Weight as of this encounter: 99.6 kg (219 lb 9.6 oz). Body surface area is 2.15 meters squared.  No Pain (0) Comment: Data Unavailable   No LMP recorded. Patient has had a hysterectomy.  Allergies reviewed: Yes  Medications reviewed: Yes    Medications: Medication refills not needed today.  Pharmacy [facility] into [id number] [place] [address]    Clinical concerns: Left breast tissue swelling and redness. Noticed this morning. Temp 99.1 no chills / fever.       Araceli Gramajo CMA              "

## 2021-07-14 NOTE — PROGRESS NOTES
Oncology Distress Screening Follow-up  Clinical Social Work  [facility]    Identified Concern and Score From Distress Screenin. How concerned are you about unintended weight loss or your current weight?   10Abnormal   on Optiva     7. How concerned are you about knowing what resources are available to help you?   10Abnormal        Date of Distress Screenin21    Data: Monika is a 55 year old woman diagnosed with breast cancer. Monika had a provider visit today with MIGUEL Rogers and expressed interest in knowing what resources are available. Monika is now on surveillance and doing well, per chart review.     Intervention: SW attempted to reach out to Monika today via phone to discuss resources that may be available to her. SW received no answer and a message was left with SW contact info encouraging a call back.     Education Provided: Oncology SW contact info    Follow-up Required: SW will await a call back. SW will remain available for ongoing resource support needs.      DWIGHT Hager, LGSW  [facility]  Adult Oncology Clinic  Phone: [phone]

## 2021-07-14 NOTE — PROGRESS NOTES
[facility] Hematology and Oncology Progress Note    Patient: Monika Franz  MRN: [id number]  Date of Service: 07/14/2021        Reason for Visit    Chief Complaint   Patient presents with     Oncology Clinic Visit     Invasive ductal carcinoma of breast, left       Assessment and Plan    Cancer Staging  Malignant neoplasm of upper-inner quadrant of left breast in female, estrogen receptor negative (H)  Staging form: Breast, AJCC 8th Edition  - Clinical stage from 6/22/2020: Stage IIB (cT2, cN0, cM0, G3, ER-, NC-, HER2-) - Signed by Sue oDugherty APRN CNP on 7/14/2021    1. Breast cancer, stage IIB, triple negative: completed neoadjuvant chemo in November 2020. Had surgery in December with complete response except for DCIS. Has been on observation since. Doing well. Will return in 3 months to see Dr. Monson.     2. Mild swelling above mastectomy incision: very mild. Encourage her to monitor closely and call if worsens.     ECOG Performance    0 - Independent    Distress Screening (within last 30 days)    1. How concerned are you about your ability to eat? : 0  2. How concerned are you about unintended weight loss or your current weight? : (!) 10 (on Optiva)  3. How concerned are you about feeling depressed or very sad? : 0  4. How concerned are you about feeling anxious or very scared? : 0  5. Do you struggle with the loss of meaning and rosemarie in your life? : Not at all  6. How concerned are you about work and home life issues that may be affected by your cancer? : 0  7. How concerned are you about knowing what resources are available to help you? : (!) 10  8. Do you currently have what you would describe as Hoahaoism or spiritual struggles?            : Not at all       Pain  Pain Score: No Pain (0)    Problem List    Patient Active Problem List   Diagnosis     REBECCA (obstructive sleep apnea)     Bariatric surgery status     Eczema     Drug overdose, intentional (H)     Malignant neoplasm of upper-inner  quadrant of left breast in female, estrogen receptor negative (H)     Narrow angle glaucoma suspect of both eyes     Severe recurrent major depression without psychotic features (H)     Adenomatous polyp of colon     Chemotherapy-induced neutropenia (H)     Obesity        ______________________________________________________________________________    History of Present Illness    Monika Franz is a very pleasant 55 y.o. old female with a history of triple negative left breast cancer.  The patient found this lump located in her left breast measuring approximately 2 cm in size, at 10 o'clock position, presenting with some stretching-like symptoms in her breast in the month of June 2020.  She was then seen by her primary care physician and had a mammogram done which was very suspicious.  A biopsy confirmed the presence of invasive ductal carcinoma ER negative MA negative HER-2/joann 2+ on immunohistochemistry negative on FISH.  It has high-grade features.  The ultrasound measures this mass to be 1.6 cm in size.  It is located 11 cm from the nipple.  No nipple inversion or any other symptoms.     The patient was seen by Dr. Allyson Douglas.  She had a Port-A-Cath placed for neoadjuvant chemotherapy.     She has past medical history of gastric sleeve surgery about a year ago.  She is taking some vitamin B12 and other supplements.     She had a hysterectomy few years ago.     As for the breast cancer risk factors are concerned she is G4, P3.  First child at age 29.  Breast-feeding for all 3 of them.  No prior breast biopsies.  No significant family history.     She started on neoadjuvant chemotherapy on 9 July 2020 with dose dense adriamysin cyclophosphamide followed by paclitaxel.   After second cycle she noticed decrease in the size of the breast lesion.  In fact it was not palpable anymore.  She has finished 4 cycles of dose dense Adriamycin and cyclophosphamide and then finished 12 doses of weekly paclitaxel. Last  "treatment was on the 18th of November 2020.      She had surgery in the middle of December 2020.  She underwent bilateral mastectomy.  She did not have reconstruction but she had initially thought of doing.  Fortunately she had a complete remission noted on the invasive tumor.  There was some DCIS present however.  The 3 sentinel lymph nodes were negative for any invasive tumor.  There was no evidence of any scarring in them either.     Now she is on surveillance. Seems to be doing well. Did notice a little swelling around left mastectomy site this am. Also feels like it is a little red. No pain. No fevers/chills.     Review of Systems    ROS: As above in the history, otherwise the remainder of the 10point ROS is negative and not contributory to current reason for visit.       Past History    Past Medical History:   Diagnosis Date     Anxiety      Breast cancer (H)      Depression      Depressive disorder      Sleep apnea     Uses CPAP       PHYSICAL EXAM  /71 (BP Location: Right arm, Patient Position: Sitting, Cuff Size: Adult Regular)   Pulse 76   Temp 99.1  F (37.3  C) (Oral)   Resp 18   Ht 1.664 m (5' 5.5\")   Wt 99.6 kg (219 lb 9.6 oz)   SpO2 96%   BMI 35.99 kg/m      GENERAL: no acute distress. Cooperative in conversation. Here alone. Mask on  RESP: Regular respiratory rate. No expiratory wheezes   MUSCULOSKELETAL: no bilateral leg swelling  NEURO: non focal. Alert and oriented x3.   PSYCH: within normal limits. No depression or anxiety.  SKIN: exposed skin is dry intact.   BREAST: s/p bilateral mastectomies. No reconstruction. Mild swelling above incision on left side. Not really red/no warmth. No nodules, rashes.     Lab Results    Recent Results (from the past 168 hour(s))   Comprehensive metabolic panel   Result Value Ref Range    Sodium 137 136 - 145 mmol/L    Potassium 4.1 3.5 - 5.0 mmol/L    Chloride 101 98 - 107 mmol/L    Carbon Dioxide (CO2) 26 22 - 31 mmol/L    Anion Gap 10 5 - 18 mmol/L "    Urea Nitrogen 13 8 - 22 mg/dL    Creatinine 0.67 0.60 - 1.10 mg/dL    Calcium 9.7 8.5 - 10.5 mg/dL    Glucose 87 70 - 125 mg/dL    Alkaline Phosphatase 75 45 - 120 U/L    AST 27 0 - 40 U/L    ALT 32 0 - 45 U/L    Protein Total 7.3 6.0 - 8.0 g/dL    Albumin 4.4 3.5 - 5.0 g/dL    Bilirubin Total 0.5 0.0 - 1.0 mg/dL    GFR Estimate >90 >60 mL/min/1.73m2   CBC with platelets and differential   Result Value Ref Range    WBC Count 8.8 4.0 - 11.0 10e3/uL    RBC Count 4.49 3.80 - 5.20 10e6/uL    Hemoglobin 13.5 11.7 - 15.7 g/dL    Hematocrit 41.9 35.0 - 47.0 %    MCV 93 78 - 100 fL    MCH 30.1 26.5 - 33.0 pg    MCHC 32.2 31.5 - 36.5 g/dL    RDW 12.8 10.0 - 15.0 %    Platelet Count 181 150 - 450 10e3/uL    % Neutrophils 69 %    % Lymphocytes 22 %    % Monocytes 8 %    % Eosinophils 1 %    % Basophils 0 %    % Immature Granulocytes 0 %    NRBCs per 100 WBC 0 <1 /100    Absolute Neutrophils 6.1 1.6 - 8.3 10e3/uL    Absolute Lymphocytes 1.9 0.8 - 5.3 10e3/uL    Absolute Monocytes 0.7 0.0 - 1.3 10e3/uL    Absolute Eosinophils 0.1 0.0 - 0.7 10e3/uL    Absolute Basophils 0.0 0.0 - 0.2 10e3/uL    Absolute Immature Granulocytes 0.0 <=0.0 10e3/uL    Absolute NRBCs 0.0 10e3/uL       Imaging    No results found.      Signed by: FALGUNI Escobedo CNP

## 2021-07-20 ENCOUNTER — TELEPHONE (OUTPATIENT)
Dept: PODIATRY | Facility: CLINIC | Age: 56
End: 2021-07-20

## 2021-07-20 NOTE — TELEPHONE ENCOUNTER
Reason for call:  Symptom   Symptom or request: Patient is having pain in her foot after taking a couple slow walks. Patient is wondering if she should put the boot back on.     Duration (how long have symptoms been present): this week  Have you been treated for this before? Yes    Additional comments: N/a    Phone number to reach patient:  Cell number on file:    Telephone Information:   Mobile [phone]       Best Time:  Any time    Can we leave a detailed message on this number?  YES    Travel screening: Not Applicable

## 2021-07-26 NOTE — TELEPHONE ENCOUNTER
Called Monika and let her know that she should call clinic to schedule follow up with Dr. Talbert regarding her symptoms.

## 2021-08-02 ENCOUNTER — OFFICE VISIT (OUTPATIENT)
Dept: PODIATRY | Facility: CLINIC | Age: 56
End: 2021-08-02
Payer: COMMERCIAL

## 2021-08-02 VITALS — OXYGEN SATURATION: 98 % | BODY MASS INDEX: 34.07 KG/M2 | WEIGHT: 212 LBS | HEIGHT: 66 IN | HEART RATE: 86 BPM

## 2021-08-02 DIAGNOSIS — M20.41 HAMMER TOE OF RIGHT FOOT: Primary | ICD-10-CM

## 2021-08-02 PROCEDURE — 99212 OFFICE O/P EST SF 10 MIN: CPT | Performed by: PODIATRIST

## 2021-08-02 ASSESSMENT — MIFFLIN-ST. JEOR: SCORE: 1573.38

## 2021-08-02 ASSESSMENT — PAIN SCALES - GENERAL: PAINLEVEL: MILD PAIN (2)

## 2021-08-02 NOTE — LETTER
2021         RE: Monika Franz  [address] [facility] [address]        Dear Colleague,    Thank you for referring your patient, Monika Franz, to the [facility]. Please see a copy of my visit note below.        FOOT AND ANKLE SURGERY/PODIATRY PROGRESS NOTE        ASSESSMENT:   Mayela MCCLAIN      TREATMENT:  -No pain along digits 2-5 right foot today on exam or with 1st MPJ ROM. Discussed that swelling may continue until one year post-op, but this should continually improve during this course of time. Recommend tylenol prn for discomfort, reduced activities when this occurs.    -Overall, she seems to have healed well. Suture removed today.     -Patient's questions invited and answered. She was encouraged to call my office with any further questions or concerns.     Narinder Talbert DPM  [facility] Podiatry/Foot & Ankle Surgery  [phone]      HPI: Monika Franz was seen again today for concerns related to toe pain on her right foot. She underwent bunionectomy with arthroplasty on digits 2-5 in March of this year. She denies pain with casual walking but has noticed increased symptoms after exercising.     Past Medical History:   Diagnosis Date     Anxiety      Breast cancer (H)      Depression      Depressive disorder      Sleep apnea     Uses CPAP       Past Surgical History:   Procedure Laterality Date     ARTHROSCOPY KNEE       BACK SURGERY       BARIATRIC SURGERY  2019     BUNIONECTOMY Right 3/22/2021    Procedure: BUNIONECTOMY right foot. Arthroplasty digits two, three, four and five right foot.;  Surgeon: Narinder Talbert DPM;  Location: [facility];  Service: Podiatry      SECTION      x 3      SECTION       CHOLECYSTECTOMY       HYSTERECTOMY  2012     LAPAROSCOPIC ABLATION ENDOMETRIOSIS       LAPAROSCOPIC HYSTERECTOMY TOTAL       MASTECTOMY Bilateral 12/15/2020     NC INSJ TUNNELED CTR VAD W/SUBQ PORT AGE 5 YR/> Right  6/30/2020    Procedure: INSERTION VASCULAR ACCESS PORT UNDER ULTRASOUND AND FLUOROSCOPIC GUIDANCE RIGHT JUGULAR;  Surgeon: Allyson Douglas DO;  Location: [facility];  Service: General     NH MASTECTOMY, SIMPLE, COMPLETE N/A 12/15/2020    Procedure: BILATERAL MASTECTOMY WITH LEFT SENTINEL LYMPH NODE BIOPSY, PORT REMOVAL;  Surgeon: Allyson Douglas DO;  Location: [facility];  Service: General     NH RMVL DARRYL CTR VAD W/SUBQ PORT/ CTR/PRPH INSJ N/A 12/15/2020    Procedure: PORT REMOVAL;  Surgeon: Allyson Douglas DO;  Location: [facility];  Service: General     STOMACH SURGERY      Gastric Sleeve     US BREAST CORE BIOPSY LEFT Left 6/22/2020     US SENTINEL NODE INJECTION  12/15/2020       Allergies   Allergen Reactions     Nsaids Nausea and Vomiting     After surgery         Current Outpatient Medications:      ARIPiprazole (ABILIFY) 5 MG tablet, Take 5 mg by mouth daily , Disp: , Rfl:      Calcium Citrate 1040 MG TABS, 1200mg-1500mg daily (separate doses 2-3 times daily), Disp: , Rfl:      cholecalciferol 25 MCG (1000 UT) TABS, Take 5,000 Units by mouth, Disp: , Rfl:      desvenlafaxine (PRISTIQ) 50 MG 24 hr tablet, Take 100 mg by mouth, Disp: , Rfl:      Multiple Vitamins-Minerals (MULTIVITAL PO), Take  by mouth daily., Disp: , Rfl:      Pediatric Multiple Vitamins (MULTIVITAMIN CHILDRENS PO), Take 1 tablet by mouth, Disp: , Rfl:      traZODone (DESYREL) 100 MG tablet, Take 100 mg by mouth, Disp: , Rfl:      vitamin B-12 (CYANOCOBALAMIN) 1000 MCG tablet, Take 1,000 mcg by mouth, Disp: , Rfl:     Family History   Problem Relation Age of Onset     Sleep Apnea Sister      Lymphoma Mother 79.00        Non-Hodgkins     Cancer Mother      Prostate Cancer Father 67.00     Cancer Father      Hodgkin's lymphoma Maternal Uncle 20.00     Brain Cancer Paternal Uncle 82.00     Lung Cancer Maternal Uncle 77.00     Breast Cancer Maternal Cousin 46.00     Leukemia Paternal Cousin 45.00     No Known Problems  "Sister      No Known Problems Brother      No Known Problems Son      No Known Problems Son      No Known Problems Daughter        Social History     Socioeconomic History     Marital status:      Spouse name: Not on file     Number of children: Not on file     Years of education: Not on file     Highest education level: Not on file   Occupational History     Not on file   Tobacco Use     Smoking status: Never Smoker     Smokeless tobacco: Never Used   Substance and Sexual Activity     Alcohol use: Not Currently     Drug use: Never     Sexual activity: Never     Partners: Male     Birth control/protection: None   Other Topics Concern     Parent/sibling w/ CABG, MI or angioplasty before 65F 55M? Not Asked   Social History Narrative     Not on file     Social Determinants of Health     Financial Resource Strain:      Difficulty of Paying Living Expenses:    Food Insecurity:      Worried About Running Out of Food in the Last Year:      Ran Out of Food in the Last Year:    Transportation Needs:      Lack of Transportation (Medical):      Lack of Transportation (Non-Medical):    Physical Activity:      Days of Exercise per Week:      Minutes of Exercise per Session:    Stress:      Feeling of Stress :    Social Connections:      Frequency of Communication with Friends and Family:      Frequency of Social Gatherings with Friends and Family:      Attends Mandaeism Services:      Active Member of Clubs or Organizations:      Attends Club or Organization Meetings:      Marital Status:    Intimate Partner Violence:      Fear of Current or Ex-Partner:      Emotionally Abused:      Physically Abused:      Sexually Abused:        10 point Review of Systems is negative except for right foot pain which is noted in HPI.     Pulse 86   Ht 1.676 m (5' 6\")   Wt 96.2 kg (212 lb)   SpO2 98%   BMI 34.22 kg/m      BMI= Body mass index is 34.22 kg/m .    OBJECTIVE:  General appearance: Patient is alert and fully cooperative with " history & exam.  No sign of distress is noted during the visit.    Vascular: Dorsalis pedis and posterior tibial pulses are palpable. There is pedal hair growth right.  CFT < 3 sec from anterior tibial surface to distal digits right. There is no appreciable edema noted.  Dermatologic: Turgor and texture are within normal limits. No coloration or temperature changes. No primary or secondary lesions noted. Suture along medial 3rd digit right foot.   Neurologic: All epicritic and proprioceptive sensations are grossly intact right.  Musculoskeletal: No pain along 1st MPJ or digits 2-5 right.     Imaging:     No results found.         Again, thank you for allowing me to participate in the care of your patient.        Sincerely,        Narinder Talbert DPM

## 2021-08-02 NOTE — PROGRESS NOTES
FOOT AND ANKLE SURGERY/PODIATRY PROGRESS NOTE        ASSESSMENT:   Mayela MCCLAIN      TREATMENT:  -No pain along digits 2-5 right foot today on exam or with 1st MPJ ROM. Discussed that swelling may continue until one year post-op, but this should continually improve during this course of time. Recommend tylenol prn for discomfort, reduced activities when this occurs.    -Overall, she seems to have healed well. Suture removed today.     -Patient's questions invited and answered. She was encouraged to call my office with any further questions or concerns.     Narinder Talbert DPM  [facility] Podiatry/Foot & Ankle Surgery  [phone]      HPI: Monika Franz was seen again today for concerns related to toe pain on her right foot. She underwent bunionectomy with arthroplasty on digits 2-5 in March of this year. She denies pain with casual walking but has noticed increased symptoms after exercising.     Past Medical History:   Diagnosis Date     Anxiety      Breast cancer (H)      Depression      Depressive disorder      Sleep apnea     Uses CPAP       Past Surgical History:   Procedure Laterality Date     ARTHROSCOPY KNEE       BACK SURGERY       BARIATRIC SURGERY  2019     BUNIONECTOMY Right 3/22/2021    Procedure: BUNIONECTOMY right foot. Arthroplasty digits two, three, four and five right foot.;  Surgeon: Narinder Talbert DPM;  Location: [facility];  Service: Podiatry      SECTION      x 3      SECTION       CHOLECYSTECTOMY       HYSTERECTOMY  2012     LAPAROSCOPIC ABLATION ENDOMETRIOSIS       LAPAROSCOPIC HYSTERECTOMY TOTAL       MASTECTOMY Bilateral 12/15/2020     AR INSJ TUNNELED CTR VAD W/SUBQ PORT AGE 5 YR/> Right 2020    Procedure: INSERTION VASCULAR ACCESS PORT UNDER ULTRASOUND AND FLUOROSCOPIC GUIDANCE RIGHT JUGULAR;  Surgeon: Allyson Douglas DO;  Location: [facility];  Service: General     AR MASTECTOMY, SIMPLE, COMPLETE N/A 12/15/2020    Procedure:  BILATERAL MASTECTOMY WITH LEFT SENTINEL LYMPH NODE BIOPSY, PORT REMOVAL;  Surgeon: Allyson Douglas DO;  Location: [facility];  Service: General     SD RMVL DARRYL CTR VAD W/SUBQ PORT/ CTR/PRPH INSJ N/A 12/15/2020    Procedure: PORT REMOVAL;  Surgeon: Allyson Douglas DO;  Location: [facility];  Service: General     STOMACH SURGERY      Gastric Sleeve     US BREAST CORE BIOPSY LEFT Left 6/22/2020     US SENTINEL NODE INJECTION  12/15/2020       Allergies   Allergen Reactions     Nsaids Nausea and Vomiting     After surgery         Current Outpatient Medications:      ARIPiprazole (ABILIFY) 5 MG tablet, Take 5 mg by mouth daily , Disp: , Rfl:      Calcium Citrate 1040 MG TABS, 1200mg-1500mg daily (separate doses 2-3 times daily), Disp: , Rfl:      cholecalciferol 25 MCG (1000 UT) TABS, Take 5,000 Units by mouth, Disp: , Rfl:      desvenlafaxine (PRISTIQ) 50 MG 24 hr tablet, Take 100 mg by mouth, Disp: , Rfl:      Multiple Vitamins-Minerals (MULTIVITAL PO), Take  by mouth daily., Disp: , Rfl:      Pediatric Multiple Vitamins (MULTIVITAMIN CHILDRENS PO), Take 1 tablet by mouth, Disp: , Rfl:      traZODone (DESYREL) 100 MG tablet, Take 100 mg by mouth, Disp: , Rfl:      vitamin B-12 (CYANOCOBALAMIN) 1000 MCG tablet, Take 1,000 mcg by mouth, Disp: , Rfl:     Family History   Problem Relation Age of Onset     Sleep Apnea Sister      Lymphoma Mother 79.00        Non-Hodgkins     Cancer Mother      Prostate Cancer Father 67.00     Cancer Father      Hodgkin's lymphoma Maternal Uncle 20.00     Brain Cancer Paternal Uncle 82.00     Lung Cancer Maternal Uncle 77.00     Breast Cancer Maternal Cousin 46.00     Leukemia Paternal Cousin 45.00     No Known Problems Sister      No Known Problems Brother      No Known Problems Son      No Known Problems Son      No Known Problems Daughter        Social History     Socioeconomic History     Marital status:      Spouse name: Not on file     Number of children: Not on  "file     Years of education: Not on file     Highest education level: Not on file   Occupational History     Not on file   Tobacco Use     Smoking status: Never Smoker     Smokeless tobacco: Never Used   Substance and Sexual Activity     Alcohol use: Not Currently     Drug use: Never     Sexual activity: Never     Partners: Male     Birth control/protection: None   Other Topics Concern     Parent/sibling w/ CABG, MI or angioplasty before 65F 55M? Not Asked   Social History Narrative     Not on file     Social Determinants of Health     Financial Resource Strain:      Difficulty of Paying Living Expenses:    Food Insecurity:      Worried About Running Out of Food in the Last Year:      Ran Out of Food in the Last Year:    Transportation Needs:      Lack of Transportation (Medical):      Lack of Transportation (Non-Medical):    Physical Activity:      Days of Exercise per Week:      Minutes of Exercise per Session:    Stress:      Feeling of Stress :    Social Connections:      Frequency of Communication with Friends and Family:      Frequency of Social Gatherings with Friends and Family:      Attends Bahai Services:      Active Member of Clubs or Organizations:      Attends Club or Organization Meetings:      Marital Status:    Intimate Partner Violence:      Fear of Current or Ex-Partner:      Emotionally Abused:      Physically Abused:      Sexually Abused:        10 point Review of Systems is negative except for right foot pain which is noted in HPI.     Pulse 86   Ht 1.676 m (5' 6\")   Wt 96.2 kg (212 lb)   SpO2 98%   BMI 34.22 kg/m      BMI= Body mass index is 34.22 kg/m .    OBJECTIVE:  General appearance: Patient is alert and fully cooperative with history & exam.  No sign of distress is noted during the visit.    Vascular: Dorsalis pedis and posterior tibial pulses are palpable. There is pedal hair growth right.  CFT < 3 sec from anterior tibial surface to distal digits right. There is no appreciable " edema noted.  Dermatologic: Turgor and texture are within normal limits. No coloration or temperature changes. No primary or secondary lesions noted. Suture along medial 3rd digit right foot.   Neurologic: All epicritic and proprioceptive sensations are grossly intact right.  Musculoskeletal: No pain along 1st MPJ or digits 2-5 right.     Imaging:     No results found.

## 2021-09-05 ENCOUNTER — HEALTH MAINTENANCE LETTER (OUTPATIENT)
Age: 56
End: 2021-09-05

## 2021-10-04 ENCOUNTER — E-VISIT (OUTPATIENT)
Dept: URGENT CARE | Facility: CLINIC | Age: 56
End: 2021-10-04
Payer: COMMERCIAL

## 2021-10-04 DIAGNOSIS — R05.9 COUGH: Primary | ICD-10-CM

## 2021-10-04 PROCEDURE — 99207 PR NO BILLABLE SERVICE THIS VISIT: CPT | Performed by: FAMILY MEDICINE

## 2021-10-21 ENCOUNTER — ONCOLOGY VISIT (OUTPATIENT)
Dept: ONCOLOGY | Facility: HOSPITAL | Age: 56
End: 2021-10-21
Attending: INTERNAL MEDICINE
Payer: COMMERCIAL

## 2021-10-21 VITALS
RESPIRATION RATE: 16 BRPM | BODY MASS INDEX: 36.06 KG/M2 | HEART RATE: 73 BPM | SYSTOLIC BLOOD PRESSURE: 129 MMHG | DIASTOLIC BLOOD PRESSURE: 60 MMHG | WEIGHT: 223.4 LBS | TEMPERATURE: 98 F | OXYGEN SATURATION: 98 %

## 2021-10-21 DIAGNOSIS — C50.212 MALIGNANT NEOPLASM OF UPPER-INNER QUADRANT OF LEFT BREAST IN FEMALE, ESTROGEN RECEPTOR NEGATIVE (H): Primary | ICD-10-CM

## 2021-10-21 DIAGNOSIS — Z17.1 MALIGNANT NEOPLASM OF UPPER-INNER QUADRANT OF LEFT BREAST IN FEMALE, ESTROGEN RECEPTOR NEGATIVE (H): Primary | ICD-10-CM

## 2021-10-21 PROCEDURE — G0463 HOSPITAL OUTPT CLINIC VISIT: HCPCS

## 2021-10-21 PROCEDURE — 99213 OFFICE O/P EST LOW 20 MIN: CPT | Performed by: INTERNAL MEDICINE

## 2021-10-21 ASSESSMENT — PAIN SCALES - GENERAL: PAINLEVEL: NO PAIN (0)

## 2021-10-21 NOTE — PROGRESS NOTES
"Oncology Rooming Note    October 21, 2021 9:11 AM   Monika Franz is a 56 year old female who presents for:    Chief Complaint   Patient presents with     Oncology Clinic Visit     Initial Vitals: /60 (BP Location: Left arm, Cuff Size: Adult Regular)   Pulse 73   Temp 98  F (36.7  C) (Oral)   Resp 16   Wt 101.3 kg (223 lb 6.4 oz)   SpO2 98%   BMI 36.06 kg/m   Estimated body mass index is 36.06 kg/m  as calculated from the following:    Height as of 8/2/21: 1.676 m (5' 6\").    Weight as of this encounter: 101.3 kg (223 lb 6.4 oz). Body surface area is 2.17 meters squared.  No Pain (0) Comment: Data Unavailable   No LMP recorded. Patient has had a hysterectomy.  Allergies reviewed: Yes  Medications reviewed: Yes    Medications: Medication refills not needed today.  Pharmacy name entered into [facility] [employer]: [id number] [place] [address]    Clinical concerns: No concerns today.    Donna Bruner              "

## 2021-10-21 NOTE — LETTER
"    10/21/2021         RE: Monika Franz  [address] [facility] [address]        Dear Colleague,    Thank you for referring your patient, Monika Franz, to the [facility] [place]. Please see a copy of my visit note below.    Oncology Rooming Note    October 21, 2021 9:11 AM   Monika Franz is a 56 year old female who presents for:    Chief Complaint   Patient presents with     Oncology Clinic Visit     Initial Vitals: /60 (BP Location: Left arm, Cuff Size: Adult Regular)   Pulse 73   Temp 98  F (36.7  C) (Oral)   Resp 16   Wt 101.3 kg (223 lb 6.4 oz)   SpO2 98%   BMI 36.06 kg/m   Estimated body mass index is 36.06 kg/m  as calculated from the following:    Height as of 8/2/21: 1.676 m (5' 6\").    Weight as of this encounter: 101.3 kg (223 lb 6.4 oz). Body surface area is 2.17 meters squared.  No Pain (0) Comment: Data Unavailable   No LMP recorded. Patient has had a hysterectomy.  Allergies reviewed: Yes  Medications reviewed: Yes    Medications: Medication refills not needed today.  Pharmacy name entered into [facility]: [id number] [place] [address]    Clinical concerns: No concerns today.    Donna Bruner                    [facility] cancer Care Progress Note  Patient: Monika Franz   MRN:  [id number]   Date of Service : Oct 21, 2021           Reason for visit      1. Invasive ductal carcinoma of breast, left (H)        Assessment     1.  A very pleasant 56 year old  woman with left-sided breast cancer at least T2 N0 M0 ER WA negative HER-2/joann negative. Started on sreekanth adjuvant chemotherapy. Responding to treatment.  She has finished 4 cycles of dose dense Adriamycin Cytoxan allowed by 12 doses of of weekly paclitaxel.  Last chemotherapy in November 2020.  Had surgery in December 2020.  Complete pathological remission obtained in the invasive tumor.  There was some DCIS present however.  Lymph nodes negative.  4.  Minimal " elevated alkaline phosphatase in the past.  2.  Slightly overweight.  3.  Status post gastric sleeve in 2019.    Plan     1.  She will continue with surveillance.  2.  Follow-up with me in 4 months for chest wall checkup and history physical etc.  3.  Discussed with the patient about diet exercise and proper rest.  4.  Follow-up with her primary care physician.    Clinical stage      Cancer Staging  Invasive ductal carcinoma of breast, left (H)  Staging form: Breast, AJCC 8th Edition  - Clinical stage from 6/22/2020: Stage IIB (cT2, cN0, cM0, G3, ER-, MT-, HER2: Equivocal) - Signed by Sue Dougherty CNP on 7/9/2020      History     Monika Franz is a very pleasant 56 year old  female with a history of triple negative left breast cancer.  The patient found this lump located in her left breast measuring approximately 2 cm in size, at 10 o'clock position, presenting with some stretching-like symptoms in her breast in the month of June 2020.  She was then seen by her primary care physician and had a mammogram done which was very suspicious.  A biopsy confirmed the presence of invasive ductal carcinoma ER negative MT negative HER-2/joann 2+ on immunohistochemistry negative on FISH.  It has high-grade features.  The ultrasound measures this mass to be 1.6 cm in size.  It is located 11 cm from the nipple.  No nipple inversion or any other symptoms.    The patient was seen by Dr. Allyson Douglas.  She had a Port-A-Cath placed for neoadjuvant chemotherapy.     She has past medical history of gastric sleeve surgery about a year ago.  She is taking some vitamin B12 and other supplements.     She had a hysterectomy few years ago.     As for the breast cancer risk factors are concerned she is G4, P3.  First child at age 29.  Breast-feeding for all 3 of them.  No prior breast biopsies.  No significant family history.    She  started on neoadjuvant chemotherapy on 9 July 2020 with dose dense adriamysin cyclophosphamide  followed by paclitaxel.   After second cycle she noticed decrease in the size of the breast lesion.  In fact it was not palpable anymore.  She has finished 4 cycles of dose dense Adriamycin and cyclophosphamide and then finished 12 doses of weekly paclitaxel. Last treatment was on the 18th of November.     She had surgery in the middle of December 2020.  She underwent bilateral mastectomy.  She did not have reconstruction but she had initially thought of doing.  Fortunately she had a complete remission noted on the invasive tumor.  There was some DCIS present however.  The 3 sentinel lymph nodes were negative for any invasive tumor.  There was no evidence of any scarring in them either.    Now she is on surveillance. Seems to be doing well. Had bunion surgery on her right foot in March 2021 along with hammer toe repair.    Comes in today for scheduled follow-up. Overall doing fine.      Past Medical History     Past Medical History:   Diagnosis Date     Anxiety      Breast cancer (H)      Depression      Sleep apnea     Uses CPAP       Review of Systems   Constitutional  Constitutional (WDL): All constitutional elements are within defined limits  Neurosensory  Neurosensory (WDL): Exceptions to WDL  Ataxia: Concerns(using scooter for post surgery)  Eye   Eye Disorder (WDL): All eye disorder elements are within defined limits  Ear  Ear Disorder (WDL): All ear disorder elements are within defined limits  Cardiovascular  Cardiovascular (WDL): All cardiovascular elements are within defined limits  Pulmonary  Respiratory (WDL): Within Defined Limits  Gastrointestinal  Gastrointestinal (WDL): All gastrointestinal elements are within defined limits  Genitourinary  Genitourinary (WDL): All genitourinary elements are within defined limits  Lymphatic  Lymph (WDL): All lymph disorder elements are within defined limits  Musculoskeletal and Connective Tissue  Musculoskeletal and Connetive Tissue Disorders (WDL): Exceptions to  WDL(right hammer toe surgery)  Integumentary  Integumentary (WDL): All integumentary elements are within defined limits  Patient Coping  Patient Coping: Accepting;Open/discussion  Accompanied by  Accompanied by: Alone  Oral Chemo Adherence         ECOG performance status and Distress Assessment      ECOG Performance:    ECOG Performance Status: 1    Distress Assessment  Distress Assessment Score: No distress:     Pain Status  Currently in Pain: No/denies        Vital Signs     /60 (BP Location: Left arm, Cuff Size: Adult Regular)   Pulse 73   Temp 98  F (36.7  C) (Oral)   Resp 16   Wt 101.3 kg (223 lb 6.4 oz)   SpO2 98%   BMI 36.06 kg/m       Physical Exam     HEENT: Examination reveals pupils are equal.Oral mucosa moist and intact.   Neck is supple, no adenopathy,  no JVD.  Chest is symmetrical. Good air entry bilaterally, no rhonchi. No wheezing.   Cardiac examination: Normal S1, S2. No S3. Very soft systolic murmur.   Abdomen: Soft, nontender. No palpable hepatosplenomegaly.  Extremities: No edema, cynosis,or clubbing.   Lymph node examination: Bilateral axillary lymph nodes are negative.  Supraclavicular as well as cervical lymph nodes are also examined and were negative.    Lab Results     No results found for this or any previous visit (from the past 240 hour(s)).     Imaging Results     No results found.         Amando Monson MD        Again, thank you for allowing me to participate in the care of your patient.        Sincerely,        Amando Monson MD, MD

## 2021-10-21 NOTE — PROGRESS NOTES
[facility] cancer Care Progress Note  Patient: Monika Franz   MRN:  [id number]   Date of Service : Oct 21, 2021           Reason for visit      1. Invasive ductal carcinoma of breast, left (H)        Assessment     1.  A very pleasant 56 year old  woman with left-sided breast cancer at least T2 N0 M0 ER NV negative HER-2/joann negative. Started on sreekanth adjuvant chemotherapy. Responding to treatment.  She has finished 4 cycles of dose dense Adriamycin Cytoxan allowed by 12 doses of of weekly paclitaxel.  Last chemotherapy in November 2020.  Had surgery in December 2020.  Complete pathological remission obtained in the invasive tumor.  There was some DCIS present however.  Lymph nodes negative.  4.  Minimal elevated alkaline phosphatase in the past.  2.  Slightly overweight.  3.  Status post gastric sleeve in 2019.    Plan     1.  She will continue with surveillance.  2.  Follow-up with me in 4 months for chest wall checkup and history physical etc.  3.  Discussed with the patient about diet exercise and proper rest.  4.  Follow-up with her primary care physician.    Clinical stage      Cancer Staging  Invasive ductal carcinoma of breast, left (H)  Staging form: Breast, AJCC 8th Edition  - Clinical stage from 6/22/2020: Stage IIB (cT2, cN0, cM0, G3, ER-, NV-, HER2: Equivocal) - Signed by Sue Dougherty CNP on 7/9/2020      History     Monika Franz is a very pleasant 56 year old  female with a history of triple negative left breast cancer.  The patient found this lump located in her left breast measuring approximately 2 cm in size, at 10 o'clock position, presenting with some stretching-like symptoms in her breast in the month of June 2020.  She was then seen by her primary care physician and had a mammogram done which was very suspicious.  A biopsy confirmed the presence of invasive ductal carcinoma ER negative NV negative HER-2/joann 2+ on immunohistochemistry negative on FISH.  It has  high-grade features.  The ultrasound measures this mass to be 1.6 cm in size.  It is located 11 cm from the nipple.  No nipple inversion or any other symptoms.    The patient was seen by Dr. Allyson Douglas.  She had a Port-A-Cath placed for neoadjuvant chemotherapy.     She has past medical history of gastric sleeve surgery about a year ago.  She is taking some vitamin B12 and other supplements.     She had a hysterectomy few years ago.     As for the breast cancer risk factors are concerned she is G4, P3.  First child at age 29.  Breast-feeding for all 3 of them.  No prior breast biopsies.  No significant family history.    She  started on neoadjuvant chemotherapy on 9 July 2020 with dose dense adriamysin cyclophosphamide followed by paclitaxel.   After second cycle she noticed decrease in the size of the breast lesion.  In fact it was not palpable anymore.  She has finished 4 cycles of dose dense Adriamycin and cyclophosphamide and then finished 12 doses of weekly paclitaxel. Last treatment was on the 18th of November.     She had surgery in the middle of December 2020.  She underwent bilateral mastectomy.  She did not have reconstruction but she had initially thought of doing.  Fortunately she had a complete remission noted on the invasive tumor.  There was some DCIS present however.  The 3 sentinel lymph nodes were negative for any invasive tumor.  There was no evidence of any scarring in them either.    Now she is on surveillance. Seems to be doing well. Had bunion surgery on her right foot in March 2021 along with hammer toe repair.    Comes in today for scheduled follow-up. Overall doing fine.      Past Medical History     Past Medical History:   Diagnosis Date     Anxiety      Breast cancer (H)      Depression      Sleep apnea     Uses CPAP       Review of Systems   Constitutional  Constitutional (WDL): All constitutional elements are within defined limits  Neurosensory  Neurosensory (WDL): Exceptions to  WDL  Ataxia: Concerns(using scooter for post surgery)  Eye   Eye Disorder (WDL): All eye disorder elements are within defined limits  Ear  Ear Disorder (WDL): All ear disorder elements are within defined limits  Cardiovascular  Cardiovascular (WDL): All cardiovascular elements are within defined limits  Pulmonary  Respiratory (WDL): Within Defined Limits  Gastrointestinal  Gastrointestinal (WDL): All gastrointestinal elements are within defined limits  Genitourinary  Genitourinary (WDL): All genitourinary elements are within defined limits  Lymphatic  Lymph (WDL): All lymph disorder elements are within defined limits  Musculoskeletal and Connective Tissue  Musculoskeletal and Connetive Tissue Disorders (WDL): Exceptions to WDL(right hammer toe surgery)  Integumentary  Integumentary (WDL): All integumentary elements are within defined limits  Patient Coping  Patient Coping: Accepting;Open/discussion  Accompanied by  Accompanied by: Alone  Oral Chemo Adherence         ECOG performance status and Distress Assessment      ECOG Performance:    ECOG Performance Status: 1    Distress Assessment  Distress Assessment Score: No distress:     Pain Status  Currently in Pain: No/denies        Vital Signs     /60 (BP Location: Left arm, Cuff Size: Adult Regular)   Pulse 73   Temp 98  F (36.7  C) (Oral)   Resp 16   Wt 101.3 kg (223 lb 6.4 oz)   SpO2 98%   BMI 36.06 kg/m       Physical Exam     HEENT: Examination reveals pupils are equal.Oral mucosa moist and intact.   Neck is supple, no adenopathy,  no JVD.  Chest is symmetrical. Good air entry bilaterally, no rhonchi. No wheezing.   Cardiac examination: Normal S1, S2. No S3. Very soft systolic murmur.   Abdomen: Soft, nontender. No palpable hepatosplenomegaly.  Extremities: No edema, cynosis,or clubbing.   Lymph node examination: Bilateral axillary lymph nodes are negative.  Supraclavicular as well as cervical lymph nodes are also examined and were negative.    Lab  Results     No results found for this or any previous visit (from the past 240 hour(s)).     Imaging Results     No results found.         Amando Monson MD

## 2021-12-03 ENCOUNTER — TELEPHONE (OUTPATIENT)
Dept: SLEEP MEDICINE | Facility: CLINIC | Age: 56
End: 2021-12-03
Payer: COMMERCIAL

## 2021-12-06 ENCOUNTER — IMMUNIZATION (OUTPATIENT)
Dept: NURSING | Facility: CLINIC | Age: 56
End: 2021-12-06
Payer: COMMERCIAL

## 2021-12-06 ENCOUNTER — TELEPHONE (OUTPATIENT)
Dept: INTERNAL MEDICINE | Facility: CLINIC | Age: 56
End: 2021-12-06

## 2021-12-06 DIAGNOSIS — Z00.00 HEALTHCARE MAINTENANCE: Primary | ICD-10-CM

## 2021-12-06 DIAGNOSIS — Z00.00 HEALTHCARE MAINTENANCE: ICD-10-CM

## 2021-12-06 PROCEDURE — 90686 IIV4 VACC NO PRSV 0.5 ML IM: CPT

## 2021-12-06 PROCEDURE — 91300 COVID-19,PF,PFIZER (12+ YRS): CPT

## 2021-12-06 PROCEDURE — 0004A COVID-19,PF,PFIZER (12+ YRS): CPT

## 2021-12-06 PROCEDURE — 90471 IMMUNIZATION ADMIN: CPT

## 2021-12-06 NOTE — TELEPHONE ENCOUNTER
Pfizer booster    COVID-19,PF,Pfizer (12+ Yrs) 3/31/2021, 3/10/2021       Please sign order within this encounter, thanks!

## 2021-12-26 ASSESSMENT — PATIENT HEALTH QUESTIONNAIRE - PHQ9: SUM OF ALL RESPONSES TO PHQ QUESTIONS 1-9: 1

## 2022-01-25 ASSESSMENT — PATIENT HEALTH QUESTIONNAIRE - PHQ9: SUM OF ALL RESPONSES TO PHQ QUESTIONS 1-9: 1

## 2022-02-22 ENCOUNTER — TRANSFERRED RECORDS (OUTPATIENT)
Dept: HEALTH INFORMATION MANAGEMENT | Facility: CLINIC | Age: 57
End: 2022-02-22
Payer: COMMERCIAL

## 2022-02-24 ENCOUNTER — LAB (OUTPATIENT)
Dept: INFUSION THERAPY | Facility: HOSPITAL | Age: 57
End: 2022-02-24
Attending: INTERNAL MEDICINE
Payer: COMMERCIAL

## 2022-02-24 ENCOUNTER — ONCOLOGY VISIT (OUTPATIENT)
Dept: ONCOLOGY | Facility: HOSPITAL | Age: 57
End: 2022-02-24
Attending: INTERNAL MEDICINE
Payer: COMMERCIAL

## 2022-02-24 VITALS
OXYGEN SATURATION: 96 % | HEIGHT: 66 IN | WEIGHT: 231 LBS | BODY MASS INDEX: 37.12 KG/M2 | RESPIRATION RATE: 16 BRPM | DIASTOLIC BLOOD PRESSURE: 55 MMHG | SYSTOLIC BLOOD PRESSURE: 119 MMHG | HEART RATE: 75 BPM

## 2022-02-24 DIAGNOSIS — Z17.1 MALIGNANT NEOPLASM OF UPPER-INNER QUADRANT OF LEFT BREAST IN FEMALE, ESTROGEN RECEPTOR NEGATIVE (H): ICD-10-CM

## 2022-02-24 DIAGNOSIS — C50.212 MALIGNANT NEOPLASM OF UPPER-INNER QUADRANT OF LEFT BREAST IN FEMALE, ESTROGEN RECEPTOR NEGATIVE (H): ICD-10-CM

## 2022-02-24 DIAGNOSIS — C50.212 MALIGNANT NEOPLASM OF UPPER-INNER QUADRANT OF LEFT BREAST IN FEMALE, ESTROGEN RECEPTOR NEGATIVE (H): Primary | ICD-10-CM

## 2022-02-24 DIAGNOSIS — Z17.1 MALIGNANT NEOPLASM OF UPPER-INNER QUADRANT OF LEFT BREAST IN FEMALE, ESTROGEN RECEPTOR NEGATIVE (H): Primary | ICD-10-CM

## 2022-02-24 LAB
ALBUMIN SERPL-MCNC: 4.2 G/DL (ref 3.5–5)
ALP SERPL-CCNC: 80 U/L (ref 45–120)
ALT SERPL W P-5'-P-CCNC: 36 U/L (ref 0–45)
ANION GAP SERPL CALCULATED.3IONS-SCNC: 8 MMOL/L (ref 5–18)
AST SERPL W P-5'-P-CCNC: 28 U/L (ref 0–40)
BILIRUB SERPL-MCNC: 0.3 MG/DL (ref 0–1)
BUN SERPL-MCNC: 14 MG/DL (ref 8–22)
CALCIUM SERPL-MCNC: 9.6 MG/DL (ref 8.5–10.5)
CHLORIDE BLD-SCNC: 103 MMOL/L (ref 98–107)
CO2 SERPL-SCNC: 29 MMOL/L (ref 22–31)
CREAT SERPL-MCNC: 0.72 MG/DL (ref 0.6–1.1)
ERYTHROCYTE [DISTWIDTH] IN BLOOD BY AUTOMATED COUNT: 12.9 % (ref 10–15)
GFR SERPL CREATININE-BSD FRML MDRD: >90 ML/MIN/1.73M2
GLUCOSE BLD-MCNC: 66 MG/DL (ref 70–125)
HCT VFR BLD AUTO: 45.3 % (ref 35–47)
HGB BLD-MCNC: 14.3 G/DL (ref 11.7–15.7)
MCH RBC QN AUTO: 30.4 PG (ref 26.5–33)
MCHC RBC AUTO-ENTMCNC: 31.6 G/DL (ref 31.5–36.5)
MCV RBC AUTO: 96 FL (ref 78–100)
PLATELET # BLD AUTO: 217 10E3/UL (ref 150–450)
POTASSIUM BLD-SCNC: 4.3 MMOL/L (ref 3.5–5)
PROT SERPL-MCNC: 7.2 G/DL (ref 6–8)
RBC # BLD AUTO: 4.71 10E6/UL (ref 3.8–5.2)
SODIUM SERPL-SCNC: 140 MMOL/L (ref 136–145)
WBC # BLD AUTO: 11.9 10E3/UL (ref 4–11)

## 2022-02-24 PROCEDURE — 85027 COMPLETE CBC AUTOMATED: CPT

## 2022-02-24 PROCEDURE — 36591 DRAW BLOOD OFF VENOUS DEVICE: CPT

## 2022-02-24 PROCEDURE — 99213 OFFICE O/P EST LOW 20 MIN: CPT | Performed by: NURSE PRACTITIONER

## 2022-02-24 PROCEDURE — G0463 HOSPITAL OUTPT CLINIC VISIT: HCPCS

## 2022-02-24 PROCEDURE — 80053 COMPREHEN METABOLIC PANEL: CPT

## 2022-02-24 PROCEDURE — 82040 ASSAY OF SERUM ALBUMIN: CPT

## 2022-02-24 ASSESSMENT — PAIN SCALES - GENERAL: PAINLEVEL: NO PAIN (1)

## 2022-02-24 NOTE — PROGRESS NOTES
"Oncology Rooming Note    February 24, 2022 9:44 AM   Monika Franz is a 56 year old female who presents for:    Chief Complaint   Patient presents with     Oncology Clinic Visit     Malignant neoplasm of upper-inner quadrant of left breast in female     Initial Vitals: Resp 16   Ht 1.676 m (5' 6\")   Wt 104.8 kg (231 lb)   BMI 37.28 kg/m   Estimated body mass index is 37.28 kg/m  as calculated from the following:    Height as of this encounter: 1.676 m (5' 6\").    Weight as of this encounter: 104.8 kg (231 lb). Body surface area is 2.21 meters squared.  No Pain (1) Comment: Data Unavailable   No LMP recorded. Patient has had a hysterectomy.  Allergies reviewed: Yes  Medications reviewed: Yes    Medications: Medication refills not needed today.  Pharmacy name entered into [facility]: [id number] [place] [address]    Clinical concerns: 4 month follow up      Mary Beth Santos            "

## 2022-02-24 NOTE — PROGRESS NOTES
[facility] Hematology and Oncology Progress Note    Patient: Monika Franz  MRN: [id number]  Date of Service: Feb 24, 2022          Reason for Visit    Chief Complaint   Patient presents with     Oncology Clinic Visit     Malignant neoplasm of upper-inner quadrant of left breast in female       Assessment and Plan    Cancer Staging  Malignant neoplasm of upper-inner quadrant of left breast in female, estrogen receptor negative (H)  Staging form: Breast, AJCC 8th Edition  - Clinical stage from 6/22/2020: Stage IIB (cT2, cN0, cM0, G3, ER-, MS-, HER2-) - Signed by Sue Dougherty APRN CNP on 7/14/2021    1. Breast cancer, stage IIB, triple negative: completed neoadjuvant chemo in November 2020. Had surgery in December with complete response except for DCIS. Has been on observation since. Doing well. Will return in 4 months to see Dr. Monson.      2.  Hypoglycemic: I gave her apple juice.    ECOG Performance    0 - Independent    Distress Screening (within last 30 days)    No data recorded     Pain  Pain Score: No Pain (1)  Pain Loc: Knee    Problem List    Patient Active Problem List   Diagnosis     REBECCA (obstructive sleep apnea)     Bariatric surgery status     Eczema     Drug overdose, intentional (H)     Malignant neoplasm of upper-inner quadrant of left breast in female, estrogen receptor negative (H)     Narrow angle glaucoma suspect of both eyes     Severe recurrent major depression without psychotic features (H)     Adenomatous polyp of colon     Chemotherapy-induced neutropenia (H)     Obesity     Hammer toe of right foot        ______________________________________________________________________________    History of Present Illness    Monika Franz is a very pleasant 56 year old female with a history of triple negative left breast cancer.  The patient found this lump located in her left breast measuring approximately 2 cm in size, at 10 o'clock position, presenting with some stretching-like  symptoms in her breast in the month of June 2020.  She was then seen by her primary care physician and had a mammogram done which was very suspicious.  A biopsy confirmed the presence of invasive ductal carcinoma ER negative VA negative HER-2/joann 2+ on immunohistochemistry negative on FISH.  It has high-grade features.  The ultrasound measures this mass to be 1.6 cm in size.  It is located 11 cm from the nipple.  No nipple inversion or any other symptoms.     The patient was seen by Dr. Allyson Douglas.  She had a Port-A-Cath placed for neoadjuvant chemotherapy.     She has past medical history of gastric sleeve surgery about a year ago.  She is taking some vitamin B12 and other supplements.     She had a hysterectomy few years ago.     As for the breast cancer risk factors are concerned she is G4, P3.  First child at age 29.  Breast-feeding for all 3 of them.  No prior breast biopsies.  No significant family history.     She started on neoadjuvant chemotherapy on 9 July 2020 with dose dense adriamysin cyclophosphamide followed by paclitaxel.   After second cycle she noticed decrease in the size of the breast lesion.  In fact it was not palpable anymore.  She has finished 4 cycles of dose dense Adriamycin and cyclophosphamide and then finished 12 doses of weekly paclitaxel. Last treatment was on the 18th of November 2020.      She had surgery in the middle of December 2020.  She underwent bilateral mastectomy.  She did not have reconstruction but she had initially thought of doing.  Fortunately she had a complete remission noted on the invasive tumor.  There was some DCIS present however.  The 3 sentinel lymph nodes were negative for any invasive tumor.  There was no evidence of any scarring in them either.     Now she is on surveillance. Seems to be doing well. no infectious complaints.  No coughing.  No fevers or chills.  Denies any other new symptoms.  Denies any bone or back pain.  Denies any unexplained weight  "loss.    Review of Systems    Pertinent items are noted in HPI.    Past History    Past Medical History:   Diagnosis Date     Anxiety      Breast cancer (H)      Depression      Depressive disorder      Sleep apnea     Uses CPAP       PHYSICAL EXAM  /55 (BP Location: Left arm, Patient Position: Sitting, Cuff Size: Adult Large)   Pulse 75   Resp 16   Ht 1.676 m (5' 6\")   Wt 104.8 kg (231 lb)   SpO2 96%   BMI 37.28 kg/m      GENERAL: no acute distress. Cooperative in conversation. Here alone. Mask on  RESP: Regular respiratory rate. No expiratory wheezes   MUSCULOSKELETAL: no bilateral leg swelling  NEURO: non focal. Alert and oriented x3.   PSYCH: within normal limits. No depression or anxiety.  SKIN: exposed skin is dry intact.   CHEST WALL: s/p bilateral mastectomies. No abnormal rashes, lesions. No evidence of local recurrence.     Lab Results    Recent Results (from the past 168 hour(s))   Comprehensive metabolic panel   Result Value Ref Range    Sodium 140 136 - 145 mmol/L    Potassium 4.3 3.5 - 5.0 mmol/L    Chloride 103 98 - 107 mmol/L    Carbon Dioxide (CO2) 29 22 - 31 mmol/L    Anion Gap 8 5 - 18 mmol/L    Urea Nitrogen 14 8 - 22 mg/dL    Creatinine 0.72 0.60 - 1.10 mg/dL    Calcium 9.6 8.5 - 10.5 mg/dL    Glucose 66 (L) 70 - 125 mg/dL    Alkaline Phosphatase 80 45 - 120 U/L    AST 28 0 - 40 U/L    ALT 36 0 - 45 U/L    Protein Total 7.2 6.0 - 8.0 g/dL    Albumin 4.2 3.5 - 5.0 g/dL    Bilirubin Total 0.3 0.0 - 1.0 mg/dL    GFR Estimate >90 >60 mL/min/1.73m2   CBC with platelets   Result Value Ref Range    WBC Count 11.9 (H) 4.0 - 11.0 10e3/uL    RBC Count 4.71 3.80 - 5.20 10e6/uL    Hemoglobin 14.3 11.7 - 15.7 g/dL    Hematocrit 45.3 35.0 - 47.0 %    MCV 96 78 - 100 fL    MCH 30.4 26.5 - 33.0 pg    MCHC 31.6 31.5 - 36.5 g/dL    RDW 12.9 10.0 - 15.0 %    Platelet Count 217 150 - 450 10e3/uL       Imaging    No results found.      Signed by: FALGUNI Escobedo CNP  "

## 2022-02-24 NOTE — LETTER
"    2/24/2022         RE: Monika Franz  [address] [id number]        Dear Colleague,    Thank you for referring your patient, Monika Franz, to the [facility]. Please see a copy of my visit note below.    Oncology Rooming Note    February 24, 2022 9:44 AM   Monika Franz is a 56 year old female who presents for:    Chief Complaint   Patient presents with     Oncology Clinic Visit     Malignant neoplasm of upper-inner quadrant of left breast in female     Initial Vitals: Resp 16   Ht 1.676 m (5' 6\")   Wt 104.8 kg (231 lb)   BMI 37.28 kg/m   Estimated body mass index is 37.28 kg/m  as calculated from the following:    Height as of this encounter: 1.676 m (5' 6\").    Weight as of this encounter: 104.8 kg (231 lb). Body surface area is 2.21 meters squared.  No Pain (1) Comment: Data Unavailable   No LMP recorded. Patient has had a hysterectomy.  Allergies reviewed: Yes  Medications reviewed: Yes    Medications: Medication refills not needed today.  Pharmacy name entered into [facility]: [id number] [place] [address]    Clinical concerns: 4 month follow up      Mary Beth Santos              [facility] Hematology and Oncology Progress Note    Patient: Monika Franz  MRN: [id number]  Date of Service: Feb 24, 2022          Reason for Visit    Chief Complaint   Patient presents with     Oncology Clinic Visit     Malignant neoplasm of upper-inner quadrant of left breast in female       Assessment and Plan    Cancer Staging  Malignant neoplasm of upper-inner quadrant of left breast in female, estrogen receptor negative (H)  Staging form: Breast, AJCC 8th Edition  - Clinical stage from 6/22/2020: Stage IIB (cT2, cN0, cM0, G3, ER-, ME-, HER2-) - Signed by Sue Dougherty APRN CNP on 7/14/2021    1. Breast cancer, stage IIB, triple negative: completed neoadjuvant chemo in November 2020. Had surgery in December with complete response " except for DCIS. Has been on observation since. Doing well. Will return in 4 months to see Dr. Monson.      2.  Hypoglycemic: I gave her apple juice.    ECOG Performance    0 - Independent    Distress Screening (within last 30 days)    No data recorded     Pain  Pain Score: No Pain (1)  Pain Loc: Knee    Problem List    Patient Active Problem List   Diagnosis     REBECCA (obstructive sleep apnea)     Bariatric surgery status     Eczema     Drug overdose, intentional (H)     Malignant neoplasm of upper-inner quadrant of left breast in female, estrogen receptor negative (H)     Narrow angle glaucoma suspect of both eyes     Severe recurrent major depression without psychotic features (H)     Adenomatous polyp of colon     Chemotherapy-induced neutropenia (H)     Obesity     Hammer toe of right foot        ______________________________________________________________________________    History of Present Illness    Monika Franz is a very pleasant 56 year old female with a history of triple negative left breast cancer.  The patient found this lump located in her left breast measuring approximately 2 cm in size, at 10 o'clock position, presenting with some stretching-like symptoms in her breast in the month of June 2020.  She was then seen by her primary care physician and had a mammogram done which was very suspicious.  A biopsy confirmed the presence of invasive ductal carcinoma ER negative ME negative HER-2/joann 2+ on immunohistochemistry negative on FISH.  It has high-grade features.  The ultrasound measures this mass to be 1.6 cm in size.  It is located 11 cm from the nipple.  No nipple inversion or any other symptoms.     The patient was seen by Dr. Allyson Douglas.  She had a Port-A-Cath placed for neoadjuvant chemotherapy.     She has past medical history of gastric sleeve surgery about a year ago.  She is taking some vitamin B12 and other supplements.     She had a hysterectomy few years ago.     As for the breast  "cancer risk factors are concerned she is G4, P3.  First child at age 29.  Breast-feeding for all 3 of them.  No prior breast biopsies.  No significant family history.     She started on neoadjuvant chemotherapy on 9 July 2020 with dose dense adriamysin cyclophosphamide followed by paclitaxel.   After second cycle she noticed decrease in the size of the breast lesion.  In fact it was not palpable anymore.  She has finished 4 cycles of dose dense Adriamycin and cyclophosphamide and then finished 12 doses of weekly paclitaxel. Last treatment was on the 18th of November 2020.      She had surgery in the middle of December 2020.  She underwent bilateral mastectomy.  She did not have reconstruction but she had initially thought of doing.  Fortunately she had a complete remission noted on the invasive tumor.  There was some DCIS present however.  The 3 sentinel lymph nodes were negative for any invasive tumor.  There was no evidence of any scarring in them either.     Now she is on surveillance. Seems to be doing well. no infectious complaints.  No coughing.  No fevers or chills.  Denies any other new symptoms.  Denies any bone or back pain.  Denies any unexplained weight loss.    Review of Systems    Pertinent items are noted in HPI.    Past History    Past Medical History:   Diagnosis Date     Anxiety      Breast cancer (H)      Depression      Depressive disorder      Sleep apnea     Uses CPAP       PHYSICAL EXAM  /55 (BP Location: Left arm, Patient Position: Sitting, Cuff Size: Adult Large)   Pulse 75   Resp 16   Ht 1.676 m (5' 6\")   Wt 104.8 kg (231 lb)   SpO2 96%   BMI 37.28 kg/m      GENERAL: no acute distress. Cooperative in conversation. Here alone. Mask on  RESP: Regular respiratory rate. No expiratory wheezes   MUSCULOSKELETAL: no bilateral leg swelling  NEURO: non focal. Alert and oriented x3.   PSYCH: within normal limits. No depression or anxiety.  SKIN: exposed skin is dry intact.   CHEST WALL: " s/p bilateral mastectomies. No abnormal rashes, lesions. No evidence of local recurrence.     Lab Results    Recent Results (from the past 168 hour(s))   Comprehensive metabolic panel   Result Value Ref Range    Sodium 140 136 - 145 mmol/L    Potassium 4.3 3.5 - 5.0 mmol/L    Chloride 103 98 - 107 mmol/L    Carbon Dioxide (CO2) 29 22 - 31 mmol/L    Anion Gap 8 5 - 18 mmol/L    Urea Nitrogen 14 8 - 22 mg/dL    Creatinine 0.72 0.60 - 1.10 mg/dL    Calcium 9.6 8.5 - 10.5 mg/dL    Glucose 66 (L) 70 - 125 mg/dL    Alkaline Phosphatase 80 45 - 120 U/L    AST 28 0 - 40 U/L    ALT 36 0 - 45 U/L    Protein Total 7.2 6.0 - 8.0 g/dL    Albumin 4.2 3.5 - 5.0 g/dL    Bilirubin Total 0.3 0.0 - 1.0 mg/dL    GFR Estimate >90 >60 mL/min/1.73m2   CBC with platelets   Result Value Ref Range    WBC Count 11.9 (H) 4.0 - 11.0 10e3/uL    RBC Count 4.71 3.80 - 5.20 10e6/uL    Hemoglobin 14.3 11.7 - 15.7 g/dL    Hematocrit 45.3 35.0 - 47.0 %    MCV 96 78 - 100 fL    MCH 30.4 26.5 - 33.0 pg    MCHC 31.6 31.5 - 36.5 g/dL    RDW 12.9 10.0 - 15.0 %    Platelet Count 217 150 - 450 10e3/uL       Imaging    No results found.      Signed by: FALGUNI Escobedo CNP      Again, thank you for allowing me to participate in the care of your patient.        Sincerely,        FALGUNI Escobedo CNP

## 2022-03-12 ASSESSMENT — PATIENT HEALTH QUESTIONNAIRE - PHQ9
SUM OF ALL RESPONSES TO PHQ QUESTIONS 1-9: 1
10. IF YOU CHECKED OFF ANY PROBLEMS, HOW DIFFICULT HAVE THESE PROBLEMS MADE IT FOR YOU TO DO YOUR WORK, TAKE CARE OF THINGS AT HOME, OR GET ALONG WITH OTHER PEOPLE: NOT DIFFICULT AT ALL
SUM OF ALL RESPONSES TO PHQ QUESTIONS 1-9: 1

## 2022-03-15 ENCOUNTER — MYC MEDICAL ADVICE (OUTPATIENT)
Dept: INTERNAL MEDICINE | Facility: CLINIC | Age: 57
End: 2022-03-15

## 2022-03-15 ENCOUNTER — OFFICE VISIT (OUTPATIENT)
Dept: INTERNAL MEDICINE | Facility: CLINIC | Age: 57
End: 2022-03-15
Payer: COMMERCIAL

## 2022-03-15 VITALS
HEART RATE: 70 BPM | BODY MASS INDEX: 37.12 KG/M2 | WEIGHT: 231 LBS | HEIGHT: 66 IN | SYSTOLIC BLOOD PRESSURE: 92 MMHG | OXYGEN SATURATION: 96 % | DIASTOLIC BLOOD PRESSURE: 58 MMHG

## 2022-03-15 DIAGNOSIS — R73.03 PREDIABETES: ICD-10-CM

## 2022-03-15 DIAGNOSIS — E66.01 MORBID OBESITY (H): ICD-10-CM

## 2022-03-15 DIAGNOSIS — Z98.84 BARIATRIC SURGERY STATUS: ICD-10-CM

## 2022-03-15 DIAGNOSIS — Z00.00 ANNUAL PHYSICAL EXAM: Primary | ICD-10-CM

## 2022-03-15 DIAGNOSIS — F33.2 SEVERE RECURRENT MAJOR DEPRESSION WITHOUT PSYCHOTIC FEATURES (H): ICD-10-CM

## 2022-03-15 DIAGNOSIS — L30.9 ECZEMA, UNSPECIFIED TYPE: ICD-10-CM

## 2022-03-15 LAB
ALT SERPL W P-5'-P-CCNC: 19 U/L (ref 0–45)
ANION GAP SERPL CALCULATED.3IONS-SCNC: 12 MMOL/L (ref 5–18)
BUN SERPL-MCNC: 8 MG/DL (ref 8–22)
CALCIUM SERPL-MCNC: 9.4 MG/DL (ref 8.5–10.5)
CHLORIDE BLD-SCNC: 104 MMOL/L (ref 98–107)
CHOLEST SERPL-MCNC: 134 MG/DL
CO2 SERPL-SCNC: 27 MMOL/L (ref 22–31)
CREAT SERPL-MCNC: 0.75 MG/DL (ref 0.6–1.1)
ERYTHROCYTE [DISTWIDTH] IN BLOOD BY AUTOMATED COUNT: 13 % (ref 10–15)
FASTING STATUS PATIENT QL REPORTED: NORMAL
FERRITIN SERPL-MCNC: 249 NG/ML (ref 10–130)
GFR SERPL CREATININE-BSD FRML MDRD: >90 ML/MIN/1.73M2
GLUCOSE BLD-MCNC: 90 MG/DL (ref 70–125)
HBA1C MFR BLD: 5.5 % (ref 0–5.6)
HCT VFR BLD AUTO: 40.7 % (ref 35–47)
HDLC SERPL-MCNC: 59 MG/DL
HGB BLD-MCNC: 13.2 G/DL (ref 11.7–15.7)
LDLC SERPL CALC-MCNC: 65 MG/DL
MCH RBC QN AUTO: 30.5 PG (ref 26.5–33)
MCHC RBC AUTO-ENTMCNC: 32.4 G/DL (ref 31.5–36.5)
MCV RBC AUTO: 94 FL (ref 78–100)
PLATELET # BLD AUTO: 169 10E3/UL (ref 150–450)
POTASSIUM BLD-SCNC: 4.1 MMOL/L (ref 3.5–5)
RBC # BLD AUTO: 4.33 10E6/UL (ref 3.8–5.2)
SODIUM SERPL-SCNC: 143 MMOL/L (ref 136–145)
TRIGL SERPL-MCNC: 51 MG/DL
TSH SERPL DL<=0.005 MIU/L-ACNC: 0.62 UIU/ML (ref 0.3–5)
VIT B12 SERPL-MCNC: 1587 PG/ML (ref 213–816)
WBC # BLD AUTO: 6 10E3/UL (ref 4–11)

## 2022-03-15 PROCEDURE — 85027 COMPLETE CBC AUTOMATED: CPT | Performed by: NURSE PRACTITIONER

## 2022-03-15 PROCEDURE — 36415 COLL VENOUS BLD VENIPUNCTURE: CPT | Performed by: NURSE PRACTITIONER

## 2022-03-15 PROCEDURE — 90715 TDAP VACCINE 7 YRS/> IM: CPT | Performed by: NURSE PRACTITIONER

## 2022-03-15 PROCEDURE — 83550 IRON BINDING TEST: CPT | Performed by: NURSE PRACTITIONER

## 2022-03-15 PROCEDURE — 84460 ALANINE AMINO (ALT) (SGPT): CPT | Performed by: NURSE PRACTITIONER

## 2022-03-15 PROCEDURE — 80061 LIPID PANEL: CPT | Performed by: NURSE PRACTITIONER

## 2022-03-15 PROCEDURE — 83036 HEMOGLOBIN GLYCOSYLATED A1C: CPT | Performed by: NURSE PRACTITIONER

## 2022-03-15 PROCEDURE — 90471 IMMUNIZATION ADMIN: CPT | Performed by: NURSE PRACTITIONER

## 2022-03-15 PROCEDURE — 84443 ASSAY THYROID STIM HORMONE: CPT | Performed by: NURSE PRACTITIONER

## 2022-03-15 PROCEDURE — 82607 VITAMIN B-12: CPT | Performed by: NURSE PRACTITIONER

## 2022-03-15 PROCEDURE — 80048 BASIC METABOLIC PNL TOTAL CA: CPT | Performed by: NURSE PRACTITIONER

## 2022-03-15 PROCEDURE — 99396 PREV VISIT EST AGE 40-64: CPT | Mod: 25 | Performed by: NURSE PRACTITIONER

## 2022-03-15 PROCEDURE — 82728 ASSAY OF FERRITIN: CPT | Performed by: NURSE PRACTITIONER

## 2022-03-15 RX ORDER — TRIAMCINOLONE ACETONIDE 1 MG/G
CREAM TOPICAL 2 TIMES DAILY
Qty: 30 G | Refills: 0 | Status: SHIPPED | OUTPATIENT
Start: 2022-03-15 | End: 2023-03-16

## 2022-03-15 RX ORDER — TRAZODONE HYDROCHLORIDE 150 MG/1
1 TABLET ORAL
COMMUNITY
Start: 2022-02-22

## 2022-03-15 NOTE — PROGRESS NOTES
Assessment/Plan:       Problem List Items Addressed This Visit        Digestive    Morbid obesity (H)     She is commended for recent efforts to lose weight that she gained after her foot surgery.  Follow-up with bariatric surgery if she is continuing to struggle and needs additional assistance.            Endocrine    Prediabetes     Repeat A1c         Relevant Orders    Hemoglobin A1c (Completed)    TSH with free T4 reflex (Completed)       Musculoskeletal and Integumentary    Eczema     Advised use of emollients, corticosteroid twice daily- no longer than 14 days in a row         Relevant Medications    triamcinolone (KENALOG) 0.1 % external cream       Behavioral    Severe recurrent major depression without psychotic features (H)     Followed by psychiatry         Relevant Medications    traZODone (DESYREL) 150 MG tablet       Other    Bariatric surgery status    Relevant Orders    ALT (Completed)    CBC with platelets (Completed)    TSH with free T4 reflex (Completed)    Vitamin B12 (Completed)    Ferritin (Completed)    CBC with platelets    Iron and iron binding capacity      Other Visit Diagnoses     Annual physical exam    -  Primary    Relevant Orders    Basic metabolic panel (Completed)    Lipid panel reflex to direct LDL Non-fasting (Completed)         - Reviewed the importance of monitoring for changes in breast appearance such as nipple inversion, changes inbreast tissue texture, non-healing wounds, or nipple discharge   - Plant-based diet and 150 minutes of physical activity per week recommended   - Health screenings and vaccines appropriate for age, biological gender,and risk factors reviewed and ordered as per this discussion   - Bariatric surgery follow-up labs today          Subjective:     Monika Franz is a 56 year old female who presents for an annual exam.     She has a rash on the left lateral leg which is itchy and getting bigger. It has been there most of the winter. She has tried  cortisone, helps with the itching.     H/o hysterectomy for AUB and associated anemia.  She is not certain whether she still has a cervix.    Still struggling with weight gain. She has started to walk again and is trying to eat better as well.     Answers for HPI/ROS submitted by the patient on 3/12/2022  If you checked off any problems, how difficult have these problems made it for you to do your work, take care of things at home, or get along with other people?: Not difficult at all  PHQ9 TOTAL SCORE: 1  Frequency of exercise:: 2-3 days/week  Getting at least 3 servings of Calcium per day:: Yes  Diet:: Regular (no restrictions)  Taking medications regularly:: Yes  Medication side effects:: None  Bi-annual eye exam:: Yes  Dental care twice a year:: Yes  Sleep apnea or symptoms of sleep apnea:: Sleep apnea  Additional concerns today:: No  Duration of exercise:: 15-30 minutes      Health Maintenance   Topic Date Due     ANNUAL REVIEW OF HM ORDERS  Never done     ADVANCE CARE PLANNING  Never done     Pneumococcal Vaccine: Pediatrics (0 to 5 Years) and At-Risk Patients (6 to 64 Years) (1 of 4 - PCV13) Never done     PAP  Never done     COVID-19 Vaccine (4 - Booster for Pfizer series) 03/06/2022     PHQ-9  09/15/2022     PREVENTIVE CARE VISIT  03/15/2023     LIPID  03/15/2027     COLORECTAL CANCER SCREENING  06/23/2031     DTAP/TDAP/TD IMMUNIZATION (3 - Td or Tdap) 03/15/2032     DEPRESSION ACTION PLAN  Completed     INFLUENZA VACCINE  Completed     ZOSTER IMMUNIZATION  Completed     IPV IMMUNIZATION  Aged Out     MENINGITIS IMMUNIZATION  Aged Out     HEPATITIS B IMMUNIZATION  Aged Out     HEPATITIS C SCREENING  Discontinued     HIV SCREENING  Discontinued         Immunization History   Administered Date(s) Administered     COVID-19,PF,Pfizer (12+ Yrs) 03/10/2021, 03/31/2021, 12/06/2021     Hep B, Peds or Adolescent 12/15/2000     HepB-Adult 05/11/2001, 10/11/2019     Influenza (IIV3) PF 02/19/2007, 10/14/2008,  2009, 10/06/2010, 2011, 2013     Influenza Quad, Recombinant, pf(RIV4) (Flublok) 2020     Influenza Vaccine IM > 6 months Valent IIV4 (Alfuria,Fluzone) 2015, 2017, 09/10/2018, 10/11/2019, 2021     Td (Adult), Adsorbed 2002     Tdap (Adacel,Boostrix) 2011, 03/15/2022     Zoster vaccine recombinant adjuvanted (SHINGRIX) 2018, 2019       Gynecologic History  No LMP recorded. Patient has had a hysterectomy.  g:     OB History    Para Term  AB Living   4 3 3 0 1 3   SAB IAB Ectopic Multiple Live Births   1 0 0 0 0      # Outcome Date GA Lbr Alexander/2nd Weight Sex Delivery Anes PTL Lv   4 SAB            3 Term            2 Term            1 Term                Current Outpatient Medications   Medication Sig Dispense Refill     ARIPiprazole (ABILIFY) 5 MG tablet Take 5 mg by mouth daily        Calcium Citrate 1040 MG TABS 1200mg-1500mg daily (separate doses 2-3 times daily)       cholecalciferol 25 MCG (1000 UT) TABS Take 5,000 Units by mouth       desvenlafaxine (PRISTIQ) 50 MG 24 hr tablet Take 100 mg by mouth       Pediatric Multiple Vitamins (MULTIVITAMIN CHILDRENS PO) Take 1 tablet by mouth       traZODone (DESYREL) 150 MG tablet Take 1 tablet by mouth nightly as needed       triamcinolone (KENALOG) 0.1 % external cream Apply topically 2 times daily X 14 days 30 g 0     vitamin B-12 (CYANOCOBALAMIN) 1000 MCG tablet Take 1,000 mcg by mouth       Past Medical History:   Diagnosis Date     Anxiety      Breast cancer (H)      Depression      Depressive disorder      Sleep apnea     Uses CPAP     Past Surgical History:   Procedure Laterality Date     ARTHROSCOPY KNEE       BACK SURGERY       BARIATRIC SURGERY  2019     BUNIONECTOMY Right 2021    Procedure: BUNIONECTOMY right foot. Arthroplasty digits two, three, four and five right foot.;  Surgeon: Narinder Talbert DPM;  Location: [facility];  Service: Podiatry       SECTION      x 3      SECTION       CHOLECYSTECTOMY       HYSTERECTOMY, PAP NO LONGER INDICATED       LAPAROSCOPIC ABLATION ENDOMETRIOSIS       LAPAROSCOPIC HYSTERECTOMY TOTAL       MASTECTOMY Bilateral 12/15/2020     NE INSJ TUNNELED CTR VAD W/SUBQ PORT AGE 5 YR/> Right 2020    Procedure: INSERTION VASCULAR ACCESS PORT UNDER ULTRASOUND AND FLUOROSCOPIC GUIDANCE RIGHT JUGULAR;  Surgeon: Allyson Douglas DO;  Location: [facility] OR;  Service: General     NE MASTECTOMY, SIMPLE, COMPLETE N/A 12/15/2020    Procedure: BILATERAL MASTECTOMY WITH LEFT SENTINEL LYMPH NODE BIOPSY, PORT REMOVAL;  Surgeon: Allyson Douglas DO;  Location: [facility] OR;  Service: General     NE RMVL DARRYL CTR VAD W/SUBQ PORT/ CTR/PRPH INSJ N/A 12/15/2020    Procedure: PORT REMOVAL;  Surgeon: Allyson Douglas DO;  Location: [facility] OR;  Service: General     STOMACH SURGERY      Gastric Sleeve     US BREAST CORE BIOPSY LEFT Left 2020     US SENTINEL NODE INJECTION  12/15/2020     Nsaids  Family History   Problem Relation Age of Onset     Sleep Apnea Sister      Lymphoma Mother 79.00        Non-Hodgkins     Cancer Mother      Prostate Cancer Father 67.00     Cancer Father      Hodgkin's lymphoma Maternal Uncle 20.00     Brain Cancer Paternal Uncle 82.00     Lung Cancer Maternal Uncle 77.00     Breast Cancer Maternal Cousin 46.00     Leukemia Paternal Cousin 45.00     No Known Problems Sister      No Known Problems Brother      No Known Problems Son      No Known Problems Son      No Known Problems Daughter      Social History     Socioeconomic History     Marital status:      Spouse name: Not on file     Number of children: Not on file     Years of education: Not on file     Highest education level: Not on file   Occupational History     Not on file   Tobacco Use     Smoking status: Never Smoker     Smokeless tobacco: Never Used   Substance and Sexual Activity     Alcohol use: Not Currently     Drug use:  "Never     Sexual activity: Never     Partners: Male     Birth control/protection: None   Other Topics Concern     Parent/sibling w/ CABG, MI or angioplasty before 65F 55M? Not Asked   Social History Narrative     Not on file     Social Determinants of Health     Financial Resource Strain: Not on file   Food Insecurity: Not on file   Transportation Needs: Not on file   Physical Activity: Not on file   Stress: Not on file   Social Connections: Not on file   Intimate Partner Violence: Not on file   Housing Stability: Not on file         Objective:      Vitals:    03/15/22 0822   BP: 92/58   BP Location: Right arm   Patient Position: Sitting   Cuff Size: Adult Large   Pulse: 70   SpO2: 96%   Weight: 104.8 kg (231 lb)   Height: 1.676 m (5' 6\")     Wt Readings from Last 3 Encounters:   03/15/22 104.8 kg (231 lb)   02/24/22 104.8 kg (231 lb)   10/21/21 101.3 kg (223 lb 6.4 oz)       Body mass index is 37.28 kg/m .     Physical Exam:  General Appearance: Alert, cooperative, no distress.  Eyes: Conjunctiva/corneas clear, EOM's intact  Ears: Normal TM's and external ear canals, both ears  Throat: Lips, mucosa, and tongue normal  Neck: Supple, symmetrical, trachea midline, no adenopathy;  thyroid: not enlarged, symmetric, no tenderness/mass/nodules  Lungs: Clear to auscultation bilaterally, respirations unlabored  Heart: Regular rate and rhythm, S1 and S2 normal, no murmur, rub, or gallop  Abdomen: Soft, non-tender, bowel sounds active all four quadrants,  no masses, no organomegaly  Pelvic: Genital: EXTERNAL GENITALIA: Normal appearing vulva without masses, tenderness or lesions. PERINEUM: normal and intact. URETHRAL MEATUS: normal VAGINA:  vagina with normal color and without discharge or lesions.  Vaginal atrophy noted.  No cervix.  Extremities: Extremities normal, atraumatic, no cyanosis or edema  Neurologic: Normal  Psych: Normal affect.  Does not appear anxious or depressed.         "

## 2022-03-16 LAB
IRON SATN MFR SERPL: 26 % (ref 15–46)
IRON SERPL-MCNC: 76 UG/DL (ref 35–180)
TIBC SERPL-MCNC: 296 UG/DL (ref 240–430)

## 2022-03-16 NOTE — TELEPHONE ENCOUNTER
Question was sent prior to viewing result notes.    Closing this encounter now    Nilton Vaughn Jr., CMA on 3/16/2022 at 12:36 PM

## 2022-03-16 NOTE — ASSESSMENT & PLAN NOTE
She is commended for recent efforts to lose weight that she gained after her foot surgery.  Follow-up with bariatric surgery if she is continuing to struggle and needs additional assistance.

## 2022-06-08 DIAGNOSIS — R21 RASH AND NONSPECIFIC SKIN ERUPTION: Primary | ICD-10-CM

## 2022-06-28 ENCOUNTER — MYC MEDICAL ADVICE (OUTPATIENT)
Dept: SLEEP MEDICINE | Facility: CLINIC | Age: 57
End: 2022-06-28

## 2022-07-06 ASSESSMENT — SLEEP AND FATIGUE QUESTIONNAIRES
HOW LIKELY ARE YOU TO NOD OFF OR FALL ASLEEP WHILE SITTING AND READING: SLIGHT CHANCE OF DOZING
HOW LIKELY ARE YOU TO NOD OFF OR FALL ASLEEP IN A CAR, WHILE STOPPED FOR A FEW MINUTES IN TRAFFIC: WOULD NEVER DOZE
HOW LIKELY ARE YOU TO NOD OFF OR FALL ASLEEP WHILE LYING DOWN TO REST IN THE AFTERNOON WHEN CIRCUMSTANCES PERMIT: MODERATE CHANCE OF DOZING
HOW LIKELY ARE YOU TO NOD OFF OR FALL ASLEEP WHILE SITTING AND TALKING TO SOMEONE: WOULD NEVER DOZE
HOW LIKELY ARE YOU TO NOD OFF OR FALL ASLEEP WHILE SITTING INACTIVE IN A PUBLIC PLACE: WOULD NEVER DOZE
HOW LIKELY ARE YOU TO NOD OFF OR FALL ASLEEP WHILE WATCHING TV: SLIGHT CHANCE OF DOZING
HOW LIKELY ARE YOU TO NOD OFF OR FALL ASLEEP WHILE SITTING QUIETLY AFTER LUNCH WITHOUT ALCOHOL: WOULD NEVER DOZE
HOW LIKELY ARE YOU TO NOD OFF OR FALL ASLEEP WHEN YOU ARE A PASSENGER IN A CAR FOR AN HOUR WITHOUT A BREAK: WOULD NEVER DOZE

## 2022-07-13 ENCOUNTER — MYC MEDICAL ADVICE (OUTPATIENT)
Dept: SLEEP MEDICINE | Facility: CLINIC | Age: 57
End: 2022-07-13

## 2022-07-13 ENCOUNTER — OFFICE VISIT (OUTPATIENT)
Dept: SLEEP MEDICINE | Facility: CLINIC | Age: 57
End: 2022-07-13
Payer: COMMERCIAL

## 2022-07-13 VITALS
BODY MASS INDEX: 38.25 KG/M2 | OXYGEN SATURATION: 96 % | WEIGHT: 238 LBS | HEART RATE: 70 BPM | HEIGHT: 66 IN | DIASTOLIC BLOOD PRESSURE: 77 MMHG | SYSTOLIC BLOOD PRESSURE: 120 MMHG

## 2022-07-13 DIAGNOSIS — G47.33 OSA (OBSTRUCTIVE SLEEP APNEA): Primary | ICD-10-CM

## 2022-07-13 PROCEDURE — 99214 OFFICE O/P EST MOD 30 MIN: CPT | Performed by: PHYSICIAN ASSISTANT

## 2022-07-13 NOTE — NURSING NOTE
"Chief Complaint   Patient presents with     RECHECK     Annual cpap visit, needs new RX DME [facility], for supplies        Initial /77   Pulse 70   Ht 1.676 m (5' 5.98\")   Wt 108 kg (238 lb)   SpO2 96%   BMI 38.43 kg/m   Estimated body mass index is 38.43 kg/m  as calculated from the following:    Height as of this encounter: 1.676 m (5' 5.98\").    Weight as of this encounter: 108 kg (238 lb).    Medication Reconciliation: complete    DME: dreamstation not on modem, uses ME for DME.    ESS 3  CHAIM 1  Cheryl Esparza, Sleep Clinic Specialist  "

## 2022-07-13 NOTE — NURSING NOTE
Pressure changed manually in clinic to 14.0cm min, 20.0cm max, sent [employer] message to schedule annual cpap visit July 2023.  No further action taken

## 2022-07-13 NOTE — PROGRESS NOTES
[facility]   Outpatient Sleep Medicine  Jul 13, 2022       Name: Monika Franz MRN# [id number]   Age: 56 year old YOB: 1965            Assessment and Plan:   1. REBECCA (obstructive sleep apnea)  Current CPAP settings 13-20 cm H2O shows residual AHI 6.2.  Compliance is okay, room for improvement.  Discussed the importance of nightly therapy and using for entire sleep duration for maximal benefit.  Patient will work to increase her use consistently at night to minimum of 4 hours.  Agreed to slight pressure change today to help with slight residual AHI elevation, increasing start pressure to 14 cm H2O.  Manual pressure change completed today in clinic.  Prescription was renewed for mask and supplies.  She will let me know if pressures feeling comfortable in any way.  We will follow-up in approximately 1 year for annual visit.  - Comprehensive DME       Chief Complaint      Chief Complaint   Patient presents with     RECHECK     Annual cpap visit, needs new RX DME [facility], for supplies           History of Present Illness:     Monika Franz is a 56 year old female who presents to the clinic for follow-up of their mild obstructive sleep apnea treated with CPAP. Other past medical history significant for breast cancer, depression, and obesity s/p bariatric surgery.      Originally diagnosed via split-night sleep study completed 2/9/2012 (269#) through [facility] revealing severe obstructive sleep apnea with an AHI of 36.6, RDI 78.8, lowest oxygen saturation 84%.  Presenting symptoms included snoring, nonrestorative sleep, excessive daytime sleepiness (baseline [place] Sleepiness Scale was 16/24). Started CPAP shortly after this study.    Repeat study completed status post bariatric surgery through our clinic - watchPAT home study on 4/23/21 (212#) showing pAHI 6.4, DEON 4.9, REM pAHI 16.7, oxygen dajuan 89%.  Patient elected to continue CPAP.      Returns today for annual visit.  Last seen  "May 2021.  Reports she is doing well on PAP therapy and no complaints today.  She received a replacement device from [employer] approximately 4 months after she registered her old device for recall, no complaints with new machine.    Overall, the patient rates their experience with PAP as 10 (0 poor, 10 great). Patient is using a full face mask. The mask is comfortable. The mask is not leaking. They are not snoring with the mask on. They are not having gasp arousals.  They are not having significant oral/nasal dryness. They are not having pain/skin breakdown. The pressure settings are comfortable.     Bedtime is typically 10:30PM, though admits that she often falls asleep in her recliner and later wakes to move to bed to put her mask on. Usually it takes about 20 minutes minutes to fall asleep with the mask on. Wake time is typically 7:30AM.  Patient is using PAP therapy average of 6 hours per night, but ranges anywhere between 2-10 hours hours per night. The patient is usually getting 8 hours of sleep per night. Does wake feeling well rested in morning.     [employer] Dreamstation Auto-PAP 13-20 cmH2O download (6/13/22-7/12/22):  24 total days of use. 6 nonuse days.  Average use 6 hours 12 minutes per day.  63.3% days with >4 hours use.  Large leak 2 min 20 sec per day average. CPAP 90% pressure 16.3cm. AHI 6.2    SCALES:   INSOMNIA: Insomnia Severity Score: 1   SLEEPINESS: [place] Sleepiness Score: 4    Past medical/surgical history, family history, social history, medications and allergies were reviewed.           Physical Examination:   /77   Pulse 70   Ht 1.676 m (5' 5.98\")   Wt 108 kg (238 lb)   SpO2 96%   BMI 38.43 kg/m    General appearance: Awake, alert, cooperative. Well groomed. Sitting comfortably in chair. In no apparent distress.  HEENT: Head: Normocephalic, atraumatic. Eyes:Conjunctiva clear. Sclera normal.  Remainder of face covered by mask.  Pulmonary:  Able to speak easily in full " sentences. No cough or wheeze.   Skin:  No rashes or significant lesions on visible skin.   Neurologic: Alert, oriented x3.   Psychiatric: Mood euthymic. Affect congruent with full range and intensity.      CC:  Donna Holt PA-C  Jul 13, 2022     [facility]  [address] [place] [address]     [facility]  [address] [address]    Chart documentation was completed, in part, with [employer] voice-recognition software. Even though reviewed, some grammatical, spelling, and word errors may remain.      31 minutes spent on day of encounter doing chart review, history and exam, documentation, and further activities as noted above

## 2022-07-13 NOTE — PATIENT INSTRUCTIONS
Your blood pressure was checked while you were in clinic today.  Please read the guidelines below about what these numbers mean and what you should do about them.  Your systolic blood pressure is the top number.  This is the pressure when the heart is pumping.  Your diastolic blood pressure is the bottom number.  This is the pressure in between beats.  If your systolic blood pressure is less than 120 and your diastolic blood pressure is less than 80, then your blood pressure is normal. There is nothing more that you need to do about it  If your systolic blood pressure is 120-139 or your diastolic blood pressure is 80-89, your blood pressure may be higher than it should be.  You should have your blood pressure re-checked within a year by a primary care provider.  If your systolic blood pressure is 140 or greater or your diastolic blood pressure is 90 or greater, you may have high blood pressure.  High blood pressure is treatable, but if left untreated over time it can put you at risk for heart attack, stroke, or kidney failure.  You should have your blood pressure re-checked by a primary care provider within the next four weeks.     Your BMI is Body mass index is 38.43 kg/m .    What is BMI?  Body mass index (BMI) is one way to tell whether you are at a healthy weight, overweight, or obese. It measures your weight in relation to your height.  A BMI of 18.5 to 24.9 is in the healthy range. A person with a BMI of 25 to 29.9 is considered overweight, and someone with a BMI of 30 or greater is considered obese.  Another way to find out if you are at risk for health problems caused by overweight and obesity is to measure your waist. If you are a woman and your waist is more than 35 inches, or if you are a man and your waist is more than 40 inches, your risk of disease may be higher.  More than two-thirds of American adults are considered overweight or obese. Being overweight or obese increases the risk for further weight  gain.  Excess weight may lead to heart disease and diabetes. Creating and following plans for healthy eating and physical activity may help you improve your health.    Methods for maintaining or losing weight.  Weight control is part of healthy lifestyle and includes exercise, emotional health, and healthy eating habits.  Careful eating habits lifelong is the mainstay of weight control.  Though there are significant health benefits from weight loss, long-term weight loss with diet alone may be very difficult to achieve- studies show long-term success with dietary management in less than 10% of people. Attaining a healthy weight may be especially difficult to achieve in those with severe obesity. In some cases, medications, devices and surgical management might be considered.    What can you do?  If you are overweight or obese and are interested in methods for weight loss, you should discuss this with your provider. In addition, we recommend that you review healthy life styles and methods for weight loss available through the National Institutes of Health patient information sites:   http://win.niddk.nih.gov/publications/index.htm

## 2022-07-18 ENCOUNTER — TRANSFERRED RECORDS (OUTPATIENT)
Dept: HEALTH INFORMATION MANAGEMENT | Facility: CLINIC | Age: 57
End: 2022-07-18

## 2022-07-25 ENCOUNTER — PATIENT OUTREACH (OUTPATIENT)
Dept: ONCOLOGY | Facility: HOSPITAL | Age: 57
End: 2022-07-25

## 2022-07-25 ENCOUNTER — ONCOLOGY VISIT (OUTPATIENT)
Dept: ONCOLOGY | Facility: HOSPITAL | Age: 57
End: 2022-07-25
Attending: NURSE PRACTITIONER
Payer: COMMERCIAL

## 2022-07-25 VITALS
BODY MASS INDEX: 39.47 KG/M2 | OXYGEN SATURATION: 96 % | TEMPERATURE: 98 F | DIASTOLIC BLOOD PRESSURE: 68 MMHG | HEART RATE: 82 BPM | WEIGHT: 244.4 LBS | SYSTOLIC BLOOD PRESSURE: 123 MMHG | RESPIRATION RATE: 18 BRPM

## 2022-07-25 DIAGNOSIS — Z17.1 MALIGNANT NEOPLASM OF UPPER-INNER QUADRANT OF LEFT BREAST IN FEMALE, ESTROGEN RECEPTOR NEGATIVE (H): Primary | ICD-10-CM

## 2022-07-25 DIAGNOSIS — C50.912 INVASIVE DUCTAL CARCINOMA OF BREAST, LEFT (H): ICD-10-CM

## 2022-07-25 DIAGNOSIS — Z80.3 FAMILY HISTORY OF MALIGNANT NEOPLASM OF BREAST: ICD-10-CM

## 2022-07-25 DIAGNOSIS — C50.212 MALIGNANT NEOPLASM OF UPPER-INNER QUADRANT OF LEFT BREAST IN FEMALE, ESTROGEN RECEPTOR NEGATIVE (H): Primary | ICD-10-CM

## 2022-07-25 PROCEDURE — G0463 HOSPITAL OUTPT CLINIC VISIT: HCPCS

## 2022-07-25 PROCEDURE — 99214 OFFICE O/P EST MOD 30 MIN: CPT | Performed by: INTERNAL MEDICINE

## 2022-07-25 ASSESSMENT — PAIN SCALES - GENERAL: PAINLEVEL: NO PAIN (0)

## 2022-07-25 NOTE — LETTER
"    7/25/2022         RE: Monika Franz  [address] [facility] [address]        Dear Colleague,    Thank you for referring your patient, Monika Franz, to the [facility] CANCER [facility]. Please see a copy of my visit note below.    Oncology Rooming Note    July 25, 2022 10:26 AM   Monika Franz is a 56 year old female who presents for:    Chief Complaint   Patient presents with     Oncology Clinic Visit     Malignant neoplasm of upper-inner quadrant of left breast in female, estrogen receptor negative (H)     Initial Vitals: /68   Pulse 82   Temp 98  F (36.7  C)   Resp 18   Wt 110.9 kg (244 lb 6.4 oz)   SpO2 96%   BMI 39.47 kg/m   Estimated body mass index is 39.47 kg/m  as calculated from the following:    Height as of 7/13/22: 1.676 m (5' 5.98\").    Weight as of this encounter: 110.9 kg (244 lb 6.4 oz). Body surface area is 2.27 meters squared.  No Pain (0) Comment: Data Unavailable   No LMP recorded. Patient has had a hysterectomy.  Allergies reviewed: Yes  Medications reviewed: Yes    Medications: Medication refills not needed today.  Pharmacy name entered into [facility]: [facility] [id number] [place] [address]    Clinical concerns: Bruise on L shin that will not go away        Tosha Cazares LPN                  [facility] cancer Care Progress Note  Patient: Monika Franz   MRN:  [id number]   Date of Service : Jul 25, 2022           Reason for visit      1. Invasive ductal carcinoma of breast, left (H)        Assessment     1.  A very pleasant 56 year old  woman with left-sided breast cancer at least T2 N0 M0 ER CT negative HER-2/joann negative. Started on sreekanth adjuvant chemotherapy. Responded well to treatment.  She finished 4 cycles of dose dense Adriamycin Cytoxan followed by 12 doses of of weekly paclitaxel.  Last chemotherapy in November 2020.  Had surgery in December 2020.  Complete pathological remission obtained in the invasive tumor.  " There was some DCIS present however.  Lymph nodes negative.  4.  Minimal elevated alkaline phosphatase in the past.  2.  Slightly overweight.  3.  Status post gastric sleeve in 2019.    Plan     1.  At this time we will continue active surveillance.  2.  We will check metabolic panel in about 4 months timeframe.  3.  She had her iron studies done in March.  They needs to be repeated every year.  4.  Continue chest wall exam as needed.  5.  Diet rich in calcium and vitamin D.  6.  Follow-up with Donna Holt her primary care provider.    Clinical stage      Cancer Staging  Invasive ductal carcinoma of breast, left (H)  Staging form: Breast, AJCC 8th Edition  - Clinical stage from 6/22/2020: Stage IIB (cT2, cN0, cM0, G3, ER-, LA-, HER2: Equivocal) - Signed by Sue Dougherty CNP on 7/9/2020      History     Monika Franz is a very pleasant 56 year old  female with a history of triple negative left breast cancer.  The patient found this lump located in her left breast measuring approximately 2 cm in size, at 10 o'clock position, presenting with some stretching-like symptoms in her breast in the month of June 2020.  She was then seen by her primary care physician and had a mammogram done which was very suspicious.  A biopsy confirmed the presence of invasive ductal carcinoma ER negative LA negative HER-2/joann 2+ on immunohistochemistry negative on FISH.  It has high-grade features.  The ultrasound measures this mass to be 1.6 cm in size.  It is located 11 cm from the nipple.  No nipple inversion or any other symptoms.    The patient was seen by Dr. Allyson Douglas.  She had a Port-A-Cath placed for neoadjuvant chemotherapy.     She has past medical history of gastric sleeve surgery about a year ago.  She is taking some vitamin B12 and other supplements.     She had a hysterectomy few years ago.     As for the breast cancer risk factors are concerned she is G4, P3.  First child at age 29.  Breast-feeding for all 3  of them.  No prior breast biopsies.  No significant family history.    She  started on neoadjuvant chemotherapy on 9 July 2020 with dose dense adriamysin cyclophosphamide followed by paclitaxel.   After second cycle she noticed decrease in the size of the breast lesion.  In fact it was not palpable anymore.  She has finished 4 cycles of dose dense Adriamycin and cyclophosphamide and then finished 12 doses of weekly paclitaxel. Last treatment was on the 18th of November.     She had surgery in the middle of December 2020.  She underwent bilateral mastectomy.  She did not have reconstruction but she had initially thought of doing.  Fortunately she had a complete remission noted on the invasive tumor.  There was some DCIS present however.  The 3 sentinel lymph nodes were negative for any invasive tumor.  There was no evidence of any scarring in them either.    Now she is on surveillance. Seems to be doing well. Had bunion surgery on her right foot in March 2021 along with hammer toe repair.    Comes in today for scheduled follow-up. Overall doing fine.  Bryce was seen by Donna Holt and had post bariatric surgery lab work done.  No evidence of any iron deficiency.  Vitamin B12 level is normal.  Romana is otherwise doing well.  Reports no new complaints.  Bryce is fully vaccinated and boosted.      Past Medical History     Past Medical History:   Diagnosis Date     Anxiety      Breast cancer (H)      Depression      Sleep apnea     Uses CPAP       Review of Systems   Review of system noted in the history.    ECOG performance status and Distress Assessment      ECOG Performance:    ECOG Performance Status: 1    Distress Assessment  Distress Assessment Score: No distress:     Pain Status  Currently in Pain: No/denies        Vital Signs     /68   Pulse 82   Temp 98  F (36.7  C)   Resp 18   Wt 110.9 kg (244 lb 6.4 oz)   SpO2 96%   BMI 39.47 kg/m       Physical Exam     GENERAL: No acute distress.  Cooperative in conversation.   HEENT:  Pupils are equal, round and reactive. Oral mucosa is clean and intact. No ulcerations or mucositis noted. No bleeding noted.    RESP:Chest symmetric lungs are clear bilaterally per auscultation. Regular respiratory rate. No wheezes or rhonchi.  CV: Normal S1 S2 Regular, rate and rhythm. No murmurs.   ABD: Nondistended, soft, nontender. Positive bowel sounds. No organomegaly.   EXTREMITIES: No lower extremity edema.   NEURO: Non- focal. Alert and oriented x3.  Cranial nerves appear intact.  PSYCH: Within normal limits. No depression or anxiety.  SKIN: Warm dry intact.  LYMPH NODES: Neck supraclavicular fossa and armpits examined.      Lab Results     No results found for this or any previous visit (from the past 240 hour(s)).     Imaging Results     No results found.     Reviewed her notes from Donna Holt and from her psychiatry.  She also was seen by a sleep study and their notes were reviewed as well.    Amando Monson MD        Again, thank you for allowing me to participate in the care of your patient.        Sincerely,        Amando Monson MD, MD

## 2022-07-25 NOTE — PROGRESS NOTES
[facility] cancer Care Progress Note  Patient: Monika Franz   MRN:  [id number]   Date of Service : Jul 25, 2022           Reason for visit      1. Invasive ductal carcinoma of breast, left (H)        Assessment     1.  A very pleasant 56 year old  woman with left-sided breast cancer at least T2 N0 M0 ER CT negative HER-2/joann negative. Started on sreekanth adjuvant chemotherapy. Responded well to treatment.  She finished 4 cycles of dose dense Adriamycin Cytoxan followed by 12 doses of of weekly paclitaxel.  Last chemotherapy in November 2020.  Had surgery in December 2020.  Complete pathological remission obtained in the invasive tumor.  There was some DCIS present however.  Lymph nodes negative.  4.  Minimal elevated alkaline phosphatase in the past.  2.  Slightly overweight.  3.  Status post gastric sleeve in 2019.    Plan     1.  At this time we will continue active surveillance.  2.  We will check metabolic panel in about 4 months timeframe.  3.  She had her iron studies done in March.  They needs to be repeated every year.  4.  Continue chest wall exam as needed.  5.  Diet rich in calcium and vitamin D.  6.  Follow-up with Donna Holt her primary care provider.    Clinical stage      Cancer Staging  Invasive ductal carcinoma of breast, left (H)  Staging form: Breast, AJCC 8th Edition  - Clinical stage from 6/22/2020: Stage IIB (cT2, cN0, cM0, G3, ER-, CT-, HER2: Equivocal) - Signed by Sue Dougherty, CNP on 7/9/2020      History     Monika Franz is a very pleasant 56 year old  female with a history of triple negative left breast cancer.  The patient found this lump located in her left breast measuring approximately 2 cm in size, at 10 o'clock position, presenting with some stretching-like symptoms in her breast in the month of June 2020.  She was then seen by her primary care physician and had a mammogram done which was very suspicious.  A biopsy confirmed the presence of invasive ductal  carcinoma ER negative [place] negative HER-2/joann 2+ on immunohistochemistry negative on FISH.  It has high-grade features.  The ultrasound measures this mass to be 1.6 cm in size.  It is located 11 cm from the nipple.  No nipple inversion or any other symptoms.    The patient was seen by Dr. Allyson Douglas.  She had a Port-A-Cath placed for neoadjuvant chemotherapy.     She has past medical history of gastric sleeve surgery about a year ago.  She is taking some vitamin B12 and other supplements.     She had a hysterectomy few years ago.     As for the breast cancer risk factors are concerned she is G4, P3.  First child at age 29.  Breast-feeding for all 3 of them.  No prior breast biopsies.  No significant family history.    She  started on neoadjuvant chemotherapy on 9 July 2020 with dose dense adriamysin cyclophosphamide followed by paclitaxel.   After second cycle she noticed decrease in the size of the breast lesion.  In fact it was not palpable anymore.  She has finished 4 cycles of dose dense Adriamycin and cyclophosphamide and then finished 12 doses of weekly paclitaxel. Last treatment was on the 18th of November.     She had surgery in the middle of December 2020.  She underwent bilateral mastectomy.  She did not have reconstruction but she had initially thought of doing.  Fortunately she had a complete remission noted on the invasive tumor.  There was some DCIS present however.  The 3 sentinel lymph nodes were negative for any invasive tumor.  There was no evidence of any scarring in them either.    Now she is on surveillance. Seems to be doing well. Had bunion surgery on her right foot in March 2021 along with hammer toe repair.    Comes in today for scheduled follow-up. Overall doing fine.  Bryce was seen by Donna Holt and had post bariatric surgery lab work done.  No evidence of any iron deficiency.  Vitamin B12 level is normal.  Romana is otherwise doing well.  Reports no new complaints.  Bryce is  fully vaccinated and boosted.      Past Medical History     Past Medical History:   Diagnosis Date     Anxiety      Breast cancer (H)      Depression      Sleep apnea     Uses CPAP       Review of Systems   Review of system noted in the history.    ECOG performance status and Distress Assessment      ECOG Performance:    ECOG Performance Status: 1    Distress Assessment  Distress Assessment Score: No distress:     Pain Status  Currently in Pain: No/denies        Vital Signs     /68   Pulse 82   Temp 98  F (36.7  C)   Resp 18   Wt 110.9 kg (244 lb 6.4 oz)   SpO2 96%   BMI 39.47 kg/m       Physical Exam     GENERAL: No acute distress. Cooperative in conversation.   HEENT:  Pupils are equal, round and reactive. Oral mucosa is clean and intact. No ulcerations or mucositis noted. No bleeding noted.    RESP:Chest symmetric lungs are clear bilaterally per auscultation. Regular respiratory rate. No wheezes or rhonchi.  CV: Normal S1 S2 Regular, rate and rhythm. No murmurs.   ABD: Nondistended, soft, nontender. Positive bowel sounds. No organomegaly.   EXTREMITIES: No lower extremity edema.   NEURO: Non- focal. Alert and oriented x3.  Cranial nerves appear intact.  PSYCH: Within normal limits. No depression or anxiety.  SKIN: Warm dry intact.  LYMPH NODES: Neck supraclavicular fossa and armpits examined.      Lab Results     No results found for this or any previous visit (from the past 240 hour(s)).     Imaging Results     No results found.     Reviewed her notes from Donna Holt and from her psychiatry.  She also was seen by a sleep study and their notes were reviewed as well.    Amando Monson MD

## 2022-07-25 NOTE — PROGRESS NOTES
"Oncology Rooming Note    July 25, 2022 10:26 AM   Monika Franz is a 56 year old female who presents for:    Chief Complaint   Patient presents with     Oncology Clinic Visit     Malignant neoplasm of upper-inner quadrant of left breast in female, estrogen receptor negative (H)     Initial Vitals: /68   Pulse 82   Temp 98  F (36.7  C)   Resp 18   Wt 110.9 kg (244 lb 6.4 oz)   SpO2 96%   BMI 39.47 kg/m   Estimated body mass index is 39.47 kg/m  as calculated from the following:    Height as of 7/13/22: 1.676 m (5' 5.98\").    Weight as of this encounter: 110.9 kg (244 lb 6.4 oz). Body surface area is 2.27 meters squared.  No Pain (0) Comment: Data Unavailable   No LMP recorded. Patient has had a hysterectomy.  Allergies reviewed: Yes  Medications reviewed: Yes    Medications: Medication refills not needed today.  Pharmacy name entered into [facility]: [facility] [id number] [place] [address]    Clinical concerns: Bruise on L shin that will not go away        Tosha Cazares LPN            "

## 2022-08-23 ENCOUNTER — TRANSFERRED RECORDS (OUTPATIENT)
Dept: HEALTH INFORMATION MANAGEMENT | Facility: CLINIC | Age: 57
End: 2022-08-23

## 2022-10-23 ENCOUNTER — HEALTH MAINTENANCE LETTER (OUTPATIENT)
Age: 57
End: 2022-10-23

## 2022-11-25 ENCOUNTER — TELEPHONE (OUTPATIENT)
Dept: ONCOLOGY | Facility: HOSPITAL | Age: 57
End: 2022-11-25

## 2022-11-25 NOTE — TELEPHONE ENCOUNTER
Patient calls in today stating that she has been exposed to COVID.  She has an appointment on 12/1 for lab/Dr Monson.  She is wondering if she can still come in today.  She does not have any symptoms.  I did let her know that if she comes down with any symptoms she will need to call and reschedule.  If she remains asymptomatic she can keep her appointment.  She verbalized understanding and was very appreciative.    Allyson Tomlin RN

## 2022-12-01 ENCOUNTER — ONCOLOGY VISIT (OUTPATIENT)
Dept: ONCOLOGY | Facility: HOSPITAL | Age: 57
End: 2022-12-01
Attending: INTERNAL MEDICINE
Payer: COMMERCIAL

## 2022-12-01 ENCOUNTER — LAB (OUTPATIENT)
Dept: INFUSION THERAPY | Facility: HOSPITAL | Age: 57
End: 2022-12-01
Attending: INTERNAL MEDICINE
Payer: COMMERCIAL

## 2022-12-01 VITALS
OXYGEN SATURATION: 97 % | TEMPERATURE: 98.6 F | WEIGHT: 238.8 LBS | HEART RATE: 65 BPM | SYSTOLIC BLOOD PRESSURE: 115 MMHG | RESPIRATION RATE: 18 BRPM | BODY MASS INDEX: 38.56 KG/M2 | DIASTOLIC BLOOD PRESSURE: 65 MMHG

## 2022-12-01 DIAGNOSIS — Z80.3 FAMILY HISTORY OF MALIGNANT NEOPLASM OF BREAST: ICD-10-CM

## 2022-12-01 DIAGNOSIS — Z17.1 MALIGNANT NEOPLASM OF UPPER-INNER QUADRANT OF LEFT BREAST IN FEMALE, ESTROGEN RECEPTOR NEGATIVE (H): ICD-10-CM

## 2022-12-01 DIAGNOSIS — Z17.1 MALIGNANT NEOPLASM OF UPPER-INNER QUADRANT OF LEFT BREAST IN FEMALE, ESTROGEN RECEPTOR NEGATIVE (H): Primary | ICD-10-CM

## 2022-12-01 DIAGNOSIS — C50.912 INVASIVE DUCTAL CARCINOMA OF BREAST, LEFT (H): ICD-10-CM

## 2022-12-01 DIAGNOSIS — C50.212 MALIGNANT NEOPLASM OF UPPER-INNER QUADRANT OF LEFT BREAST IN FEMALE, ESTROGEN RECEPTOR NEGATIVE (H): ICD-10-CM

## 2022-12-01 DIAGNOSIS — C50.212 MALIGNANT NEOPLASM OF UPPER-INNER QUADRANT OF LEFT BREAST IN FEMALE, ESTROGEN RECEPTOR NEGATIVE (H): Primary | ICD-10-CM

## 2022-12-01 DIAGNOSIS — I89.0 LYMPHEDEMA OF LEFT ARM: ICD-10-CM

## 2022-12-01 LAB
ALBUMIN SERPL BCG-MCNC: 4.6 G/DL (ref 3.5–5.2)
ALP SERPL-CCNC: 80 U/L (ref 35–104)
ALT SERPL W P-5'-P-CCNC: 39 U/L (ref 10–35)
ANION GAP SERPL CALCULATED.3IONS-SCNC: 9 MMOL/L (ref 7–15)
AST SERPL W P-5'-P-CCNC: 27 U/L (ref 10–35)
BILIRUB SERPL-MCNC: 0.2 MG/DL
BUN SERPL-MCNC: 12.9 MG/DL (ref 6–20)
CALCIUM SERPL-MCNC: 9.2 MG/DL (ref 8.6–10)
CHLORIDE SERPL-SCNC: 99 MMOL/L (ref 98–107)
CREAT SERPL-MCNC: 0.7 MG/DL (ref 0.51–0.95)
DEPRECATED HCO3 PLAS-SCNC: 29 MMOL/L (ref 22–29)
ERYTHROCYTE [DISTWIDTH] IN BLOOD BY AUTOMATED COUNT: 13.2 % (ref 10–15)
GFR SERPL CREATININE-BSD FRML MDRD: >90 ML/MIN/1.73M2
GLUCOSE SERPL-MCNC: 83 MG/DL (ref 70–99)
HCT VFR BLD AUTO: 41.8 % (ref 35–47)
HGB BLD-MCNC: 13.5 G/DL (ref 11.7–15.7)
MCH RBC QN AUTO: 31.3 PG (ref 26.5–33)
MCHC RBC AUTO-ENTMCNC: 32.3 G/DL (ref 31.5–36.5)
MCV RBC AUTO: 97 FL (ref 78–100)
PLATELET # BLD AUTO: 201 10E3/UL (ref 150–450)
POTASSIUM SERPL-SCNC: 3.8 MMOL/L (ref 3.4–5.3)
PROT SERPL-MCNC: 6.9 G/DL (ref 6.4–8.3)
RBC # BLD AUTO: 4.31 10E6/UL (ref 3.8–5.2)
SODIUM SERPL-SCNC: 137 MMOL/L (ref 136–145)
WBC # BLD AUTO: 9.9 10E3/UL (ref 4–11)

## 2022-12-01 PROCEDURE — 99214 OFFICE O/P EST MOD 30 MIN: CPT | Performed by: INTERNAL MEDICINE

## 2022-12-01 PROCEDURE — 85027 COMPLETE CBC AUTOMATED: CPT

## 2022-12-01 PROCEDURE — 36591 DRAW BLOOD OFF VENOUS DEVICE: CPT

## 2022-12-01 PROCEDURE — 80053 COMPREHEN METABOLIC PANEL: CPT

## 2022-12-01 PROCEDURE — G0463 HOSPITAL OUTPT CLINIC VISIT: HCPCS

## 2022-12-01 ASSESSMENT — PAIN SCALES - GENERAL: PAINLEVEL: SEVERE PAIN (6)

## 2022-12-01 NOTE — LETTER
"    12/1/2022         RE: Monika Franz  [address] [facility] [address]        Dear Colleague,    Thank you for referring your patient, Monika Franz, to the [facility] CANCER [facility]. Please see a copy of my visit note below.    Oncology Rooming Note    December 1, 2022 3:08 PM   Monika Franz is a 57 year old female who presents for:    Chief Complaint   Patient presents with     Oncology Clinic Visit     Malignant neoplasm of upper-inner quadrant of left breast in female, estrogen receptor negative (H)     Initial Vitals: /65   Pulse 65   Temp 98.6  F (37  C)   Resp 18   Wt 108.3 kg (238 lb 12.8 oz)   SpO2 97%   BMI 38.56 kg/m   Estimated body mass index is 38.56 kg/m  as calculated from the following:    Height as of 7/13/22: 1.676 m (5' 5.98\").    Weight as of this encounter: 108.3 kg (238 lb 12.8 oz). Body surface area is 2.25 meters squared.  Severe Pain (6) Comment: Data Unavailable   No LMP recorded. Patient has had a hysterectomy.  Allergies reviewed: Yes  Medications reviewed: Yes    Medications: Medication refills not needed today.  Pharmacy name entered into [employer]: [facility] [id number] [place] [address]    Clinical concerns: None       Tosha Cazares LPN                  [facility] cancer Care Progress Note  Patient: Monika Franz   MRN:  [id number]   Date of Service : Dec 1, 2022           Reason for visit      1. Invasive ductal carcinoma of breast, left (H)        Assessment     1.  A very pleasant 57 year old  woman with left-sided breast cancer at least T2 N0 M0 ER LA negative HER-2/joann negative. Started on sreekanth adjuvant chemotherapy. Responded well to treatment.  She finished 4 cycles of dose dense Adriamycin Cytoxan followed by 12 doses of of weekly paclitaxel.  Last chemotherapy in November 2020.  Had surgery in December 2020.  Complete pathological remission obtained in the invasive tumor.  There was some DCIS present " however.  Lymph nodes negative.  4.  Minimal elevated alkaline phosphatase in the past.  2.  Slightly overweight.  3.  Status post gastric sleeve in 2019.  4.  Slight lymphedema in the left arm.  Landon is having little bit of pain as well.  Especially after activity.    Plan     1.  At this time recommend active surveillance.  2.  Romana will come back in about 4 months with another set of blood test.  3.  Due to lymphedema I am going to recommend that she sees a lymphedema specialist.  Also gave her a prescription of lymphedema compression sleeve.  4.  Advised to elevate the left arm especially at nighttime above heart to minimize fluid collection.  5.  Annual iron level checked due to gastric sleeve surgery.  6.  Continue partake diet rich in calcium and vitamin D.      Clinical stage      Cancer Staging  Invasive ductal carcinoma of breast, left (H)  Staging form: Breast, AJCC 8th Edition  - Clinical stage from 6/22/2020: Stage IIB (cT2, cN0, cM0, G3, ER-, WI-, HER2: Equivocal) - Signed by Sue Dougherty CNP on 7/9/2020      History     Monika Franz is a very pleasant 57 year old  female with a history of triple negative left breast cancer.  The patient found this lump located in her left breast measuring approximately 2 cm in size, at 10 o'clock position, presenting with some stretching-like symptoms in her breast in the month of June 2020.  She was then seen by her primary care physician and had a mammogram done which was very suspicious.  A biopsy confirmed the presence of invasive ductal carcinoma ER negative WI negative HER-2/joann 2+ on immunohistochemistry negative on FISH.  It had high-grade features.  The ultrasound measures this mass to be 1.6 cm in size.  It was located 11 cm from the nipple.  No nipple inversion or any other symptoms.    The patient was seen by Dr. Allyson Douglas.  She had a Port-A-Cath placed for neoadjuvant chemotherapy.     She has past medical history of gastric sleeve  surgery about a year ago.  She is taking some vitamin B12 and other supplements.     She had a hysterectomy few years ago.     As for the breast cancer risk factors are concerned she is G4, P3.  First child at age 29.  Breast-feeding for all 3 of them.  No prior breast biopsies.  No significant family history.    She  started on neoadjuvant chemotherapy on 9 July 2020 with dose dense adriamysin cyclophosphamide followed by paclitaxel.   After second cycle she noticed decrease in the size of the breast lesion.  In fact it was not palpable anymore.  She has finished 4 cycles of dose dense Adriamycin and cyclophosphamide and then finished 12 doses of weekly paclitaxel. Last treatment was on the 18th of November.     She had surgery in the middle of December 2020.  She underwent bilateral mastectomy.  She did not have reconstruction but she had initially thought of doing.  Fortunately she had a complete remission noted on the invasive tumor.  There was some DCIS present however.  The 3 sentinel lymph nodes were negative for any invasive tumor.  There was no evidence of any scarring in them either.    Now she is on surveillance. Seems to be doing well. Had bunion surgery on her right foot in March 2021 along with hammer toe repair.    Patient comes in today for scheduled follow-up.  Overall she is doing fine.  No new medical events to report.  Does have some achiness in her left arm.  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  So after hard days work the arm is slightly achy.  Otherwise doing well.  No family history changes.      Past Medical History     Past Medical History:   Diagnosis Date     Anxiety      Breast cancer (H)      Depression      Sleep apnea     Uses CPAP       Review of Systems   Review of system noted in the history.    ECOG performance status and Distress Assessment      ECOG Performance:    ECOG Performance Status: 1    Distress Assessment  Distress Assessment Score: No distress:      Pain Status  Currently in Pain: No/denies        Vital Signs     /65   Pulse 65   Temp 98.6  F (37  C)   Resp 18   Wt 108.3 kg (238 lb 12.8 oz)   SpO2 97%   BMI 38.56 kg/m       Physical Exam     GENERAL: No acute distress. Cooperative in conversation.   HEENT:  Pupils are equal, round and reactive. Oral mucosa is clean and intact. No ulcerations or mucositis noted. No bleeding noted.    RESP:Chest symmetric lungs are clear bilaterally per auscultation. Regular respiratory rate. No wheezes or rhonchi.  CV: Normal S1 S2 Regular, rate and rhythm. No murmurs.   ABD: Nondistended, soft, nontender. Positive bowel sounds. No organomegaly.   EXTREMITIES: No lower extremity edema.  Circumference measurement of the upper extremity reveals slight increased size of the left lower arm compared to the right arm.  NEURO: Non- focal. Alert and oriented x3.  Cranial nerves appear intact.  PSYCH: Within normal limits. No depression or anxiety.  SKIN: Warm dry intact.  LYMPH NODES: Neck supraclavicular fossa and armpits examined.  CHEST WALL: Bilateral chest wall examination was done.  No evidence of any recurrence.      Lab Results     Recent Results (from the past 240 hour(s))   CBC with platelets    Collection Time: 12/01/22  2:14 PM   Result Value Ref Range    WBC Count 9.9 4.0 - 11.0 10e3/uL    RBC Count 4.31 3.80 - 5.20 10e6/uL    Hemoglobin 13.5 11.7 - 15.7 g/dL    Hematocrit 41.8 35.0 - 47.0 %    MCV 97 78 - 100 fL    MCH 31.3 26.5 - 33.0 pg    MCHC 32.3 31.5 - 36.5 g/dL    RDW 13.2 10.0 - 15.0 %    Platelet Count 201 150 - 450 10e3/uL   Comprehensive metabolic panel    Collection Time: 12/01/22  2:14 PM   Result Value Ref Range    Sodium 137 136 - 145 mmol/L    Potassium 3.8 3.4 - 5.3 mmol/L    Chloride 99 98 - 107 mmol/L    Carbon Dioxide (CO2) 29 22 - 29 mmol/L    Anion Gap 9 7 - 15 mmol/L    Urea Nitrogen 12.9 6.0 - 20.0 mg/dL    Creatinine 0.70 0.51 - 0.95 mg/dL    Calcium 9.2 8.6 - 10.0 mg/dL    Glucose  83 70 - 99 mg/dL    Alkaline Phosphatase 80 35 - 104 U/L    AST 27 10 - 35 U/L    ALT 39 (H) 10 - 35 U/L    Protein Total 6.9 6.4 - 8.3 g/dL    Albumin 4.6 3.5 - 5.2 g/dL    Bilirubin Total 0.2 <=1.2 mg/dL    GFR Estimate >90 >60 mL/min/1.73m2        Imaging Results     No results found.         Amando Monson MD        Again, thank you for allowing me to participate in the care of your patient.        Sincerely,        Amando Monson MD, MD

## 2022-12-01 NOTE — PROGRESS NOTES
"Oncology Rooming Note    December 1, 2022 3:08 PM   Monika Franz is a 57 year old female who presents for:    Chief Complaint   Patient presents with     Oncology Clinic Visit     Malignant neoplasm of upper-inner quadrant of left breast in female, estrogen receptor negative (H)     Initial Vitals: /65   Pulse 65   Temp 98.6  F (37  C)   Resp 18   Wt 108.3 kg (238 lb 12.8 oz)   SpO2 97%   BMI 38.56 kg/m   Estimated body mass index is 38.56 kg/m  as calculated from the following:    Height as of 7/13/22: 1.676 m (5' 5.98\").    Weight as of this encounter: 108.3 kg (238 lb 12.8 oz). Body surface area is 2.25 meters squared.  Severe Pain (6) Comment: Data Unavailable   No LMP recorded. Patient has had a hysterectomy.  Allergies reviewed: Yes  Medications reviewed: Yes    Medications: Medication refills not needed today.  Pharmacy name entered into [facility]: [facility] [id number] [place] [address]    Clinical concerns: None       oTsha Cazares LPN            "

## 2022-12-01 NOTE — PROGRESS NOTES
[facility] cancer Care Progress Note  Patient: Monika Franz   MRN:  [id number]   Date of Service : Dec 1, 2022           Reason for visit      1. Invasive ductal carcinoma of breast, left (H)        Assessment     1.  A very pleasant 57 year old  woman with left-sided breast cancer at least T2 N0 M0 ER DE negative HER-2/joann negative. Started on sreekanth adjuvant chemotherapy. Responded well to treatment.  She finished 4 cycles of dose dense Adriamycin Cytoxan followed by 12 doses of of weekly paclitaxel.  Last chemotherapy in November 2020.  Had surgery in December 2020.  Complete pathological remission obtained in the invasive tumor.  There was some DCIS present however.  Lymph nodes negative.  4.  Minimal elevated alkaline phosphatase in the past.  2.  Slightly overweight.  3.  Status post gastric sleeve in 2019.  4.  Slight lymphedema in the left arm.  Landon is having little bit of pain as well.  Especially after activity.    Plan     1.  At this time recommend active surveillance.  2.  Romana will come back in about 4 months with another set of blood test.  3.  Due to lymphedema I am going to recommend that she sees a lymphedema specialist.  Also gave her a prescription of lymphedema compression sleeve.  4.  Advised to elevate the left arm especially at nighttime above heart to minimize fluid collection.  5.  Annual iron level checked due to gastric sleeve surgery.  6.  Continue partake diet rich in calcium and vitamin D.      Clinical stage      Cancer Staging  Invasive ductal carcinoma of breast, left (H)  Staging form: Breast, AJCC 8th Edition  - Clinical stage from 6/22/2020: Stage IIB (cT2, cN0, cM0, G3, ER-, DE-, HER2: Equivocal) - Signed by Sue Dougherty CNP on 7/9/2020      History     Monika Franz is a very pleasant 57 year old  female with a history of triple negative left breast cancer.  The patient found this lump located in her left breast measuring approximately 2 cm in  size, at 10 o'clock position, presenting with some stretching-like symptoms in her breast in the month of June 2020.  She was then seen by her primary care physician and had a mammogram done which was very suspicious.  A biopsy confirmed the presence of invasive ductal carcinoma ER negative OR negative HER-2/joann 2+ on immunohistochemistry negative on FISH.  It had high-grade features.  The ultrasound measures this mass to be 1.6 cm in size.  It was located 11 cm from the nipple.  No nipple inversion or any other symptoms.    The patient was seen by Dr. Allyson Douglas.  She had a Port-A-Cath placed for neoadjuvant chemotherapy.     She has past medical history of gastric sleeve surgery about a year ago.  She is taking some vitamin B12 and other supplements.     She had a hysterectomy few years ago.     As for the breast cancer risk factors are concerned she is G4, P3.  First child at age 29.  Breast-feeding for all 3 of them.  No prior breast biopsies.  No significant family history.    She  started on neoadjuvant chemotherapy on 9 July 2020 with dose dense adriamysin cyclophosphamide followed by paclitaxel.   After second cycle she noticed decrease in the size of the breast lesion.  In fact it was not palpable anymore.  She has finished 4 cycles of dose dense Adriamycin and cyclophosphamide and then finished 12 doses of weekly paclitaxel. Last treatment was on the 18th of November.     She had surgery in the middle of December 2020.  She underwent bilateral mastectomy.  She did not have reconstruction but she had initially thought of doing.  Fortunately she had a complete remission noted on the invasive tumor.  There was some DCIS present however.  The 3 sentinel lymph nodes were negative for any invasive tumor.  There was no evidence of any scarring in them either.    Now she is on surveillance. Seems to be doing well. Had bunion surgery on her right foot in March 2021 along with hammer toe repair.    Patient comes  in today for scheduled follow-up.  Overall she is doing fine.  No new medical events to report.  Does have some achiness in her left arm.  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  So after hard days work the arm is slightly achy.  Otherwise doing well.  No family history changes.      Past Medical History     Past Medical History:   Diagnosis Date     Anxiety      Breast cancer (H)      Depression      Sleep apnea     Uses CPAP       Review of Systems   Review of system noted in the history.    ECOG performance status and Distress Assessment      ECOG Performance:    ECOG Performance Status: 1    Distress Assessment  Distress Assessment Score: No distress:     Pain Status  Currently in Pain: No/denies        Vital Signs     /65   Pulse 65   Temp 98.6  F (37  C)   Resp 18   Wt 108.3 kg (238 lb 12.8 oz)   SpO2 97%   BMI 38.56 kg/m       Physical Exam     GENERAL: No acute distress. Cooperative in conversation.   HEENT:  Pupils are equal, round and reactive. Oral mucosa is clean and intact. No ulcerations or mucositis noted. No bleeding noted.    RESP:Chest symmetric lungs are clear bilaterally per auscultation. Regular respiratory rate. No wheezes or rhonchi.  CV: Normal S1 S2 Regular, rate and rhythm. No murmurs.   ABD: Nondistended, soft, nontender. Positive bowel sounds. No organomegaly.   EXTREMITIES: No lower extremity edema.  Circumference measurement of the upper extremity reveals slight increased size of the left lower arm compared to the right arm.  NEURO: Non- focal. Alert and oriented x3.  Cranial nerves appear intact.  PSYCH: Within normal limits. No depression or anxiety.  SKIN: Warm dry intact.  LYMPH NODES: Neck supraclavicular fossa and armpits examined.  CHEST WALL: Bilateral chest wall examination was done.  No evidence of any recurrence.      Lab Results     Recent Results (from the past 240 hour(s))   CBC with platelets    Collection Time: 12/01/22  2:14 PM   Result Value  Ref Range    WBC Count 9.9 4.0 - 11.0 10e3/uL    RBC Count 4.31 3.80 - 5.20 10e6/uL    Hemoglobin 13.5 11.7 - 15.7 g/dL    Hematocrit 41.8 35.0 - 47.0 %    MCV 97 78 - 100 fL    MCH 31.3 26.5 - 33.0 pg    MCHC 32.3 31.5 - 36.5 g/dL    RDW 13.2 10.0 - 15.0 %    Platelet Count 201 150 - 450 10e3/uL   Comprehensive metabolic panel    Collection Time: 12/01/22  2:14 PM   Result Value Ref Range    Sodium 137 136 - 145 mmol/L    Potassium 3.8 3.4 - 5.3 mmol/L    Chloride 99 98 - 107 mmol/L    Carbon Dioxide (CO2) 29 22 - 29 mmol/L    Anion Gap 9 7 - 15 mmol/L    Urea Nitrogen 12.9 6.0 - 20.0 mg/dL    Creatinine 0.70 0.51 - 0.95 mg/dL    Calcium 9.2 8.6 - 10.0 mg/dL    Glucose 83 70 - 99 mg/dL    Alkaline Phosphatase 80 35 - 104 U/L    AST 27 10 - 35 U/L    ALT 39 (H) 10 - 35 U/L    Protein Total 6.9 6.4 - 8.3 g/dL    Albumin 4.6 3.5 - 5.2 g/dL    Bilirubin Total 0.2 <=1.2 mg/dL    GFR Estimate >90 >60 mL/min/1.73m2        Imaging Results     No results found.         Amando Monson MD

## 2022-12-23 ENCOUNTER — HOSPITAL ENCOUNTER (OUTPATIENT)
Dept: PHYSICAL THERAPY | Facility: REHABILITATION | Age: 57
Discharge: HOME | End: 2022-12-23
Payer: COMMERCIAL

## 2022-12-23 DIAGNOSIS — I89.0 LYMPHEDEMA: Primary | ICD-10-CM

## 2022-12-23 PROCEDURE — 97140 MANUAL THERAPY 1/> REGIONS: CPT | Mod: GP | Performed by: PHYSICAL THERAPIST

## 2022-12-23 PROCEDURE — 97161 PT EVAL LOW COMPLEX 20 MIN: CPT | Mod: GP | Performed by: PHYSICAL THERAPIST

## 2022-12-23 PROCEDURE — 97110 THERAPEUTIC EXERCISES: CPT | Mod: GP | Performed by: PHYSICAL THERAPIST

## 2022-12-23 NOTE — PROGRESS NOTES
12/23/22 0900   Rehab Discipline   Discipline PT   Type of Visit   Type of visit Initial Edema Evaluation       present No   General Information   Start of care 12/23/22   Referring physician Dr Monson   Orders Evaluate and treat as indicated   Order date 12/01/22   Medical diagnosis Malignant neoplasm of upper-inner quadrant of left breast in female, estrogen receptor negative (H)  C50.912 (ICD-10-CM) - Invasive ductal carcinoma of breast, left (H)  I89.0 (ICD-10-CM) - Lymphedema of left arm   Onset of illness / date of surgery 12/01/22   Edema onset   (Sept 2022)   Affected body parts LUE   Edema etiology Cancer with lymph node dissection;Chemo;Surgery   Location - Cancer with lymph node dissection Left breast cancer with 3 LN disection( negative for malignancy)   Chemotherapy comments 16 rounds finished 2 years ago   Pertinent history of current problem (PT: include personal factors and/or comorbidities that impact the POC; OT: include additional occupational profile info) Patient was dx with L breast cancer in june 2020 and had 16 sessions of chemotherapy with surgyer for B mastectomy and LN dissection. Patient noted increase in edema in the L UE this fall with increase in stiffness, heaviness and aching pain in the arm. She does not have a compression sleeve.   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 No   Have you fallen and had an injury in the past year? No   Is patient a fall risk? No   Fall screen comments n   Abuse Screen (yes response referral indicated)   Feels  Unsafe at Home or Work/School no   Feels Threatened by Someone no   Does Anyone Try to Keep You From Having Contact with Others or Doing Things Outside Your Home? no   Physical Signs of Abuse Present no   Patient needs abuse support services and resources No   System Outcome Measures   Outcome Measures Lymphedema   Lymphedema Life Impact Scale (score range 0-72). A higher score indicates greater impairment. 25   Subjective Report   Patient report of symptoms achiness, aching, full and stiffness   Patient / Family Goals   Patient / family goals statement to feel more comfortable   Pain   Pain comments aching pain in the left arm and shoulder   Cognitive Status   Orientation Orientation to person, place and time   Level of consciousness Alert   Follows commands and answers questions 100% of the time   Personal safety and judgement Intact   Memory Intact   Edema Exam / Assessment   Skin condition Non-pitting;Intact   Scar Yes   Location chest from B mastectomy   Mobility WNL   Stemmer sign Negative   Ulceration No   Girth Measurements   Girth Measurements Refer to separate girth measurement flowsheet   Volume UE   Right UE (mL) 2767.08   Left UE (mL) 2720.1   UE volume comparison RUE volume greater than LUE volume   % difference 1.7%   Range of Motion   ROM No deficits were identified   ROM comments WNL flexion, abduction, ER and IR in PROM in supine today, no pain with movement   Strength   Strength No deficits were identified   Posture   Posture Forward head position;Protracted shoulders   Sensory   Sensory perception Light touch   Light touch Intact   Coordination   Coordination Gross motor coordination appropriate   Muscle Tone   Muscle tone No deficits were identified   Planned Edema Interventions   Planned edema interventions Manual lymph drainage;Gradient compression bandaging;Fit for compression garment;Exercises;Precautions to prevent infection / exacerbation;Education;Manual therapy;Skin care /  precautions;Soft tissue mobilization;Scar mobilization;Myofascial release;Home management program development   Clinical Impression   Criteria for skilled therapeutic intervention met Yes   Therapy diagnosis Lymphedema left arm   Influenced by the following impairments / conditions Stage 1   Functional limitations due to impairments / conditions reaching, lifting, carrying   Clinical Presentation Stable/Uncomplicated   Clinical Decision Making (Complexity) Low complexity   Treatment Frequency 1x/week;2x/week   Treatment duration up to 12 weeks for up to 12 sessions   Patient / family and/or staff in agreement with plan of care Yes   Risks and benefits of therapy have been explained Yes   Clinical impression comments Patient is a 58 yo F with hx of left breast cancer that had chemotherapy and B mastectomies with 3 LN removed in 2020. Paitent noted an increase soreness, fatigue and heaviness feeling in the L arm since Sept 2022. When talking with the MD noted it was likely from lymphedema. Patient has not had therapy in the past, she did not have a L arm larger than the right side but no presurgical measures are available to now how much edema is there now vs before. Patient will benefit from skilled therapy to instruct in self care, banadaging, exercises, self massage to improve arm comfort and edema.   Education Assessment   Preferred learning style Listening;Reading;Demonstration;Pictures / video   Barriers to learning No barriers   Goals   Edema Eval Goals 1;2;3   Goal 1   Goal identifier HEP   Goal description Patient will be independent in a HEP in 12 weeks   Target date 03/17/23   Goal 2   Goal identifier Self care/home management   Goal description Patinet will be independent in self care and home management techniques such as bandaging, compression garment wear and care, self massage and skin care to assist in management of symptoms to prevent infections in 12 weeks   Target date 03/17/23   Goal 3   Goal  identifier Volumes,   Goal description Patient will demonstrate a decrease in edema volumes for decrease risk for increase and improvement in pain and function of the arm in 12 weeks   Target date 03/17/23   Total Evaluation Time   PT Car, Low Complexity Minutes (54673) 30     Edie Thomas, PT, DPT, CLT-DANY

## 2022-12-30 ENCOUNTER — HOSPITAL ENCOUNTER (OUTPATIENT)
Dept: PHYSICAL THERAPY | Facility: REHABILITATION | Age: 57
Discharge: HOME | End: 2022-12-30
Payer: COMMERCIAL

## 2022-12-30 DIAGNOSIS — Z17.1 MALIGNANT NEOPLASM OF UPPER-INNER QUADRANT OF LEFT BREAST IN FEMALE, ESTROGEN RECEPTOR NEGATIVE (H): ICD-10-CM

## 2022-12-30 DIAGNOSIS — C50.912 INVASIVE DUCTAL CARCINOMA OF BREAST, LEFT (H): ICD-10-CM

## 2022-12-30 DIAGNOSIS — I89.0 LYMPHEDEMA OF LEFT ARM: ICD-10-CM

## 2022-12-30 DIAGNOSIS — I89.0 LYMPHEDEMA: Primary | ICD-10-CM

## 2022-12-30 DIAGNOSIS — C50.212 MALIGNANT NEOPLASM OF UPPER-INNER QUADRANT OF LEFT BREAST IN FEMALE, ESTROGEN RECEPTOR NEGATIVE (H): ICD-10-CM

## 2022-12-30 PROCEDURE — 97140 MANUAL THERAPY 1/> REGIONS: CPT | Mod: GP | Performed by: PHYSICAL THERAPIST

## 2023-01-10 ENCOUNTER — HOSPITAL ENCOUNTER (OUTPATIENT)
Dept: PHYSICAL THERAPY | Facility: REHABILITATION | Age: 58
Discharge: HOME | End: 2023-01-10
Attending: INTERNAL MEDICINE
Payer: COMMERCIAL

## 2023-01-10 DIAGNOSIS — I89.0 LYMPHEDEMA: ICD-10-CM

## 2023-01-10 DIAGNOSIS — I89.0 LYMPHEDEMA OF LEFT ARM: Primary | ICD-10-CM

## 2023-01-10 PROCEDURE — 97140 MANUAL THERAPY 1/> REGIONS: CPT | Mod: GP | Performed by: PHYSICAL THERAPIST

## 2023-01-17 ENCOUNTER — HOSPITAL ENCOUNTER (OUTPATIENT)
Dept: PHYSICAL THERAPY | Facility: REHABILITATION | Age: 58
Discharge: HOME | End: 2023-01-17
Payer: COMMERCIAL

## 2023-01-17 DIAGNOSIS — I89.0 LYMPHEDEMA OF LEFT ARM: Primary | ICD-10-CM

## 2023-01-17 PROCEDURE — 97535 SELF CARE MNGMENT TRAINING: CPT | Mod: GP | Performed by: PHYSICAL THERAPIST

## 2023-01-17 PROCEDURE — 97140 MANUAL THERAPY 1/> REGIONS: CPT | Mod: GP | Performed by: PHYSICAL THERAPIST

## 2023-02-17 ENCOUNTER — HOSPITAL ENCOUNTER (OUTPATIENT)
Dept: PHYSICAL THERAPY | Facility: REHABILITATION | Age: 58
Discharge: HOME | End: 2023-02-17
Payer: COMMERCIAL

## 2023-02-17 DIAGNOSIS — I89.0 LYMPHEDEMA OF LEFT ARM: Primary | ICD-10-CM

## 2023-02-17 PROCEDURE — 97140 MANUAL THERAPY 1/> REGIONS: CPT | Mod: GP | Performed by: PHYSICAL THERAPIST

## 2023-02-27 ENCOUNTER — HOSPITAL ENCOUNTER (OUTPATIENT)
Dept: PHYSICAL THERAPY | Facility: REHABILITATION | Age: 58
Discharge: HOME | End: 2023-02-27
Payer: COMMERCIAL

## 2023-02-27 DIAGNOSIS — I89.0 LYMPHEDEMA OF LEFT ARM: Primary | ICD-10-CM

## 2023-02-27 DIAGNOSIS — I89.0 LYMPHEDEMA: ICD-10-CM

## 2023-02-27 PROCEDURE — 97110 THERAPEUTIC EXERCISES: CPT | Mod: GP | Performed by: PHYSICAL THERAPIST

## 2023-03-06 ENCOUNTER — HOSPITAL ENCOUNTER (OUTPATIENT)
Dept: PHYSICAL THERAPY | Facility: REHABILITATION | Age: 58
Discharge: HOME | End: 2023-03-06
Payer: COMMERCIAL

## 2023-03-06 DIAGNOSIS — I89.0 LYMPHEDEMA OF LEFT ARM: Primary | ICD-10-CM

## 2023-03-06 PROCEDURE — 97140 MANUAL THERAPY 1/> REGIONS: CPT | Mod: GP | Performed by: PHYSICAL THERAPIST

## 2023-03-09 ASSESSMENT — ENCOUNTER SYMPTOMS
HEADACHES: 0
JOINT SWELLING: 0
CONSTIPATION: 0
SHORTNESS OF BREATH: 0
WEAKNESS: 0
FREQUENCY: 0
ARTHRALGIAS: 0
NERVOUS/ANXIOUS: 0
HEMATOCHEZIA: 0
CHILLS: 0
MYALGIAS: 0
PALPITATIONS: 0
BREAST MASS: 0
NAUSEA: 0
HEARTBURN: 0
DIARRHEA: 0
ABDOMINAL PAIN: 0
DYSURIA: 0
FEVER: 0
SORE THROAT: 0
EYE PAIN: 0
COUGH: 0
PARESTHESIAS: 0
DIZZINESS: 0
HEMATURIA: 0

## 2023-03-13 ENCOUNTER — HOSPITAL ENCOUNTER (OUTPATIENT)
Dept: PHYSICAL THERAPY | Facility: REHABILITATION | Age: 58
Discharge: HOME | End: 2023-03-13
Payer: COMMERCIAL

## 2023-03-13 DIAGNOSIS — I89.0 LYMPHEDEMA OF LEFT ARM: Primary | ICD-10-CM

## 2023-03-13 PROCEDURE — 97110 THERAPEUTIC EXERCISES: CPT | Mod: GP | Performed by: PHYSICAL THERAPIST

## 2023-03-13 PROCEDURE — 97140 MANUAL THERAPY 1/> REGIONS: CPT | Mod: GP | Performed by: PHYSICAL THERAPIST

## 2023-03-13 NOTE — PROGRESS NOTES
[facility] Rehabilitation Service    Outpatient Physical Therapy Discharge Note  Patient: Monika Franz  : 1965    Beginning/End Dates of Reporting Period:  22 to 3/13/2023     Referring Provider: Dr. Amando Monson     Therapy Diagnosis: Lymphedema left arm     Client Self Report: Pt reports no changes; been wearing the sleeve, working on Strength ABC 2x/week    Objective Measurements:  See below/separate girth measurements    Outcome Measures (most recent score):  Lymphedema Life Impact Scale (score range 0-72). A higher score indicates greater impairment.: 0    Goals:  See below    Plan:  Discharge from therapy.    Discharge:    Reason for Discharge: Patient has met all goals.    Equipment Issued: N/A    Discharge Plan: Patient to continue home program.       23 1600   Signing Clinician's Name / Credentials   Signing clinician's name / credentials Moncho Gutierrez PT, CLT   Session Number   Session Number    Progress Note/Recertification   Progress Note Due Date   (visit 10)   Adult Goals   Edema Eval Goals 1;2;3   Goal 1   Goal identifier HEP   Goal description Patient will be independent in a HEP in 12 weeks   Goal Progress Goal met, pt is indep with exercises   Target date 23   Date met 23   Goal 2   Goal identifier Self care/home management   Goal description Patinet will be independent in self care and home management techniques such as bandaging, compression garment wear and care, self massage and skin care to assist in management of symptoms to prevent infections in 12 weeks   Goal Progress Goal met, pt is indep with exercises, self MLD, use of compression and pump   Target date 23   Date met 23   Goal 3   Goal identifier Volumes,   Goal description Patient will demonstrate a decrease in edema volumes for decrease risk for increase and improvement in pain and function of the  arm in 12 weeks   Goal Progress Goal met, volumes decreased; pain is now gone   Target date 03/17/23   Date met 03/13/23       Present No   Subjective Report   Subjective Report Pt reports no changes; been wearing the sleeve, working on Strength ABC 2x/week   Objective Measures   Objective Measures Objective Measure 1   Objective Measure 1   Objective Measure Edema   Details Refer to separate girth measurements, decrease of 1.2% in L UE   System Outcome Measures   Outcome Measures Lymphedema   Lymphedema Life Impact Scale (score range 0-72). A higher score indicates greater impairment. 0   Treatment Interventions   Interventions Manual Therapy;Therapeutic Procedure/Exercise;Self Care/Home Management   Therapeutic Procedure/exercise   Therapeutic Procedures: strength, endurance, ROM, flexibillity minutes (25860) 41   Skilled Intervention Pt given verbal, visual and tactile cueing with demonstration to perform correct technique and appropriate alignment for exercises attempted. Provided rationale for exercises performed and educated on the importance of completing them as instructed.   Patient Response Tolerated well   Treatment Detail Strength After Breast Cancer session 2 completed today. Assessment of workout log. Nustep warm up x 10 minutes, Assessment of initial exercises including: UE/LE stretches x 15 seconds each, core strengthening x 10 each, chest press with 2 lb weights, 10 x 3, squats with 2 lb weights (wall squat today), 10 x 3, one arm row with 2 lb weight, 10 x 3 and dead lift with 2 lb weights, 10 x 3. Added the following exercises: scaption with 2 lb weights, 10 x 3, step ups with 2 lb weights, 10 x 3, tricep kickbacks with 2 lb weights, 10 x 3, calf raises with 2 lb weights, 10 x 3, bicep curls with 2 lb weights, 10 x 3   Manual Therapy   Manual Therapy: Mobilization, MFR, MLD, friction massage minutes (74425) 10   Skilled Intervention Measurement   Patient Response Pt happy  with progress   Treatment Detail Measurement of L UE volume in supine and pt educated on progress   Equipment Needs   Equipment Needs Pt has Flexitouch pump and compression sleeves   Education   Learner Patient   Readiness Acceptance   Method Explanation;Demonstration;Booklet/handout   Response Verbalizes Understanding   Plan   Homework PTRx Code   Home program UE lymphedema exercises, doorway pec stretching. Wear sleeve daily and use pump daily   Updates to plan of care ABC 2x/week   Plan for next session N/A   Comments   Comments Pt has been seen for 8 visits from 12/23/22 to present with initial focus on decongestive therapy to improve function/size of L UE and more recently on strength After Breast Cancer instruction. At this point she is independent with all home management, use of compression garments, massage, exercise and MLD and the volume of her L UE did decrease slightly (previously it had increased with remeasurement). She is appropriate for D/C at this time and can return for a yearly check up or if problems arise.   Total Session Time   Timed Code Treatment Minutes 51   Total Treatment Time (sum of timed and untimed services) 51   Medicare Claim Information   Medical diagnosis Malignant neoplasm of upper-inner quadrant of left breast in female, estrogen receptor negative (H)  C50.912 (ICD-10-CM) - Invasive ductal carcinoma of breast, left (H)  I89.0 (ICD-10-CM) - Lymphedema of left arm   Therapy diagnosis Lymphedema left arm   Start of care 12/23/22   Onset of illness / date of surgery 12/01/22       Moncho Gutierrez, PT, DPT, CLT  3/13/2023

## 2023-03-16 ENCOUNTER — OFFICE VISIT (OUTPATIENT)
Dept: INTERNAL MEDICINE | Facility: CLINIC | Age: 58
End: 2023-03-16
Payer: COMMERCIAL

## 2023-03-16 VITALS
HEART RATE: 70 BPM | BODY MASS INDEX: 36.74 KG/M2 | HEIGHT: 68 IN | WEIGHT: 242.4 LBS | RESPIRATION RATE: 17 BRPM | DIASTOLIC BLOOD PRESSURE: 64 MMHG | SYSTOLIC BLOOD PRESSURE: 108 MMHG

## 2023-03-16 DIAGNOSIS — F33.2 SEVERE EPISODE OF RECURRENT MAJOR DEPRESSIVE DISORDER, WITHOUT PSYCHOTIC FEATURES (H): ICD-10-CM

## 2023-03-16 DIAGNOSIS — C50.212 MALIGNANT NEOPLASM OF UPPER-INNER QUADRANT OF LEFT BREAST IN FEMALE, ESTROGEN RECEPTOR NEGATIVE (H): ICD-10-CM

## 2023-03-16 DIAGNOSIS — L30.9 ECZEMA, UNSPECIFIED TYPE: ICD-10-CM

## 2023-03-16 DIAGNOSIS — H40.033 NARROW ANGLE GLAUCOMA SUSPECT OF BOTH EYES: ICD-10-CM

## 2023-03-16 DIAGNOSIS — Z00.00 ROUTINE GENERAL MEDICAL EXAMINATION AT A HEALTH CARE FACILITY: Primary | ICD-10-CM

## 2023-03-16 DIAGNOSIS — Z98.84 BARIATRIC SURGERY STATUS: ICD-10-CM

## 2023-03-16 DIAGNOSIS — Z17.1 MALIGNANT NEOPLASM OF UPPER-INNER QUADRANT OF LEFT BREAST IN FEMALE, ESTROGEN RECEPTOR NEGATIVE (H): ICD-10-CM

## 2023-03-16 DIAGNOSIS — F33.2 SEVERE RECURRENT MAJOR DEPRESSION WITHOUT PSYCHOTIC FEATURES (H): ICD-10-CM

## 2023-03-16 DIAGNOSIS — G47.33 OSA (OBSTRUCTIVE SLEEP APNEA): ICD-10-CM

## 2023-03-16 DIAGNOSIS — E66.01 MORBID OBESITY (H): ICD-10-CM

## 2023-03-16 PROBLEM — T45.1X5A CHEMOTHERAPY-INDUCED NEUTROPENIA (H): Status: RESOLVED | Noted: 2020-07-01 | Resolved: 2023-03-16

## 2023-03-16 PROBLEM — D70.1 CHEMOTHERAPY-INDUCED NEUTROPENIA (H): Status: RESOLVED | Noted: 2020-07-01 | Resolved: 2023-03-16

## 2023-03-16 LAB
ALBUMIN SERPL BCG-MCNC: 4.3 G/DL (ref 3.5–5.2)
ALP SERPL-CCNC: 67 U/L (ref 35–104)
ALT SERPL W P-5'-P-CCNC: 27 U/L (ref 10–35)
ANION GAP SERPL CALCULATED.3IONS-SCNC: 11 MMOL/L (ref 7–15)
AST SERPL W P-5'-P-CCNC: 24 U/L (ref 10–35)
BILIRUB SERPL-MCNC: 0.2 MG/DL
BUN SERPL-MCNC: 11 MG/DL (ref 6–20)
CALCIUM SERPL-MCNC: 9.5 MG/DL (ref 8.6–10)
CHLORIDE SERPL-SCNC: 107 MMOL/L (ref 98–107)
CHOLEST SERPL-MCNC: 160 MG/DL
CREAT SERPL-MCNC: 0.72 MG/DL (ref 0.51–0.95)
DEPRECATED HCO3 PLAS-SCNC: 27 MMOL/L (ref 22–29)
FERRITIN SERPL-MCNC: 205 NG/ML (ref 11–328)
GFR SERPL CREATININE-BSD FRML MDRD: >90 ML/MIN/1.73M2
GLUCOSE SERPL-MCNC: 99 MG/DL (ref 70–99)
HDLC SERPL-MCNC: 67 MG/DL
IRON BINDING CAPACITY (ROCHE): 281 UG/DL (ref 240–430)
IRON SATN MFR SERPL: 20 % (ref 15–46)
IRON SERPL-MCNC: 57 UG/DL (ref 37–145)
LDLC SERPL CALC-MCNC: 79 MG/DL
NONHDLC SERPL-MCNC: 93 MG/DL
POTASSIUM SERPL-SCNC: 4.2 MMOL/L (ref 3.4–5.3)
PROT SERPL-MCNC: 6.9 G/DL (ref 6.4–8.3)
SODIUM SERPL-SCNC: 145 MMOL/L (ref 136–145)
TRIGL SERPL-MCNC: 69 MG/DL
VIT B12 SERPL-MCNC: 1348 PG/ML (ref 232–1245)

## 2023-03-16 PROCEDURE — 83540 ASSAY OF IRON: CPT | Performed by: NURSE PRACTITIONER

## 2023-03-16 PROCEDURE — 83550 IRON BINDING TEST: CPT | Performed by: NURSE PRACTITIONER

## 2023-03-16 PROCEDURE — 36415 COLL VENOUS BLD VENIPUNCTURE: CPT | Performed by: NURSE PRACTITIONER

## 2023-03-16 PROCEDURE — 80053 COMPREHEN METABOLIC PANEL: CPT | Performed by: NURSE PRACTITIONER

## 2023-03-16 PROCEDURE — 99396 PREV VISIT EST AGE 40-64: CPT | Performed by: NURSE PRACTITIONER

## 2023-03-16 PROCEDURE — 80061 LIPID PANEL: CPT | Performed by: NURSE PRACTITIONER

## 2023-03-16 PROCEDURE — 82728 ASSAY OF FERRITIN: CPT | Performed by: NURSE PRACTITIONER

## 2023-03-16 PROCEDURE — 82607 VITAMIN B-12: CPT | Performed by: NURSE PRACTITIONER

## 2023-03-16 RX ORDER — TRIAMCINOLONE ACETONIDE 1 MG/G
CREAM TOPICAL 2 TIMES DAILY
Qty: 30 G | Refills: 0 | Status: SHIPPED | OUTPATIENT
Start: 2023-03-16

## 2023-03-16 ASSESSMENT — PAIN SCALES - GENERAL: PAINLEVEL: NO PAIN (0)

## 2023-03-16 ASSESSMENT — PATIENT HEALTH QUESTIONNAIRE - PHQ9
10. IF YOU CHECKED OFF ANY PROBLEMS, HOW DIFFICULT HAVE THESE PROBLEMS MADE IT FOR YOU TO DO YOUR WORK, TAKE CARE OF THINGS AT HOME, OR GET ALONG WITH OTHER PEOPLE: NOT DIFFICULT AT ALL
SUM OF ALL RESPONSES TO PHQ QUESTIONS 1-9: 1
SUM OF ALL RESPONSES TO PHQ QUESTIONS 1-9: 1

## 2023-03-16 NOTE — PROGRESS NOTES
SUBJECTIVE:   CC: Monika is an 57 year old who presents for preventive health visit.     The right knee is bothering her, she will have TKA in the summer.     Patient has been advised of split billing requirements and indicates understanding: Yes     Healthy Habits:     Getting at least 3 servings of Calcium per day:  Yes    Bi-annual eye exam:  Yes    Dental care twice a year:  Yes    Sleep apnea or symptoms of sleep apnea:  Sleep apnea    Diet:  Regular (no restrictions)    Frequency of exercise:  4-5 days/week    Duration of exercise:  30-45 minutes    Taking medications regularly:  Yes    Medication side effects:  None    PHQ-2 Total Score: 0    Additional concerns today:  No      Today's PHQ-2 Score:   PHQ-2 ( 1999 [employer]) 3/9/2023   Q1: Little interest or pleasure in doing things 0   Q2: Feeling down, depressed or hopeless 0   PHQ-2 Score 0   Q1: Little interest or pleasure in doing things Not at all   Q2: Feeling down, depressed or hopeless Not at all   PHQ-2 Score 0       Have you ever done Advance Care Planning? (For example, a Health Directive, POLST, or a discussion with a medical provider or your loved ones about your wishes): No, advance care planning information given to patient to review.  Patient plans to discuss their wishes with loved ones or provider.      Social History     Tobacco Use     Smoking status: Never     Smokeless tobacco: Never   Substance Use Topics     Alcohol use: Not Currently       Alcohol Use 3/9/2023   Prescreen: >3 drinks/day or >7 drinks/week? Not Applicable       Reviewed orders with patient.  Reviewed health maintenance and updated orders accordingly - Yes      Breast Cancer Screening:    Breast CA Risk Assessment (FHS-7) 12/26/2021 12/26/2021 12/26/2021 3/12/2022   Do you have a family history of breast, colon, or ovarian cancer? No / Unknown No / Unknown No / Unknown No / Unknown          Reviewed and updated as needed this visit by clinical staff   Tobacco   "Allergies  Meds  Problems  Med Hx  Surg Hx  Fam Hx          Reviewed and updated as needed this visit by Provider   Tobacco  Allergies  Meds  Problems  Med Hx  Surg Hx  Fam Hx         Answers for HPI/ROS submitted by the patient on 3/16/2023  If you checked off any problems, how difficult have these problems made it for you to do your work, take care of things at home, or get along with other people?: Not difficult at all  PHQ9 TOTAL SCORE: 1  abdominal pain: No  Blood in stool: No  Blood in urine: No  chest pain: No  chills: No  congestion: No  constipation: No  cough: No  diarrhea: No  dizziness: No  ear pain: No  eye pain: No  nervous/anxious: No  fever: No  frequency: No  genital sores: No  headaches: No  hearing loss: No  heartburn: No  arthralgias: No  joint swelling: No  peripheral edema: No  mood changes: No  myalgias: No  nausea: No  dysuria: No  palpitations: No  Skin sensation changes: No  sore throat: No  urgency: No  rash: No  shortness of breath: No  visual disturbance: No  weakness: No  pelvic pain: No  vaginal bleeding: No  vaginal discharge: No  tenderness: No  breast mass: No  breast discharge: No         OBJECTIVE:   /64 (BP Location: Right arm, Patient Position: Sitting, Cuff Size: Adult Regular)   Pulse 70   Resp 17   Ht 1.715 m (5' 7.5\")   Wt 110 kg (242 lb 6.4 oz)   BMI 37.40 kg/m       Wt Readings from Last 5 Encounters:   03/16/23 110 kg (242 lb 6.4 oz)   12/01/22 108.3 kg (238 lb 12.8 oz)   07/25/22 110.9 kg (244 lb 6.4 oz)   07/13/22 108 kg (238 lb)   03/15/22 104.8 kg (231 lb)       Physical Exam  GENERAL APPEARANCE: healthy, alert and no distress  EYES: Eyes grossly normal to inspection, conjunctivae and sclerae normal  HENT: ear canals and TM's normal, nose and mouth without ulcers or lesions, oropharynx clear and oral mucous membranes moist  NECK: no adenopathy, no asymmetry, masses, or scars and thyroid normal to palpation  RESP: lungs clear to auscultation - " no rales, rhonchi or wheezes  BREAST: defer breast exam to oncology, visit upcoming   CV: regular rate and rhythm, normal S1 S2, no S3 or S4, no murmur, click or rub, no peripheral edema and peripheral pulses strong  ABDOMEN: soft, nontender, no hepatosplenomegaly, no masses and bowel sounds normal  MS: no musculoskeletal defects are noted and gait is age appropriate without ataxia  SKIN: no suspicious lesions or rashes  NEURO: Normal strength and tone, sensory exam grossly normal, mentation intact and speech normal  PSYCH: mentation appears normal and affect normal/bright        ASSESSMENT/PLAN:     Problem List Items Addressed This Visit        Respiratory    REBECCA (obstructive sleep apnea)     Continue CPAP             Digestive    Morbid obesity (H)     Associated diagnoses of REBECCA. Exercising regularly. Encouraged healthy diet and regular exercise          Relevant Medications    metFORMIN (GLUCOPHAGE) 500 MG tablet       Musculoskeletal and Integumentary    Eczema     No current issues. PRN triamcinolone          Relevant Medications    triamcinolone (KENALOG) 0.1 % external cream       Behavioral    Severe recurrent major depression without psychotic features (H)     Continue follow up with psychiatry             Other    Bariatric surgery status     S/p bariatric labs today          Relevant Orders    Iron and iron binding capacity    Ferritin    Vitamin B12    Malignant neoplasm of upper-inner quadrant of left breast in female, estrogen receptor negative (H)     Followed by oncology. No evidence of recurrence          Narrow angle glaucoma suspect of both eyes     Followed by ophthalmology, has not progressed to glaucoma at this time          Other Visit Diagnoses     Routine general medical examination at a health care facility    -  Primary    Severe episode of recurrent major depressive disorder, without psychotic features (H)        Relevant Orders    Lipid Profile    Comprehensive metabolic panel     "        COUNSELING:  Reviewed preventive health counseling, as reflected in patient instructions      BMI:   Estimated body mass index is 37.4 kg/m  as calculated from the following:    Height as of this encounter: 1.715 m (5' 7.5\").    Weight as of this encounter: 110 kg (242 lb 6.4 oz).         She reports that she has never smoked. She has never used smokeless tobacco.    Donna Holt NP  [facility]  "

## 2023-03-16 NOTE — ASSESSMENT & PLAN NOTE
Associated diagnoses of REBECCA. Exercising regularly. Encouraged healthy diet and regular exercise

## 2023-03-17 ENCOUNTER — TELEPHONE (OUTPATIENT)
Dept: ONCOLOGY | Facility: HOSPITAL | Age: 58
End: 2023-03-17
Payer: COMMERCIAL

## 2023-03-17 DIAGNOSIS — I89.0 LYMPHEDEMA OF LEFT UPPER EXTREMITY: Primary | ICD-10-CM

## 2023-03-17 NOTE — TELEPHONE ENCOUNTER
Patient calls in today and leaves a message on the  line.  The message was quite confusing stating that she had a claim that partially went through and the other part was denied.  She mention something about a prior authorization for an arm pump.  She is not sure who to go through for this and would like a call back at [phone].  I will get this routed over to the RN care coordinator to see if she can assist.    Allyson Tomlin, TODD

## 2023-03-20 NOTE — TELEPHONE ENCOUNTER
Hello,  Do you know if they (pump vendor or insurance) need an order? Otherwise, I'm also uncertain how to help either.   Donna

## 2023-03-20 NOTE — TELEPHONE ENCOUNTER
Patient's  calls in today on her behalf stating that part of her pneumatic compressor for her lymphedema has been denied.  He states that one of the parts (he believes the actual mechanics station) has been covered.  He believes the sleeve portion of the device has been denied as a prior authorization was not done.  He states that they have received an estimate of benefits and he is actually called [facility] and they tell him that there is still time to submit this authorization.  He tells me that the date of service is 2/2/2023.  Procedure code: E 0668.  Reason code: 012.  Claim #[id number].  They received the device from [employer] in [place] at phone #[phone].  Khang would like a call back at [phone] when this authorization has been submitted.  Since the RN care coordinator for this patient is out of the office, I will route this over to the RN care coordinator pool to assist.    Allyson Tomlin RN

## 2023-03-20 NOTE — TELEPHONE ENCOUNTER
Order is written now and on her chart. I wonder if the medical supply company can submit it to her insurance for coverage? I would imagine they have more experience than we do with this since they ultimately want to be paid for the device they are supplying. If the med supply company has any paperwork that I need to complete for authorization I am happy to do so.

## 2023-03-29 ENCOUNTER — LAB (OUTPATIENT)
Dept: INFUSION THERAPY | Facility: HOSPITAL | Age: 58
End: 2023-03-29
Attending: INTERNAL MEDICINE
Payer: COMMERCIAL

## 2023-03-29 ENCOUNTER — ONCOLOGY VISIT (OUTPATIENT)
Dept: ONCOLOGY | Facility: HOSPITAL | Age: 58
End: 2023-03-29
Attending: INTERNAL MEDICINE
Payer: COMMERCIAL

## 2023-03-29 VITALS
WEIGHT: 242.3 LBS | HEIGHT: 68 IN | HEART RATE: 73 BPM | OXYGEN SATURATION: 94 % | SYSTOLIC BLOOD PRESSURE: 116 MMHG | RESPIRATION RATE: 16 BRPM | BODY MASS INDEX: 36.72 KG/M2 | DIASTOLIC BLOOD PRESSURE: 58 MMHG | TEMPERATURE: 98.2 F

## 2023-03-29 DIAGNOSIS — I89.0 LYMPHEDEMA OF LEFT ARM: ICD-10-CM

## 2023-03-29 DIAGNOSIS — I89.0 LYMPHEDEMA OF LEFT UPPER EXTREMITY: Primary | ICD-10-CM

## 2023-03-29 DIAGNOSIS — Z17.1 MALIGNANT NEOPLASM OF UPPER-INNER QUADRANT OF LEFT BREAST IN FEMALE, ESTROGEN RECEPTOR NEGATIVE (H): ICD-10-CM

## 2023-03-29 DIAGNOSIS — C50.912 INVASIVE DUCTAL CARCINOMA OF BREAST, LEFT (H): ICD-10-CM

## 2023-03-29 DIAGNOSIS — C50.212 MALIGNANT NEOPLASM OF UPPER-INNER QUADRANT OF LEFT BREAST IN FEMALE, ESTROGEN RECEPTOR NEGATIVE (H): ICD-10-CM

## 2023-03-29 DIAGNOSIS — Z80.3 FAMILY HISTORY OF MALIGNANT NEOPLASM OF BREAST: ICD-10-CM

## 2023-03-29 LAB
ALBUMIN SERPL BCG-MCNC: 4.2 G/DL (ref 3.5–5.2)
ALP SERPL-CCNC: 77 U/L (ref 35–104)
ALT SERPL W P-5'-P-CCNC: 40 U/L (ref 10–35)
ANION GAP SERPL CALCULATED.3IONS-SCNC: 9 MMOL/L (ref 7–15)
AST SERPL W P-5'-P-CCNC: 30 U/L (ref 10–35)
BILIRUB SERPL-MCNC: <0.2 MG/DL
BUN SERPL-MCNC: 10.1 MG/DL (ref 6–20)
CALCIUM SERPL-MCNC: 8.8 MG/DL (ref 8.6–10)
CHLORIDE SERPL-SCNC: 103 MMOL/L (ref 98–107)
CREAT SERPL-MCNC: 0.72 MG/DL (ref 0.51–0.95)
DEPRECATED HCO3 PLAS-SCNC: 28 MMOL/L (ref 22–29)
ERYTHROCYTE [DISTWIDTH] IN BLOOD BY AUTOMATED COUNT: 13.1 % (ref 10–15)
GFR SERPL CREATININE-BSD FRML MDRD: >90 ML/MIN/1.73M2
GLUCOSE SERPL-MCNC: 62 MG/DL (ref 70–99)
HCT VFR BLD AUTO: 40.9 % (ref 35–47)
HGB BLD-MCNC: 13.3 G/DL (ref 11.7–15.7)
MCH RBC QN AUTO: 30.8 PG (ref 26.5–33)
MCHC RBC AUTO-ENTMCNC: 32.5 G/DL (ref 31.5–36.5)
MCV RBC AUTO: 95 FL (ref 78–100)
PLATELET # BLD AUTO: 215 10E3/UL (ref 150–450)
POTASSIUM SERPL-SCNC: 4.1 MMOL/L (ref 3.4–5.3)
PROT SERPL-MCNC: 6.7 G/DL (ref 6.4–8.3)
RBC # BLD AUTO: 4.32 10E6/UL (ref 3.8–5.2)
SODIUM SERPL-SCNC: 140 MMOL/L (ref 136–145)
WBC # BLD AUTO: 6.6 10E3/UL (ref 4–11)

## 2023-03-29 PROCEDURE — 99212 OFFICE O/P EST SF 10 MIN: CPT | Performed by: INTERNAL MEDICINE

## 2023-03-29 PROCEDURE — 80053 COMPREHEN METABOLIC PANEL: CPT

## 2023-03-29 PROCEDURE — 36591 DRAW BLOOD OFF VENOUS DEVICE: CPT

## 2023-03-29 PROCEDURE — 85027 COMPLETE CBC AUTOMATED: CPT

## 2023-03-29 PROCEDURE — 99214 OFFICE O/P EST MOD 30 MIN: CPT | Performed by: INTERNAL MEDICINE

## 2023-03-29 ASSESSMENT — PAIN SCALES - GENERAL: PAINLEVEL: MILD PAIN (3)

## 2023-03-29 NOTE — PROGRESS NOTES
"Oncology Rooming Note    March 29, 2023 10:18 AM   Monika Franz is a 57 year old female who presents for:    Chief Complaint   Patient presents with     Oncology Clinic Visit     4 month follow up with labs related to Malignant neoplasm of upper-inner quadrant of left breast in female, estrogen receptor negative     Initial Vitals: /58 (BP Location: Right arm, Patient Position: Sitting, Cuff Size: Adult Large)   Pulse 73   Temp 98.2  F (36.8  C) (Tympanic)   Resp 16   Ht 1.715 m (5' 7.5\")   Wt 109.9 kg (242 lb 4.8 oz)   SpO2 94%   BMI 37.39 kg/m   Estimated body mass index is 37.39 kg/m  as calculated from the following:    Height as of this encounter: 1.715 m (5' 7.5\").    Weight as of this encounter: 109.9 kg (242 lb 4.8 oz). Body surface area is 2.29 meters squared.  Mild Pain (3) Comment: Data Unavailable   No LMP recorded. Patient has had a hysterectomy.  Allergies reviewed: Yes  Medications reviewed: Yes    Medications: Medication refills not needed today.  Pharmacy name entered into [employer]: [id number] [place] [address]    Clinical concerns: 4 month follow up with labs related to Malignant neoplasm of upper-inner quadrant of left breast in female, estrogen receptor negative      ROSE WEINER CMA            "

## 2023-03-29 NOTE — LETTER
"    3/29/2023         RE: Monika Franz  [address] [facility] [id number]        Dear Colleague,    Thank you for referring your patient, Monika Franz, to the [facility] [place]. Please see a copy of my visit note below.    Oncology Rooming Note    March 29, 2023 10:18 AM   Monika Franz is a 57 year old female who presents for:    Chief Complaint   Patient presents with     Oncology Clinic Visit     4 month follow up with labs related to Malignant neoplasm of upper-inner quadrant of left breast in female, estrogen receptor negative     Initial Vitals: /58 (BP Location: Right arm, Patient Position: Sitting, Cuff Size: Adult Large)   Pulse 73   Temp 98.2  F (36.8  C) (Tympanic)   Resp 16   Ht 1.715 m (5' 7.5\")   Wt 109.9 kg (242 lb 4.8 oz)   SpO2 94%   BMI 37.39 kg/m   Estimated body mass index is 37.39 kg/m  as calculated from the following:    Height as of this encounter: 1.715 m (5' 7.5\").    Weight as of this encounter: 109.9 kg (242 lb 4.8 oz). Body surface area is 2.29 meters squared.  Mild Pain (3) Comment: Data Unavailable   No LMP recorded. Patient has had a hysterectomy.  Allergies reviewed: Yes  Medications reviewed: Yes    Medications: Medication refills not needed today.  Pharmacy name entered into [employer]: [id number] [place] [address]    Clinical concerns: 4 month follow up with labs related to Malignant neoplasm of upper-inner quadrant of left breast in female, estrogen receptor negative      ROSE WEINER CMA                  [facility] cancer Care Progress Note  Patient: Monika Franz   MRN:  [id number]   Date of Service : Mar 29, 2023           Reason for visit      1. Invasive ductal carcinoma of breast, left (H)        Assessment     1.  A very pleasant 57 year old  woman with left-sided breast cancer at least T2 N0 M0 ER NE negative HER-2/joann negative. Started on sreekanth adjuvant chemotherapy. Responded well to " treatment.  She finished 4 cycles of dose dense Adriamycin Cytoxan followed by 12 doses of of weekly paclitaxel.  Last chemotherapy in November 2020.  Had surgery in December 2020.  Complete pathological remission obtained in the invasive tumor.  There was some DCIS present however.  Lymph nodes negative.  Current evaluation negative for any recurrence.  4.  Minimal elevated alkaline phosphatase in the past.  Current alk phos is normal.  2.  Slightly overweight.  3.  Status post gastric sleeve in 2019.  Weight has been stable for the last 6 months or so.  4.  Slight lymphedema in the left arm.  Landon is having little bit of pain as well.  Especially after activity.    Plan     1.  We will continue with very close active surveillance.  If needed we may have to do a breast MRI although at this time I do not find any thing worrisome.  2.  Continue management of her lymphedema.  She should follow-up with the lymphedema specialist.  3.  Advised to continue elevating left arm.  She should also use compression sleeve when she is flying.  4.  Monitor her iron level secondary to her gastric sleeve surgery.  5.  Diet rich in calcium and vitamin D.  6.  Continue with pandemic precaution for now.        Clinical stage      Cancer Staging  Invasive ductal carcinoma of breast, left (H)  Staging form: Breast, AJCC 8th Edition  - Clinical stage from 6/22/2020: Stage IIB (cT2, cN0, cM0, G3, ER-, WI-, HER2: Equivocal) - Signed by Sue Dougherty, CNP on 7/9/2020      History     Monika Franz is a very pleasant 57 year old  female with a history of triple negative left breast cancer.  The patient found this lump located in her left breast measuring approximately 2 cm in size, at 10 o'clock position, presenting with some stretching-like symptoms in her breast in the month of June 2020.  She was then seen by her primary care physician and had a mammogram done which was very suspicious.  A biopsy confirmed the presence of  invasive ductal carcinoma ER negative [place] negative HER-2/joann 2+ on immunohistochemistry negative on FISH.  It had high-grade features.  The ultrasound measures this mass to be 1.6 cm in size.  It was located 11 cm from the nipple.  No nipple inversion or any other symptoms.    The patient was seen by Dr. Allyson Douglas.  She had a Port-A-Cath placed for neoadjuvant chemotherapy.     She has past medical history of gastric sleeve surgery about a year ago.  She is taking some vitamin B12 and other supplements.     She had a hysterectomy few years ago.     As for the breast cancer risk factors are concerned she is G4, P3.  First child at age 29.  Breast-feeding for all 3 of them.  No prior breast biopsies.  No significant family history.    She  started on neoadjuvant chemotherapy on 9 July 2020 with dose dense adriamysin cyclophosphamide followed by paclitaxel.   After second cycle she noticed decrease in the size of the breast lesion.  In fact it was not palpable anymore.  She has finished 4 cycles of dose dense Adriamycin and cyclophosphamide and then finished 12 doses of weekly paclitaxel. Last treatment was on the 18th of November.     She had surgery in the middle of December 2020.  She underwent bilateral mastectomy.  She did not have reconstruction but she had initially thought of doing.  Fortunately she had a complete remission noted on the invasive tumor.  There was some DCIS present however.  The 3 sentinel lymph nodes were negative for any invasive tumor.  There was no evidence of any scarring in them either.    Now she is on surveillance. Seems to be doing well. Had bunion surgery on her right foot in March 2021 along with hammer toe repair.    Comes in today for scheduled follow-up.  Overall she is doing fine.  She is very much concerned about tenderness and lump on her chest wall.  She wanted that to be examined today.  Continues to have some swelling in her left arm.  Some tiredness and fatigue which is  "not unusual for her.      Past Medical History     Past Medical History:   Diagnosis Date     Anxiety      Breast cancer (H)      Depression      Sleep apnea     Uses CPAP       Review of Systems   A 14 point review of systems was obtained.  Positive findings noted in the history.  Rest of the review of system is otherwise negative.     Vital Signs     /58 (BP Location: Right arm, Patient Position: Sitting, Cuff Size: Adult Large)   Pulse 73   Temp 98.2  F (36.8  C) (Tympanic)   Resp 16   Ht 1.715 m (5' 7.5\")   Wt 109.9 kg (242 lb 4.8 oz)   SpO2 94%   BMI 37.39 kg/m       Physical Exam     GENERAL: No acute distress. Cooperative in conversation.   HEENT:  Pupils are equal, round and reactive. Oral mucosa is clean and intact. No ulcerations or mucositis noted. No bleeding noted.  RESP:Chest symmetric lungs are clear bilaterally per auscultation. Regular respiratory rate. No wheezes or rhonchi.  CV: Normal S1 S2 Regular, rate and rhythm. No murmurs.    ABD: Nondistended, soft, nontender. Positive bowel sounds. No organomegaly.   EXTREMITIES: No lower extremity edema.  No lymphedema in the left arm. Has a sleeve on.  NEURO: Non- focal. Alert and oriented x3.  Cranial nerves appear intact.  PSYCH: Within normal limits. No depression or anxiety.  SKIN: Warm dry intact.  Chest wall examination was done.  No clear evidence of any recurrence.  She does have some tenderness on the incision site.      Lab Results     Recent Results (from the past 240 hour(s))   Comprehensive metabolic panel    Collection Time: 03/29/23  9:21 AM   Result Value Ref Range    Sodium 140 136 - 145 mmol/L    Potassium 4.1 3.4 - 5.3 mmol/L    Chloride 103 98 - 107 mmol/L    Carbon Dioxide (CO2) 28 22 - 29 mmol/L    Anion Gap 9 7 - 15 mmol/L    Urea Nitrogen 10.1 6.0 - 20.0 mg/dL    Creatinine 0.72 0.51 - 0.95 mg/dL    Calcium 8.8 8.6 - 10.0 mg/dL    Glucose 62 (L) 70 - 99 mg/dL    Alkaline Phosphatase 77 35 - 104 U/L    AST 30 10 - 35 " U/L    ALT 40 (H) 10 - 35 U/L    Protein Total 6.7 6.4 - 8.3 g/dL    Albumin 4.2 3.5 - 5.2 g/dL    Bilirubin Total <0.2 <=1.2 mg/dL    GFR Estimate >90 >60 mL/min/1.73m2   CBC with platelets    Collection Time: 03/29/23  9:21 AM   Result Value Ref Range    WBC Count 6.6 4.0 - 11.0 10e3/uL    RBC Count 4.32 3.80 - 5.20 10e6/uL    Hemoglobin 13.3 11.7 - 15.7 g/dL    Hematocrit 40.9 35.0 - 47.0 %    MCV 95 78 - 100 fL    MCH 30.8 26.5 - 33.0 pg    MCHC 32.5 31.5 - 36.5 g/dL    RDW 13.1 10.0 - 15.0 %    Platelet Count 215 150 - 450 10e3/uL        Imaging Results     No results found.         Amando Monson MD        Again, thank you for allowing me to participate in the care of your patient.        Sincerely,        Amando Monson MD, MD

## 2023-03-29 NOTE — PROGRESS NOTES
[facility] cancer Care Progress Note  Patient: Monika Franz   MRN:  [id number]   Date of Service : Mar 29, 2023           Reason for visit      1. Invasive ductal carcinoma of breast, left (H)        Assessment     1.  A very pleasant 57 year old  woman with left-sided breast cancer at least T2 N0 M0 ER SD negative HER-2/joann negative. Started on sreekanth adjuvant chemotherapy. Responded well to treatment.  She finished 4 cycles of dose dense Adriamycin Cytoxan followed by 12 doses of of weekly paclitaxel.  Last chemotherapy in November 2020.  Had surgery in December 2020.  Complete pathological remission obtained in the invasive tumor.  There was some DCIS present however.  Lymph nodes negative.  Current evaluation negative for any recurrence.  4.  Minimal elevated alkaline phosphatase in the past.  Current alk phos is normal.  2.  Slightly overweight.  3.  Status post gastric sleeve in 2019.  Weight has been stable for the last 6 months or so.  4.  Slight lymphedema in the left arm.  Landon is having little bit of pain as well.  Especially after activity.    Plan     1.  We will continue with very close active surveillance.  If needed we may have to do a breast MRI although at this time I do not find any thing worrisome.  2.  Continue management of her lymphedema.  She should follow-up with the lymphedema specialist.  3.  Advised to continue elevating left arm.  She should also use compression sleeve when she is flying.  4.  Monitor her iron level secondary to her gastric sleeve surgery.  5.  Diet rich in calcium and vitamin D.  6.  Continue with pandemic precaution for now.        Clinical stage      Cancer Staging  Invasive ductal carcinoma of breast, left (H)  Staging form: Breast, AJCC 8th Edition  - Clinical stage from 6/22/2020: Stage IIB (cT2, cN0, cM0, G3, ER-, SD-, HER2: Equivocal) - Signed by Sue Dougherty, CNP on 7/9/2020      History     Monika Franz is a very pleasant 57 year old   female with a history of triple negative left breast cancer.  The patient found this lump located in her left breast measuring approximately 2 cm in size, at 10 o'clock position, presenting with some stretching-like symptoms in her breast in the month of June 2020.  She was then seen by her primary care physician and had a mammogram done which was very suspicious.  A biopsy confirmed the presence of invasive ductal carcinoma ER negative VT negative HER-2/joann 2+ on immunohistochemistry negative on FISH.  It had high-grade features.  The ultrasound measures this mass to be 1.6 cm in size.  It was located 11 cm from the nipple.  No nipple inversion or any other symptoms.    The patient was seen by Dr. Allyson Douglas.  She had a Port-A-Cath placed for neoadjuvant chemotherapy.     She has past medical history of gastric sleeve surgery about a year ago.  She is taking some vitamin B12 and other supplements.     She had a hysterectomy few years ago.     As for the breast cancer risk factors are concerned she is G4, P3.  First child at age 29.  Breast-feeding for all 3 of them.  No prior breast biopsies.  No significant family history.    She  started on neoadjuvant chemotherapy on 9 July 2020 with dose dense adriamysin cyclophosphamide followed by paclitaxel.   After second cycle she noticed decrease in the size of the breast lesion.  In fact it was not palpable anymore.  She has finished 4 cycles of dose dense Adriamycin and cyclophosphamide and then finished 12 doses of weekly paclitaxel. Last treatment was on the 18th of November.     She had surgery in the middle of December 2020.  She underwent bilateral mastectomy.  She did not have reconstruction but she had initially thought of doing.  Fortunately she had a complete remission noted on the invasive tumor.  There was some DCIS present however.  The 3 sentinel lymph nodes were negative for any invasive tumor.  There was no evidence of any scarring in them  "either.    Now she is on surveillance. Seems to be doing well. Had bunion surgery on her right foot in March 2021 along with hammer toe repair.    Comes in today for scheduled follow-up.  Overall she is doing fine.  She is very much concerned about tenderness and lump on her chest wall.  She wanted that to be examined today.  Continues to have some swelling in her left arm.  Some tiredness and fatigue which is not unusual for her.      Past Medical History     Past Medical History:   Diagnosis Date     Anxiety      Breast cancer (H)      Depression      Sleep apnea     Uses CPAP       Review of Systems   A 14 point review of systems was obtained.  Positive findings noted in the history.  Rest of the review of system is otherwise negative.     Vital Signs     /58 (BP Location: Right arm, Patient Position: Sitting, Cuff Size: Adult Large)   Pulse 73   Temp 98.2  F (36.8  C) (Tympanic)   Resp 16   Ht 1.715 m (5' 7.5\")   Wt 109.9 kg (242 lb 4.8 oz)   SpO2 94%   BMI 37.39 kg/m       Physical Exam     GENERAL: No acute distress. Cooperative in conversation.   HEENT:  Pupils are equal, round and reactive. Oral mucosa is clean and intact. No ulcerations or mucositis noted. No bleeding noted.  RESP:Chest symmetric lungs are clear bilaterally per auscultation. Regular respiratory rate. No wheezes or rhonchi.  CV: Normal S1 S2 Regular, rate and rhythm. No murmurs.    ABD: Nondistended, soft, nontender. Positive bowel sounds. No organomegaly.   EXTREMITIES: No lower extremity edema.  No lymphedema in the left arm. Has a sleeve on.  NEURO: Non- focal. Alert and oriented x3.  Cranial nerves appear intact.  PSYCH: Within normal limits. No depression or anxiety.  SKIN: Warm dry intact.  Chest wall examination was done.  No clear evidence of any recurrence.  She does have some tenderness on the incision site.      Lab Results     Recent Results (from the past 240 hour(s))   Comprehensive metabolic panel    Collection " Time: 03/29/23  9:21 AM   Result Value Ref Range    Sodium 140 136 - 145 mmol/L    Potassium 4.1 3.4 - 5.3 mmol/L    Chloride 103 98 - 107 mmol/L    Carbon Dioxide (CO2) 28 22 - 29 mmol/L    Anion Gap 9 7 - 15 mmol/L    Urea Nitrogen 10.1 6.0 - 20.0 mg/dL    Creatinine 0.72 0.51 - 0.95 mg/dL    Calcium 8.8 8.6 - 10.0 mg/dL    Glucose 62 (L) 70 - 99 mg/dL    Alkaline Phosphatase 77 35 - 104 U/L    AST 30 10 - 35 U/L    ALT 40 (H) 10 - 35 U/L    Protein Total 6.7 6.4 - 8.3 g/dL    Albumin 4.2 3.5 - 5.2 g/dL    Bilirubin Total <0.2 <=1.2 mg/dL    GFR Estimate >90 >60 mL/min/1.73m2   CBC with platelets    Collection Time: 03/29/23  9:21 AM   Result Value Ref Range    WBC Count 6.6 4.0 - 11.0 10e3/uL    RBC Count 4.32 3.80 - 5.20 10e6/uL    Hemoglobin 13.3 11.7 - 15.7 g/dL    Hematocrit 40.9 35.0 - 47.0 %    MCV 95 78 - 100 fL    MCH 30.8 26.5 - 33.0 pg    MCHC 32.5 31.5 - 36.5 g/dL    RDW 13.1 10.0 - 15.0 %    Platelet Count 215 150 - 450 10e3/uL        Imaging Results     No results found.         Amando Monson MD

## 2023-04-05 ENCOUNTER — TELEPHONE (OUTPATIENT)
Dept: ONCOLOGY | Facility: HOSPITAL | Age: 58
End: 2023-04-05
Payer: COMMERCIAL

## 2023-04-05 ENCOUNTER — PATIENT OUTREACH (OUTPATIENT)
Dept: ONCOLOGY | Facility: HOSPITAL | Age: 58
End: 2023-04-05
Payer: COMMERCIAL

## 2023-04-05 ENCOUNTER — MYC MEDICAL ADVICE (OUTPATIENT)
Dept: INTERNAL MEDICINE | Facility: CLINIC | Age: 58
End: 2023-04-05
Payer: COMMERCIAL

## 2023-04-05 DIAGNOSIS — I89.0 LYMPHEDEMA OF LEFT UPPER EXTREMITY: Primary | ICD-10-CM

## 2023-04-05 NOTE — PROGRESS NOTES
Called Khang regarding following message:    Patient's  Khang calls in today and leaves message on the  line wondering about the status of a authorization for medical equipment.  He had called back on 3/17 regarding this.  I will get this sent over to the RN care coordinator who had assisted with this.  He would like a call back at [phone].    Compression pump order was placed by Donna Holt NP and it appears from the notes that they are working on it.  Encouraged Khang to reach out to them and if we need to jump in to let us know.  Khang will reach out tomorrow regarding the compression pump.

## 2023-04-05 NOTE — TELEPHONE ENCOUNTER
Patient's  Khang calls in today and leaves message on the  line wondering about the status of a authorization for medical equipment.  He had called back on 3/17 regarding this.  I will get this sent over to the RN care coordinator who had assisted with this.  He would like a call back at [phone].    Allyson Tomlin RN

## 2023-04-06 ENCOUNTER — TELEPHONE (OUTPATIENT)
Dept: ONCOLOGY | Facility: HOSPITAL | Age: 58
End: 2023-04-06
Payer: COMMERCIAL

## 2023-04-06 NOTE — TELEPHONE ENCOUNTER
Oncology Distress Screen    Called Monika in regards to her positive oncology nutrition distress screen:    2. How concerned are you about unintended weight loss or your current weight? : 10  And  9. If you want to be contacted by one of our professionals, I can send a message to them right now. : ONCOLOGY DIETITIAN    Called and left voicemail explaining role and reason for call. Call back number provided.      Mattie Darby, MS, RD, LD

## 2023-04-07 NOTE — TELEPHONE ENCOUNTER
Does our prior authorization team (that we typically use for medications) process these requests?

## 2023-04-11 NOTE — TELEPHONE ENCOUNTER
Spoke with nurse Schmid, she states that our PA team only does prescribed medication PA's so this will have to go through the DME or tactile medical for the patient.

## 2023-04-18 ENCOUNTER — TELEPHONE (OUTPATIENT)
Dept: ONCOLOGY | Facility: HOSPITAL | Age: 58
End: 2023-04-18
Payer: COMMERCIAL

## 2023-04-18 NOTE — TELEPHONE ENCOUNTER
TODD Salamanca with [employer] calls in today stating that she needs some information regarding this patient's care with Dr Monson.  She is looking for some continuity of care information.  This is for case #[id number].  She needs to know Dr Monson's network status, patient's acuity of care and frequency of care.  She would like a call back at [phone] with a direct extension of [phone].  I will get this over to the RN care coordinator who will give them a call back tomorrow when she is back in the office.    Allyson Tomlin RN

## 2023-04-24 ENCOUNTER — PATIENT OUTREACH (OUTPATIENT)
Dept: ONCOLOGY | Facility: HOSPITAL | Age: 58
End: 2023-04-24
Payer: COMMERCIAL

## 2023-04-24 NOTE — PROGRESS NOTES
on  for patient to call regarding the following message:    Jadyn RN with [employer] calls in today stating that she needs some information regarding this patient's care with Dr Monson.  She is looking for some continuity of care information.  This is for case #[id number].  She needs to know Dr Monson's network status, patient's acuity of care and frequency of care.  She would like a call back at [phone] with a direct extension of [phone].  I will get this over to the RN care coordinator who will give them a call back tomorrow when she is back in the office.     Allyson Tomlin RN    Wondering if patient is agreeable to giving out this information on her behalf.      Monika was called and gave permission to call Jadyn BROOKS.  Answered Jadyn's questions.  Monika also given Jadyn's # so she can call her as well.

## 2023-04-26 ENCOUNTER — TELEPHONE (OUTPATIENT)
Dept: ONCOLOGY | Facility: HOSPITAL | Age: 58
End: 2023-04-26
Payer: COMMERCIAL

## 2023-04-26 NOTE — TELEPHONE ENCOUNTER
Annia calls back in regarding a prior authorization for continuing of care for this patient.  She states that the claim for 3/29 came through billed by Dr Monson but was put to the group as in network.  This is for case #[id number].  She would like a call back at [phone] with direct extension [phone].  I will get this routed over to the RN care coordinator who has been dealing with this.    Allyson Tolmin RN

## 2023-04-27 ENCOUNTER — PATIENT OUTREACH (OUTPATIENT)
Dept: ONCOLOGY | Facility: HOSPITAL | Age: 58
End: 2023-04-27
Payer: COMMERCIAL

## 2023-04-27 NOTE — PROGRESS NOTES
LM on  regarding this message, encouraged to call with questions.    Annia calls back in regarding a prior authorization for continuing of care for this patient.  She states that the claim for 3/29 came through billed by Dr Monson but was put to the group as in network.  This is for case #[id number].  She would like a call back at [phone] with direct extension [phone].  I will get this routed over to the RN care coordinator who has been dealing with this.     Allyson Tomlin RN

## 2023-05-16 ENCOUNTER — OFFICE VISIT (OUTPATIENT)
Dept: INTERNAL MEDICINE | Facility: CLINIC | Age: 58
End: 2023-05-16
Payer: COMMERCIAL

## 2023-05-16 VITALS
HEART RATE: 67 BPM | RESPIRATION RATE: 12 BRPM | HEIGHT: 68 IN | OXYGEN SATURATION: 98 % | WEIGHT: 247.6 LBS | TEMPERATURE: 98.5 F | BODY MASS INDEX: 37.53 KG/M2 | SYSTOLIC BLOOD PRESSURE: 106 MMHG | DIASTOLIC BLOOD PRESSURE: 68 MMHG

## 2023-05-16 DIAGNOSIS — F33.2 SEVERE RECURRENT MAJOR DEPRESSION WITHOUT PSYCHOTIC FEATURES (H): ICD-10-CM

## 2023-05-16 DIAGNOSIS — M17.11 PRIMARY OSTEOARTHRITIS OF RIGHT KNEE: ICD-10-CM

## 2023-05-16 DIAGNOSIS — G47.33 OSA (OBSTRUCTIVE SLEEP APNEA): ICD-10-CM

## 2023-05-16 DIAGNOSIS — Z01.818 PRE-OPERATIVE GENERAL PHYSICAL EXAMINATION: Primary | ICD-10-CM

## 2023-05-16 DIAGNOSIS — E66.01 MORBID OBESITY (H): ICD-10-CM

## 2023-05-16 DIAGNOSIS — Z17.1 MALIGNANT NEOPLASM OF UPPER-INNER QUADRANT OF LEFT BREAST IN FEMALE, ESTROGEN RECEPTOR NEGATIVE (H): ICD-10-CM

## 2023-05-16 DIAGNOSIS — C50.212 MALIGNANT NEOPLASM OF UPPER-INNER QUADRANT OF LEFT BREAST IN FEMALE, ESTROGEN RECEPTOR NEGATIVE (H): ICD-10-CM

## 2023-05-16 LAB
ERYTHROCYTE [DISTWIDTH] IN BLOOD BY AUTOMATED COUNT: 12.6 % (ref 10–15)
HCT VFR BLD AUTO: 40.3 % (ref 35–47)
HGB BLD-MCNC: 13.8 G/DL (ref 11.7–15.7)
MCH RBC QN AUTO: 32 PG (ref 26.5–33)
MCHC RBC AUTO-ENTMCNC: 34.2 G/DL (ref 31.5–36.5)
MCV RBC AUTO: 94 FL (ref 78–100)
PLATELET # BLD AUTO: 217 10E3/UL (ref 150–450)
RBC # BLD AUTO: 4.31 10E6/UL (ref 3.8–5.2)
WBC # BLD AUTO: 8.6 10E3/UL (ref 4–11)

## 2023-05-16 PROCEDURE — 99214 OFFICE O/P EST MOD 30 MIN: CPT | Performed by: NURSE PRACTITIONER

## 2023-05-16 PROCEDURE — 85027 COMPLETE CBC AUTOMATED: CPT | Performed by: NURSE PRACTITIONER

## 2023-05-16 PROCEDURE — 36415 COLL VENOUS BLD VENIPUNCTURE: CPT | Performed by: NURSE PRACTITIONER

## 2023-05-16 PROCEDURE — 93010 ELECTROCARDIOGRAM REPORT: CPT | Performed by: INTERNAL MEDICINE

## 2023-05-16 PROCEDURE — 93005 ELECTROCARDIOGRAM TRACING: CPT | Performed by: NURSE PRACTITIONER

## 2023-05-16 PROCEDURE — 80048 BASIC METABOLIC PNL TOTAL CA: CPT | Performed by: NURSE PRACTITIONER

## 2023-05-16 NOTE — PATIENT INSTRUCTIONS
- Take trazodone the night before surgery, no change  - HOLD metformin morning of surgery  - Okay to take the desvenlafaxine (Pristiq) and Abilify with a small sip of water   - HOLD the multivitamin 7 days before the surgery   - Tylenol is the only pain medication I would recommend before surgery if you need one. Avoid NSAID type pain relievers

## 2023-05-16 NOTE — PROGRESS NOTES
[address] [address]  Phone: [phone]  Fax: [phone]  Primary Provider: Donna Gupta  Pre-op Performing Provider: DONNA GUPTA      PREOPERATIVE EVALUATION:  Today's date: 5/16/2023    Monika Franz is a 57 year old female who presents for a preoperative evaluation.      5/16/2023     3:30 PM   Additional Questions   Roomed by Antwon WILEY CMA     Surgical Information:  Surgery/Procedure: Right knee replacement  Surgery Location: [facility]  Surgeon: Dr. Cabrera  Surgery Date: 06/13/2023  Time of Surgery: TBD  Where patient plans to recover: At home with family  Fax number for surgical facility: [phone]    Assessment & Plan     The proposed surgical procedure is considered INTERMEDIATE risk.    Problem List Items Addressed This Visit        Respiratory    REBECCA (obstructive sleep apnea)     Uses CPAP therapy regularly            Digestive    Morbid obesity (H)     She is status post bariatric surgery            Behavioral    Severe recurrent major depression without psychotic features (H)     Followed by psychiatry and well-controlled with current medications            Other    Malignant neoplasm of upper-inner quadrant of left breast in female, estrogen receptor negative (H)     She is under close surveillance through oncology.  She has left arm lymphedema.        Other Visit Diagnoses     Pre-operative general physical examination    -  Primary    Relevant Orders    EKG 12-lead, tracing only (Completed)    Basic metabolic panel (Completed)    CBC with platelets (Completed)    Primary osteoarthritis of right knee        Relevant Orders    Basic metabolic panel (Completed)    CBC with platelets (Completed)             Risks and Recommendations:  The patient has the following additional risks and recommendations for perioperative complications:  Obstructive Sleep Apnea:       Antiplatelet or Anticoagulation Medication  Instructions:   - Patient is on no antiplatelet or anticoagulation medications.    Additional Medication Instructions:  - Take trazodone the night before surgery, no change  - HOLD metformin morning of surgery  - Okay to take the desvenlafaxine (Pristiq) and Abilify with a small sip of water   - HOLD the multivitamin 7 days before the surgery   - Tylenol is the only pain medication I would recommend before surgery if you need one. Avoid NSAID type pain relievers     RECOMMENDATION:  APPROVAL GIVEN to proceed with proposed procedure, without further diagnostic evaluation.      Subjective     HPI related to upcoming procedure: Monika Franz is a 56 y/o who is here for a preoperative evaluation. She is a history of severe right knee osteoarthritis which has been refractory to usual measures of care including exercise and corticosteroid injection.        5/9/2023     6:35 PM   Preop Questions   1. Have you ever had a heart attack or stroke? No   2. Have you ever had surgery on your heart or blood vessels, such as a stent placement, a coronary artery bypass, or surgery on an artery in your head, neck, heart, or legs? No   3. Do you have chest pain with activity? No   4. Do you have a history of  heart failure? No   5. Do you currently have a cold, bronchitis or symptoms of other infection? No   6. Do you have a cough, shortness of breath, or wheezing? No   7. Do you or anyone in your family have previous history of blood clots? No   8. Do you or does anyone in your family have a serious bleeding problem such as prolonged bleeding following surgeries or cuts? No   9. Have you ever had problems with anemia or been told to take iron pills? No   10. Have you had any abnormal blood loss such as black, tarry or bloody stools, or abnormal vaginal bleeding? No   11. Have you ever had a blood transfusion? No   12. Are you willing to have a blood transfusion if it is medically needed before, during, or after your surgery? Yes    13. Have you or any of your relatives ever had problems with anesthesia? No   14. Do you have sleep apnea, excessive snoring or daytime drowsiness? YES - diagnosed with REBECCA    14a. Do you have a CPAP machine? Yes   15. Do you have any artifical heart valves or other implanted medical devices like a pacemaker, defibrillator, or continuous glucose monitor? No   16. Do you have artificial joints? No   17. Are you allergic to latex? No   18. Is there any chance that you may be pregnant? No       Health Care Directive:  Patient does not have a Health Care Directive or Living Will: Discussed advance care planning with patient; information given to patient to review.    Preoperative Review of :  - 2 hydrocodone prescriptions in the past 1 year but no concerning or unusual prescribing patterns      Review of Systems  CONSTITUTIONAL: NEGATIVE for fever, chills, change in weight  INTEGUMENTARY/SKIN: NEGATIVE for worrisome rashes, moles or lesions  EYES: NEGATIVE for vision changes or irritation  ENT/MOUTH: NEGATIVE for ear, mouth and throat problems  RESP: NEGATIVE for significant cough or SOB  CV: NEGATIVE for chest pain, palpitations or peripheral edema  GI: NEGATIVE for nausea, abdominal pain, heartburn, or change in bowel habits  : NEGATIVE for frequency, dysuria, or hematuria  MUSCULOSKELETAL: NEGATIVE for significant myalgia +knee pain, +lymphedema  Left upper extremity    NEURO: NEGATIVE for weakness, dizziness or paresthesias  ENDOCRINE: NEGATIVE for temperature intolerance, skin/hair changes  HEME: NEGATIVE for bleeding problems  PSYCHIATRIC: NEGATIVE for changes in mood or affect +well controlled depression     Patient Active Problem List    Diagnosis Date Noted     Morbid obesity (H) 03/15/2022     Priority: Medium     Hammer toe of right foot 08/02/2021     Priority: Medium     Eczema 03/01/2021     Priority: Medium     Severe recurrent major depression without psychotic features (H) 12/07/2020      Priority: Medium     Followed by jason, Laura Burns        Malignant neoplasm of upper-inner quadrant of left breast in female, estrogen receptor negative (H) 06/25/2020     Priority: Medium     Diagnosed 6/2020.+ invasive ductal carcinoma ER negative MS negative HER-2/joann 2+ on immunohistochemistry negative on FISH.  It had high-grade features.      She  started on neoadjuvant chemotherapy on 9 July 2020 with dose dense adriamysin cyclophosphamide followed by paclitaxel.   After second cycle she noticed decrease in the size of the breast lesion.  In fact it was not palpable anymore.  She has finished 4 cycles of dose dense Adriamycin and cyclophosphamide and then finished 12 doses of weekly paclitaxel. Last treatment was on the 18th of November.      She had surgery in the middle of December 2020.  She underwent bilateral mastectomy.  She did not have reconstruction but she had initially thought of doing.  Fortunately she had a complete remission noted on the invasive tumor.  There was some DCIS present however.  The 3 sentinel lymph nodes were negative for any invasive tumor.  There was no evidence of any scarring in them either.       Narrow angle glaucoma suspect of both eyes 02/25/2020     Priority: Medium     Bariatric surgery status 06/24/2019     Priority: Medium     Dr. Beverly       Adenomatous polyp of colon 08/17/2018     Priority: Medium     Drug overdose, intentional (H) 01/05/2013     Priority: Medium     REBECCA (obstructive sleep apnea) 02/13/2012     Priority: Medium     Formatting of this note might be different from the original.  [facility] 2/10/2012  AHI 36.6, RDI 76 Oxygen Ned 84% APAP [id number]  Formatting of this note might be different from the original.  [facility] [place] 2/10/2012  AHI 36.6, RDI 76 Oxygen Ned 84% APAP [id number]    Last Assessment & Plan:   Formatting of this note might be different from the original.  - Continue CPAP therapy        Past Medical History:    Diagnosis Date     Anxiety      Breast cancer (H)      Depression      Depressive disorder      Sleep apnea     Uses CPAP     Past Surgical History:   Procedure Laterality Date     ARTHROSCOPY KNEE       BACK SURGERY       BARIATRIC SURGERY  2019     BREAST SURGERY  2020     BUNIONECTOMY Right 2021    Procedure: BUNIONECTOMY right foot. Arthroplasty digits two, three, four and five right foot.;  Surgeon: Narinder Talbert DPM;  Location: [facility] OR;  Service: Podiatry      SECTION      x 3     CHOLECYSTECTOMY       COLONOSCOPY  2021     GYN SURGERY       HYSTERECTOMY, PAP NO LONGER INDICATED       LAPAROSCOPIC ABLATION ENDOMETRIOSIS       MASTECTOMY Bilateral 12/15/2020     AK INSJ TUNNELED CTR VAD W/SUBQ PORT AGE 5 YR/> Right 2020    Procedure: INSERTION VASCULAR ACCESS PORT UNDER ULTRASOUND AND FLUOROSCOPIC GUIDANCE RIGHT JUGULAR;  Surgeon: Allyson Douglas DO;  Location: [facility] OR;  Service: General     AK MASTECTOMY, SIMPLE, COMPLETE N/A 12/15/2020    Procedure: BILATERAL MASTECTOMY WITH LEFT SENTINEL LYMPH NODE BIOPSY, PORT REMOVAL;  Surgeon: Allyson Douglas DO;  Location: [facility] OR;  Service: General     AK RMVL DARRYL CTR VAD W/SUBQ PORT/ CTR/PRPH INSJ N/A 12/15/2020    Procedure: PORT REMOVAL;  Surgeon: Allyson Douglas DO;  Location: [facility] OR;  Service: General     STOMACH SURGERY      Gastric Sleeve     US BREAST CORE BIOPSY LEFT Left 2020     US SENTINEL NODE INJECTION  12/15/2020     Current Outpatient Medications   Medication Sig Dispense Refill     ARIPiprazole (ABILIFY) 5 MG tablet Take 5 mg by mouth daily        Calcium Citrate 1040 MG TABS 1200mg-1500mg daily (separate doses 2-3 times daily)       cholecalciferol 25 MCG (1000 UT) TABS Take 5,000 Units by mouth       desvenlafaxine (PRISTIQ) 50 MG 24 hr tablet Take 100 mg by mouth       metFORMIN (GLUCOPHAGE) 500 MG tablet Take 1 tablet by mouth 2 times daily       Pediatric  "Multiple Vitamins (MULTIVITAMIN CHILDRENS PO) Take 1 tablet by mouth       traZODone (DESYREL) 150 MG tablet Take 1 tablet by mouth nightly as needed       triamcinolone (KENALOG) 0.1 % external cream Apply topically 2 times daily X 14 days 30 g 0     vitamin B-12 (CYANOCOBALAMIN) 1000 MCG tablet Take 1,000 mcg by mouth         Allergies   Allergen Reactions     Nsaids Nausea and Vomiting     After surgery        Social History     Tobacco Use     Smoking status: Never     Smokeless tobacco: Never   Vaping Use     Vaping status: Never Used   Substance Use Topics     Alcohol use: Not Currently     Family History   Problem Relation Age of Onset     Sleep Apnea Sister      Lymphoma Mother 79        Non-Hodgkins     Cancer Mother      Prostate Cancer Father      Cancer Father      Hypertension Father      Hodgkin's lymphoma Maternal Uncle 20     Brain Cancer Paternal Uncle 82     Lung Cancer Maternal Uncle 77     Breast Cancer Maternal Cousin 46     Leukemia Paternal Cousin 45     No Known Problems Sister      No Known Problems Brother      No Known Problems Son      No Known Problems Son      No Known Problems Daughter      History   Drug Use Unknown         Objective     /68 (BP Location: Right arm, Patient Position: Sitting, Cuff Size: Adult Large)   Pulse 67   Temp 98.5  F (36.9  C) (Oral)   Resp 12   Ht 1.715 m (5' 7.5\")   Wt 112.3 kg (247 lb 9.6 oz)   LMP  (LMP Unknown)   SpO2 98%   BMI 38.21 kg/m      Physical Exam    GENERAL APPEARANCE: healthy, alert and no distress     EYES: EOMI     HENT: ear canals and TM's normal and mouth without ulcers or lesions     NECK: no adenopathy, no asymmetry     RESP: lungs clear to auscultation - no rales, rhonchi or wheezes     CV: regular rates and rhythm, normal S1 S2, no S3 or S4 and no murmur, click or rub     ABDOMEN:  soft, nontender, no HSM or masses and bowel sounds normal     MS: extremities normal- no gross deformities noted     NEURO: Normal strength " and tone, sensory exam grossly normal, mentation intact and speech normal     PSYCH: mentation appears normal. and affect normal/bright      Recent Labs   Lab Test 03/29/23  0921 03/16/23  1015 12/01/22  1414 03/15/22  0940 03/15/22  0940   HGB 13.3  --  13.5  --  13.2     --  201  --  169    145 137  --  143   POTASSIUM 4.1 4.2 3.8   < > 4.1   CR 0.72 0.72 0.70  --  0.75   A1C  --   --   --   --  5.5    < > = values in this interval not displayed.        Diagnostics:  Recent Results (from the past 168 hour(s))   EKG 12-lead, tracing only    Collection Time: 05/16/23  3:46 PM   Result Value Ref Range    Systolic Blood Pressure  mmHg    Diastolic Blood Pressure  mmHg    Ventricular Rate 70 BPM    Atrial Rate 70 BPM    CT Interval 134 ms    QRS Duration 88 ms     ms    QTc 444 ms    P Axis 55 degrees    R AXIS 28 degrees    T Axis 5 degrees    Interpretation ECG       Sinus rhythm  Normal ECG  When compared with ECG of 07-DEC-2020 10:45,  No significant change was found     Basic metabolic panel    Collection Time: 05/16/23  4:16 PM   Result Value Ref Range    Sodium 142 136 - 145 mmol/L    Potassium 4.4 3.4 - 5.3 mmol/L    Chloride 103 98 - 107 mmol/L    Carbon Dioxide (CO2) 28 22 - 29 mmol/L    Anion Gap 11 7 - 15 mmol/L    Urea Nitrogen 13.5 6.0 - 20.0 mg/dL    Creatinine 0.70 0.51 - 0.95 mg/dL    Calcium 9.6 8.6 - 10.0 mg/dL    Glucose 90 70 - 99 mg/dL    GFR Estimate >90 >60 mL/min/1.73m2   CBC with platelets    Collection Time: 05/16/23  4:16 PM   Result Value Ref Range    WBC Count 8.6 4.0 - 11.0 10e3/uL    RBC Count 4.31 3.80 - 5.20 10e6/uL    Hemoglobin 13.8 11.7 - 15.7 g/dL    Hematocrit 40.3 35.0 - 47.0 %    MCV 94 78 - 100 fL    MCH 32.0 26.5 - 33.0 pg    MCHC 34.2 31.5 - 36.5 g/dL    RDW 12.6 10.0 - 15.0 %    Platelet Count 217 150 - 450 10e3/uL      EKG: appears normal, NSR, normal axis, normal intervals, no acute ST/T changes c/w ischemia, no LVH by voltage criteria, unchanged from  previous tracings    Revised Cardiac Risk Index (RCRI):  The patient has the following serious cardiovascular risks for perioperative complications:   - No serious cardiac risks = 0 points     RCRI Interpretation: 0 points: Class I (very low risk - 0.4% complication rate)           Signed Electronically by: Donna Holt NP  Copy of this evaluation report is provided to requesting physician.

## 2023-05-17 LAB
ANION GAP SERPL CALCULATED.3IONS-SCNC: 11 MMOL/L (ref 7–15)
BUN SERPL-MCNC: 13.5 MG/DL (ref 6–20)
CALCIUM SERPL-MCNC: 9.6 MG/DL (ref 8.6–10)
CHLORIDE SERPL-SCNC: 103 MMOL/L (ref 98–107)
CREAT SERPL-MCNC: 0.7 MG/DL (ref 0.51–0.95)
DEPRECATED HCO3 PLAS-SCNC: 28 MMOL/L (ref 22–29)
GFR SERPL CREATININE-BSD FRML MDRD: >90 ML/MIN/1.73M2
GLUCOSE SERPL-MCNC: 90 MG/DL (ref 70–99)
POTASSIUM SERPL-SCNC: 4.4 MMOL/L (ref 3.4–5.3)
SODIUM SERPL-SCNC: 142 MMOL/L (ref 136–145)

## 2023-05-19 PROBLEM — C50.212 MALIGNANT NEOPLASM OF UPPER-INNER QUADRANT OF LEFT BREAST IN FEMALE, ESTROGEN RECEPTOR NEGATIVE (H): Status: ACTIVE | Noted: 2020-06-25

## 2023-05-19 PROBLEM — Z17.1 MALIGNANT NEOPLASM OF UPPER-INNER QUADRANT OF LEFT BREAST IN FEMALE, ESTROGEN RECEPTOR NEGATIVE (H): Status: ACTIVE | Noted: 2020-06-25

## 2023-05-19 LAB
ATRIAL RATE - MUSE: 70 BPM
DIASTOLIC BLOOD PRESSURE - MUSE: NORMAL MMHG
INTERPRETATION ECG - MUSE: NORMAL
P AXIS - MUSE: 55 DEGREES
PR INTERVAL - MUSE: 134 MS
QRS DURATION - MUSE: 88 MS
QT - MUSE: 412 MS
QTC - MUSE: 444 MS
R AXIS - MUSE: 28 DEGREES
SYSTOLIC BLOOD PRESSURE - MUSE: NORMAL MMHG
T AXIS - MUSE: 5 DEGREES
VENTRICULAR RATE- MUSE: 70 BPM

## 2023-06-13 ENCOUNTER — TRANSFERRED RECORDS (OUTPATIENT)
Dept: HEALTH INFORMATION MANAGEMENT | Facility: CLINIC | Age: 58
End: 2023-06-13
Payer: COMMERCIAL

## 2023-06-27 ENCOUNTER — TRANSFERRED RECORDS (OUTPATIENT)
Dept: HEALTH INFORMATION MANAGEMENT | Facility: CLINIC | Age: 58
End: 2023-06-27
Payer: COMMERCIAL

## 2023-07-25 ENCOUNTER — TRANSFERRED RECORDS (OUTPATIENT)
Dept: HEALTH INFORMATION MANAGEMENT | Facility: CLINIC | Age: 58
End: 2023-07-25
Payer: COMMERCIAL

## 2023-09-27 NOTE — PROGRESS NOTES
[facility] Outpatient Progress Note    Patient: Monika Franz  MRN: [id number]  Date of Service: Sep 28, 2023          Reason for Visit    History of triple negative left breast cancer    Primary Oncologist: Dr. Monson      Assessment/Plan  Triple negative left breast cancer (2020)  Monika was treated 3 years ago.  She had neoadjuvant chemo followed by bilateral mastectomies.  She had a complete response without residual invasive cancer at the time of her surgery.    No evidence for recurrence to date.      CBC, CMP WNL    Plan:  -Continue with every 6-month exams until she is 5 years from her treatment (Fall, 2025)  -CBC and CMP annually  -No role for mammograms given bilateral mastectomies    Left arm lymphedema  Has been assessed with lymphedema therapy and has compression sleeve.    Status post gastric sleeve surgery with vitamin deficiencies (iron, vitamin B12)  On supplementation.  Lab work monitored routinely by PCP, within normal range.    4.    Fatigue  This is somewhat chronic but more acutely notable over the past month.  She has also noted a bit of weight gain.  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      No anemia. No cardiac symptoms.  No signs of disease recurrence.  Sleeping well with trazodone.  Compliant with CPAP.    Plan:  -Encouraged she increase her activity to help both with her fatigue and weight gain  -Fatigue is persistent, follow-up with PCP for further investigation (i.e. thyroid, etc.)  ______________________________________________________________________________    History of Present Illness/ Interval History    Ms. Monika Franz is a 57 year old with history of stage IIb triple negative left breast cancer treated with neoadjuvant chemo and mastectomy 3 years ago.  He had a complete response following neoadjuvant chemo so did not require adjuvant treatment.  She returns for 6-month  surveillance visit.    She is doing generally well.  He has some baseline mild fatigue but this is been more notable in the past few months.  He is also gained some weight.  She underwent a right knee replacement this summer with improving pain and she is hoping to increase her activity.  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  Sleeps well at night, with trazodone.  Compliant with her CPAP.  Denies depression/anxiety.  No dyspnea.  No lower extremity edema.    Mild left arm lymphedema, wears compression sleeve.    No chest wall changes, pain.  No new sites of bony pain.  No headaches.  No respiratory changes.    ECOG Performance    0      Oncology History/Treatment  Diagnosis/Stage:   6/2020: Stage IIb (cT2-cN0-cM0) left breast invasive ductal carcinoma, triple negative // ypCR  -Self-palpated a lump in left breast at 10 o'clock position  -Mammogram confirmed suspicious lesion, 1.6 centimeter  -US biopsy: Invasive ductal carcinoma, high-grade.  ER negative, ME negative, HER2 nu 2+ IHC but negative on FISH  -12/2020 mastectomy surgical pathology: Complete response, no residual base of cancer, some residual DCIS.  0/3 sLN involved.    Treatment:  7/2020 -11/2020: neoadjuvant ddAC x4, followed by weekly Taxol x12    12/2020: Bilateral mastectomy (complete response, no residual invasive tumor)    Surveillance      Physical Exam    GENERAL: Alert and oriented to time place and person. Seated comfortably. In no distress.  HEAD: Atraumatic and normocephalic. No alopecia.  EYES: SAMRA, EOMI. No erythema. No icterus.  LYMPH NODES: No palpable supraclavicular, cervical, axillary lymphadenopathy.  BREASTS: Bilateral mastectomies.  Chest wall without abnormalities, nontender.  CHEST: clear to auscultation bilaterally. Resonant to percussion throughout bilaterally. Symmetrical breath movements bilaterally.  CVS: RRR  ABDOMEN: Soft.  Not tender. Not distended. No palpable hepatomegaly or splenomegaly. No other mass palpable. Bowel sounds present.  EXTREMITIES: Left arm lymphedema with compression sleeve on.  SKIN: no rash, or bruising or purpura.   NEURO: No gross deficit noted. Non-antalgic gait.      Lab Results    Recent Results (from the past 168 hour(s))   Comprehensive metabolic panel   Result Value Ref Range    Sodium 138 135 - 145 mmol/L    Potassium 4.2 3.4 - 5.3 mmol/L    Carbon Dioxide (CO2) 27 22 - 29 mmol/L    Anion Gap 11 7 - 15 mmol/L    Urea Nitrogen 10.0 6.0 - 20.0 mg/dL    Creatinine 0.68 0.51 - 0.95 mg/dL    GFR Estimate >90 >60 mL/min/1.73m2    Calcium 9.6 8.6 - 10.0 mg/dL    Chloride 100 98 - 107 mmol/L    Glucose 90 70 - 99 mg/dL    Alkaline Phosphatase 82 35 - 104 U/L    AST 28 0 - 45 U/L    ALT 34 0 - 50 U/L    Protein Total 7.1 6.4 - 8.3 g/dL    Albumin 4.6 3.5 - 5.2 g/dL    Bilirubin Total 0.2 <=1.2 mg/dL   CBC with platelets   Result Value Ref Range    WBC Count 7.1 4.0 - 11.0 10e3/uL    RBC Count 4.66 3.80 - 5.20 10e6/uL    Hemoglobin 13.4 11.7 - 15.7 g/dL    Hematocrit 43.0 35.0 - 47.0 %    MCV 92 78 - 100 fL    MCH 28.8 26.5 - 33.0 pg    MCHC 31.2 (L) 31.5 - 36.5 g/dL    RDW 13.6 10.0 - 15.0 %    Platelet Count 222 150 - 450 10e3/uL       Imaging    No results found.    Billing  Total time 30 minutes, to include face to face visit, review of EMR, ordering, documentation and coordination of care on date of service    Signed by: Johanna Fraga NP

## 2023-09-28 ENCOUNTER — ONCOLOGY VISIT (OUTPATIENT)
Dept: ONCOLOGY | Facility: HOSPITAL | Age: 58
End: 2023-09-28
Attending: INTERNAL MEDICINE
Payer: COMMERCIAL

## 2023-09-28 ENCOUNTER — LAB (OUTPATIENT)
Dept: INFUSION THERAPY | Facility: HOSPITAL | Age: 58
End: 2023-09-28
Attending: INTERNAL MEDICINE
Payer: COMMERCIAL

## 2023-09-28 VITALS
SYSTOLIC BLOOD PRESSURE: 133 MMHG | BODY MASS INDEX: 38.24 KG/M2 | OXYGEN SATURATION: 97 % | DIASTOLIC BLOOD PRESSURE: 64 MMHG | RESPIRATION RATE: 18 BRPM | HEART RATE: 78 BPM | WEIGHT: 252.3 LBS | TEMPERATURE: 98.3 F | HEIGHT: 68 IN

## 2023-09-28 DIAGNOSIS — I89.0 LYMPHEDEMA OF LEFT UPPER EXTREMITY: ICD-10-CM

## 2023-09-28 DIAGNOSIS — Z80.3 FAMILY HISTORY OF MALIGNANT NEOPLASM OF BREAST: ICD-10-CM

## 2023-09-28 DIAGNOSIS — Z17.1 MALIGNANT NEOPLASM OF UPPER-INNER QUADRANT OF LEFT BREAST IN FEMALE, ESTROGEN RECEPTOR NEGATIVE (H): ICD-10-CM

## 2023-09-28 DIAGNOSIS — Z17.1 MALIGNANT NEOPLASM OF UPPER-INNER QUADRANT OF LEFT BREAST IN FEMALE, ESTROGEN RECEPTOR NEGATIVE (H): Primary | ICD-10-CM

## 2023-09-28 DIAGNOSIS — C50.212 MALIGNANT NEOPLASM OF UPPER-INNER QUADRANT OF LEFT BREAST IN FEMALE, ESTROGEN RECEPTOR NEGATIVE (H): Primary | ICD-10-CM

## 2023-09-28 DIAGNOSIS — C50.212 MALIGNANT NEOPLASM OF UPPER-INNER QUADRANT OF LEFT BREAST IN FEMALE, ESTROGEN RECEPTOR NEGATIVE (H): ICD-10-CM

## 2023-09-28 LAB
ALBUMIN SERPL BCG-MCNC: 4.6 G/DL (ref 3.5–5.2)
ALP SERPL-CCNC: 82 U/L (ref 35–104)
ALT SERPL W P-5'-P-CCNC: 34 U/L (ref 0–50)
ANION GAP SERPL CALCULATED.3IONS-SCNC: 11 MMOL/L (ref 7–15)
AST SERPL W P-5'-P-CCNC: 28 U/L (ref 0–45)
BILIRUB SERPL-MCNC: 0.2 MG/DL
BUN SERPL-MCNC: 10 MG/DL (ref 6–20)
CALCIUM SERPL-MCNC: 9.6 MG/DL (ref 8.6–10)
CHLORIDE SERPL-SCNC: 100 MMOL/L (ref 98–107)
CREAT SERPL-MCNC: 0.68 MG/DL (ref 0.51–0.95)
EGFRCR SERPLBLD CKD-EPI 2021: >90 ML/MIN/1.73M2
ERYTHROCYTE [DISTWIDTH] IN BLOOD BY AUTOMATED COUNT: 13.6 % (ref 10–15)
GLUCOSE SERPL-MCNC: 90 MG/DL (ref 70–99)
HCO3 SERPL-SCNC: 27 MMOL/L (ref 22–29)
HCT VFR BLD AUTO: 43 % (ref 35–47)
HGB BLD-MCNC: 13.4 G/DL (ref 11.7–15.7)
MCH RBC QN AUTO: 28.8 PG (ref 26.5–33)
MCHC RBC AUTO-ENTMCNC: 31.2 G/DL (ref 31.5–36.5)
MCV RBC AUTO: 92 FL (ref 78–100)
PLATELET # BLD AUTO: 222 10E3/UL (ref 150–450)
POTASSIUM SERPL-SCNC: 4.2 MMOL/L (ref 3.4–5.3)
PROT SERPL-MCNC: 7.1 G/DL (ref 6.4–8.3)
RBC # BLD AUTO: 4.66 10E6/UL (ref 3.8–5.2)
SODIUM SERPL-SCNC: 138 MMOL/L (ref 135–145)
WBC # BLD AUTO: 7.1 10E3/UL (ref 4–11)

## 2023-09-28 PROCEDURE — 80053 COMPREHEN METABOLIC PANEL: CPT

## 2023-09-28 PROCEDURE — 99214 OFFICE O/P EST MOD 30 MIN: CPT | Performed by: NURSE PRACTITIONER

## 2023-09-28 PROCEDURE — 36591 DRAW BLOOD OFF VENOUS DEVICE: CPT

## 2023-09-28 PROCEDURE — 85027 COMPLETE CBC AUTOMATED: CPT

## 2023-09-28 ASSESSMENT — PAIN SCALES - GENERAL: PAINLEVEL: NO PAIN (0)

## 2023-09-28 NOTE — LETTER
9/28/2023         RE: Monika Franz  [address] [facility] [id number]        Dear Colleague,    Thank you for referring your patient, Monika Franz, to the [facility] [place]. Please see a copy of my visit note below.    [facility] Hematology and Oncology Outpatient Progress Note    Patient: Monika Franz  MRN: [id number]  Date of Service: Sep 28, 2023          Reason for Visit    History of triple negative left breast cancer    Primary Oncologist: Dr. Monson      Assessment/Plan  Triple negative left breast cancer (2020)  Monika was treated 3 years ago.  She had neoadjuvant chemo followed by bilateral mastectomies.  She had a complete response without residual invasive cancer at the time of her surgery.    No evidence for recurrence to date.      CBC, CMP WNL    Plan:  -Continue with every 6-month exams until she is 5 years from her treatment (Fall, 2025)  -CBC and CMP annually  -No role for mammograms given bilateral mastectomies    Left arm lymphedema  Has been assessed with lymphedema therapy and has compression sleeve.    Status post gastric sleeve surgery with vitamin deficiencies (iron, vitamin B12)  On supplementation.  Lab work monitored routinely by PCP, within normal range.    4.    Fatigue  This is somewhat chronic but more acutely notable over the past month.  She has also noted a bit of weight gain.  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      No anemia. No cardiac symptoms.  No signs of disease recurrence.  Sleeping well with trazodone.  Compliant with CPAP.    Plan:  -Encouraged she increase her activity to help both with her fatigue and weight gain  -Fatigue is persistent, follow-up with PCP for further investigation (i.e. thyroid, etc.)  ______________________________________________________________________________    History of Present Illness/ Interval History    Ms. Monika Franz  is a 57 year old with history of stage IIb triple negative left breast cancer treated with neoadjuvant chemo and mastectomy 3 years ago.  He had a complete response following neoadjuvant chemo so did not require adjuvant treatment.  She returns for 6-month surveillance visit.    She is doing generally well.  He has some baseline mild fatigue but this is been more notable in the past few months.  He is also gained some weight.  She underwent a right knee replacement this summer with improving pain and she is hoping to increase her activity.  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  Sleeps well at night, with trazodone.  Compliant with her CPAP.  Denies depression/anxiety.  No dyspnea.  No lower extremity edema.    Mild left arm lymphedema, wears compression sleeve.    No chest wall changes, pain.  No new sites of bony pain.  No headaches.  No respiratory changes.    ECOG Performance    0      Oncology History/Treatment  Diagnosis/Stage:   6/2020: Stage IIb (cT2-cN0-cM0) left breast invasive ductal carcinoma, triple negative // ypCR  -Self-palpated a lump in left breast at 10 o'clock position  -Mammogram confirmed suspicious lesion, 1.6 centimeter  -US biopsy: Invasive ductal carcinoma, high-grade.  ER negative, GA negative, HER2 nu 2+ IHC but negative on FISH  -12/2020 mastectomy surgical pathology: Complete response, no residual base of cancer, some residual DCIS.  0/3 sLN involved.    Treatment:  7/2020 -11/2020: neoadjuvant ddAC x4, followed by weekly Taxol x12    12/2020: Bilateral mastectomy (complete response, no residual invasive tumor)    Surveillance      Physical Exam    GENERAL: Alert and oriented to time place and person. Seated comfortably. In no distress.  HEAD: Atraumatic and normocephalic. No alopecia.  EYES: SAMRA, EOMI. No erythema. No icterus.  LYMPH NODES: No palpable supraclavicular, cervical,  axillary lymphadenopathy.  BREASTS: Bilateral mastectomies.  Chest wall without abnormalities, nontender.  CHEST: clear to auscultation bilaterally. Resonant to percussion throughout bilaterally. Symmetrical breath movements bilaterally.  CVS: RRR  ABDOMEN: Soft. Not tender. Not distended. No palpable hepatomegaly or splenomegaly. No other mass palpable. Bowel sounds present.  EXTREMITIES: Left arm lymphedema with compression sleeve on.  SKIN: no rash, or bruising or purpura.   NEURO: No gross deficit noted. Non-antalgic gait.      Lab Results    Recent Results (from the past 168 hour(s))   Comprehensive metabolic panel   Result Value Ref Range    Sodium 138 135 - 145 mmol/L    Potassium 4.2 3.4 - 5.3 mmol/L    Carbon Dioxide (CO2) 27 22 - 29 mmol/L    Anion Gap 11 7 - 15 mmol/L    Urea Nitrogen 10.0 6.0 - 20.0 mg/dL    Creatinine 0.68 0.51 - 0.95 mg/dL    GFR Estimate >90 >60 mL/min/1.73m2    Calcium 9.6 8.6 - 10.0 mg/dL    Chloride 100 98 - 107 mmol/L    Glucose 90 70 - 99 mg/dL    Alkaline Phosphatase 82 35 - 104 U/L    AST 28 0 - 45 U/L    ALT 34 0 - 50 U/L    Protein Total 7.1 6.4 - 8.3 g/dL    Albumin 4.6 3.5 - 5.2 g/dL    Bilirubin Total 0.2 <=1.2 mg/dL   CBC with platelets   Result Value Ref Range    WBC Count 7.1 4.0 - 11.0 10e3/uL    RBC Count 4.66 3.80 - 5.20 10e6/uL    Hemoglobin 13.4 11.7 - 15.7 g/dL    Hematocrit 43.0 35.0 - 47.0 %    MCV 92 78 - 100 fL    MCH 28.8 26.5 - 33.0 pg    MCHC 31.2 (L) 31.5 - 36.5 g/dL    RDW 13.6 10.0 - 15.0 %    Platelet Count 222 150 - 450 10e3/uL       Imaging    No results found.    Billing  Total time 30 minutes, to include face to face visit, review of EMR, ordering, documentation and coordination of care on date of service    Signed by: Johanna Fraga NP      Again, thank you for allowing me to participate in the care of your patient.        Sincerely,        Johanna Fraga, NP

## 2023-09-29 DIAGNOSIS — E66.01 MORBID OBESITY (H): Primary | ICD-10-CM

## 2023-10-05 ENCOUNTER — TELEPHONE (OUTPATIENT)
Dept: ONCOLOGY | Facility: HOSPITAL | Age: 58
End: 2023-10-05
Payer: COMMERCIAL

## 2023-10-05 NOTE — TELEPHONE ENCOUNTER
Oncology Distress Screen    Called Monika in regards to her positive oncology nutrition distress screen:    2. How concerned are you about unintended weight loss or your current weight? : 8  And  9. If you want to be contacted by one of our professionals, I can send a message to them right now. : ONCOLOGY DIETITIAN    Called and left voicemail explaining role and reason for call. Call back information provided.    Mattie Darby, MS, RD, LD

## 2023-11-28 ENCOUNTER — PATIENT OUTREACH (OUTPATIENT)
Dept: GASTROENTEROLOGY | Facility: CLINIC | Age: 58
End: 2023-11-28
Payer: COMMERCIAL

## 2024-02-06 ENCOUNTER — LAB (OUTPATIENT)
Dept: LAB | Facility: CLINIC | Age: 59
End: 2024-02-06
Payer: COMMERCIAL

## 2024-02-06 ENCOUNTER — OFFICE VISIT (OUTPATIENT)
Dept: SURGERY | Facility: CLINIC | Age: 59
End: 2024-02-06
Attending: NURSE PRACTITIONER
Payer: COMMERCIAL

## 2024-02-06 VITALS
WEIGHT: 254 LBS | DIASTOLIC BLOOD PRESSURE: 64 MMHG | SYSTOLIC BLOOD PRESSURE: 130 MMHG | HEIGHT: 66 IN | BODY MASS INDEX: 40.82 KG/M2

## 2024-02-06 DIAGNOSIS — E66.01 MORBID OBESITY (H): ICD-10-CM

## 2024-02-06 DIAGNOSIS — K91.2 POSTOPERATIVE MALABSORPTION: ICD-10-CM

## 2024-02-06 DIAGNOSIS — K91.2 POSTOPERATIVE MALABSORPTION: Primary | ICD-10-CM

## 2024-02-06 PROBLEM — I89.0 LYMPHEDEMA OF ARM: Status: ACTIVE | Noted: 2024-02-06

## 2024-02-06 LAB
FOLATE SERPL-MCNC: 32.5 NG/ML (ref 4.6–34.8)
HBA1C MFR BLD: 5.4 % (ref 0–5.6)
PTH-INTACT SERPL-MCNC: 23 PG/ML (ref 15–65)

## 2024-02-06 PROCEDURE — 99205 OFFICE O/P NEW HI 60 MIN: CPT | Performed by: FAMILY MEDICINE

## 2024-02-06 PROCEDURE — 83970 ASSAY OF PARATHORMONE: CPT

## 2024-02-06 PROCEDURE — 84425 ASSAY OF VITAMIN B-1: CPT | Mod: 90

## 2024-02-06 PROCEDURE — 84630 ASSAY OF ZINC: CPT | Mod: 90

## 2024-02-06 PROCEDURE — 84443 ASSAY THYROID STIM HORMONE: CPT

## 2024-02-06 PROCEDURE — 36415 COLL VENOUS BLD VENIPUNCTURE: CPT

## 2024-02-06 PROCEDURE — 82306 VITAMIN D 25 HYDROXY: CPT

## 2024-02-06 PROCEDURE — 84590 ASSAY OF VITAMIN A: CPT | Mod: 90

## 2024-02-06 PROCEDURE — 99000 SPECIMEN HANDLING OFFICE-LAB: CPT

## 2024-02-06 PROCEDURE — 82746 ASSAY OF FOLIC ACID SERUM: CPT

## 2024-02-06 PROCEDURE — 83036 HEMOGLOBIN GLYCOSYLATED A1C: CPT

## 2024-02-06 NOTE — PATIENT INSTRUCTIONS
If you are planning to have your surgery at [facility], please complete this online video seminar series.    Throughout this six-video series, you will be introduced to some members of our bariatric team, gain a better understanding of what obesity is, learn about the surgery process, review the benefits and risks of surgery, and see a glimpse of life after surgery.      Completion time of all six videos is about 25 minutes and you can watch them each separately as time allows.     Start by clicking on the link below and keep scrolling down to the [facility] Weight Loss Surgery Video.    [email or web address]    Please let us know if you have any questions or issues.        We offer support groups for patients who are working on weight loss and considering, preparing for or have had weight loss surgery.   There is no cost for this opportunity.  You are invited to attend the?Virtual Support Groups?provided by any of the following locations:    [facility] via Microsoft Teams with Desire Thomas RN  2.   [place] via [employer] with Larry Crooks, PhD, LP  3.   [place] via [employer] with Cecile Aguillon RN  4.   [facility] via Microsoft Teams with Cecile Klein [facility]    The following Support Group information can also be found on our website:  [email or web address]      [facility] Weight Loss Surgery Support Group    [facility] Weight Loss Surgery Support Group  The support group is a patient-lead forum that meets monthly to share experiences, encouragement and education. It is open to those who have had weight loss surgery, are scheduled for surgery, and those who are considering surgery.   WHEN: This group meets on the 3rd Wednesday of each month from 5:00PM - 6:00PM virtually using Microsoft Teams.   FACILITATOR: Led by Desire Thomas, the program's Clinical Coordinator.   TO REGISTER: Please  contact the clinic via Doppelgames or call the nurse line directly at [phone] to inform our staff that you would like an invite sent to you and to let us know the email you would like the invite sent to. Prior to the meeting, a link with directions on how to join the meeting will be sent to you.    [facility] Support Groups    Connections: Bariatric Care Support Group?  This is open to all [facility] (and those external to this program) pre- and post- operative bariatric surgery patients as well as their support system.   WHEN: This group meets the 3rd Tuesday of each month from 6:30 PM - 8:00 PM virtually using Microsoft Teams.   FACILITATOR: Led by Larry Crooks, Ph.D who is a Licensed Psychologist with the [facility] Comprehensive Weight Management Program.   TO REGISTER: Please send an email to Larry Crooks, Ph.D., LP at [email or web address]?if you would like an invitation to the group and to learn about using Microsoft Teams.      Connections: Post-Operative Bariatric Surgery Support Group  This is a support group for [facility] bariatric patients (and those external to [facility]) who have had bariatric surgery and are at least 3 months post-surgery.  WHEN: This support group meets the 4th Wednesday of the month from 11:00 AM - 12:00 PM virtually using Microsoft Teams.   FACILITATOR: Led by Certified Bariatric Nurse, Cecile Aguillon RN.   TO REGISTER: Please send an email to Cecile at [email or web address] if you would like an invitation to the group and to learn about using [employer].      [facility] Healthy Lifestyle Virtual Support Group    Healthy Lifestyle Virtual Support Group?  This is 60 minutes of small group guided discussion, support and resources. All are welcome who want a healthy lifestyle.  WHEN: This group meets monthly on a Friday from 12:30 PM - to 1:30 PM  virtually using Microsoft Teams.   FACILITATOR: Led by National Board Certified Health , Cecile Klein, [facility].   TO REGISTER: Please send an email to Cecile [email or web address] to receive monthly invites to the group or if you have any questions about having a health .  Prior to the meeting, a link with directions on how to join the meeting will be sent to you.    Bariatric Task List    Fax:  Please fax all paperwork to: [phone] -     Status:  Is patient a candidate for bariatric surgery?:  patient is a candidate for bariatric surgery -     Cleared to schedule surgeon consult?:  not cleared to schedule surgeon consult -     Status:  surgery evaluation in process -     Surgeon: Gypsy or Blanca -        Insurance: Insurance:  [facility] out of state -      Contact insurance to discuss coverage: Needed -       Other:  Call Johanna at [phone] to discuss insurance requirements. -        Patient Info: Initial Weight:  254 lb -     Date of Initial Weight/Height:  2/6/2024 -     Goal Weight (lbs):  254 -     Required Weight Loss:  No gain from initial weight. -     Surgery Type:  bariatric revision - LSG to RNY or MARIELA      Dietician Visits: Structured weight loss required by insurance?:    - Per Johanna   Clearance from dietician to see surgeon?:  Needed - Anusha      Psychological Evaluation: Psych eval:  Needed - Larry      Lab Work: Complete Blood Count:  Completed -     Comprehensive Metabolic Panel:  Completed -     Vitamin D:  Completed -     PTH:  Completed -     Hgb A1c:  Completed - 5.4 on 2/6/2024    Lipids: Completed -      TSH (UCARE, SCA, MN MA): Completed -       Ferritin: Completed -       Folate: Completed -     Thiamine: Completed -     Vitamin A: Completed -     Vitamin B12: Completed -     Zinc: Completed -        Consults/ Clearance Sleep Medicine:  Completed - Uses CPAP--bring to the hospital on the day of surgery.      Health Maintenance: Colonoscopy(> 50 yrs or family hx):  Completed -  "6/21/2021--repeat in 5 yrs (2026)   Mammogram (> 40 yrs or family hx):  NOT Needed - s/p Bilateral Mastectomy   Pap Smear (women): NOT Needed - s/p Hyst      Stopping Smoking/ Alcohol Use/Cannabis Use: Quit tobacco use (3 months smoke free)?:    - Never smoker      Patient Education:  Information Session:  Needed - Must watch videos!!   Given \"Making your decision\" handout?:  Yes -     Given \"A Roadmap to you Weight Loss Surgery\" handout?: Yes -     Attended support group?:  Needed - Must attend at least once!   Support plan in place?:  Needed -        Additional Surgery Requirements: Review Coag plan:  Completed - Standard   Birth control plan:  Completed - s/p hyst   Gallstone prevention plan (Actigall for 6 months postop): Completed - s/p jaquelin      Final Tasks:  Before surgery online preop class:  Needed - Prior to surgery date.   After surgery online class:  Needed - 1 week after surgery w/dietitian   History and Physical: Primary Care Provider -   Needed - within 30 days of surgery   Final labs per clinic: Needed - within 30 days of surgery   Chest xray per clinic: Needed - within 90 days of surgery   Electrocardiogram (ECG) per clinic: Needed - within 90 days of surgery   Schedule postop appointments: Needed - Johanna to set up when scheduling surgery.             "

## 2024-02-06 NOTE — PROGRESS NOTES
Bariatric Follow Up Visit with a History of Previous Bariatric Surgery     Date of visit: 2/6/2024  Physician: Samreen Pickard MD, MD  Primary Care Provider:  Donna Holt DOUG Franz   58 year old  female    Date of Surgery: 6/24/2019  Initial Weight: 262#  Initial BMI: 42.34  Today's Weight:   Wt Readings from Last 1 Encounters:   02/06/24 115.2 kg (254 lb)     Body mass index is 41 kg/m .      Assessment and Plan     Assessment: Monika is a 58 year old year old female who is 4.5 yrs s/p  Sleeve Gastrectomy with  Dr. Siria Frank DOUG Franz feels as if she has achieved the goals she hoped to accomplish through bariatric surgery and weight loss.    Encounter Diagnoses   Name Primary?    Postoperative malabsorption Yes    Morbid obesity (H)          Current Outpatient Medications:     ARIPiprazole (ABILIFY) 5 MG tablet, Take 5 mg by mouth daily , Disp: , Rfl:     Calcium Citrate 1040 MG TABS, 1200mg-1500mg daily (separate doses 2-3 times daily), Disp: , Rfl:     cholecalciferol 25 MCG (1000 UT) TABS, Take 5,000 Units by mouth, Disp: , Rfl:     desvenlafaxine (PRISTIQ) 50 MG 24 hr tablet, Take 100 mg by mouth, Disp: , Rfl:     metFORMIN (GLUCOPHAGE) 500 MG tablet, Take 1 tablet by mouth 2 times daily, Disp: , Rfl:     Pediatric Multiple Vitamins (MULTIVITAMIN CHILDRENS PO), Take 1 tablet by mouth, Disp: , Rfl:     traZODone (DESYREL) 150 MG tablet, Take 1 tablet by mouth nightly as needed, Disp: , Rfl:     triamcinolone (KENALOG) 0.1 % external cream, Apply topically 2 times daily X 14 days, Disp: 30 g, Rfl: 0    vitamin B-12 (CYANOCOBALAMIN) 1000 MCG tablet, Take 1,000 mcg by mouth, Disp: , Rfl:      Plan:  In summary, Monika has REBECCA in the setting of morbid obesity. her Body mass index is 41 kg/m .. This satisfies NIH criteria for revisional bariatric surgery. Once Monika has been cleared by our bariatric psychologist and our bariatric dietitian, completed initial lab work, attended one support  group, and satisfied insurance mandated visits if applicable, she  will be scheduled with  Dr. Rios for Bariatric Surgery Consultation. She is interested in the  MARIELA or RYGB.  Monika understands that routine health care maintenance will need to be up to date prior to surgery. she verbalizes understanding of the process to surgery and expectations for the postoperative period including the need for lifelong lifestyle changes, vitamin supplementation, and laboratory monitoring.       Weight will need to be 254# prior to submitting for insurance approval.  Standard DVT protocol  Sleep Study has been done. She will bring her CPAP to the hospital on the morning of surgery  HCM UTD        Sincerely,          Samreen Pickard MD     Total time spent on the date of this encounter doing: chart review, review of test results, patient visit, physical exam, education, counseling, developing plan of care, and documenting = 60 minutes.       Return in about 6 months (around 8/6/2024).    Bariatric Surgery Review     Interim History/LifeChanges: Monika's high weight was 280#. She lost to 262# prior to her surgery. She has been taking her vitamins with consistency since her surgery. She lost down to 190# and then was diagnosed with breast cancer, her  lost his job, she had her knee replaced, she had bunionectomy and 3 hammertoes,     Patient Concerns: weight recurrence  Appetite (1-10): stable  GERD: none    Medication changes: no    Vitamin Intake:   B-12   SL   MVI  2/d   Vitamin D  5,000   Calcium   citrate     Other                LABS: ordered  And reviewed   Nausea no  Vomiting no  Constipation no  Diarrhea no  Rashes no  Hair Loss no  Calf tenderness no  Breathing difficulty no  Reactive Hypoglycemia with fat then diarrhea  Light Headedness no   Moods stable on medications    12 point ROS as above and otherwise negative      Habits:  Alcohol: no  Tobacco: no  Caffeine diet   NSAIDS no  Exercise Routine: walks  45-60 min/d 7K average, belongs to [facility]  3 meals/day yes  Protein first yes B protein shake with fruit, L: salad with chicken D: meat and veggie S: cookies, crackers,   60+grams/day  Water Separate from meals yes  Calorie Containing Beverages no  Restaurant eating/wk none  Sleeping 7 -7.5  Stress no  CPAP: using  Contraception: hysterectomy  DEXA:NA    Social History     Social History     Socioeconomic History    Marital status:      Spouse name: Not on file    Number of children: Not on file    Years of education: Not on file    Highest education level: Not on file   Occupational History    Not on file   Tobacco Use    Smoking status: Never    Smokeless tobacco: Never   Vaping Use    Vaping Use: Never used   Substance and Sexual Activity    Alcohol use: Not Currently    Drug use: Never    Sexual activity: Yes     Partners: Male     Birth control/protection: Female Surgical, None   Other Topics Concern    Parent/sibling w/ CABG, MI or angioplasty before 65F 55M? No   Social History Narrative    . 3 children 29 daughter, 26 son, 20 son. Lives with her  and 2 boys. Working FT  at [employer].  is .     Social Determinants of Health     Financial Resource Strain: Not on file   Food Insecurity: Not on file   Transportation Needs: Not on file   Physical Activity: Not on file   Stress: Not on file   Social Connections: Not on file   Interpersonal Safety: Not on file   Housing Stability: Not on file       Past Medical History     Past Medical History:   Diagnosis Date    Anxiety     Breast cancer (H) 06/2020    chemo and bilateral mastectomy    Cataract     Depression     Depressive disorder     Glaucoma     with phentermine    Lymphedema of arm     Sleep apnea     Uses CPAP     Problem List     Patient Active Problem List   Diagnosis    REBECCA (obstructive sleep apnea)    Bariatric surgery status    Eczema    Drug overdose, intentional (H)    Malignant  neoplasm of upper-inner quadrant of left breast in female, estrogen receptor negative (H)    Narrow angle glaucoma suspect of both eyes    Severe recurrent major depression without psychotic features (H)    Adenomatous polyp of colon    Hammer toe of right foot    Morbid obesity (H)    Lymphedema of arm     Medications       Current Outpatient Medications:     ARIPiprazole (ABILIFY) 5 MG tablet, Take 5 mg by mouth daily , Disp: , Rfl:     Calcium Citrate 1040 MG TABS, 1200mg-1500mg daily (separate doses 2-3 times daily), Disp: , Rfl:     cholecalciferol 25 MCG (1000 UT) TABS, Take 5,000 Units by mouth, Disp: , Rfl:     desvenlafaxine (PRISTIQ) 50 MG 24 hr tablet, Take 100 mg by mouth, Disp: , Rfl:     metFORMIN (GLUCOPHAGE) 500 MG tablet, Take 1 tablet by mouth 2 times daily, Disp: , Rfl:     Pediatric Multiple Vitamins (MULTIVITAMIN CHILDRENS PO), Take 1 tablet by mouth, Disp: , Rfl:     traZODone (DESYREL) 150 MG tablet, Take 1 tablet by mouth nightly as needed, Disp: , Rfl:     triamcinolone (KENALOG) 0.1 % external cream, Apply topically 2 times daily X 14 days, Disp: 30 g, Rfl: 0    vitamin B-12 (CYANOCOBALAMIN) 1000 MCG tablet, Take 1,000 mcg by mouth, Disp: , Rfl:    Surgical History     Past Surgical History  She has a past surgical history that includes  section; Laparoscopic ablation endometriosis; Bariatric Surgery (2019); Us Breast Core Biopsy Left (Left, 2020); Cholecystectomy; back surgery; Arthroscopy knee; Gastrectomy Laparoscopic Sleeve (2019); Pr Insj Tunneled Ctr Vad W/Subq Port Age 5 Yr/> (Right, 2020); US [place] Node Injection (12/15/2020); Pr Mastectomy, Simple, Complete (N/A, 12/15/2020); Pr Rmvl Carlos Ctr Vad W/Subq Port/ Ctr/Prph Insj (N/A, 12/15/2020); Mastectomy (Bilateral, 12/15/2020); Bunionectomy (Right, 2021); Hysterectomy, Pap No Longer Indicated (); Breast surgery (2020); colonoscopy (2021); and GYN surgery.    Objective-Exam  "    Constitutional:  /64   Ht 1.676 m (5' 6\")   Wt 115.2 kg (254 lb)   LMP  (LMP Unknown)   BMI 41.00 kg/m      General:  Pleasant and in no acute distress   Eyes:  EOMI  ENT:  Airway 3+  Moist mucous membranes   Neck: 16.5\"  Respiratory: Normal respiratory effort, no cough, wheezes or crackles  CV:  Regular rate and Rhythm, no murmurs, pulses palpable, no calf tenderness, no LE edema, left arm sleeve  Gastrointestinal: Abdomen 48\" circumference. No rashes  Musculoskeletal: muscle mass WNL  Skin: color fair hair full, incisions nicely healed  Neurological: No tremor, normal gait  Psychiatric: alert and oriented X3, mood and affect normal    Counseling     We reviewed the important post op bariatric recommendations:  -eating 3 meals daily  -eating protein first, getting >60gm protein daily  -eating slowly, chewing food well  -avoiding/limiting calorie containing beverages  -drinking water 15-30 minutes before or after meals  -choosing wheat, not white with breads, crackers, pastas, ovidio, bagels, tortillas, rice  -limiting restaurant or cafeteria eating to twice a week or less    We discussed the importance of restorative sleep and stress management in maintaining a healthy weight.  We discussed the National Weight Control Registry healthy weight maintenance strategies and ways to optimize metabolism.  We discussed the importance of physical activity including cardiovascular and strength training in maintaining a healthier weight.    We discussed the importance of life-long vitamin supplementation and life-long  follow-up.    Monika was reminded that, to avoid marginal ulcers she should avoid tobacco at all, alcohol in excess, caffeine in excess, and NSAIDS (unless indicated for cardioprotection or othewise and opposed by a PPI).    Samreen Pickard MD, MD, FAAFP  [facility].  2/6/2024  11:14 AM            Total time spent on the date of this encounter doing: chart review, " review of test results, patient visit, physical exam, education, counseling, developing plan of care, and documenting = 60 minutes.

## 2024-02-06 NOTE — LETTER
2/6/2024         RE: Monika Franz  [address] [facility] [id number]        Dear Colleague,    Thank you for referring your patient, Monika Franz, to the [facility] SURGERY CLINIC AND BARIATRICS CARE [place]. Please see a copy of my visit note below.    Bariatric Follow Up Visit with a History of Previous Bariatric Surgery     Date of visit: 2/6/2024  Physician: Samreen Pickard MD, MD  Primary Care Provider:  Donna Holt  Monika Franz   58 year old  female    Date of Surgery: 6/24/2019  Initial Weight: 262#  Initial BMI: 42.34  Today's Weight:   Wt Readings from Last 1 Encounters:   02/06/24 115.2 kg (254 lb)     Body mass index is 41 kg/m .      Assessment and Plan     Assessment: Monika is a 58 year old year old female who is 4.5 yrs s/p  Sleeve Gastrectomy with  Dr. Beverly  Monika Franz feels as if she has achieved the goals she hoped to accomplish through bariatric surgery and weight loss.    Encounter Diagnoses   Name Primary?     Postoperative malabsorption Yes     Morbid obesity (H)          Current Outpatient Medications:      ARIPiprazole (ABILIFY) 5 MG tablet, Take 5 mg by mouth daily , Disp: , Rfl:      Calcium Citrate 1040 MG TABS, 1200mg-1500mg daily (separate doses 2-3 times daily), Disp: , Rfl:      cholecalciferol 25 MCG (1000 UT) TABS, Take 5,000 Units by mouth, Disp: , Rfl:      desvenlafaxine (PRISTIQ) 50 MG 24 hr tablet, Take 100 mg by mouth, Disp: , Rfl:      metFORMIN (GLUCOPHAGE) 500 MG tablet, Take 1 tablet by mouth 2 times daily, Disp: , Rfl:      Pediatric Multiple Vitamins (MULTIVITAMIN CHILDRENS PO), Take 1 tablet by mouth, Disp: , Rfl:      traZODone (DESYREL) 150 MG tablet, Take 1 tablet by mouth nightly as needed, Disp: , Rfl:      triamcinolone (KENALOG) 0.1 % external cream, Apply topically 2 times daily X 14 days, Disp: 30 g, Rfl: 0     vitamin B-12 (CYANOCOBALAMIN) 1000 MCG tablet, Take 1,000 mcg by mouth, Disp: , Rfl:      Plan:  In  summary, Monika has REBECCA in the setting of morbid obesity. her Body mass index is 41 kg/m .. This satisfies NIH criteria for revisional bariatric surgery. Once Monika has been cleared by our bariatric psychologist and our bariatric dietitian, completed initial lab work, attended one support group, and satisfied insurance mandated visits if applicable, she  will be scheduled with  Dr. Rios for Bariatric Surgery Consultation. She is interested in the  MARIELA or RYGB.  Monika understands that routine health care maintenance will need to be up to date prior to surgery. she verbalizes understanding of the process to surgery and expectations for the postoperative period including the need for lifelong lifestyle changes, vitamin supplementation, and laboratory monitoring.       Weight will need to be 254# prior to submitting for insurance approval.  Standard DVT protocol  Sleep Study has been done. She will bring her CPAP to the hospital on the morning of surgery  HCM UTD        Sincerely,          Samreen Pickard MD     Total time spent on the date of this encounter doing: chart review, review of test results, patient visit, physical exam, education, counseling, developing plan of care, and documenting = 60 minutes.       Return in about 6 months (around 8/6/2024).    Bariatric Surgery Review     Interim History/LifeChanges: Monika's high weight was 280#. She lost to 262# prior to her surgery. She has been taking her vitamins with consistency since her surgery. She lost down to 190# and then was diagnosed with breast cancer, her  lost his job, she had her knee replaced, she had bunionectomy and 3 hammertoes,     Patient Concerns: weight recurrence  Appetite (1-10): stable  GERD: none    Medication changes: no    Vitamin Intake:   B-12   SL   MVI  2/d   Vitamin D  5,000   Calcium   citrate     Other                LABS: ordered  And reviewed   Nausea no  Vomiting no  Constipation no  Diarrhea no  Rashes  no  Hair Loss no  Calf tenderness no  Breathing difficulty no  Reactive Hypoglycemia with fat then diarrhea  Light Headedness no   Moods stable on medications    12 point ROS as above and otherwise negative      Habits:  Alcohol: no  Tobacco: no  Caffeine diet   NSAIDS no  Exercise Routine: walks 45-60 min/d 7K average, belongs to community center  3 meals/day yes  Protein first yes B protein shake with fruit, L: salad with chicken D: meat and veggie S: cookies, crackers,   60+grams/day  Water Separate from meals yes  Calorie Containing Beverages no  Restaurant eating/wk none  Sleeping 7 -7.5  Stress no  CPAP: using  Contraception: hysterectomy  DEXA:NA    Social History     Social History     Socioeconomic History     Marital status:      Spouse name: Not on file     Number of children: Not on file     Years of education: Not on file     Highest education level: Not on file   Occupational History     Not on file   Tobacco Use     Smoking status: Never     Smokeless tobacco: Never   Vaping Use     Vaping Use: Never used   Substance and Sexual Activity     Alcohol use: Not Currently     Drug use: Never     Sexual activity: Yes     Partners: Male     Birth control/protection: Female Surgical, None   Other Topics Concern     Parent/sibling w/ CABG, MI or angioplasty before 65F 55M? No   Social History Narrative    . 3 children 29 daughter, 26 son, 20 son. Lives with her  and 2 boys. Working FT  at [employer] in [place].  is .     Social Determinants of Health     Financial Resource Strain: Not on file   Food Insecurity: Not on file   Transportation Needs: Not on file   Physical Activity: Not on file   Stress: Not on file   Social Connections: Not on file   Interpersonal Safety: Not on file   Housing Stability: Not on file       Past Medical History     Past Medical History:   Diagnosis Date     Anxiety      Breast cancer (H) 06/2020    chemo and bilateral mastectomy      Cataract      Depression      Depressive disorder      Glaucoma     with phentermine     Lymphedema of arm      Sleep apnea     Uses CPAP     Problem List     Patient Active Problem List   Diagnosis     REBECCA (obstructive sleep apnea)     Bariatric surgery status     Eczema     Drug overdose, intentional (H)     Malignant neoplasm of upper-inner quadrant of left breast in female, estrogen receptor negative (H)     Narrow angle glaucoma suspect of both eyes     Severe recurrent major depression without psychotic features (H)     Adenomatous polyp of colon     Hammer toe of right foot     Morbid obesity (H)     Lymphedema of arm     Medications       Current Outpatient Medications:      ARIPiprazole (ABILIFY) 5 MG tablet, Take 5 mg by mouth daily , Disp: , Rfl:      Calcium Citrate 1040 MG TABS, 1200mg-1500mg daily (separate doses 2-3 times daily), Disp: , Rfl:      cholecalciferol 25 MCG (1000 UT) TABS, Take 5,000 Units by mouth, Disp: , Rfl:      desvenlafaxine (PRISTIQ) 50 MG 24 hr tablet, Take 100 mg by mouth, Disp: , Rfl:      metFORMIN (GLUCOPHAGE) 500 MG tablet, Take 1 tablet by mouth 2 times daily, Disp: , Rfl:      Pediatric Multiple Vitamins (MULTIVITAMIN CHILDRENS PO), Take 1 tablet by mouth, Disp: , Rfl:      traZODone (DESYREL) 150 MG tablet, Take 1 tablet by mouth nightly as needed, Disp: , Rfl:      triamcinolone (KENALOG) 0.1 % external cream, Apply topically 2 times daily X 14 days, Disp: 30 g, Rfl: 0     vitamin B-12 (CYANOCOBALAMIN) 1000 MCG tablet, Take 1,000 mcg by mouth, Disp: , Rfl:    Surgical History     Past Surgical History  She has a past surgical history that includes  section; Laparoscopic ablation endometriosis; Bariatric Surgery (2019); Us Breast Core Biopsy Left (Left, 2020); Cholecystectomy; back surgery; Arthroscopy knee; Gastrectomy Laparoscopic Sleeve (2019); Pr Insj Tunneled Ctr Vad W/Subq Port Age 5 Yr/> (Right, 2020); US [place] Node Injection  "(12/15/2020); Pr Mastectomy, Simple, Complete (N/A, 12/15/2020); Pr Rmvl Carlos Ctr Vad W/Subq Port/ Ctr/Prph Insj (N/A, 12/15/2020); Mastectomy (Bilateral, 12/15/2020); Bunionectomy (Right, 03/22/2021); Hysterectomy, Pap No Longer Indicated (2012); Breast surgery (12/2020); colonoscopy (June 2021); and GYN surgery.    Objective-Exam     Constitutional:  /64   Ht 1.676 m (5' 6\")   Wt 115.2 kg (254 lb)   LMP  (LMP Unknown)   BMI 41.00 kg/m      General:  Pleasant and in no acute distress   Eyes:  EOMI  ENT:  Airway 3+  Moist mucous membranes   Neck: 16.5\"  Respiratory: Normal respiratory effort, no cough, wheezes or crackles  CV:  Regular rate and Rhythm, no murmurs, pulses palpable, no calf tenderness, no LE edema, left arm sleeve  Gastrointestinal: Abdomen 48\" circumference. No rashes  Musculoskeletal: muscle mass WNL  Skin: color fair hair full, incisions nicely healed  Neurological: No tremor, normal gait  Psychiatric: alert and oriented X3, mood and affect normal    Counseling     We reviewed the important post op bariatric recommendations:  -eating 3 meals daily  -eating protein first, getting >60gm protein daily  -eating slowly, chewing food well  -avoiding/limiting calorie containing beverages  -drinking water 15-30 minutes before or after meals  -choosing wheat, not white with breads, crackers, pastas, ovidio, bagels, tortillas, rice  -limiting restaurant or cafeteria eating to twice a week or less    We discussed the importance of restorative sleep and stress management in maintaining a healthy weight.  We discussed the National Weight Control Registry healthy weight maintenance strategies and ways to optimize metabolism.  We discussed the importance of physical activity including cardiovascular and strength training in maintaining a healthier weight.    We discussed the importance of life-long vitamin supplementation and life-long  follow-up.    Monika was reminded that, to avoid marginal ulcers " she should avoid tobacco at all, alcohol in excess, caffeine in excess, and NSAIDS (unless indicated for cardioprotection or othewise and opposed by a PPI).    Samreen Pickard MD, MD, [facility].  2/6/2024  11:14 AM            Total time spent on the date of this encounter doing: chart review, review of test results, patient visit, physical exam, education, counseling, developing plan of care, and documenting = 60 minutes.      Again, thank you for allowing me to participate in the care of your patient.        Sincerely,        Samreen Pickard MD

## 2024-02-07 LAB
TSH SERPL DL<=0.005 MIU/L-ACNC: 0.88 UIU/ML (ref 0.3–4.2)
VIT D+METAB SERPL-MCNC: 68 NG/ML (ref 20–50)
ZINC SERPL-MCNC: 68.2 UG/DL

## 2024-02-09 LAB
ANNOTATION COMMENT IMP: NORMAL
RETINYL PALMITATE SERPL-MCNC: 0.05 MG/L
VIT A SERPL-MCNC: 0.81 MG/L

## 2024-02-10 LAB — VIT B1 PYROPHOSHATE BLD-SCNC: 168 NMOL/L

## 2024-02-15 ENCOUNTER — PATIENT OUTREACH (OUTPATIENT)
Dept: CARE COORDINATION | Facility: CLINIC | Age: 59
End: 2024-02-15
Payer: COMMERCIAL

## 2024-02-20 ENCOUNTER — MYC MEDICAL ADVICE (OUTPATIENT)
Dept: INTERNAL MEDICINE | Facility: CLINIC | Age: 59
End: 2024-02-20

## 2024-02-22 ENCOUNTER — TELEPHONE (OUTPATIENT)
Dept: SURGERY | Facility: CLINIC | Age: 59
End: 2024-02-22
Payer: COMMERCIAL

## 2024-02-22 NOTE — TELEPHONE ENCOUNTER
I spoke with Monika this afternoon about her insurance.  She has [place] [employer] and will follow the local Blue of 6 months of SWL for possible revision surgery.  In her notes with the dietician, it should be included that she is participating in some sort of exercise.  She doesn't need to join a gym.  Just that she's making an effort to include exercise in her daily routine.  She will need to be cleared by Larry and complete her task list.

## 2024-02-23 ENCOUNTER — PATIENT OUTREACH (OUTPATIENT)
Dept: ONCOLOGY | Facility: HOSPITAL | Age: 59
End: 2024-02-23
Payer: COMMERCIAL

## 2024-02-23 NOTE — PROGRESS NOTES
[facility]: Cancer Care                                                                                          Chart check today to make sure patient is following up appropriately in our clinic.  She was last seen in the clinic in September 2023 and it was recommended that she follow-up in 6 months.  Patient has an appointment on 3/28 with Dr Monson.  Will follow-up with patient at that time.    Signature:  Allyson Tomlin RN

## 2024-02-28 ENCOUNTER — VIRTUAL VISIT (OUTPATIENT)
Dept: SURGERY | Facility: CLINIC | Age: 59
End: 2024-02-28
Payer: COMMERCIAL

## 2024-02-28 DIAGNOSIS — E66.01 MORBID OBESITY (H): Primary | ICD-10-CM

## 2024-02-28 NOTE — PROGRESS NOTES
Virtual Visit Details    Type of service:  Video Visit     Originating Location (pt. Location): Home    Distant Location (provider location):  Off-site  Platform used for Video Visit: Zoom (Telehealth)    Start Time: 3:20 PM  End Time: 4:10 PM    Monika would like to have a revision to a RNY (from her 2019 VSG at [place]) as she was dx with breast cancer and had a double mastectomy, had a knee replacement surgery and a bunion surgery all within the span of a couple of years which got her off track. She has been sober from alcohol for 13 years. She has good knowledge of surgery and good support. She has a history of depression and anxiety for which she is medicated. She also has a history of stress, emotional and boredom eating. She will follow up and complete psychological testing. F32.9; F41.9; E66.01

## 2024-02-29 ENCOUNTER — TELEPHONE (OUTPATIENT)
Dept: ONCOLOGY | Facility: HOSPITAL | Age: 59
End: 2024-02-29
Payer: COMMERCIAL

## 2024-02-29 ENCOUNTER — PATIENT OUTREACH (OUTPATIENT)
Dept: CARE COORDINATION | Facility: CLINIC | Age: 59
End: 2024-02-29
Payer: COMMERCIAL

## 2024-03-05 ENCOUNTER — VIRTUAL VISIT (OUTPATIENT)
Dept: SURGERY | Facility: CLINIC | Age: 59
End: 2024-03-05
Payer: COMMERCIAL

## 2024-03-05 DIAGNOSIS — E66.01 OBESITY, CLASS III, BMI 40-49.9 (MORBID OBESITY) (H): Primary | ICD-10-CM

## 2024-03-05 PROCEDURE — 97802 MEDICAL NUTRITION INDIV IN: CPT | Mod: 95

## 2024-03-05 NOTE — LETTER
3/5/2024         RE: Monika Franz  [address] [address]        Dear Colleague,    Thank you for referring your patient, Monika Franz, to the [facility] SURGERY CLINIC AND BARIATRICS CARE [place]. Please see a copy of my visit note below.    Monika Franz is a 58 year old who is being evaluated via a billable video visit.        How would you like to obtain your AVS? MyChart  If the video visit is dropped, the invitation should be resent by: Text to cell phone: [phone]  Will anyone else be joining your video visit? No        Initial Structured Weight Loss Supervised Diet Evaluation     Assessment:  Monika is being seen today for initial RD nutritional evaluation. Patient has been unsuccessful with non-surgical weight loss methods and is interested in bariatric surgery. Today we reviewed current eating habits and level of physical activity, and instructed on the changes that are required for successful bariatric outcomes.      Surgery of interest per pt: Revision.    Workflow review:  Support Group: Not completed.  Psychology:In progress.  Lab work:Completed.  SWL:Yes 2/6      Weight goal: At or below initial.    Anthropometrics:  Pt's weight is 254 lbs  Initial weight: 254 lbs  Weight change: n/a  BMI: There is no height or weight on file to calculate BMI.   Ideal body weight: 59.3 kg (130 lb 11.7 oz)  Adjusted ideal body weight: 81.7 kg (180 lb 0.6 oz)    Medical History:  Patient Active Problem List   Diagnosis     REBECCA (obstructive sleep apnea)     Bariatric surgery status     Eczema     Drug overdose, intentional (H)     Malignant neoplasm of upper-inner quadrant of left breast in female, estrogen receptor negative (H)     Narrow angle glaucoma suspect of both eyes     Severe recurrent major depression without psychotic features (H)     Adenomatous polyp of colon     Hammer toe of right foot     Morbid obesity (H)     Lymphedema of arm          Nutrition History  Food  allergies/intolerances/cultural or religous food customs: No   Diet history: has habit of grazing/snacking after work (cookies, crackers)  Vitamins/Mineral currently taking: Bariatric vitamins (B12, D3, MVI with iron, Ca citrate)     Socioeconomic Status  Who does the grocery shopping for your household? Self and    Where do you grocery shop?: [employer]  Utilizing food bank, food stamps, and/or meal delivery program(s)?: No   Who prepares your meals at home? Self and      Diet Recall/Time  Breakfast: Premiere protein with banana   Lunch: salads with chicken and added vegetables with ranch dressing   Dinner: Spaghetti Or tacos with vegetables Or turkey burgers Or chili Or fried rice with added chicken - meals always have side of vegetables    Typical Snacks: almonds, cheese stick, cookies, crackers     Overnight eating: No    Eating out: rarely <1x per week     fluids from meals: yes  Meal duration: 20-25 minutes       Beverages  Water (50-60oz)  Diet Mt, Dew   Vitamin water   Crystal light     Exercise  Walking outside (5 miles) at least 4 days per week   Going to a [facility] that has a track - aims for 5 miles     Nutrition Diagnosis (PES statement)    Morbid obesity related to excessive calorie intake as evidenced by Intake of high caloric density foods/beverages (juice, soda, alcohol) at meals and/or snacks; large portions; frequent grazing; Estimated intake that exceeds estimated daily energy intake; Binge eating patterns; Frequent excessive fast food or restaurant intake; and BMI 41       Intervention    Nutrition Education:   Provided general overview of diet and lifestyle modifications needed to be a deemed a safe candidate for bariatric surgery.   Educated patient on how to read a food label: choosing foods with than 10 grams fat and 10 grams sugar per serving to avoid dumping syndrome.  Dumping Syndrome: Described the mechanisms of syndrome, symptoms, and prevention tools from a  dietary perspective.   Vitamins: Educated on post-op vitamin regimen including MVI+ 18 mg Fe two times a day, calcium citrate 400-600 mg two times a day, 3218-1951 mcg sublingual B12 daily, 5000 IU vitamin D3 daily.     Food/Nutrient Delivery:  Educated patient on eating three meals, with cutting out snacking.  Bariatric Plate: Patient and I discussed the importance of including a lean protein source (20-30 grams/meal), vegetables (included at lunch and dinner), one serving (15g) of carbohydrate, and limited added fat (1 tb/day) at each meal.   Educated patient on how to complete a food journal and benefits of meal planning.   Educated patient on using a protein powder drink as a meal replacement and/or supplement after bariatric surgery.   Discussed importance of adequate hydration after surgery, with goal of at least 64 oz of fluids/day.  Addressed avoiding all carbonated, caffeinated and sweetened drinks to prepare for bariatric surgery.     Nutrition Counseling:  Mindful eating techniques: Encouraged slow meal pace, chewing foods to applesauce consistency for 20-30 minutes/meal.   Discussed  fluids 30 minutes before, during, and after meal to prevent dumping syndrome and discomfort post bariatric surgery.   Discussed pre/post operative diet progression, post op vitamin regimen, gave review of surgery process.     Instructions/Goals:     Start implementing bariatric surgery lifestyle modifications.  Fill out food journal and return at follow up.  Continue to talk ~5 miles at least 4x per week     Handouts Provided:   [facility] Bariatric Surgery Nutrition Info      Monitor/Evaluation:  Pt. s target weight: no gain from initial visit, patient verbalized understanding.     Plan for next visit:   Visit 3 follow up         Video-Visit Details    Type of service:  Video Visit    Video Start Time (time video started): 3:33 PM    Video End Time (time video stopped): 4:03 PM    Originating Location (pt.  Location): Home      Distant Location (provider location):  Off-site    Mode of Communication:  Video Conference via [facility]    Physician has received verbal consent for a Video Visit from the patient? Yes      Shayla Armenta RD         Again, thank you for allowing me to participate in the care of your patient.        Sincerely,        Shayla Armenta RD

## 2024-03-05 NOTE — PATIENT INSTRUCTIONS
PREPARING FOR WEIGHT-LOSS SURGERY    Lifestyle Changes Before and After Weight Loss Surgery: Keys for Success     Diet Guidelines after Weight Loss Surgery     Portion Control Without Measuring     Supplements after Sleeve Gastrectomy, Gastric Bypass or Single Anastomosis Duodenal Switch       Lean Protein Sources    Protein Source Portion Calories Grams of Protein                           Nonfat, plain Greek yogurt    (10 grams sugar or less) 3/4 cup (6 oz)  12-17   Light Yogurt (10 grams sugar or less) 3/4 cup (6 oz)  6-8   Protein Shake 1 shake 110-180 15-30   Skim/1% Milk or lactose-free milk 1 cup ( 8 oz)  8   Plain or light, flavored soymilk 1 cup  7-8   Plain or light, hemp milk 1 cup 110 6   Fat Free or 1% Cottage Cheese 1/2 cup 90 15   Part skim ricotta cheese 1/2 cup 100 14   Part skim or reduced fat cheese slices 1/4cup, 3 dice 65-80 8     Mozzarella String Cheese 1 80 8   Canned tuna, chicken, crab or salmon  (canned in water)  1/2 cup 100 15-20   White fish (broiled, grilled, baked) 3 ounces 100 21   [place]/Tuna (broiled, grilled, baked) 3 ounces 150-180 21   Shrimp, Scallops, Lobster, Crab 3 ounces 100 21   Pork loin, Pork Tenderloin 3 ounces 150 21   Boneless, skinless chicken /turkey breast                          (broiled, grilled, baked) 3 ounces 120 21   Sterling, [place], [place], and Venison 3 ounces 120 21   Lean cuts of red meat and pork (sirloin,   round, tenderloin, flank, ground 93%-96%) 3 ounces 170 21   Lean or Extra Lean Ground Turkey 1/2 cup 150 20   90-95% Lean [place] Burger 1 pam 140-180 21   Low-fat casserole with lean meat 3/4 cup 200 17   Luncheon Meats                                                        (turkey, lean ham, roast beef, chicken) 3 ounces    100 21   Egg (boiled, poached, scrambled) 1 Egg 60 7   Egg Substitute 1/2 cup 70 10   Nuts (limit to 1 serving per day)  3 Tbsp. 150 7   Nut [place] (peanut, almond)  Limit to 1 serving or less daily 1  Tbsp. 90 4   Soy Burger (varies) 1  10-15   Garbanzo, Black, Harris Beans/Edamame 1/2 cup 110 7-9   Refried Beans 1/2 cup 100 7   Kidney and Lima beans 1/2 cup 110 7   Tempeh 3 oz 175 18   Vegan crumbles 1/2 cup 100 14   Tofu 1/2 cup 110 14   Chili (beans and extra lean beef or turkey) 1 cup 200 23   Lentils 1/2 cup 115 9   Black Bean Soup 1 cup 175 12

## 2024-03-05 NOTE — PROGRESS NOTES
Monika Franz is a 58 year old who is being evaluated via a billable video visit.        How would you like to obtain your AVS? MyChart  If the video visit is dropped, the invitation should be resent by: Text to cell phone: [phone]  Will anyone else be joining your video visit? No        Initial Structured Weight Loss Supervised Diet Evaluation     Assessment:  Monika is being seen today for initial RD nutritional evaluation. Patient has been unsuccessful with non-surgical weight loss methods and is interested in bariatric surgery. Today we reviewed current eating habits and level of physical activity, and instructed on the changes that are required for successful bariatric outcomes.      Surgery of interest per pt: Revision.    Workflow review:  Support Group: Not completed.  Psychology:In progress.  Lab work:Completed.  SWL:Yes 2/6      Weight goal: At or below initial.    Anthropometrics:  Pt's weight is 254 lbs  Initial weight: 254 lbs  Weight change: n/a  BMI: There is no height or weight on file to calculate BMI.   Ideal body weight: 59.3 kg (130 lb 11.7 oz)  Adjusted ideal body weight: 81.7 kg (180 lb 0.6 oz)    Medical History:  Patient Active Problem List   Diagnosis    REBECCA (obstructive sleep apnea)    Bariatric surgery status    Eczema    Drug overdose, intentional (H)    Malignant neoplasm of upper-inner quadrant of left breast in female, estrogen receptor negative (H)    Narrow angle glaucoma suspect of both eyes    Severe recurrent major depression without psychotic features (H)    Adenomatous polyp of colon    Hammer toe of right foot    Morbid obesity (H)    Lymphedema of arm          Nutrition History  Food allergies/intolerances/cultural or religous food customs: No   Diet history: has habit of grazing/snacking after work (cookies, crackers)  Vitamins/Mineral currently taking: Bariatric vitamins (B12, D3, MVI with iron, Ca citrate)     Socioeconomic Status  Who does the grocery shopping for  your household? Self and    Where do you grocery shop?: [employer]  Utilizing food bank, food stamps, and/or meal delivery program(s)?: No   Who prepares your meals at home? Self and      Diet Recall/Time  Breakfast: Premiere protein with banana   Lunch: salads with chicken and added vegetables with ranch dressing   Dinner: Spaghetti Or tacos with vegetables Or turkey burgers Or chili Or fried rice with added chicken - meals always have side of vegetables    Typical Snacks: almonds, cheese stick, cookies, crackers     Overnight eating: No    Eating out: rarely <1x per week     fluids from meals: yes  Meal duration: 20-25 minutes       Beverages  Water (50-60oz)  Diet Mt, Dew   Vitamin water   Crystal light     Exercise  Walking outside (5 miles) at least 4 days per week   Going to a [facility] that has a track - aims for 5 miles     Nutrition Diagnosis (PES statement)    Morbid obesity related to excessive calorie intake as evidenced by Intake of high caloric density foods/beverages (juice, soda, alcohol) at meals and/or snacks; large portions; frequent grazing; Estimated intake that exceeds estimated daily energy intake; Binge eating patterns; Frequent excessive fast food or restaurant intake; and BMI 41       Intervention    Nutrition Education:   Provided general overview of diet and lifestyle modifications needed to be a deemed a safe candidate for bariatric surgery.   Educated patient on how to read a food label: choosing foods with than 10 grams fat and 10 grams sugar per serving to avoid dumping syndrome.  Dumping Syndrome: Described the mechanisms of syndrome, symptoms, and prevention tools from a dietary perspective.   Vitamins: Educated on post-op vitamin regimen including MVI+ 18 mg Fe two times a day, calcium citrate 400-600 mg two times a day, 3280-2838 mcg sublingual B12 daily, 5000 IU vitamin D3 daily.     Food/Nutrient Delivery:  Educated patient on eating three meals, with  cutting out snacking.  Bariatric Plate: Patient and I discussed the importance of including a lean protein source (20-30 grams/meal), vegetables (included at lunch and dinner), one serving (15g) of carbohydrate, and limited added fat (1 tb/day) at each meal.   Educated patient on how to complete a food journal and benefits of meal planning.   Educated patient on using a protein powder drink as a meal replacement and/or supplement after bariatric surgery.   Discussed importance of adequate hydration after surgery, with goal of at least 64 oz of fluids/day.  Addressed avoiding all carbonated, caffeinated and sweetened drinks to prepare for bariatric surgery.     Nutrition Counseling:  Mindful eating techniques: Encouraged slow meal pace, chewing foods to applesauce consistency for 20-30 minutes/meal.   Discussed  fluids 30 minutes before, during, and after meal to prevent dumping syndrome and discomfort post bariatric surgery.   Discussed pre/post operative diet progression, post op vitamin regimen, gave review of surgery process.     Instructions/Goals:     Start implementing bariatric surgery lifestyle modifications.  Fill out food journal and return at follow up.  Continue to talk ~5 miles at least 4x per week     Handouts Provided:    [employer] [facility] Nutrition Info      Monitor/Evaluation:  Pt. s target weight: no gain from initial visit, patient verbalized understanding.     Plan for next visit:   Visit 3 follow up         Video-Visit Details    Type of service:  Video Visit    Video Start Time (time video started): 3:33 PM    Video End Time (time video stopped): 4:03 PM    Originating Location (pt. Location): Home      Distant Location (provider location):  Off-site    Mode of Communication:  Video Conference via [facility]    Physician has received verbal consent for a Video Visit from the patient? Yes      Shayla Armenta RD

## 2024-03-25 ENCOUNTER — TELEPHONE (OUTPATIENT)
Dept: SURGERY | Facility: CLINIC | Age: 59
End: 2024-03-25

## 2024-03-25 ENCOUNTER — TRANSFERRED RECORDS (OUTPATIENT)
Dept: HEALTH INFORMATION MANAGEMENT | Facility: CLINIC | Age: 59
End: 2024-03-25

## 2024-03-25 ENCOUNTER — VIRTUAL VISIT (OUTPATIENT)
Dept: SURGERY | Facility: CLINIC | Age: 59
End: 2024-03-25
Payer: COMMERCIAL

## 2024-03-25 DIAGNOSIS — E66.01 MORBID OBESITY (H): Primary | ICD-10-CM

## 2024-03-25 NOTE — TELEPHONE ENCOUNTER
I received a call from Monika this afternoon about her SWL visits and timing for surgery.  She is concerned about it coming too close to when she needs to return to school.  I have her scheduled for her 6 months of SWL with Anusha.  She will see Dr. Pickard if necessary in August.  I have her meeting with Anusha until the first week of July and then she should be ready to see the surgeon.  I will work with her to help get this done prior to school starting after Labor Day.

## 2024-03-25 NOTE — PROGRESS NOTES
Virtual Visit Details    Type of service:  Video Visit     Originating Location (pt. Location): Home    Distant Location (provider location):  Off-site  Platform used for Video Visit: Zoom (Telehealth)    Start Time: 3:20 PM  End Time: 4 PM    Monika has made quality changes in eating and lifestyle and appears more mindful about her eating. She is now ready to proceed with surgery. A report was sent to the clinic. F32.9; F41.9; E66.01

## 2024-03-25 NOTE — TELEPHONE ENCOUNTER
Tasklist updated.     Jadyn Simpson RN, CBN  [facility]  P [phone]  F [phone]

## 2024-04-01 ENCOUNTER — PATIENT OUTREACH (OUTPATIENT)
Dept: ONCOLOGY | Facility: HOSPITAL | Age: 59
End: 2024-04-01
Payer: COMMERCIAL

## 2024-04-01 ENCOUNTER — ONCOLOGY VISIT (OUTPATIENT)
Dept: ONCOLOGY | Facility: HOSPITAL | Age: 59
End: 2024-04-01
Attending: NURSE PRACTITIONER
Payer: COMMERCIAL

## 2024-04-01 VITALS
BODY MASS INDEX: 41.14 KG/M2 | RESPIRATION RATE: 16 BRPM | SYSTOLIC BLOOD PRESSURE: 123 MMHG | WEIGHT: 256 LBS | TEMPERATURE: 98.1 F | DIASTOLIC BLOOD PRESSURE: 60 MMHG | HEIGHT: 66 IN | OXYGEN SATURATION: 96 % | HEART RATE: 72 BPM

## 2024-04-01 DIAGNOSIS — Z17.1 MALIGNANT NEOPLASM OF UPPER-INNER QUADRANT OF LEFT BREAST IN FEMALE, ESTROGEN RECEPTOR NEGATIVE (H): Primary | ICD-10-CM

## 2024-04-01 DIAGNOSIS — C50.212 MALIGNANT NEOPLASM OF UPPER-INNER QUADRANT OF LEFT BREAST IN FEMALE, ESTROGEN RECEPTOR NEGATIVE (H): Primary | ICD-10-CM

## 2024-04-01 PROCEDURE — G2211 COMPLEX E/M VISIT ADD ON: HCPCS

## 2024-04-01 PROCEDURE — 99213 OFFICE O/P EST LOW 20 MIN: CPT

## 2024-04-01 PROCEDURE — 99214 OFFICE O/P EST MOD 30 MIN: CPT | Performed by: NURSE PRACTITIONER

## 2024-04-01 RX ORDER — AMOXICILLIN 500 MG/1
TABLET, FILM COATED ORAL
COMMUNITY
Start: 2024-01-23

## 2024-04-01 ASSESSMENT — PAIN SCALES - GENERAL: PAINLEVEL: NO PAIN (0)

## 2024-04-01 NOTE — PROGRESS NOTES
[facility]: Cancer Care                                                                                            Situation: Patient chart reviewed by care coordinator.    Background: Patient here today in follow-up with Sue Dougherty CNP in regards to her diagnosis of triple negative left breast cancer.  Patient had bilateral mastectomies in December 2020.    Assessment: She has been on observation since and will continue every 6-month follow-up until she is 5 years out from treatment which will be the Fall 2025.    Plan/Recommendations: Patient has been scheduled to see Dr Monson on 10/3/2024 with labs.    Signature:  Allyson Tomlin RN

## 2024-04-01 NOTE — PROGRESS NOTES
"Oncology Rooming Note    April 1, 2024 1:54 PM   Monika Franz is a 58 year old female who presents for:    Chief Complaint   Patient presents with    Oncology Clinic Visit     6 month follow up related to Malignant neoplasm of upper-inner quadrant of left breast in female, estrogen receptor negative     Initial Vitals: /60 (BP Location: Right arm, Patient Position: Sitting, Cuff Size: Adult Large)   Pulse 72   Temp 98.1  F (36.7  C) (Oral)   Resp 16   Ht 1.676 m (5' 6\")   Wt 116.1 kg (256 lb)   LMP  (LMP Unknown)   SpO2 96%   BMI 41.32 kg/m   Estimated body mass index is 41.32 kg/m  as calculated from the following:    Height as of this encounter: 1.676 m (5' 6\").    Weight as of this encounter: 116.1 kg (256 lb). Body surface area is 2.33 meters squared.  No Pain (0) Comment: Data Unavailable   No LMP recorded (lmp unknown). Patient has had a hysterectomy.  Allergies reviewed: Yes  Medications reviewed: Yes    Medications: Medication refills not needed today.  Pharmacy name entered into [id number] [place] [address]    Frailty Screening:   Is the patient here for a new oncology consult visit in cancer care? 2. No      Clinical concerns: 6 month follow up related to Malignant neoplasm of upper-inner quadrant of left breast in female, estrogen receptor negative. Itchy, swollen skin around both eyes x 1 1/2 months.      Viviana Montague, PARMINDER              "

## 2024-04-01 NOTE — PROGRESS NOTES
[facility] Hematology and Oncology Progress Note    Patient: Monika Franz  MRN: [id number]  Date of Service: Apr 1, 2024         Reason for Visit    Chief Complaint   Patient presents with    Oncology Clinic Visit     6 month follow up related to Malignant neoplasm of upper-inner quadrant of left breast in female, estrogen receptor negative       Assessment and Plan     Cancer Staging   Malignant neoplasm of upper-inner quadrant of left breast in female, estrogen receptor negative (H)  Staging form: Breast, AJCC 8th Edition  - Clinical stage from 6/22/2020: Stage IIB (cT2, cN0, cM0, G3, ER-, MA-, HER2-) - Signed by Sue Dougherty APRN CNP on 7/14/2021    Breast cancer, stage IIB, triple negative: completed neoadjuvant chemo in November 2020. Had bilateral mastectomies in December 2020 with complete response except for DCIS. Has been on observation since. Doing well with no evidence of disease recurrence on exam. Plan to continue every 6 month visits until 5 years out from treatment (around Fall 2025). Will plan to get labs in 6 months with history of chemotherapy.    Left arm lymphedema: Mild. Was seen by the lymphedema clinic did and completed lymphedema therapy. She continues to do the exercises they gave her and wears the compression sleeve. Patient denies any lymphedema concerns at this time.     Hx of gastric sleeve surgery, now with vitamin B12 and iron deficiencies: Continues on supplementation. Monitored by PCP. Patient has a sleeve reconstruction scheduled for this August.     ECOG Performance    0 - Independent    Distress Screening (within last 30 days)    No data recorded     Pain  Pain Score: No Pain (0)    Problem List    Patient Active Problem List   Diagnosis    REBECCA (obstructive sleep apnea)    Bariatric surgery status    Eczema    Drug overdose, intentional (H)    Malignant neoplasm of upper-inner quadrant of left breast in female, estrogen receptor negative (H)    Narrow angle  glaucoma suspect of both eyes    Severe recurrent major depression without psychotic features (H)    Adenomatous polyp of colon    Hammer toe of right foot    Morbid obesity (H)    Lymphedema of arm        ______________________________________________________________________________    History of Present Illness    Oncologist: Dr. Monson    Diagnosis: Stage IIB left breast invasive ductal carcinoma, triple negative, T2 N0 M0   - Diagnosed June 2020 after self-palpating lump in left breast  - Mammogram with 1.6 cm lesion, US biopsy showed invasive ductal carcinoma, high-grade. ER negative, UT negative, HER2 negative, 2+ IHC but negative on FISH  - December 2020: mastectomy surgical pathology: complete response with no residual base of cancer and some residual DCIS    Treatment:  - July 2020 to November 2020: neoadjuvant dose dense AC x4 followed by weekly Taxol x12 doses  - December 2020: bilateral mastectomies  - Currently on surveillance    Interval History:     Monika is here today for an oncology visit. Overall she is doing well. She denies any new lumps or bumps, significant weight changes, bone pain, back pain, or headaches. Her lymphedema is stable and she continues to wear the compression sleeve as well as completes the exercises the lymphedema clinic gave her. She reports she was limited on exercises due to getting her knee replaced months ago. She also notes she is having a gastric sleeve reconstruction this coming August. Additionally, her right eye has been itchy with redness on her inner eyelid for about a month and a half. She has clear, watery drainage. No infectious symptoms. She has an appointment for this scheduled for tomorrow.     Review of Systems    Pertinent items are noted in HPI.    Past History    Past Medical History:   Diagnosis Date    Anxiety     Breast cancer (H) 06/2020    chemo and bilateral mastectomy    Cataract     Depression     Depressive disorder     Glaucoma     with  "phentermine    Lymphedema of arm     Sleep apnea     Uses CPAP       Physical Exam    /60 (BP Location: Right arm, Patient Position: Sitting, Cuff Size: Adult Large)   Pulse 72   Temp 98.1  F (36.7  C) (Oral)   Resp 16   Ht 1.676 m (5' 6\")   Wt 116.1 kg (256 lb)   LMP  (LMP Unknown)   SpO2 96%   BMI 41.32 kg/m       Constitutional: Well-appearing female in no acute distress.  Eyes: EOMI, PERRL. No scleral icterus. Erythema inner right eyelid  ENT: Oral mucosa is moist without lesions or thrush.   Lymphatic: Neck is supple without cervical or supraclavicular lymphadenopathy. No axillary lymphadenopathy.  Breast: Mastectomy scars well-healed with no palpable scar tissue. Skin soft with no erythema or pitting. No palpable masses or evidence of disease recurrence.   Cardiovascular: Regular rate and rhythm. No murmurs. No peripheral edema.  Respiratory: Clear to auscultation bilaterally. No wheezes or crackles.  Gastrointestinal: Bowel sounds present. Abdomen soft, non-tender.   Neurologic: Cranial nerves II through XII are grossly intact.  Skin: No rashes, petechiae, or bruising noted on exposed skin.     Lab Results    No results found for this or any previous visit (from the past 168 hour(s)).    Imaging    No results found.      Patient seen and discussed with MIGUEL Reddy, Nurse Practitioner Student  [facility]      Seen and examined pt with NP student. Was present for visit. The medical care has been evaluated and discussed with the student, Mattie.  The documentation has been reviewed and I agree with the medical plan.     The longitudinal plan of care for the diagnosis(es)/condition(s) as documented were addressed during this visit. Due to the added complexity in care, I will continue to support Monika in the subsequent management and with ongoing continuity of care.    "

## 2024-04-01 NOTE — LETTER
4/1/2024         RE: Monika Franz  [address] [address]        Dear Colleague,    Thank you for referring your patient, Monika Franz, to the [facility] [place]. Please see a copy of my visit note below.    [facility] Hematology and Oncology Progress Note    Patient: Monika Franz  MRN: [id number]  Date of Service: Apr 1, 2024         Reason for Visit    Chief Complaint   Patient presents with     Oncology Clinic Visit     6 month follow up related to Malignant neoplasm of upper-inner quadrant of left breast in female, estrogen receptor negative       Assessment and Plan     Cancer Staging   Malignant neoplasm of upper-inner quadrant of left breast in female, estrogen receptor negative (H)  Staging form: Breast, AJCC 8th Edition  - Clinical stage from 6/22/2020: Stage IIB (cT2, cN0, cM0, G3, ER-, OR-, HER2-) - Signed by Sue Dougherty APRN CNP on 7/14/2021    Breast cancer, stage IIB, triple negative: completed neoadjuvant chemo in November 2020. Had bilateral mastectomies in December 2020 with complete response except for DCIS. Has been on observation since. Doing well with no evidence of disease recurrence on exam. Plan to continue every 6 month visits until 5 years out from treatment (around Fall 2025). Will plan to get labs in 6 months with history of chemotherapy.    Left arm lymphedema: Mild. Was seen by the lymphedema clinic did and completed lymphedema therapy. She continues to do the exercises they gave her and wears the compression sleeve. Patient denies any lymphedema concerns at this time.     Hx of gastric sleeve surgery, now with vitamin B12 and iron deficiencies: Continues on supplementation. Monitored by PCP. Patient has a sleeve reconstruction scheduled for this August.     ECOG Performance    0 - Independent    Distress Screening (within last 30 days)    No data recorded     Pain  Pain Score: No Pain (0)    Problem List    Patient  Active Problem List   Diagnosis     REBECCA (obstructive sleep apnea)     Bariatric surgery status     Eczema     Drug overdose, intentional (H)     Malignant neoplasm of upper-inner quadrant of left breast in female, estrogen receptor negative (H)     Narrow angle glaucoma suspect of both eyes     Severe recurrent major depression without psychotic features (H)     Adenomatous polyp of colon     Hammer toe of right foot     Morbid obesity (H)     Lymphedema of arm        ______________________________________________________________________________    History of Present Illness    Oncologist: Dr. Monson    Diagnosis: Stage IIB left breast invasive ductal carcinoma, triple negative, T2 N0 M0   - Diagnosed June 2020 after self-palpating lump in left breast  - Mammogram with 1.6 cm lesion, US biopsy showed invasive ductal carcinoma, high-grade. ER negative, MO negative, HER2 negative, 2+ IHC but negative on FISH  - December 2020: mastectomy surgical pathology: complete response with no residual base of cancer and some residual DCIS    Treatment:  - July 2020 to November 2020: neoadjuvant dose dense AC x4 followed by weekly Taxol x12 doses  - December 2020: bilateral mastectomies  - Currently on surveillance    Interval History:     Monika is here today for an oncology visit. Overall she is doing well. She denies any new lumps or bumps, significant weight changes, bone pain, back pain, or headaches. Her lymphedema is stable and she continues to wear the compression sleeve as well as completes the exercises the lymphedema clinic gave her. She reports she was limited on exercises due to getting her knee replaced months ago. She also notes she is having a gastric sleeve reconstruction this coming August. Additionally, her right eye has been itchy with redness on her inner eyelid for about a month and a half. She has clear, watery drainage. No infectious symptoms. She has an appointment for this scheduled for tomorrow.  "    Review of Systems    Pertinent items are noted in HPI.    Past History    Past Medical History:   Diagnosis Date     Anxiety      Breast cancer (H) 06/2020    chemo and bilateral mastectomy     Cataract      Depression      Depressive disorder      Glaucoma     with phentermine     Lymphedema of arm      Sleep apnea     Uses CPAP       Physical Exam    /60 (BP Location: Right arm, Patient Position: Sitting, Cuff Size: Adult Large)   Pulse 72   Temp 98.1  F (36.7  C) (Oral)   Resp 16   Ht 1.676 m (5' 6\")   Wt 116.1 kg (256 lb)   LMP  (LMP Unknown)   SpO2 96%   BMI 41.32 kg/m       Constitutional: Well-appearing female in no acute distress.  Eyes: EOMI, PERRL. No scleral icterus. Erythema inner right eyelid  ENT: Oral mucosa is moist without lesions or thrush.   Lymphatic: Neck is supple without cervical or supraclavicular lymphadenopathy. No axillary lymphadenopathy.  Breast: Mastectomy scars well-healed with no palpable scar tissue. Skin soft with no erythema or pitting. No palpable masses or evidence of disease recurrence.   Cardiovascular: Regular rate and rhythm. No murmurs. No peripheral edema.  Respiratory: Clear to auscultation bilaterally. No wheezes or crackles.  Gastrointestinal: Bowel sounds present. Abdomen soft, non-tender.   Neurologic: Cranial nerves II through XII are grossly intact.  Skin: No rashes, petechiae, or bruising noted on exposed skin.     Lab Results    No results found for this or any previous visit (from the past 168 hour(s)).    Imaging    No results found.      Patient seen and discussed with MIGUEL Reddy, Nurse Practitioner Student  [facility]      Seen and examined pt with NP student. Was present for visit. The medical care has been evaluated and discussed with the student, Mattie.  The documentation has been reviewed and I agree with the medical plan.     The longitudinal plan of care for the diagnosis(es)/condition(s) as " "documented were addressed during this visit. Due to the added complexity in care, I will continue to support Monika in the subsequent management and with ongoing continuity of care.      Oncology Rooming Note    April 1, 2024 1:54 PM   Monika Franz is a 58 year old female who presents for:    Chief Complaint   Patient presents with     Oncology Clinic Visit     6 month follow up related to Malignant neoplasm of upper-inner quadrant of left breast in female, estrogen receptor negative     Initial Vitals: /60 (BP Location: Right arm, Patient Position: Sitting, Cuff Size: Adult Large)   Pulse 72   Temp 98.1  F (36.7  C) (Oral)   Resp 16   Ht 1.676 m (5' 6\")   Wt 116.1 kg (256 lb)   LMP  (LMP Unknown)   SpO2 96%   BMI 41.32 kg/m   Estimated body mass index is 41.32 kg/m  as calculated from the following:    Height as of this encounter: 1.676 m (5' 6\").    Weight as of this encounter: 116.1 kg (256 lb). Body surface area is 2.33 meters squared.  No Pain (0) Comment: Data Unavailable   No LMP recorded (lmp unknown). Patient has had a hysterectomy.  Allergies reviewed: Yes  Medications reviewed: Yes    Medications: Medication refills not needed today.  Pharm[facility] [id number] [place] [address]    Frailty Screening:   Is the patient here for a new oncology consult visit in cancer care? 2. No      Clinical concerns: 6 month follow up related to Malignant neoplasm of upper-inner quadrant of left breast in female, estrogen receptor negative. Itchy, swollen skin around both eyes x 1 1/2 months.      Viviana Montague [facility]                Again, thank you for allowing me to participate in the care of your patient.        Sincerely,        FALGUNI Escobedo CNP  "

## 2024-04-03 SDOH — HEALTH STABILITY: PHYSICAL HEALTH: ON AVERAGE, HOW MANY DAYS PER WEEK DO YOU ENGAGE IN MODERATE TO STRENUOUS EXERCISE (LIKE A BRISK WALK)?: 5 DAYS

## 2024-04-03 SDOH — HEALTH STABILITY: PHYSICAL HEALTH: ON AVERAGE, HOW MANY MINUTES DO YOU ENGAGE IN EXERCISE AT THIS LEVEL?: 50 MIN

## 2024-04-03 ASSESSMENT — SOCIAL DETERMINANTS OF HEALTH (SDOH): HOW OFTEN DO YOU GET TOGETHER WITH FRIENDS OR RELATIVES?: PATIENT DECLINED

## 2024-04-09 ASSESSMENT — PATIENT HEALTH QUESTIONNAIRE - PHQ9
10. IF YOU CHECKED OFF ANY PROBLEMS, HOW DIFFICULT HAVE THESE PROBLEMS MADE IT FOR YOU TO DO YOUR WORK, TAKE CARE OF THINGS AT HOME, OR GET ALONG WITH OTHER PEOPLE: NOT DIFFICULT AT ALL
SUM OF ALL RESPONSES TO PHQ QUESTIONS 1-9: 2
SUM OF ALL RESPONSES TO PHQ QUESTIONS 1-9: 2

## 2024-04-10 ENCOUNTER — OFFICE VISIT (OUTPATIENT)
Dept: INTERNAL MEDICINE | Facility: CLINIC | Age: 59
End: 2024-04-10
Payer: COMMERCIAL

## 2024-04-10 VITALS
WEIGHT: 258.6 LBS | TEMPERATURE: 98 F | BODY MASS INDEX: 41.56 KG/M2 | HEART RATE: 97 BPM | HEIGHT: 66 IN | RESPIRATION RATE: 14 BRPM | DIASTOLIC BLOOD PRESSURE: 62 MMHG | SYSTOLIC BLOOD PRESSURE: 118 MMHG | OXYGEN SATURATION: 93 %

## 2024-04-10 DIAGNOSIS — H40.033 NARROW ANGLE GLAUCOMA SUSPECT OF BOTH EYES: ICD-10-CM

## 2024-04-10 DIAGNOSIS — G47.33 OSA (OBSTRUCTIVE SLEEP APNEA): ICD-10-CM

## 2024-04-10 DIAGNOSIS — E66.01 MORBID OBESITY (H): ICD-10-CM

## 2024-04-10 DIAGNOSIS — C50.212 MALIGNANT NEOPLASM OF UPPER-INNER QUADRANT OF LEFT BREAST IN FEMALE, ESTROGEN RECEPTOR NEGATIVE (H): ICD-10-CM

## 2024-04-10 DIAGNOSIS — Z98.84 BARIATRIC SURGERY STATUS: ICD-10-CM

## 2024-04-10 DIAGNOSIS — L20.82 FLEXURAL ECZEMA: ICD-10-CM

## 2024-04-10 DIAGNOSIS — Z17.1 MALIGNANT NEOPLASM OF UPPER-INNER QUADRANT OF LEFT BREAST IN FEMALE, ESTROGEN RECEPTOR NEGATIVE (H): ICD-10-CM

## 2024-04-10 DIAGNOSIS — F33.2 SEVERE RECURRENT MAJOR DEPRESSION WITHOUT PSYCHOTIC FEATURES (H): ICD-10-CM

## 2024-04-10 DIAGNOSIS — Z00.00 ROUTINE GENERAL MEDICAL EXAMINATION AT A HEALTH CARE FACILITY: Primary | ICD-10-CM

## 2024-04-10 PROCEDURE — 99396 PREV VISIT EST AGE 40-64: CPT | Performed by: NURSE PRACTITIONER

## 2024-04-10 RX ORDER — TRIAMCINOLONE ACETONIDE 5 MG/G
OINTMENT TOPICAL
Qty: 15 G | Refills: 0 | Status: SHIPPED | OUTPATIENT
Start: 2024-04-10

## 2024-04-10 RX ORDER — CLOBETASOL PROPIONATE 0.5 MG/G
CREAM TOPICAL
Qty: 15 G | Refills: 0 | Status: CANCELLED | OUTPATIENT
Start: 2024-04-10

## 2024-04-10 NOTE — PROGRESS NOTES
"Preventive Care Visit  [facility]  Donna Holt NP  Apr 10, 2024      Assessment & Plan   Problem List Items Addressed This Visit       REBECCA (obstructive sleep apnea)     Uses CPAP therapy regularly         Relevant Orders    Adult Sleep Eval & Management  Referral    Bariatric surgery status     Follow up with bariatric clinic as scheduled. She recently had bariatric surgery labs completed          Eczema     Left posterior calf. Prescribed a higher potency topical steroid cream, reviewed appropriate use and potential risks. If no improvement, could see dermatology.          Relevant Medications    triamcinolone (KENALOG) 0.5 % external ointment    Malignant neoplasm of upper-inner quadrant of left breast in female, estrogen receptor negative (H)     Followed by oncology          Narrow angle glaucoma suspect of both eyes     Followed by ophthalmology          Severe recurrent major depression without psychotic features (H)     Followed by psychiatry and well-controlled with current medications. Will forward recent labs to psychiatry provider          Morbid obesity (H)     S/p bariatric surgery with likely revision later this summer           Other Visit Diagnoses       Routine general medical examination at a health care facility    -  Primary              BMI  Estimated body mass index is 41.74 kg/m  as calculated from the following:    Height as of this encounter: 1.676 m (5' 6\").    Weight as of this encounter: 117.3 kg (258 lb 9.6 oz).     Counseling  Appropriate preventive services were discussed with this patient, including applicable screening as appropriate for fall prevention, nutrition, physical activity, Tobacco-use cessation, weight loss and cognition.  Checklist reviewing preventive services available has been given to the patient.  Reviewed patient's diet, addressing concerns and/or questions.     Mickey Frank is a 58 year old, presenting for annual " physical        4/10/2024     4:03 PM   Additional Questions   Roomed by Nay ALVARADO   Accompanied by Self        Health Care Directive  Patient does not have a Health Care Directive or Living Will: Patient states has Advance Directive and will bring in a copy to clinic.    HPI  A few years ago the patient had a rash which she was seen for and prescribed Kenalog cream and a dermatology referral was placed. She did not see a dermatologist at this time as the rash had resolved but at the end of the summer in 2023 the rash returned. She reports the rash is itchy, is not expanding in size, and is not painful. She denies rashes anywhere else or any other skin changes.     Obesity - following up with the bariatric clinic and plans to have a gastric sleeve revision at the end of the summer. She wonders if a GLP-1 might be helpful for her before/after surgery as well.     Depression - continues to follow with psychiatry who prescribes her medications for her mood. Reports feeling comfortable with the current plan in place.     History of neoplasm of breast - continues to follow with oncology for ongoing care.          4/3/2024   General Health   How would you rate your overall physical health? Good   Feel stress (tense, anxious, or unable to sleep) Not at all         4/3/2024   Nutrition   Three or more servings of calcium each day? Yes   Diet: Regular (no restrictions)   How many servings of fruit and vegetables per day? (!) 2-3   How many sweetened beverages each day? 0-1         4/3/2024   Exercise   Days per week of moderate/strenous exercise 5 days   Average minutes spent exercising at this level 50 min         4/3/2024   Social Factors   Frequency of gathering with friends or relatives Patient declined   Worry food won't last until get money to buy more No   Food not last or not have enough money for food? No   Do you have housing?  Yes   Are you worried about losing your housing? No   Lack of transportation? No   Unable  "to get utilities (heat,electricity)? No         4/3/2024   Fall Risk   Fallen 2 or more times in the past year? No   Trouble with walking or balance? No          4/3/2024   Dental   Dentist two times every year? Yes         4/3/2024   TB Screening   Were you born outside of the [place]? No       Today's PHQ-9 Score:       4/9/2024     4:43 PM   PHQ-9 SCORE   PHQ-9 Total Score MyChart 2 (Minimal depression)   PHQ-9 Total Score 2         4/3/2024   Substance Use   Alcohol more than 3/day or more than 7/wk Not Applicable   Do you use any other substances recreationally? No     Social History     Tobacco Use    Smoking status: Never    Smokeless tobacco: Never   Vaping Use    Vaping status: Never Used   Substance Use Topics    Alcohol use: Not Currently    Drug use: Never           4/3/2024   Breast Cancer Screening   Family history of breast, colon, or ovarian cancer? Yes         4/3/2024   LAST FHS-7 RESULTS   1st degree relative breast or ovarian cancer Yes   Any relative bilateral breast cancer Unknown   Any male have breast cancer No   Any ONE woman have BOTH breast AND ovarian cancer No   Any woman with breast cancer before 50yrs Yes   2 or more relatives with breast AND/OR ovarian cancer No   2 or more relatives with breast AND/OR bowel cancer No           4/3/2024   STI Screening   New sexual partner(s) since last STI/HIV test? No        ASCVD Risk   The 10-year ASCVD risk score (Sundar KIM, et al., 2019) is: 1.6%    Values used to calculate the score:      Age: 58 years      Sex: Female      Is Non- : No      Diabetic: No      Tobacco smoker: No      Systolic Blood Pressure: 118 mmHg      Is BP treated: No      HDL Cholesterol: 67 mg/dL      Total Cholesterol: 160 mg/dL    ROS unremarkable other than above noted concerns.        Objective    Exam  /62   Pulse 97   Temp 98  F (36.7  C) (Oral)   Resp 14   Ht 1.676 m (5' 6\")   Wt 117.3 kg (258 lb 9.6 oz)   LMP  (LMP Unknown)  " " SpO2 93%   BMI 41.74 kg/m     Estimated body mass index is 41.74 kg/m  as calculated from the following:    Height as of this encounter: 1.676 m (5' 6\").    Weight as of this encounter: 117.3 kg (258 lb 9.6 oz).    Physical Exam  GENERAL: alert and no distress  EYES: Eyes grossly normal to inspection, PERRL and conjunctivae and sclerae normal  HENT: ear canals and TM's normal, nose and mouth without ulcers or lesions  NECK: no adenopathy, no asymmetry, masses, or scars  RESP: lungs clear to auscultation - no rales, rhonchi or wheezes  CV: regular rate and rhythm, normal S1 S2, no S3 or S4, no murmur, click or rub, no peripheral edema  ABDOMEN: soft, nontender, no hepatosplenomegaly, no masses and bowel sounds normal  MS: no gross musculoskeletal defects noted, no edema  SKIN: erythematous rash on the left posterior calf that has a slight scale  NEURO: Normal strength and tone, mentation intact and speech normal  PSYCH: mentation appears normal, affect normal/bright        I was present with the nurse practitioner student, Guerline Chavarria, who participated in the service and in the documentation of the note. I have verified the history and personally performed the physical exam and medical decision making. I agree with the assessment and plan of care as documented in the note.  Donna Holt, DNP, APRN, CNP       "

## 2024-04-12 NOTE — PATIENT INSTRUCTIONS
Preventive Care Advice   This is general advice given by our system to help you stay healthy. However, your care team may have specific advice just for you. Please talk to your care team about your preventive care needs.  Nutrition  Eat 5 or more servings of fruits and vegetables each day.  Try wheat bread, brown rice and whole grain pasta (instead of white bread, rice, and pasta).  Get enough calcium and vitamin D. Check the label on foods and aim for 100% of the RDA (recommended daily allowance).  Lifestyle  Exercise at least 150 minutes each week   (30 minutes a day, 5 days a week).  Do muscle strengthening activities 2 days a week. These help control your weight and prevent disease.  No smoking.  Wear sunscreen to prevent skin cancer.  Have a dental exam and cleaning every 6 months.  Yearly exams  See your health care team every year to talk about:  Any changes in your health.  Any medicines your care team has prescribed.  Preventive care, family planning, and ways to prevent chronic diseases.  Shots (vaccines)   HPV shots (up to age 26), if you've never had them before.  Hepatitis B shots (up to age 59), if you've never had them before.  COVID-19 shot: Get this shot when it's due.  Flu shot: Get a flu shot every year.  Tetanus shot: Get a tetanus shot every 10 years.  Pneumococcal, hepatitis A, and RSV shots: Ask your care team if you need these based on your risk.  Shingles shot (for age 50 and up).  General health tests  Diabetes screening:  Starting at age 35, Get screened for diabetes at least every 3 years.  If you are younger than age 35, ask your care team if you should be screened for diabetes.  Cholesterol test: At age 39, start having a cholesterol test every 5 years, or more often if advised.  Bone density scan (DEXA): At age 50, ask your care team if you should have this scan for osteoporosis (brittle bones).  Hepatitis C: Get tested at least once in your life.  STIs (sexually transmitted  infections)  Before age 24: Ask your care team if you should be screened for STIs.  After age 24: Get screened for STIs if you're at risk. You are at risk for STIs (including HIV) if:  You are sexually active with more than one person.  You don't use condoms every time.  You or a partner was diagnosed with a sexually transmitted infection.  If you are at risk for HIV, ask about PrEP medicine to prevent HIV.  Get tested for HIV at least once in your life, whether you are at risk for HIV or not.  Cancer screening tests  Cervical cancer screening: If you have a cervix, begin getting regular cervical cancer screening tests at age 21. Most people who have regular screenings with normal results can stop after age 65. Talk about this with your provider.  Breast cancer scan (mammogram): If you've ever had breasts, begin having regular mammograms starting at age 40. This is a scan to check for breast cancer.  Colon cancer screening: It is important to start screening for colon cancer at age 45.  Have a colonoscopy test every 10 years (or more often if you're at risk) Or, ask your provider about stool tests like a FIT test every year or Cologuard test every 3 years.  To learn more about your testing options, visit: https://[email or web address]/881512.pdf.  For help making a decision, visit: https://bit.ly/uz22955.  Prostate cancer screening test: If you have a prostate and are age 55 to 69, ask your provider if you would benefit from a yearly prostate cancer screening test.  Lung cancer screening: If you are a current or former smoker age 50 to 80, ask your care team if ongoing lung cancer screenings are right for you.  For informational purposes only. Not to replace the advice of your health care provider. Copyright   2023 [employer]. All rights reserved. Clinically reviewed by the [facility]. [place] 439615 - REV 01/24.

## 2024-04-12 NOTE — ASSESSMENT & PLAN NOTE
Followed by psychiatry and well-controlled with current medications. Will forward recent labs to psychiatry provider

## 2024-04-12 NOTE — ASSESSMENT & PLAN NOTE
Left posterior calf. Prescribed a higher potency topical steroid cream, reviewed appropriate use and potential risks. If no improvement, could see dermatology.

## 2024-04-17 ENCOUNTER — VIRTUAL VISIT (OUTPATIENT)
Dept: SURGERY | Facility: CLINIC | Age: 59
End: 2024-04-17
Payer: COMMERCIAL

## 2024-04-17 DIAGNOSIS — E66.01 OBESITY, CLASS III, BMI 40-49.9 (MORBID OBESITY) (H): Primary | ICD-10-CM

## 2024-04-17 PROCEDURE — 97803 MED NUTRITION INDIV SUBSEQ: CPT | Mod: 95

## 2024-04-17 NOTE — PROGRESS NOTES
Monika Franz is a 58 year old who is being evaluated via a billable video visit.      How would you like to obtain your AVS? MyChart  If the video visit is dropped, the invitation should be resent by: Text to cell phone: [phone]  Will anyone else be joining your video visit? No           Follow Up Surgical Weight Loss Supervised Diet Evaluation    Assessment:  Pt. is being seen today for a follow up RD nutritional evaluation. Pt. has been unsuccessful with non-surgical weight loss methods and is interested in bariatric surgery. Today we reviewed current eating habits and level of physical activity, and instructed on the changes that are required for successful bariatric outcomes.    Surgery of interest per pt: Revision.  Weight loss medication: Metformin - pending discontinue     Workflow review:  Support Group: Completed. (3/19/2024)  Psychology:Completed.  Lab work:Completed.  SWL:Yes 3/6      Weight goal: At or below initial.    Anthropometrics:  Pt's weight is 250.3 lbs   Initial weight: 254 lbs  Weight change: 4 lbs down  BMI: There is no height or weight on file to calculate BMI.   Ideal body weight: 59.3 kg (130 lb 11.7 oz)  Adjusted ideal body weight: 82.5 kg (181 lb 14.1 oz)    Medical History:  Patient Active Problem List   Diagnosis    REBECCA (obstructive sleep apnea)    Bariatric surgery status    Eczema    Drug overdose, intentional (H)    Malignant neoplasm of upper-inner quadrant of left breast in female, estrogen receptor negative (H)    Narrow angle glaucoma suspect of both eyes    Severe recurrent major depression without psychotic features (H)    Adenomatous polyp of colon    Hammer toe of right foot    Morbid obesity (H)    Lymphedema of arm          Progress over past month: Patient reports she is getting use to tracking her intake via journal. Is finding it really helps stay on track of her nutrition. Tracking her steps aiming for 7-10k per day. Continues to hit her protein goals. Noted  stressors are higher at work d/t layoffs (not effecting her). Is staying aware to avoid turn to food for comfort.  - down to x2 soda's per day        Diet Recall/Time  Breakfast: Premiere protein with banana Or greek yogurt low fat with granola   Lunch: salads with chicken and added vegetables with ranch dressing   Dinner: Spaghetti Or tacos with vegetables Or turkey burgers Or chili Or fried rice with added chicken - meals always have side of vegetables     Typical Snacks: almonds, cheese stick     Overnight eating: No    Eating out: rarely <1x per week     Meal duration: 15-20 minutes.      fluids by 30 minutes before, during meal, and waiting 30 minutes after meal before drinking fluids: Yes    Beverages  Water (50-60oz)  Diet Mt, Dew (x2 bottles/day)   Vitamin water   Crystal light   Milk 1%    Exercise  Walking outside (5 miles) at least 4 days per week   Going to a [facility] that has a track - aims for 5 miles     PES statement:   Morbid obesity related to excessive energy intake as evidenced by Intake of high caloric density foods/beverages (juice, soda, alcohol) at meals and/or snacks; large portions; frequent grazing; Estimated intake that exceeds estimated daily energy intake; Binge eating patterns; Frequent excessive fast food or restaurant intake; and BMI 41.74       Intervention      Food/Nutrient Delivery:  Educated patient on eating three meals, with cutting out snacking.  Bariatric Plate: Patient and I discussed the importance of including a lean protein source (20-30 grams/meal), vegetables (included at lunch and dinner), one serving (15g) of carbohydrate, and limited added fat (1 tb/day) at each meal.   Educated patient on how to complete a food journal and benefits of meal planning.   Educated patient on using a protein powder drink as a meal replacement and/or supplement after bariatric surgery.   Discussed importance of adequate hydration after surgery, with goal of at least 64  oz of fluids/day.  Addressed avoiding all carbonated, caffeinated and sweetened drinks to prepare for bariatric surgery.     Nutrition Counseling:  Mindful eating techniques: Encouraged slow meal pace, chewing foods to applesauce consistency for 20-30 minutes/meal.   Discussed  fluids 30 minutes before, during, and after meal to prevent dumping syndrome and discomfort post bariatric surgery.   Discussed pre/post operative diet progression, post op vitamin regimen, gave review of surgery process.     Instructions/Goals:     Continue implementing bariatric surgery lifestyle modifications.      Handouts Provided:   [facility] B[facility] Nutrition Info    Monitor/Evaluation:  Pt. s target weight: no gain from initial visit, pt. verbalized understanding.       Plan for next visit:   Follow up visit 4      Video-Visit Details    Type of service:  Video Visit    Video Start Time (time video started): 9:28 AM    Video End Time (time video stopped): 9:41 AM    Originating Location (pt. Location): Home      Distant Location (provider location):  Off-site    Mode of Communication:  Video Conference via [facility]    Physician has received verbal consent for a Video Visit from the patient? Yes      Shayla Armenta RD

## 2024-04-17 NOTE — LETTER
4/17/2024         RE: Monika Franz  [address] [id number]        Dear Colleague,    Thank you for referring your patient, Monika Franz, to the [facility] [place]. Please see a copy of my visit note below.    Monika Franz is a 58 year old who is being evaluated via a billable video visit.      How would you like to obtain your AVS? MyChart  If the video visit is dropped, the invitation should be resent by: Text to cell phone: [phone]  Will anyone else be joining your video visit? No           Follow Up Surgical Weight Loss Supervised Diet Evaluation    Assessment:  Pt. is being seen today for a follow up RD nutritional evaluation. Pt. has been unsuccessful with non-surgical weight loss methods and is interested in bariatric surgery. Today we reviewed current eating habits and level of physical activity, and instructed on the changes that are required for successful bariatric outcomes.    Surgery of interest per pt: Revision.  Weight loss medication: Metformin - pending discontinue     Workflow review:  Support Group: Completed. (3/19/2024)  Psychology:Completed.  Lab work:Completed.  SWL:Yes 3/6      Weight goal: At or below initial.    Anthropometrics:  Pt's weight is 250.3 lbs   Initial weight: 254 lbs  Weight change: 4 lbs down  BMI: There is no height or weight on file to calculate BMI.   Ideal body weight: 59.3 kg (130 lb 11.7 oz)  Adjusted ideal body weight: 82.5 kg (181 lb 14.1 oz)    Medical History:  Patient Active Problem List   Diagnosis     REBECCA (obstructive sleep apnea)     Bariatric surgery status     Eczema     Drug overdose, intentional (H)     Malignant neoplasm of upper-inner quadrant of left breast in female, estrogen receptor negative (H)     Narrow angle glaucoma suspect of both eyes     Severe recurrent major depression without psychotic features (H)     Adenomatous polyp of colon     Hammer toe of right foot      Morbid obesity (H)     Lymphedema of arm          Progress over past month: Patient reports she is getting use to tracking her intake via journal. Is finding it really helps stay on track of her nutrition. Tracking her steps aiming for 7-10k per day. Continues to hit her protein goals. Noted stressors are higher at work d/t layoffs (not effecting her). Is staying aware to avoid turn to food for comfort.  - down to x2 soda's per day        Diet Recall/Time  Breakfast: Premiere protein with banana Or greek yogurt low fat with granola   Lunch: salads with chicken and added vegetables with ranch dressing   Dinner: Spaghetti Or tacos with vegetables Or turkey burgers Or chili Or fried rice with added chicken - meals always have side of vegetables     Typical Snacks: almonds, cheese stick     Overnight eating: No    Eating out: rarely <1x per week     Meal duration: 15-20 minutes.      fluids by 30 minutes before, during meal, and waiting 30 minutes after meal before drinking fluids: Yes    Beverages  Water (50-60oz)  Diet Mt, Dew (x2 bottles/day)   Vitamin water   Crystal light   Milk 1%    Exercise  Walking outside (5 miles) at least 4 days per week   Going to a community center that has a track - aims for 5 miles     PES statement:   Morbid obesity related to excessive energy intake as evidenced by Intake of high caloric density foods/beverages (juice, soda, alcohol) at meals and/or snacks; large portions; frequent grazing; Estimated intake that exceeds estimated daily energy intake; Binge eating patterns; Frequent excessive fast food or restaurant intake; and BMI 41.74       Intervention      Food/Nutrient Delivery:  Educated patient on eating three meals, with cutting out snacking.  Bariatric Plate: Patient and I discussed the importance of including a lean protein source (20-30 grams/meal), vegetables (included at lunch and dinner), one serving (15g) of carbohydrate, and limited added fat (1 tb/day) at  each meal.   Educated patient on how to complete a food journal and benefits of meal planning.   Educated patient on using a protein powder drink as a meal replacement and/or supplement after bariatric surgery.   Discussed importance of adequate hydration after surgery, with goal of at least 64 oz of fluids/day.  Addressed avoiding all carbonated, caffeinated and sweetened drinks to prepare for bariatric surgery.     Nutrition Counseling:  Mindful eating techniques: Encouraged slow meal pace, chewing foods to applesauce consistency for 20-30 minutes/meal.   Discussed  fluids 30 minutes before, during, and after meal to prevent dumping syndrome and discomfort post bariatric surgery.   Discussed pre/post operative diet progression, post op vitamin regimen, gave review of surgery process.     Instructions/Goals:     Continue implementing bariatric surgery lifestyle modifications.      Handouts Provided:   [facility] Nutrition Info    Monitor/Evaluation:  Pt. s target weight: no gain from initial visit, pt. verbalized understanding.       Plan for next visit:   Follow up visit 4      Video-Visit Details    Type of service:  Video Visit    Video Start Time (time video started): 9:28 AM    Video End Time (time video stopped): 9:41 AM    Originating Location (pt. Location): Home      Distant Location (provider location):  Off-site    Mode of Communication:  Video Conference via [facility]    Physician has received verbal consent for a Video Visit from the patient? Yes      Shayla Armenta RD             Again, thank you for allowing me to participate in the care of your patient.        Sincerely,        Shayla Armenta RD

## 2024-04-24 ENCOUNTER — DOCUMENTATION ONLY (OUTPATIENT)
Dept: OTHER | Facility: CLINIC | Age: 59
End: 2024-04-24
Payer: COMMERCIAL

## 2024-05-09 ENCOUNTER — VIRTUAL VISIT (OUTPATIENT)
Dept: SURGERY | Facility: CLINIC | Age: 59
End: 2024-05-09
Payer: COMMERCIAL

## 2024-05-09 DIAGNOSIS — E66.01 OBESITY, CLASS III, BMI 40-49.9 (MORBID OBESITY) (H): Primary | ICD-10-CM

## 2024-05-09 PROCEDURE — 97803 MED NUTRITION INDIV SUBSEQ: CPT | Mod: 95

## 2024-05-09 NOTE — LETTER
5/9/2024         RE: Monika Franz  [address] [facility] [address]        Dear Colleague,    Thank you for referring your patient, Monika Franz, to the [facility] [place]. Please see a copy of my visit note below.    Monika Franz is a 58 year old who is being evaluated via a billable video visit.      How would you like to obtain your AVS? MyChart  If the video visit is dropped, the invitation should be resent by: Text to cell phone: [phone]  Will anyone else be joining your video visit? No           Follow Up Surgical Weight Loss Supervised Diet Evaluation    Assessment:  Pt. is being seen today for a follow up RD nutritional evaluation. Pt. has been unsuccessful with non-surgical weight loss methods and is interested in bariatric surgery. Today we reviewed current eating habits and level of physical activity, and instructed on the changes that are required for successful bariatric outcomes.    Surgery of interest per pt: Revision.  Weight loss medication: Metformin - pending discontinue     Workflow review:  Support Group: Completed. (3/19/2024)  Psychology:Completed.  Lab work:Completed.  SWL:Yes 4/6      Weight goal: At or below initial.    Anthropometrics:  Pt's weight is 255 lbs   Initial weight: 254 lbs  Weight change: 1 lbs down  BMI: There is no height or weight on file to calculate BMI.   Ideal body weight: 59.3 kg (130 lb 11.7 oz)  Adjusted ideal body weight: 82.5 kg (181 lb 14.1 oz)    Medical History:  Patient Active Problem List   Diagnosis     REBECCA (obstructive sleep apnea)     Bariatric surgery status     Eczema     Drug overdose, intentional (H)     Malignant neoplasm of upper-inner quadrant of left breast in female, estrogen receptor negative (H)     Narrow angle glaucoma suspect of both eyes     Severe recurrent major depression without psychotic features (H)     Adenomatous polyp of colon     Hammer toe of right foot     Morbid  obesity (H)     Lymphedema of arm          Progress over past month: Continues to do well with implementing bariatric nutritional guidelines. Using [employer] to journal her food. Is up ~4 lbs from last weight recorded (no concerns). Reports journaling her food helps stay on track of her nutrition. Tracking her steps aiming for 7-10k per day. Continues to hit her protein goals. Is staying mindful to avoid turning to food for comfort.  + down to 0-1 diet soda per day        Diet Recall/Time  Breakfast: Fairlife OR Premiere protein with banana Or greek yogurt low fat with granola   Lunch: salads with chicken and added vegetables with ranch dressing   Dinner: Spaghetti Or tacos with vegetables Or turkey burgers Or chili Or fried rice with added chicken - meals always have side of vegetables     Typical Snacks: almonds, cheese stick     Overnight eating: No    Eating out: rarely <1x per week     Meal duration: 15-20 minutes.      fluids by 30 minutes before, during meal, and waiting 30 minutes after meal before drinking fluids: Yes    Beverages  Water (50-60oz)  Diet Mt, Dew (x0-1 bottles/day)   Vitamin water zero calorie    Crystal light   Milk 1%    Exercise    Walking outside aims for 45 min - 1 hour at least 5x per week     PES statement:   Morbid obesity related to excessive energy intake as evidenced by Intake of high caloric density foods/beverages (juice, soda, alcohol) at meals and/or snacks; large portions; frequent grazing; Estimated intake that exceeds estimated daily energy intake; Binge eating patterns; Frequent excessive fast food or restaurant intake; and BMI 41.74       Intervention      Food/Nutrient Delivery:  Educated patient on eating three meals, with cutting out snacking.  Bariatric Plate: Patient and I discussed the importance of including a lean protein source (20-30 grams/meal), vegetables (included at lunch and dinner), one serving (15g) of carbohydrate, and limited added fat (1  tb/day) at each meal.   Educated patient on how to complete a food journal and benefits of meal planning.   Educated patient on using a protein powder drink as a meal replacement and/or supplement after bariatric surgery.   Discussed importance of adequate hydration after surgery, with goal of at least 64 oz of fluids/day.  Addressed avoiding all carbonated, caffeinated and sweetened drinks to prepare for bariatric surgery.     Nutrition Counseling:  Mindful eating techniques: Encouraged slow meal pace, chewing foods to applesauce consistency for 20-30 minutes/meal.   Discussed  fluids 30 minutes before, during, and after meal to prevent dumping syndrome and discomfort post bariatric surgery.   Discussed pre/post operative diet progression, post op vitamin regimen, gave review of surgery process.     Instructions/Goals:     Continue implementing bariatric surgery lifestyle modifications.      Handouts Provided:   [facility] Bariatric Surgery Nutrition Info    Monitor/Evaluation:  Pt. s target weight: no gain from initial visit, pt. verbalized understanding.       Plan for next visit:   Follow up visit 5      Video-Visit Details    Type of service:  Video Visit    Video Start Time (time video started): 3:28 PM    Video End Time (time video stopped): 3:38 PM    Originating Location (pt. Location): Home      Distant Location (provider location):  Off-site    Mode of Communication:  Video Conference via [facility]    Physician has received verbal consent for a Video Visit from the patient? Yes      Shayla Armenta RD             Again, thank you for allowing me to participate in the care of your patient.        Sincerely,        Shayla Armenta RD

## 2024-05-09 NOTE — PROGRESS NOTES
Monika Franz is a 58 year old who is being evaluated via a billable video visit.      How would you like to obtain your AVS? MyChart  If the video visit is dropped, the invitation should be resent by: Text to cell phone: [phone]  Will anyone else be joining your video visit? No           Follow Up Surgical Weight Loss Supervised Diet Evaluation    Assessment:  Pt. is being seen today for a follow up RD nutritional evaluation. Pt. has been unsuccessful with non-surgical weight loss methods and is interested in bariatric surgery. Today we reviewed current eating habits and level of physical activity, and instructed on the changes that are required for successful bariatric outcomes.    Surgery of interest per pt: Revision.  Weight loss medication: Metformin - pending discontinue     Workflow review:  Support Group: Completed. (3/19/2024)  Psychology:Completed.  Lab work:Completed.  SWL:Yes 4/6      Weight goal: At or below initial.    Anthropometrics:  Pt's weight is 255 lbs   Initial weight: 254 lbs  Weight change: 1 lbs down  BMI: There is no height or weight on file to calculate BMI.   Ideal body weight: 59.3 kg (130 lb 11.7 oz)  Adjusted ideal body weight: 82.5 kg (181 lb 14.1 oz)    Medical History:  Patient Active Problem List   Diagnosis    REBECCA (obstructive sleep apnea)    Bariatric surgery status    Eczema    Drug overdose, intentional (H)    Malignant neoplasm of upper-inner quadrant of left breast in female, estrogen receptor negative (H)    Narrow angle glaucoma suspect of both eyes    Severe recurrent major depression without psychotic features (H)    Adenomatous polyp of colon    Hammer toe of right foot    Morbid obesity (H)    Lymphedema of arm          Progress over past month: Continues to do well with implementing bariatric nutritional guidelines. Using [employer] to journal her food. Is up ~4 lbs from last weight recorded (no concerns). Reports journaling her food helps stay on track of her  nutrition. Tracking her steps aiming for 7-10k per day. Continues to hit her protein goals. Is staying mindful to avoid turning to food for comfort.  + down to 0-1 diet soda per day        Diet Recall/Time  Breakfast: Fairlife OR Premiere protein with banana Or greek yogurt low fat with granola   Lunch: salads with chicken and added vegetables with ranch dressing   Dinner: Spaghetti Or tacos with vegetables Or turkey burgers Or chili Or fried rice with added chicken - meals always have side of vegetables     Typical Snacks: almonds, cheese stick     Overnight eating: No    Eating out: rarely <1x per week     Meal duration: 15-20 minutes.      fluids by 30 minutes before, during meal, and waiting 30 minutes after meal before drinking fluids: Yes    Beverages  Water (50-60oz)  Diet Mt, Dew (x0-1 bottles/day)   Vitamin water zero calorie    Crystal light   Milk 1%    Exercise    Walking outside aims for 45 min - 1 hour at least 5x per week     PES statement:   Morbid obesity related to excessive energy intake as evidenced by Intake of high caloric density foods/beverages (juice, soda, alcohol) at meals and/or snacks; large portions; frequent grazing; Estimated intake that exceeds estimated daily energy intake; Binge eating patterns; Frequent excessive fast food or restaurant intake; and BMI 41.74       Intervention      Food/Nutrient Delivery:  Educated patient on eating three meals, with cutting out snacking.  Bariatric Plate: Patient and I discussed the importance of including a lean protein source (20-30 grams/meal), vegetables (included at lunch and dinner), one serving (15g) of carbohydrate, and limited added fat (1 tb/day) at each meal.   Educated patient on how to complete a food journal and benefits of meal planning.   Educated patient on using a protein powder drink as a meal replacement and/or supplement after bariatric surgery.   Discussed importance of adequate hydration after surgery, with goal  of at least 64 oz of fluids/day.  Addressed avoiding all carbonated, caffeinated and sweetened drinks to prepare for bariatric surgery.     Nutrition Counseling:  Mindful eating techniques: Encouraged slow meal pace, chewing foods to applesauce consistency for 20-30 minutes/meal.   Discussed  fluids 30 minutes before, during, and after meal to prevent dumping syndrome and discomfort post bariatric surgery.   Discussed pre/post operative diet progression, post op vitamin regimen, gave review of surgery process.     Instructions/Goals:     Continue implementing bariatric surgery lifestyle modifications.      Handouts Provided:   [facility] Bariatric Surgery Nutrition Info    Monitor/Evaluation:  Pt. s target weight: no gain from initial visit, pt. verbalized understanding.       Plan for next visit:   Follow up visit 5      Video-Visit Details    Type of service:  Video Visit    Video Start Time (time video started): 3:28 PM    Video End Time (time video stopped): 3:38 PM    Originating Location (pt. Location): Home      Distant Location (provider location):  Off-site    Mode of Communication:  Video Conference via [facility]    Physician has received verbal consent for a Video Visit from the patient? Yes      Shayla Armenta RD

## 2024-06-10 ASSESSMENT — SLEEP AND FATIGUE QUESTIONNAIRES
HOW LIKELY ARE YOU TO NOD OFF OR FALL ASLEEP WHILE LYING DOWN TO REST IN THE AFTERNOON WHEN CIRCUMSTANCES PERMIT: MODERATE CHANCE OF DOZING
HOW LIKELY ARE YOU TO NOD OFF OR FALL ASLEEP WHILE SITTING AND READING: MODERATE CHANCE OF DOZING
HOW LIKELY ARE YOU TO NOD OFF OR FALL ASLEEP WHEN YOU ARE A PASSENGER IN A CAR FOR AN HOUR WITHOUT A BREAK: WOULD NEVER DOZE
HOW LIKELY ARE YOU TO NOD OFF OR FALL ASLEEP WHILE SITTING INACTIVE IN A PUBLIC PLACE: WOULD NEVER DOZE
HOW LIKELY ARE YOU TO NOD OFF OR FALL ASLEEP WHILE SITTING AND TALKING TO SOMEONE: WOULD NEVER DOZE
HOW LIKELY ARE YOU TO NOD OFF OR FALL ASLEEP WHILE SITTING QUIETLY AFTER LUNCH WITHOUT ALCOHOL: WOULD NEVER DOZE
HOW LIKELY ARE YOU TO NOD OFF OR FALL ASLEEP WHILE WATCHING TV: HIGH CHANCE OF DOZING
HOW LIKELY ARE YOU TO NOD OFF OR FALL ASLEEP IN A CAR, WHILE STOPPED FOR A FEW MINUTES IN TRAFFIC: WOULD NEVER DOZE

## 2024-06-10 NOTE — PROGRESS NOTES
Virtual Visit Details    Type of service:  Video Visit   Video start 12:59 PM  Video end 1:11 PM  Originating Location (pt. Location): Home    Distant Location (provider location):  Off-site  Platform used for Video Visit: [facility]   Outpatient Sleep Medicine  Jun 11, 2024       Name: Monika Franz MRN# [id number]   Age: 58 year old YOB: 1965            Assessment and Plan:   1. REBECCA (obstructive sleep apnea)  Patient's sleep apnea is subjectively well treated with current auto CPAP 14-20 cm H2O. [facility] was unable to obtain download so no objective data to look at today but reports 100% nightly compliance.  Tolerates pressure settings well.  Primary reason for today's visit is to discuss getting replacement CPAP machine as well as mask and supplies.  Was happy to provide prescription for her today.  Current machine over 5 years old and no longer able to be downloaded. Discussed she will likely need a compliance visit following new machine so we will plan to see her about 2 months after she gets started on the new device to ensure compliance. Of note, plans on getting gastric bypass revision done sometime this summer hopefully, discussed as long as she remains compliant on CPAP will have no issues giving her sleep clearance.   - Comprehensive DME       Chief Complaint      Chief Complaint   Patient presents with    RECHECK          History of Present Illness:     Monika Franz is a 58 year old female with breast cancer, depression, and obesity s/p bariatric surgery who presents to the clinic for follow-up of their mild obstructive sleep apnea treated with CPAP.     Originally diagnosed via split-night sleep study completed 2/9/2012 (269#) through [facility] revealing severe obstructive sleep apnea with an AHI of 36.6, RDI 78.8, lowest oxygen saturation 84%.  Presenting symptoms included snoring, nonrestorative sleep, excessive daytime sleepiness (baseline [place]  "Sleepiness Scale was 16/24). Started CPAP shortly after this study.     Repeat study completed status post bariatric surgery through our clinic - watchPAT home study on 4/23/21 (212#) showing pAHI 6.4, DEON 4.9, REM pAHI 16.7, oxygen dajuan 89%.  Patient elected to continue CPAP.       Returns today for routine CPAP visit.  Last seen 7/2022.  Patient needs prescription for replacement CPAP machine.  Brought her machine to [facility] and they were unable to obtain download, told her that she needs replacement.  Believes she got her last machine in 2018.    Overall, the patient rates their experience with PAP as 10 (0 poor, 10 great). Patient is using a full face mask. The mask is comfortable. The mask is not leaking. They are not snoring with the mask on. They are not having gasp arousals.  They are not having significant oral/nasal dryness or epistaxis. The pressure settings are comfortable.     Bedtime is typically 10:00PM. Usually it takes about 15 minutes to fall asleep with the mask on. Wake time is typically 5:00AM.  Patient is using PAP therapy 7 hours per night. The patient is usually getting 7 hours of sleep per night. Does feel well rested in AM.     SCALES:   INSOMNIA: Insomnia Severity Score: 6   SLEEPINESS: [place] Sleepiness Score: 7    Past medical/surgical history, family history, social history, medications and allergies were reviewed.           Physical Examination:   /62   Ht 1.676 m (5' 6\")   Wt 117 kg (258 lb)   LMP  (LMP Unknown)   BMI 41.64 kg/m    General appearance: Awake, alert, cooperative. Well groomed. Sitting comfortably in chair. In no apparent distress.  HEENT: Head: Normocephalic, atraumatic. Eyes:Conjunctiva clear. Sclera normal. Nose: External appearance without deformity.   Pulmonary:  Able to speak easily in full sentences. No cough or wheeze.   Skin:  No rashes or significant lesions on visible skin.   Neurologic: Alert, oriented x3.   Psychiatric: Mood euthymic. Affect " congruent with full range and intensity.      CC:  Donna Holt PA-C  Jun 11, 2024     [facility] [place]  [address] , [place] [address]     [facility]  [address] [address]    Chart documentation was completed, in part, with [employer] voice-recognition software. Even though reviewed, some grammatical, spelling, and word errors may remain.    25 minutes spent on day of encounter doing chart review, history and exam, documentation, and further activities as noted above

## 2024-06-11 ENCOUNTER — VIRTUAL VISIT (OUTPATIENT)
Dept: SLEEP MEDICINE | Facility: CLINIC | Age: 59
End: 2024-06-11
Payer: COMMERCIAL

## 2024-06-11 VITALS
SYSTOLIC BLOOD PRESSURE: 118 MMHG | WEIGHT: 258 LBS | HEIGHT: 66 IN | BODY MASS INDEX: 41.46 KG/M2 | DIASTOLIC BLOOD PRESSURE: 62 MMHG

## 2024-06-11 DIAGNOSIS — G47.33 OSA (OBSTRUCTIVE SLEEP APNEA): ICD-10-CM

## 2024-06-11 PROCEDURE — 99213 OFFICE O/P EST LOW 20 MIN: CPT | Mod: 95 | Performed by: PHYSICIAN ASSISTANT

## 2024-06-11 ASSESSMENT — PAIN SCALES - GENERAL: PAINLEVEL: NO PAIN (0)

## 2024-06-11 NOTE — NURSING NOTE
Is the patient currently in the state of [place]? YES    Visit mode:VIDEO    If the visit is dropped, the patient can be reconnected by: VIDEO VISIT: Text to cell phone:   Telephone Information:   Mobile [phone]       Will anyone else be joining the visit? NO  (If patient encounters technical issues they should call [phone] :[phone])    How would you like to obtain your AVS? MyChart    Are changes needed to the allergy or medication list? Pt stated no changes to allergies and Pt stated no med changes    Are refills needed on medications prescribed by this physician? NO    Reason for visit: RECHECK    Gaye ORTEGA  Has patient had flu shot for current/most recent flu season? If so, when? Yes: 9/2023

## 2024-06-24 ENCOUNTER — VIRTUAL VISIT (OUTPATIENT)
Dept: SURGERY | Facility: CLINIC | Age: 59
End: 2024-06-24
Payer: COMMERCIAL

## 2024-06-24 DIAGNOSIS — E66.01 OBESITY, CLASS III, BMI 40-49.9 (MORBID OBESITY) (H): Primary | ICD-10-CM

## 2024-06-24 PROCEDURE — 97803 MED NUTRITION INDIV SUBSEQ: CPT | Mod: 95

## 2024-06-24 NOTE — PROGRESS NOTES
Monika Franz is a 58 year old who is being evaluated via a billable video visit.      How would you like to obtain your AVS? MyChart  If the video visit is dropped, the invitation should be resent by: Text to cell phone: [phone]  Will anyone else be joining your video visit? No           Follow Up Surgical Weight Loss Supervised Diet Evaluation    Assessment:  Pt. is being seen today for a follow up RD nutritional evaluation. Pt. has been unsuccessful with non-surgical weight loss methods and is interested in bariatric surgery. Today we reviewed current eating habits and level of physical activity, and instructed on the changes that are required for successful bariatric outcomes.    Surgery of interest per pt: Revision.      Workflow review:  Support Group: Completed. (3/19/2024)  Psychology:Completed.  Lab work:Completed.  SWL:Yes 5/6 - 6th visit with Dr. Pickard in July   Sleep study: in progress        Weight goal: At or below initial.    Anthropometrics:  Pt's weight is 253 lbs   Initial weight: 254 lbs  Weight change: 5 lbs down in the last 1.5 months  BMI: There is no height or weight on file to calculate BMI.   Ideal body weight: 59.3 kg (130 lb 11.7 oz)  Adjusted ideal body weight: 82.5 kg (181 lb 14.1 oz)    Medical History:  Patient Active Problem List   Diagnosis    REBECCA (obstructive sleep apnea)    Bariatric surgery status    Eczema    Drug overdose, intentional (H)    Malignant neoplasm of upper-inner quadrant of left breast in female, estrogen receptor negative (H)    Narrow angle glaucoma suspect of both eyes    Severe recurrent major depression without psychotic features (H)    Adenomatous polyp of colon    Hammer toe of right foot    Morbid obesity (H)    Lymphedema of arm          Progress over past month: Continues to do well with implementing bariatric nutritional guidelines. Using [employer] to journal her food.  Reports journaling her food helps stay on track of her nutrition. Meals have  remained stable with a Protein with vegetables and small amount of carbohydrates.  Tracking her steps aiming for 7-10k per day. Continues to hit her protein goals. Is staying mindful to avoid turning to food for comfort.  + down to 2 diet sodas per week   + activity status is going well  + getting 2-5 servings of fruit and/or veggies daily.        Diet Recall/Time  Breakfast: Fairlife OR Premiere protein with banana Or greek (yogurt low fat) with granola   Lunch: salads with chicken and added vegetables with ranch dressing   Dinner: tacos with vegetables Or turkey burgers Or chili Or fried rice with added chicken - meals always have side of vegetables     Typical Snacks: almonds, cheese stick,  quest chips, hard boiled egg     Overnight eating: No    Eating out: rarely <1x per week     Meal duration: 15-20 minutes.      fluids by 30 minutes before, during meal, and waiting 30 minutes after meal before drinking fluids: Yes    Beverages  Water (50-60oz) sometimes Crystal light   Diet Mt, Dew maybe 2 bottles per week   Vitamin water zero calorie    [employer]   Milk 1%    Exercise  Walking outside aims for 45 min - 1 hour at least 5x per week,   Is doing a weight program provided by her lymphedema specialist at least 2x per week     PES statement:   Morbid obesity related to excessive energy intake as evidenced by Intake of high caloric density foods/beverages (juice, soda, alcohol) at meals and/or snacks; large portions; frequent grazing; Estimated intake that exceeds estimated daily energy intake; Binge eating patterns; Frequent excessive fast food or restaurant intake; and BMI 41.6      Intervention      Food/Nutrient Delivery:  Educated patient on eating three meals, with cutting out snacking.  Bariatric Plate: Patient and I discussed the importance of including a lean protein source (20-30 grams/meal), vegetables (included at lunch and dinner), one serving (15g) of carbohydrate, and limited added fat (1  tb/day) at each meal.   Educated patient on how to complete a food journal and benefits of meal planning.   Educated patient on using a protein powder drink as a meal replacement and/or supplement after bariatric surgery.   Discussed importance of adequate hydration after surgery, with goal of at least 64 oz of fluids/day.  Addressed avoiding all carbonated, caffeinated and sweetened drinks to prepare for bariatric surgery.     Nutrition Counseling:  Mindful eating techniques: Encouraged slow meal pace, chewing foods to applesauce consistency for 20-30 minutes/meal.   Discussed  fluids 30 minutes before, during, and after meal to prevent dumping syndrome and discomfort post bariatric surgery.   Discussed pre/post operative diet progression, post op vitamin regimen, gave review of surgery process.     Instructions/Goals:     Continue implementing bariatric surgery lifestyle modifications.      Handouts Provided:   [facility] diet advancement     Monitor/Evaluation:  Pt. s target weight: no gain from initial visit, pt. verbalized understanding.       Plan for next visit:   Seeding Dr. Pickard next month (July)    Pt has completed all nutrition requirements and is well-informed of the dietary and physical activity requirements that are necessary for successful bariatric outcomes. This pt is an appropriate candidate for surgery from a nutrition standpoint at this time. The patient understands that surgery is a tool, not a cure, and post operative follow up is essential.       Video-Visit Details    Type of service:  Video Visit    Video Start Time (time video started): 2:31 PM    Video End Time (time video stopped): 32:45 PM    Originating Location (pt. Location): Home      Distant Location (provider location):  Off-site    Mode of Communication:  Video Conference via [facility]    Physician has received verbal consent for a Video Visit from the patient? Yes      Shayla Armenta RD

## 2024-06-24 NOTE — LETTER
6/24/2024      Monika Franz  [address] [address]      Dear Colleague,    Thank you for referring your patient, Monika Franz, to the [facility] SURGERY CLINIC AND BARIATRICS CARE [place]. Please see a copy of my visit note below.    Monika Franz is a 58 year old who is being evaluated via a billable video visit.      How would you like to obtain your AVS? MyChart  If the video visit is dropped, the invitation should be resent by: Text to cell phone: [phone]  Will anyone else be joining your video visit? No           Follow Up Surgical Weight Loss Supervised Diet Evaluation    Assessment:  Pt. is being seen today for a follow up RD nutritional evaluation. Pt. has been unsuccessful with non-surgical weight loss methods and is interested in bariatric surgery. Today we reviewed current eating habits and level of physical activity, and instructed on the changes that are required for successful bariatric outcomes.    Surgery of interest per pt: Revision.      Workflow review:  Support Group: Completed. (3/19/2024)  Psychology:Completed.  Lab work:Completed.  SWL:Yes 5/6 - 6th visit with Dr. Pickard in July   Sleep study: in progress        Weight goal: At or below initial.    Anthropometrics:  Pt's weight is 253 lbs   Initial weight: 254 lbs  Weight change: 5 lbs down in the last 1.5 months  BMI: There is no height or weight on file to calculate BMI.   Ideal body weight: 59.3 kg (130 lb 11.7 oz)  Adjusted ideal body weight: 82.5 kg (181 lb 14.1 oz)    Medical History:  Patient Active Problem List   Diagnosis     REBECCA (obstructive sleep apnea)     Bariatric surgery status     Eczema     Drug overdose, intentional (H)     Malignant neoplasm of upper-inner quadrant of left breast in female, estrogen receptor negative (H)     Narrow angle glaucoma suspect of both eyes     Severe recurrent major depression without psychotic features (H)     Adenomatous polyp of colon     Hammer toe of  right foot     Morbid obesity (H)     Lymphedema of arm          Progress over past month: Continues to do well with implementing bariatric nutritional guidelines. Using My[employer] to journal her food.  Reports journaling her food helps stay on track of her nutrition. Meals have remained stable with a Protein with vegetables and small amount of carbohydrates.  Tracking her steps aiming for 7-10k per day. Continues to hit her protein goals. Is staying mindful to avoid turning to food for comfort.  + down to 2 diet sodas per week   + activity status is going well  + getting 2-5 servings of fruit and/or veggies daily.        Diet Recall/Time  Breakfast: Fairlife OR Premiere protein with banana Or greek (yogurt low fat) with granola   Lunch: salads with chicken and added vegetables with ranch dressing   Dinner: tacos with vegetables Or turkey burgers Or chili Or fried rice with added chicken - meals always have side of vegetables     Typical Snacks: almonds, cheese stick,  quest chips, hard boiled egg     Overnight eating: No    Eating out: rarely <1x per week     Meal duration: 15-20 minutes.      fluids by 30 minutes before, during meal, and waiting 30 minutes after meal before drinking fluids: Yes    Beverages  Water (50-60oz) sometimes Crystal light   Diet Mt, Dew maybe 2 bottles per week   Vitamin water zero calorie    Propel   Milk 1%    Exercise  Walking outside aims for 45 min - 1 hour at least 5x per week,   Is doing a weight program provided by her lymphedema specialist at least 2x per week     PES statement:   Morbid obesity related to excessive energy intake as evidenced by Intake of high caloric density foods/beverages (juice, soda, alcohol) at meals and/or snacks; large portions; frequent grazing; Estimated intake that exceeds estimated daily energy intake; Binge eating patterns; Frequent excessive fast food or restaurant intake; and BMI 41.6      Intervention      Food/Nutrient  Delivery:  Educated patient on eating three meals, with cutting out snacking.  Bariatric Plate: Patient and I discussed the importance of including a lean protein source (20-30 grams/meal), vegetables (included at lunch and dinner), one serving (15g) of carbohydrate, and limited added fat (1 tb/day) at each meal.   Educated patient on how to complete a food journal and benefits of meal planning.   Educated patient on using a protein powder drink as a meal replacement and/or supplement after bariatric surgery.   Discussed importance of adequate hydration after surgery, with goal of at least 64 oz of fluids/day.  Addressed avoiding all carbonated, caffeinated and sweetened drinks to prepare for bariatric surgery.     Nutrition Counseling:  Mindful eating techniques: Encouraged slow meal pace, chewing foods to applesauce consistency for 20-30 minutes/meal.   Discussed  fluids 30 minutes before, during, and after meal to prevent dumping syndrome and discomfort post bariatric surgery.   Discussed pre/post operative diet progression, post op vitamin regimen, gave review of surgery process.     Instructions/Goals:     Continue implementing bariatric surgery lifestyle modifications.      Handouts Provided:   Richard diet advancement     Monitor/Evaluation:  Pt. s target weight: no gain from initial visit, pt. verbalized understanding.       Plan for next visit:   Seeding Dr. Pickard next month (July)    Pt has completed all nutrition requirements and is well-informed of the dietary and physical activity requirements that are necessary for successful bariatric outcomes. This pt is an appropriate candidate for surgery from a nutrition standpoint at this time. The patient understands that surgery is a tool, not a cure, and post operative follow up is essential.       Video-Visit Details    Type of service:  Video Visit    Video Start Time (time video started): 2:31 PM    Video End Time (time video stopped): 32:45  PM    Originating Location (pt. Location): Home      Distant Location (provider location):  Off-site    Mode of Communication:  Video Conference via [facility]    Physician has received verbal consent for a Video Visit from the patient? Yes      Shayla Armenta RD             Again, thank you for allowing me to participate in the care of your patient.        Sincerely,        Shayla Armenta RD

## 2024-06-25 ENCOUNTER — DOCUMENTATION ONLY (OUTPATIENT)
Dept: SLEEP MEDICINE | Facility: CLINIC | Age: 59
End: 2024-06-25
Payer: COMMERCIAL

## 2024-06-25 DIAGNOSIS — G47.33 OBSTRUCTIVE SLEEP APNEA (ADULT) (PEDIATRIC): Primary | ICD-10-CM

## 2024-06-25 NOTE — PROGRESS NOTES
Patient was offered choice of vendor and chose [facility].  Patient Monika Franz was set up at [place]  on June 25, 2024. Patient received a [employer]med Airsense 11 Pressures were set at  14-20 cm H2O.   Patient s ramp is 7 cm H2O for Auto and FLEX/EPR is EPR, 2.  Patient received a [employer] Mask name: VITERA  Full Face mask size Medium, heated tubing and heated humidifier.  Patient has the following compliance requirements: usage only    [place]

## 2024-07-15 ENCOUNTER — TELEPHONE (OUTPATIENT)
Dept: SURGERY | Facility: CLINIC | Age: 59
End: 2024-07-15
Payer: COMMERCIAL

## 2024-07-15 NOTE — TELEPHONE ENCOUNTER
I received a call from Monika this morning letting me know that her  has changed jobs and she has new insurance with [facility].  She did reach out to her insurance and was told that she does have coverage for bariatric surgery.  She is currently nearing completion of the bariatric program with her 6th visit with Dr. Pickard next week, she's been using her CPAP for nearly 3 weeks now.  We will get this set up in the Epic system

## 2024-07-24 ENCOUNTER — OFFICE VISIT (OUTPATIENT)
Dept: SURGERY | Facility: CLINIC | Age: 59
End: 2024-07-24
Payer: COMMERCIAL

## 2024-07-24 VITALS
WEIGHT: 264 LBS | SYSTOLIC BLOOD PRESSURE: 120 MMHG | DIASTOLIC BLOOD PRESSURE: 66 MMHG | BODY MASS INDEX: 42.43 KG/M2 | HEIGHT: 66 IN

## 2024-07-24 DIAGNOSIS — E66.01 SEVERE OBESITY (H): Primary | ICD-10-CM

## 2024-07-24 DIAGNOSIS — G47.33 OSA (OBSTRUCTIVE SLEEP APNEA): ICD-10-CM

## 2024-07-24 DIAGNOSIS — R63.8 ABNORMAL CRAVING: ICD-10-CM

## 2024-07-24 PROCEDURE — 99214 OFFICE O/P EST MOD 30 MIN: CPT | Performed by: FAMILY MEDICINE

## 2024-07-24 RX ORDER — DESVENLAFAXINE 100 MG/1
1 TABLET, EXTENDED RELEASE ORAL DAILY
COMMUNITY
Start: 2024-06-26

## 2024-07-24 RX ORDER — NALTREXONE HYDROCHLORIDE 50 MG/1
TABLET, FILM COATED ORAL
Qty: 45 TABLET | Refills: 3 | Status: ON HOLD | OUTPATIENT
Start: 2024-07-24 | End: 2024-09-17

## 2024-07-24 NOTE — LETTER
7/24/2024      Monika Franz  [address] [address]      Dear Colleague,    Thank you for referring your patient, Monika Franz, to the [facility] SURGERY CLINIC AND BARIATRICS CARE [place]. Please see a copy of my visit note below.    Outpatient Bariatric Medicine Progress Note-Structured Weight Loss   Indication: Medical bariatric therapy to precede bariatric surgery    Surgery:  Revision Sleeve to RYGB or MARIELA    Surgeon:  Dr. Rios    Impression     58 year old female with Body mass index is 42.61 kg/m . in the setting of morbid obesity which satisfies the NIH criteria for bariatric surgery.    Comorbidities:  Patient Active Problem List   Diagnosis     REBECCA (obstructive sleep apnea)     Bariatric surgery status     Eczema     Drug overdose, intentional (H)     Malignant neoplasm of upper-inner quadrant of left breast in female, estrogen receptor negative (H)     Narrow angle glaucoma suspect of both eyes     Severe recurrent major depression without psychotic features (H)     Adenomatous polyp of colon     Hammer toe of right foot     Morbid obesity (H)     Lymphedema of arm       Plan   Weight will need to be close to 254# prior to the 2 week pre-operative diet.  Heparin Protocol: standard  CPAP: will bring to the hospital on the morning of surgery  Actigall: NA  Exercise Plan: Walking and will continue resistance she got after   Contraception Plan: hysterectomy  Agrees to take vitamins for Life: yes  Agrees to follow up for life: yes  Stress Management Plan: exercise, read, realax,   Medication Management: will stop her metformin and naltrexone    Past Surgical History        Past Surgical History:   Procedure Laterality Date     ARTHROSCOPY KNEE       BACK SURGERY       BARIATRIC SURGERY  06/24/2019     BREAST SURGERY  12/2020     BUNIONECTOMY Right 03/22/2021    Procedure: BUNIONECTOMY right foot. Arthroplasty digits two, three, four and five right foot.;  Surgeon: Nitish  Narinder MUJICA DPM;  Location: [facility];  Service: Podiatry      SECTION      x 3     CHOLECYSTECTOMY       COLONOSCOPY  2021     GASTRECTOMY LAPAROSCOPIC SLEEVE  2019    Dr. Beverly wt 262# BMI 42.34     GYN SURGERY       HYSTERECTOMY, PAP NO LONGER INDICATED       LAPAROSCOPIC ABLATION ENDOMETRIOSIS       MASTECTOMY Bilateral 12/15/2020     [place] INSJ TUNNELED CTR VAD W/SUBQ PORT AGE 5 YR/> Right 2020    Procedure: INSERTION VASCULAR ACCESS PORT UNDER ULTRASOUND AND FLUOROSCOPIC GUIDANCE RIGHT JUGULAR;  Surgeon: Allyson Douglas DO;  Location: [facility];  Service: General     WV MASTECTOMY, SIMPLE, COMPLETE N/A 12/15/2020    Procedure: BILATERAL MASTECTOMY WITH LEFT SENTINEL LYMPH NODE BIOPSY, PORT REMOVAL;  Surgeon: Allyson Douglas DO;  Location: [facility];  Service: General     WV RMVL DARRYL CTR VAD W/SUBQ PORT/ CTR/PRPH INSJ N/A 12/15/2020    Procedure: PORT REMOVAL;  Surgeon: Allyson Douglas DO;  Location: [facility];  Service: General     US BREAST CORE BIOPSY LEFT Left 2020     US SENTINEL NODE INJECTION  12/15/2020       Family History, Social History   Reviewed as documented. See time and date confirmation.    Interim History/Pre-op needs list    Initial Labs Complete and Reviewed: reviewed and excellent  Dietitian Visits Initiated/cleared: cleared  Weight Change since initial weight: stable within 5-10#  Psychologist visits initiated/cleared: cleared  HCM UTD: UTD  Sugars Well Controlled: yes  Cardiac: no issues  Pulmonary: will bring CPAP to the hospital. Never smoker  Hormone use: NA       Medications and Allergies      Current Outpatient Medications   Medication Sig Dispense Refill     amoxicillin (AMOXIL) 500 MG tablet TAKE 4 TABLETS BY MOUTH 1 HOUR BEFORE DENTAL APPOINTMENT       ARIPiprazole (ABILIFY) 5 MG tablet Take 5 mg by mouth daily        Calcium Citrate 1040 MG TABS 1200mg-1500mg daily (separate doses 2-3 times daily)        cholecalciferol 25 MCG (1000 UT) TABS Take 5,000 Units by mouth       desvenlafaxine (PRISTIQ) 100 MG 24 hr tablet Take 1 tablet by mouth daily at 2 pm       naltrexone (DEPADE/REVIA) 50 MG tablet Take 1/2 tablet with evening meal 45 tablet 3     Pediatric Multiple Vitamins (MULTIVITAMIN CHILDRENS PO) Take 1 tablet by mouth       traZODone (DESYREL) 150 MG tablet Take 1 tablet by mouth nightly as needed       triamcinolone (KENALOG) 0.1 % external cream Apply topically 2 times daily X 14 days 30 g 0     triamcinolone (KENALOG) 0.5 % external ointment Apply to affected area on the left calf twice daily x 14 days 15 g 0     vitamin B-12 (CYANOCOBALAMIN) 1000 MCG tablet Take 1,000 mcg by mouth       Allergies: Phentermine and Nsaids    Habits   Tobacco Use: never  Alcohol Use: none  NSAIDS: no  Caffeine: none  SLEEP: well  CPAP: consistent  PHYSICAL ACTIVITY PATTERNS:  Cardiovascular: walking with consistency  Strength Training: with consistency  STRESS MANAGEMENT PATTERNS:   See above    Dietary History   Meals Per Day: 3  Eating Protein First: yes  Grams of Protein daily: 80+  Typical Snack: cheese stick, hard boiled egg, apples, grapes  Water Intake: intentional  Drinking Separate from Meals: yes  Calorie Containing Beverages: no  Portion Control: stable  Chewing Food to Applesauce Consistency: yes  Able to list protein foods: yes  Choosing Whole Grains: yes  Meals at Restaurant/Cafeteria/Take Out Per Week: none  Eating at the Table: yes  TV is Off During Meals: yes    Positive Changes Since Last Visit: consistency with healthy habits  Struggling With: weight loss    Knowledgeable in Reading Food Labels: yes    Review of Systems     GENERAL/CONSTITUTIONAL:  Fatigue: no  HEENT:  Dental Pain: no  Trouble Swallowing: no  CARDIOVASCULAR:  Chest pain with exertion: no  PULMONARY:  CPAP Use: yes  Asthma Controlled: NA  GASTROINTESTINAL:  GERD/Heartburn: no  Gallbladder: absent  UROLOGIC:  Urinary Symptoms:  "no  NEUROLOGIC:  Headaches: no  Paresthesias: no  PSYCHIATRIC:  Moods: euthymic  MUSCULOSKELETAL/RHEUMATOLOGIC  Arthralgias: no  Myalgias: no  ENDOCRINE:  Monitoring Blood Sugars: no  Sugars Well Controlled: yes  DERMATOLOGIC:  Rashes: no  Physical Exam   Vitals: /66 (BP Location: Right arm, Patient Position: Right side)   Ht 1.676 m (5' 6\")   Wt 119.7 kg (264 lb)   LMP  (LMP Unknown)   BMI 42.61 kg/m    Body mass index is 42.61 kg/m .  Neck Circumference: 16.5\"  Waist Circumference: 48\"  GENERAL: appears her stated age. Ambulatory.  HEENT: PEERLA, EOMI, teeth adequate, airway 2+   NECK: no carotid bruits, no anterior/supraclavicular lymphadenopathy, thyroid normal  HEART: Rhythm regular, rate regular, no murmur   LUNGS: Clear   ABDOMEN: soft, non-tender, obese,no rashes,  MUSCULOSKELETAL: no obvious deformities  VASCULAR: palpable peripheral pulses, trace to 1+  lower extremity edema, no color changes of venous stasis, no ulcerations  SKIN: Rashes: no    Counseling     We discussed the National Weight Control Registry and Healthy Weight Maintenance Strategies.   We discussed ways to optimize her metabolism.  We discussed specific exercise goals and dietary habits conducive to a healthy weight.  We discussed the importance of lifelong vitamin supplementation and the potential medical complications of vitamin non-compliance.  We discussed the importance of restorative sleep and stress management in maintaining a healthy weight.  I reminded her to avoid alcohol for 1 year post-operatively, to avoid NSAIDS for life unless specifically indicated and used with a concomitant PPI, to avoid caffeine in excess, and explained the importance of lifelong tobacco abstinence.  Total time spent on the date of this encounter doing: chart review, review of test results, patient visit, physical exam, education, counseling, developing plan of care, and documenting = 30 minutes.        IMonika, give verbal consent " for a resident to be present in today's visit.       Again, thank you for allowing me to participate in the care of your patient.        Sincerely,        Samreen Pickard MD

## 2024-07-24 NOTE — PROGRESS NOTES
Outpatient Bariatric Medicine Progress Note-Structured Weight Loss   Indication: Medical bariatric therapy to precede bariatric surgery    Surgery:  Revision Sleeve to RYGB or MARIELA    Surgeon:  Dr. Rios    Impression     58 year old female with Body mass index is 42.61 kg/m . in the setting of morbid obesity which satisfies the NIH criteria for bariatric surgery.    Comorbidities:  Patient Active Problem List   Diagnosis    REBECCA (obstructive sleep apnea)    Bariatric surgery status    Eczema    Drug overdose, intentional (H)    Malignant neoplasm of upper-inner quadrant of left breast in female, estrogen receptor negative (H)    Narrow angle glaucoma suspect of both eyes    Severe recurrent major depression without psychotic features (H)    Adenomatous polyp of colon    Hammer toe of right foot    Morbid obesity (H)    Lymphedema of arm       Plan   Weight will need to be close to 254# prior to the 2 week pre-operative diet.  Heparin Protocol: standard  CPAP: will bring to the hospital on the morning of surgery  Actigall: NA  Exercise Plan: Walking and will continue resistance she got after   Contraception Plan: hysterectomy  Agrees to take vitamins for Life: yes  Agrees to follow up for life: yes  Stress Management Plan: exercise, read, realax,   Medication Management: will stop her metformin and naltrexone    Past Surgical History        Past Surgical History:   Procedure Laterality Date    ARTHROSCOPY KNEE      BACK SURGERY      BARIATRIC SURGERY  2019    BREAST SURGERY  2020    BUNIONECTOMY Right 2021    Procedure: BUNIONECTOMY right foot. Arthroplasty digits two, three, four and five right foot.;  Surgeon: Narinder Talbert DPM;  Location: [facility];  Service: Podiatry     SECTION      x 3    CHOLECYSTECTOMY      COLONOSCOPY  2021    GASTRECTOMY LAPAROSCOPIC SLEEVE  2019    Dr. Beverly wt 262# BMI 42.34    GYN SURGERY      HYSTERECTOMY, PAP NO LONGER INDICATED       LAPAROSCOPIC ABLATION ENDOMETRIOSIS      MASTECTOMY Bilateral 12/15/2020    [place] INSJ TUNNELED CTR VAD W/SUBQ PORT AGE 5 YR/> Right 06/30/2020    Procedure: INSERTION VASCULAR ACCESS PORT UNDER ULTRASOUND AND FLUOROSCOPIC GUIDANCE RIGHT JUGULAR;  Surgeon: Allyson Douglas DO;  Location: [facility];  Service: General    [place] MASTECTOMY, SIMPLE, COMPLETE N/A 12/15/2020    Procedure: BILATERAL MASTECTOMY WITH LEFT SENTINEL LYMPH NODE BIOPSY, PORT REMOVAL;  Surgeon: Allyson Douglas DO;  Location: [facility];  Service: General    [place] RMVL DARRYL CTR VAD W/SUBQ PORT/ CTR/PRPH INSJ N/A 12/15/2020    Procedure: PORT REMOVAL;  Surgeon: Allyson Douglas DO;  Location: [facility] OR;  Service: General    US BREAST CORE BIOPSY LEFT Left 06/22/2020     SENTINEL NODE INJECTION  12/15/2020       Family History, Social History   Reviewed as documented. See time and date confirmation.    Interim History/Pre-op needs list    Initial Labs Complete and Reviewed: reviewed and excellent  Dietitian Visits Initiated/cleared: cleared  Weight Change since initial weight: stable within 5-10#  Psychologist visits initiated/cleared: cleared  HCM UTD: UTD  Sugars Well Controlled: yes  Cardiac: no issues  Pulmonary: will bring CPAP to the hospital. Never smoker  Hormone use: NA       Medications and Allergies      Current Outpatient Medications   Medication Sig Dispense Refill    amoxicillin (AMOXIL) 500 MG tablet TAKE 4 TABLETS BY MOUTH 1 HOUR BEFORE DENTAL APPOINTMENT      ARIPiprazole (ABILIFY) 5 MG tablet Take 5 mg by mouth daily       Calcium Citrate 1040 MG TABS 1200mg-1500mg daily (separate doses 2-3 times daily)      cholecalciferol 25 MCG (1000 UT) TABS Take 5,000 Units by mouth      desvenlafaxine (PRISTIQ) 100 MG 24 hr tablet Take 1 tablet by mouth daily at 2 pm      naltrexone (DEPADE/REVIA) 50 MG tablet Take 1/2 tablet with evening meal 45 tablet 3    Pediatric Multiple Vitamins (MULTIVITAMIN CHILDRENS PO) Take 1  tablet by mouth      traZODone (DESYREL) 150 MG tablet Take 1 tablet by mouth nightly as needed      triamcinolone (KENALOG) 0.1 % external cream Apply topically 2 times daily X 14 days 30 g 0    triamcinolone (KENALOG) 0.5 % external ointment Apply to affected area on the left calf twice daily x 14 days 15 g 0    vitamin B-12 (CYANOCOBALAMIN) 1000 MCG tablet Take 1,000 mcg by mouth       Allergies: Phentermine and Nsaids    Habits   Tobacco Use: never  Alcohol Use: none  NSAIDS: no  Caffeine: none  SLEEP: well  CPAP: consistent  PHYSICAL ACTIVITY PATTERNS:  Cardiovascular: walking with consistency  Strength Training: with consistency  STRESS MANAGEMENT PATTERNS:   See above    Dietary History   Meals Per Day: 3  Eating Protein First: yes  Grams of Protein daily: 80+  Typical Snack: cheese stick, hard boiled egg, apples, grapes  Water Intake: intentional  Drinking Separate from Meals: yes  Calorie Containing Beverages: no  Portion Control: stable  Chewing Food to Applesauce Consistency: yes  Able to list protein foods: yes  Choosing Whole Grains: yes  Meals at Restaurant/Cafeteria/Take Out Per Week: none  Eating at the Table: yes  TV is Off During Meals: yes    Positive Changes Since Last Visit: consistency with healthy habits  Struggling With: weight loss    Knowledgeable in Reading Food Labels: yes    Review of Systems     GENERAL/CONSTITUTIONAL:  Fatigue: no  HEENT:  Dental Pain: no  Trouble Swallowing: no  CARDIOVASCULAR:  Chest pain with exertion: no  PULMONARY:  CPAP Use: yes  Asthma Controlled: NA  GASTROINTESTINAL:  GERD/Heartburn: no  Gallbladder: absent  UROLOGIC:  Urinary Symptoms: no  NEUROLOGIC:  Headaches: no  Paresthesias: no  PSYCHIATRIC:  Moods: euthymic  MUSCULOSKELETAL/RHEUMATOLOGIC  Arthralgias: no  Myalgias: no  ENDOCRINE:  Monitoring Blood Sugars: no  Sugars Well Controlled: yes  DERMATOLOGIC:  Rashes: no  Physical Exam   Vitals: /66 (BP Location: Right arm, Patient Position: Right side)   " Ht 1.676 m (5' 6\")   Wt 119.7 kg (264 lb)   LMP  (LMP Unknown)   BMI 42.61 kg/m    Body mass index is 42.61 kg/m .  Neck Circumference: 16.5\"  Waist Circumference: 48\"  GENERAL: appears her stated age. Ambulatory.  HEENT: PEERLA, EOMI, teeth adequate, airway 2+   NECK: no carotid bruits, no anterior/supraclavicular lymphadenopathy, thyroid normal  HEART: Rhythm regular, rate regular, no murmur   LUNGS: Clear   ABDOMEN: soft, non-tender, obese,no rashes,  MUSCULOSKELETAL: no obvious deformities  VASCULAR: palpable peripheral pulses, trace to 1+  lower extremity edema, no color changes of venous stasis, no ulcerations  SKIN: Rashes: no    Counseling     We discussed the National Weight Control Registry and Healthy Weight Maintenance Strategies.   We discussed ways to optimize her metabolism.  We discussed specific exercise goals and dietary habits conducive to a healthy weight.  We discussed the importance of lifelong vitamin supplementation and the potential medical complications of vitamin non-compliance.  We discussed the importance of restorative sleep and stress management in maintaining a healthy weight.  I reminded her to avoid alcohol for 1 year post-operatively, to avoid NSAIDS for life unless specifically indicated and used with a concomitant PPI, to avoid caffeine in excess, and explained the importance of lifelong tobacco abstinence.  Total time spent on the date of this encounter doing: chart review, review of test results, patient visit, physical exam, education, counseling, developing plan of care, and documenting = 30 minutes.      "

## 2024-07-26 ENCOUNTER — OFFICE VISIT (OUTPATIENT)
Dept: SURGERY | Facility: CLINIC | Age: 59
End: 2024-07-26
Payer: COMMERCIAL

## 2024-07-26 VITALS — WEIGHT: 261.8 LBS | BODY MASS INDEX: 42.07 KG/M2 | HEIGHT: 66 IN

## 2024-07-26 DIAGNOSIS — E66.01 OBESITY, CLASS III, BMI 40-49.9 (MORBID OBESITY) (H): ICD-10-CM

## 2024-07-26 DIAGNOSIS — E66.01 MORBID OBESITY (H): Primary | ICD-10-CM

## 2024-07-26 PROCEDURE — 99244 OFF/OP CNSLTJ NEW/EST MOD 40: CPT | Performed by: SURGERY

## 2024-07-26 RX ORDER — ACETAMINOPHEN 325 MG/1
975 TABLET ORAL ONCE
Status: CANCELLED | OUTPATIENT
Start: 2024-09-17 | End: 2024-07-26

## 2024-07-26 RX ORDER — CEFAZOLIN SODIUM/WATER 2 G/20 ML
2 SYRINGE (ML) INTRAVENOUS
Status: CANCELLED | OUTPATIENT
Start: 2024-09-17

## 2024-07-26 RX ORDER — ENOXAPARIN SODIUM 100 MG/ML
40 INJECTION SUBCUTANEOUS ONCE
Status: CANCELLED | OUTPATIENT
Start: 2024-07-26 | End: 2024-07-26

## 2024-07-26 RX ORDER — CEFAZOLIN SODIUM/WATER 2 G/20 ML
2 SYRINGE (ML) INTRAVENOUS SEE ADMIN INSTRUCTIONS
Status: CANCELLED | OUTPATIENT
Start: 2024-09-17

## 2024-07-26 RX ORDER — ONDANSETRON 2 MG/ML
4 INJECTION INTRAMUSCULAR; INTRAVENOUS
Status: CANCELLED | OUTPATIENT
Start: 2024-09-17

## 2024-07-26 RX ORDER — GABAPENTIN 300 MG/1
600 CAPSULE ORAL
Status: CANCELLED | OUTPATIENT
Start: 2024-09-17

## 2024-07-26 NOTE — PROGRESS NOTES
HPI: Monika Franz is a 58 year old female here today for consideration of revisional bariatric surgery. She is referred by Donna Holt CNP.    She had her initial foray with bariatric surgery in 2019, undergoing a sleeve gastrectomy with Dr. Indio Little.  Her weight prior to that surgery was around 280 pounds, and she was able to lose weight down to a dajuan of 190 pounds.  Unfortunately this occurred around the time of a breast cancer diagnosis, undergoing bilateral mastectomy and December 2020.  She completed her chemotherapy course following this and has been disease-free ever since.  The hits unfortunately kept coming for her, with foot pain secondary to bunions resulting in multiple foot surgeries in 2021, and severe osteoarthritis involving her right knee requiring a total right knee arthroplasty in 2023.  The myriad of her medical issues and orthopedic issues resulted in gradual weight gain over the past 4 years to the point where she got back up to her presurgical weight by earlier this year.  Her ability to participate in physical exercise is still extreme limited due to foot and left knee pain, which also has osteoarthritis present.  Her initial response to her weight loss with bariatric surgery in 2019 was positive, and she was able to discontinue use of her CPAP machine with resolution of her sleep apnea.  This unfortunately recurred with weight regain.  The weight regain on top of her underlying orthopedic issues is again limiting her ability to engage in physical activity and participate in a healthy active lifestyle.  She has maintained appropriate eating habits and adherence to the recommended postoperative diets and vitamin and mineral supplementation following surgery.  She became interested in revisional surgery after return of her sleep apnea and ongoing mobility issues related to her underlying foot and knee troubles.  She also developed lymphedema in the left arm following her bilateral  mastectomy, and the resultant lymphedema is also somewhat limiting with regard to her ability to engage in upper extremity exercise    Her bariatric comorbidities include obstructive sleep apnea requiring CPAP, lymphedema, osteoarthritis of the knees.    Bariatric comorbidities not present include type 2 diabetes, hypertension, GERD.      Allergies:Phentermine and Nsaids    Past Medical History:   Diagnosis Date    Anxiety     Breast cancer (H) 2020    chemo and bilateral mastectomy    Cataract     Depression     Depressive disorder     Glaucoma     with phentermine    Lymphedema of arm     Sleep apnea     Uses CPAP       Past Surgical History:   Procedure Laterality Date    ARTHROSCOPY KNEE      BACK SURGERY      BARIATRIC SURGERY  2019    BREAST SURGERY  2020    BUNIONECTOMY Right 2021    Procedure: BUNIONECTOMY right foot. Arthroplasty digits two, three, four and five right foot.;  Surgeon: Narinder Talbert DPM;  Location: [facility] OR;  Service: Podiatry     SECTION      x 3    CHOLECYSTECTOMY      COLONOSCOPY  2021    GASTRECTOMY LAPAROSCOPIC SLEEVE  2019    Dr. Beverly wt 262# BMI 42.34    GYN SURGERY      HYSTERECTOMY, PAP NO LONGER INDICATED      LAPAROSCOPIC ABLATION ENDOMETRIOSIS      MASTECTOMY Bilateral 12/15/2020    MT INSJ TUNNELED CTR VAD W/SUBQ PORT AGE 5 YR/> Right 2020    Procedure: INSERTION VASCULAR ACCESS PORT UNDER ULTRASOUND AND FLUOROSCOPIC GUIDANCE RIGHT JUGULAR;  Surgeon: Allyson Douglas DO;  Location: [facility] OR;  Service: General    MT MASTECTOMY, SIMPLE, COMPLETE N/A 12/15/2020    Procedure: BILATERAL MASTECTOMY WITH LEFT SENTINEL LYMPH NODE BIOPSY, PORT REMOVAL;  Surgeon: Allyson Douglas DO;  Location: [facility] OR;  Service: General    MT RMVL DARRYL CTR VAD W/SUBQ PORT/ CTR/PRPH INSJ N/A 12/15/2020    Procedure: PORT REMOVAL;  Surgeon: Allyson Douglas DO;  Location: [facility] OR;  Service: General    US BREAST  CORE BIOPSY LEFT Left 06/22/2020     SENTINEL NODE INJECTION  12/15/2020       CURRENT MEDS:  Prior to Admission medications    Medication Sig Start Date End Date Taking? Authorizing Provider   amoxicillin (AMOXIL) 500 MG tablet TAKE 4 TABLETS BY MOUTH 1 HOUR BEFORE DENTAL APPOINTMENT 1/23/24  Yes Reported, Patient   ARIPiprazole (ABILIFY) 5 MG tablet Take 5 mg by mouth daily  1/3/20  Yes Reported, Patient   Calcium Citrate 1040 MG TABS 1200mg-1500mg daily (separate doses 2-3 times daily)   Yes Reported, Patient   cholecalciferol 25 MCG (1000 UT) TABS Take 5,000 Units by mouth   Yes Reported, Patient   desvenlafaxine (PRISTIQ) 100 MG 24 hr tablet Take 1 tablet by mouth daily at 2 pm 6/26/24  Yes Reported, Patient   naltrexone (DEPADE/REVIA) 50 MG tablet Take 1/2 tablet with evening meal 7/24/24  Yes Lewis Pickard, Samreen Cheng MD   Pediatric Multiple Vitamins (MULTIVITAMIN CHILDRENS PO) Take 1 tablet by mouth   Yes Reported, Patient   traZODone (DESYREL) 150 MG tablet Take 1 tablet by mouth nightly as needed 2/22/22  Yes Reported, Patient   triamcinolone (KENALOG) 0.1 % external cream Apply topically 2 times daily X 14 days 3/16/23  Yes Donna Holt NP   triamcinolone (KENALOG) 0.5 % external ointment Apply to affected area on the left calf twice daily x 14 days 4/10/24  Yes Donna Holt NP   vitamin B-12 (CYANOCOBALAMIN) 1000 MCG tablet Take 1,000 mcg by mouth   Yes Reported, Patient         Social Connections: Unknown (4/3/2024)    Social Connection and Isolation Panel [NHANES]     Frequency of Communication with Friends and Family: Not on file     Frequency of Social Gatherings with Friends and Family: Patient declined     Attends Faith Services: Not on file     Active Member of Clubs or Organizations: Not on file     Attends Club or Organization Meetings: Not on file     Marital Status: Not on file       Family History   Problem Relation Age of Onset    Sleep Apnea Sister     Lymphoma Mother 79         "Non-Hodgkins    Cancer Mother     Prostate Cancer Father 67.00    Cancer Father     Hypertension Father     Hodgkin's lymphoma Maternal Uncle 20    Brain Cancer Paternal Uncle 82    Lung Cancer Maternal Uncle 77    Breast Cancer Maternal Cousin 46    Leukemia Paternal Cousin 45    No Known Problems Sister     No Known Problems Brother     No Known Problems Son     No Known Problems Son     No Known Problems Daughter         reports that she has never smoked. She has never used smokeless tobacco. She reports that she does not currently use alcohol. She reports that she does not use drugs.    History   Smoking Status    Never   Smokeless Tobacco    Never       Review of Systems -  A complete ROS was reviewed and except for what is listed in the HPI above, all others are negative  PSYCHIATRIC: She has undergone a lifestyle assessment and has been deemed a good candidate for bariatric surgery by the psychologist.    Ht 1.676 m (5' 6\")   Wt 118.8 kg (261 lb 12.8 oz)   LMP  (LMP Unknown)   BMI 42.26 kg/m      Wt Readings from Last 4 Encounters:   07/26/24 118.8 kg (261 lb 12.8 oz)   07/24/24 119.7 kg (264 lb)   06/11/24 117 kg (258 lb)   04/10/24 117.3 kg (258 lb 9.6 oz)        Body mass index is 42.26 kg/m .    EXAM:  GENERAL: This is a well-developed 58 year old female who appears her stated age  HEAD & NECK: Grossly normal.  No visible goiter or scars  CARDIAC: Regular rate and rhythm  CHEST/LUNG: Normal respiratory effort  ABDOMEN: Obese.  No visible hernias or masses appreciated.  LYMPHATIC:  No significant adenopathy visualized.    EXTREMITIES: Grossly normal.  No evidence of chronic venous stasis.    NEUROLOGIC: Focally intact  INTEGUMENT: No open lesions or ulcers appreciated visually  PSYCHIATRIC: Normal affect. She has a good grasp on the nature of her obesity and the treatment options.    LABS:      Assessment/Plan: 58 year old female who is an excellent candidate for revisional bariatric and metabolic " surgery.  I do not appreciate a behavioral cause for her weight regain, and find that her weight regain over the past 5 years is consistent with diminished physical activity secondary to her underlying orthopedic issues as well as a known tendency for gradual weight regain over time with a sleeve gastrectomy.  After a careful conversation with the patient it was decided that a conversion to a traditional duodenal switch would be her best option.  She would like to pursue this as a same-day surgery if at all possible which I think she is a good candidate for.        I went over the surgery in detail with her.  I went over the nature of the operation and some of the potential consequences of the surgery.  I went over the expected hospital course and discussed laparoscopic versus open surgery, understanding that we will plan on doing this laparoscopically with the possibility of having to convert to an open operation.  I went over some of the risks and complications of the operation including, but not limited to, DVT, pulmonary emboli, pneumonia, postoperative bleeding, wound infection, staple line leak, intra-abdominal sepsis, and possible death.  I also went over some of the potential nutritional concerns such as vitamin B-12, iron, vitamin D, vitamin A, calcium and protein deficiencies.  I will also went over the need for lifelong nutritional surveillance.  This includes our regular scheduled follow up of a 1 week post op dietician visit, 2 week post op surgeon visit, an then additional dietician visits at 1 month, 3 months, and 9 months.  They are to follow up with a physician in our program at 6 months and 1 year, and annual thereafter.  The patient understands and wants to proceed with surgery.  We will submit for prior authorization.      Dmias Rios MD ,MD  [facility] Department of Surgery

## 2024-07-26 NOTE — LETTER
7/26/2024      Monika Franz  [address] [address]      Dear Colleague,    Thank you for referring your patient, Monika Franz, to the [facility] AND [facility] [place]. Please see a copy of my visit note below.    [facility] Informed Consent for Bariatric Surgery from the clinic was given to the patient, reviewed, signed and sent for scanning.       HPI: Monika Franz is a 58 year old female here today for consideration of revisional bariatric surgery. She is referred by Donna Holt CNP.    She had her initial foray with bariatric surgery in 2019, undergoing a sleeve gastrectomy with Dr. Indio Little.  Her weight prior to that surgery was around 280 pounds, and she was able to lose weight down to a dajuan of 190 pounds.  Unfortunately this occurred around the time of a breast cancer diagnosis, undergoing bilateral mastectomy and December 2020.  She completed her chemotherapy course following this and has been disease-free ever since.  The hits unfortunately kept coming for her, with foot pain secondary to bunions resulting in multiple foot surgeries in 2021, and severe osteoarthritis involving her right knee requiring a total right knee arthroplasty in 2023.  The myriad of her medical issues and orthopedic issues resulted in gradual weight gain over the past 4 years to the point where she got back up to her presurgical weight by earlier this year.  Her ability to participate in physical exercise is still extreme limited due to foot and left knee pain, which also has osteoarthritis present.  Her initial response to her weight loss with bariatric surgery in 2019 was positive, and she was able to discontinue use of her CPAP machine with resolution of her sleep apnea.  This unfortunately recurred with weight regain.  The weight regain on top of her underlying orthopedic issues is again limiting her ability to engage in physical activity and participate in a  healthy active lifestyle.  She has maintained appropriate eating habits and adherence to the recommended postoperative diets and vitamin and mineral supplementation following surgery.  She became interested in revisional surgery after return of her sleep apnea and ongoing mobility issues related to her underlying foot and knee troubles.  She also developed lymphedema in the left arm following her bilateral mastectomy, and the resultant lymphedema is also somewhat limiting with regard to her ability to engage in upper extremity exercise    Her bariatric comorbidities include obstructive sleep apnea requiring CPAP, lymphedema, osteoarthritis of the knees.    Bariatric comorbidities not present include type 2 diabetes, hypertension, GERD.      Allergies:Phentermine and Nsaids    Past Medical History:   Diagnosis Date     Anxiety      Breast cancer (H) 2020    chemo and bilateral mastectomy     Cataract      Depression      Depressive disorder      Glaucoma     with phentermine     Lymphedema of arm      Sleep apnea     Uses CPAP       Past Surgical History:   Procedure Laterality Date     ARTHROSCOPY KNEE       BACK SURGERY       BARIATRIC SURGERY  2019     BREAST SURGERY  2020     BUNIONECTOMY Right 2021    Procedure: BUNIONECTOMY right foot. Arthroplasty digits two, three, four and five right foot.;  Surgeon: Narinder Talbert DPM;  Location: [facility];  Service: Podiatry      SECTION      x 3     CHOLECYSTECTOMY       COLONOSCOPY  2021     GASTRECTOMY LAPAROSCOPIC SLEEVE  2019    Dr. Beverly wt 262# BMI 42.34     GYN SURGERY       HYSTERECTOMY, PAP NO LONGER INDICATED       LAPAROSCOPIC ABLATION ENDOMETRIOSIS       MASTECTOMY Bilateral 12/15/2020     AL INSJ TUNNELED CTR VAD W/SUBQ PORT AGE 5 YR/> Right 2020    Procedure: INSERTION VASCULAR ACCESS PORT UNDER ULTRASOUND AND FLUOROSCOPIC GUIDANCE RIGHT JUGULAR;  Surgeon: Allyson Douglas DO;  Location: [place]  [facility];  Service: General     AL MASTECTOMY, SIMPLE, COMPLETE N/A 12/15/2020    Procedure: BILATERAL MASTECTOMY WITH LEFT SENTINEL LYMPH NODE BIOPSY, PORT REMOVAL;  Surgeon: Allyson Douglas DO;  Location: [facility] OR;  Service: General     AL RMVL DARRYL CTR VAD W/SUBQ PORT/ CTR/PRPH INSJ N/A 12/15/2020    Procedure: PORT REMOVAL;  Surgeon: Allyson Douglas DO;  Location: [facility] OR;  Service: General     US BREAST CORE BIOPSY LEFT Left 06/22/2020      SENTINEL NODE INJECTION  12/15/2020       CURRENT MEDS:  Prior to Admission medications    Medication Sig Start Date End Date Taking? Authorizing Provider   amoxicillin (AMOXIL) 500 MG tablet TAKE 4 TABLETS BY MOUTH 1 HOUR BEFORE DENTAL APPOINTMENT 1/23/24  Yes Reported, Patient   ARIPiprazole (ABILIFY) 5 MG tablet Take 5 mg by mouth daily  1/3/20  Yes Reported, Patient   Calcium Citrate 1040 MG TABS 1200mg-1500mg daily (separate doses 2-3 times daily)   Yes Reported, Patient   cholecalciferol 25 MCG (1000 UT) TABS Take 5,000 Units by mouth   Yes Reported, Patient   desvenlafaxine (PRISTIQ) 100 MG 24 hr tablet Take 1 tablet by mouth daily at 2 pm 6/26/24  Yes Reported, Patient   naltrexone (DEPADE/REVIA) 50 MG tablet Take 1/2 tablet with evening meal 7/24/24  Yes Saucedo Neeraj, Samreen Cheng MD   Pediatric Multiple Vitamins (MULTIVITAMIN CHILDRENS PO) Take 1 tablet by mouth   Yes Reported, Patient   traZODone (DESYREL) 150 MG tablet Take 1 tablet by mouth nightly as needed 2/22/22  Yes Reported, Patient   triamcinolone (KENALOG) 0.1 % external cream Apply topically 2 times daily X 14 days 3/16/23  Yes Donna Holt NP   triamcinolone (KENALOG) 0.5 % external ointment Apply to affected area on the left calf twice daily x 14 days 4/10/24  Yes Donna Holt, TAMMI   vitamin B-12 (CYANOCOBALAMIN) 1000 MCG tablet Take 1,000 mcg by mouth   Yes Reported, Patient         Social Connections: Unknown (4/3/2024)    Social Connection and Isolation Panel [NHANES]       "Frequency of Communication with Friends and Family: Not on file      Frequency of Social Gatherings with Friends and Family: Patient declined      Attends Congregational Services: Not on file      Active Member of Clubs or Organizations: Not on file      Attends Club or Organization Meetings: Not on file      Marital Status: Not on file       Family History   Problem Relation Age of Onset     Sleep Apnea Sister      Lymphoma Mother 79        Non-Hodgkins     Cancer Mother      Prostate Cancer Father 67.00     Cancer Father      Hypertension Father      Hodgkin's lymphoma Maternal Uncle 20     Brain Cancer Paternal Uncle 82     Lung Cancer Maternal Uncle 77     Breast Cancer Maternal Cousin 46     Leukemia Paternal Cousin 45     No Known Problems Sister      No Known Problems Brother      No Known Problems Son      No Known Problems Son      No Known Problems Daughter         reports that she has never smoked. She has never used smokeless tobacco. She reports that she does not currently use alcohol. She reports that she does not use drugs.    History   Smoking Status     Never   Smokeless Tobacco     Never       Review of Systems -  A complete ROS was reviewed and except for what is listed in the HPI above, all others are negative  PSYCHIATRIC: She has undergone a lifestyle assessment and has been deemed a good candidate for bariatric surgery by the psychologist.    Ht 1.676 m (5' 6\")   Wt 118.8 kg (261 lb 12.8 oz)   LMP  (LMP Unknown)   BMI 42.26 kg/m      Wt Readings from Last 4 Encounters:   07/26/24 118.8 kg (261 lb 12.8 oz)   07/24/24 119.7 kg (264 lb)   06/11/24 117 kg (258 lb)   04/10/24 117.3 kg (258 lb 9.6 oz)        Body mass index is 42.26 kg/m .    EXAM:  GENERAL: This is a well-developed 58 year old female who appears her stated age  HEAD & NECK: Grossly normal.  No visible goiter or scars  CARDIAC: Regular rate and rhythm  CHEST/LUNG: Normal respiratory effort  ABDOMEN: Obese.  No visible hernias or " masses appreciated.  LYMPHATIC:  No significant adenopathy visualized.    EXTREMITIES: Grossly normal.  No evidence of chronic venous stasis.    NEUROLOGIC: Focally intact  INTEGUMENT: No open lesions or ulcers appreciated visually  PSYCHIATRIC: Normal affect. She has a good grasp on the nature of her obesity and the treatment options.    LABS:      Assessment/Plan: 58 year old female who is an excellent candidate for revisional bariatric and metabolic surgery.  I do not appreciate a behavioral cause for her weight regain, and find that her weight regain over the past 5 years is consistent with diminished physical activity secondary to her underlying orthopedic issues as well as a known tendency for gradual weight regain over time with a sleeve gastrectomy.  After a careful conversation with the patient it was decided that a conversion to a traditional duodenal switch would be her best option.  She would like to pursue this as a same-day surgery if at all possible which I think she is a good candidate for.        I went over the surgery in detail with her.  I went over the nature of the operation and some of the potential consequences of the surgery.  I went over the expected hospital course and discussed laparoscopic versus open surgery, understanding that we will plan on doing this laparoscopically with the possibility of having to convert to an open operation.  I went over some of the risks and complications of the operation including, but not limited to, DVT, pulmonary emboli, pneumonia, postoperative bleeding, wound infection, staple line leak, intra-abdominal sepsis, and possible death.  I also went over some of the potential nutritional concerns such as vitamin B-12, iron, vitamin D, vitamin A, calcium and protein deficiencies.  I will also went over the need for lifelong nutritional surveillance.  This includes our regular scheduled follow up of a 1 week post op dietician visit, 2 week post op surgeon visit,  an then additional dietician visits at 1 month, 3 months, and 9 months.  They are to follow up with a physician in our program at 6 months and 1 year, and annual thereafter.  The patient understands and wants to proceed with surgery.  We will submit for prior authorization.      Dimas Rios MD ,MD  [facility] Department of Surgery      Again, thank you for allowing me to participate in the care of your patient.        Sincerely,        Dimas Rios MD

## 2024-07-26 NOTE — PROGRESS NOTES
[facility] Informed Consent for Bariatric Surgery from the clinic was given to the patient, reviewed, signed and sent for scanning.

## 2024-08-02 ENCOUNTER — DOCUMENTATION ONLY (OUTPATIENT)
Dept: SURGERY | Facility: CLINIC | Age: 59
End: 2024-08-02
Payer: COMMERCIAL

## 2024-08-02 NOTE — PROGRESS NOTES
I have submitted a PA to [employer] through [employer].  Their system has been down most of the week and I have finally been able to get it to upload!

## 2024-08-02 NOTE — TELEPHONE ENCOUNTER
Tasklist updated.     Jadyn Simpson RN, CBN  [facility]  P [phone]  F [phone]

## 2024-08-13 ENCOUNTER — TELEPHONE (OUTPATIENT)
Dept: SURGERY | Facility: CLINIC | Age: 59
End: 2024-08-13
Payer: COMMERCIAL

## 2024-08-18 NOTE — TELEPHONE ENCOUNTER
Left patient a voice mail to return call if she wants to get replacement CPAP from [facility].  
No

## 2024-08-27 RX ORDER — PEDI MULTIVIT NO.25/FOLIC ACID 300 MCG
1 TABLET,CHEWABLE ORAL 2 TIMES DAILY
Qty: 180 TABLET | Refills: 3 | Status: SHIPPED | OUTPATIENT
Start: 2024-08-27

## 2024-08-27 NOTE — PATIENT INSTRUCTIONS
Patient name: Monika Franz  Surgery:   Revision to BDS  Surgery Date:  9/17/2024  Surgery Time: 7:20 am (*This time is just a tentative time and may end up changing.*)  Arrival Time to Hospital: 5:20 am (two hours prior to surgery time)  Surgeon: Dimas Rios MD       MONTH BEFORE SURGERY    Schedule and have Pre-operative History and Physical with your primary care in addition to the labs, CXR and EKG.  These should be completed within a month of surgery and no closer than 5 days.  ALL of the following tests must be completed per anesthesia and your surgeon prior to your surgery:    Chest Xray  EKG  Complete Blood Count with Differential  Complete Metabolic Panel  INR  APTT(PTT)  Urine Analysis (UA) with Urine Culture if UA abnormal  Hemaglobin A1c if hasn't been done in the last 3 months AND you are diabetic.  Urine Cotinine with Metabolites if you quit smoking within the last 6 months.    * COVID testing- is no longer required prior to your surgery.  However, please let us know if you aren't feeling well or have tested positive for Covid-19 between now and your surgery date. Let us know if you have any questions regarding this.      2 WEEKS BEFORE SURGERY    Start Preoperative Liquid Diet per dietitian instructions      WEEK BEFORE SURGERY CHECKLIST       Continue Full Liquid Diet per dietitian instructions    2.   Purchase diet components for after surgery      3.   Arrange for someone to stay with you the first 1-2 nights you return home.     4.   Arrange for a ride home from the hospital.  We can't give an exact time for  because it depends on how you are doing with your drinking and pain control.  Most people are reaching their goals for discharge by lunch time and you will need to have a ride ready and waiting by 11 am.    5.   Do your grocery/vitamin shopping for before AND after surgery.    6.   Make sure you have a bowel movement if you haven t gone in a while. There is no need for a bowel  prep before surgery.       7.   Make sure you have picked up the prescriptions/supplements that were sent to your pharmacy - [facility]     NEW PRESCRIPTIONS:  In preparation for surgery, we have prescribed some medication that you will need to  as soon as possible     A. Vitamins: Chewable Multivitamin and Vitamin B12 (if you weren't taking already)  - You can start taking the Multivitamin and B12 as soon as you pick this up from the pharmacy.  Some insurance companies will not cover the vitamins because they are available over the counter, so in those cases you will need to purchase them yourselves.  Do not take the morning of surgery.    B. Acid Reducer:  Omeprazole (Prilosec) - If not already taking, you will get a prescription to start when you get home from the hospital.  Tablets cannot be cut or crushed.  If a capsule, entire capsule contents may be sprinkled on a spoonful of applesauce and taken immediately without chewing.  If only on a full liquid diet, dump capsule contents into a spoonful of liquid and take without chewing.  May swallow whole again starting 6 weeks after surgery but can try swallowing sooner if having issues with opening into food.  Continue after surgery for at least 3 months even without symptoms of acid reflux. Do not take the morning of surgery.      BEFORE Surgery Medication Considerations:  Certain medications may need to be stopped before major surgery. Some medications may increase your risk of bleeding, others may change the way your blood clots, and other medications can affect your lab work. The bariatric clinic will give you specific instructions about when to stop these medications.    This timeline shows when certain medications should be stopped before surgery: This is a general timeline, if your bariatric nurse or surgeon gives you other instructions, follow those specific instructions.    30 Days (One Month) Before Surgery  Birth control pills & patches  that contain estrogen  You must use alternative forms of contraception  Hormone replacement therapy   Premarin  Testosterone  14 Days (Two Weeks) Before Surgery  Appetite control medications   Phentermine  Other: __________  Diuretics ( water pills )   Talk to your primary doctor.  You may need an alternative medication,  Hydrochlorothiazide (HCTZ)  Furosemide (Lasix )  Bumetanide (Bumex )  Spironolactone (Aldactone )  Chlorthalidone  Any blood pressure medication that is combined with a diuretic  Herbal supplements  Fish oil or Omega 3   Homeopathic remedies  7 Days (One Week) Before Surgery  Anti-platelet medications and medications that help to prevent blood clots:  Clopidogrel (Plavix ), Prasugrel (Effient ), Ticagrelor (Brilinta )  Aspirin/Dipyridamole (Aggrenox ), Dipyridamole (Persantine )  Cilostazol (Pletal )  All Non-Steroidal Anti-Inflammatory Drugs (NSAIDs):   Non-prescription: Ibuprofen (Advil , Motrin , Nuprin ), Naproxen (Naprosyn , Aleve , Anaprox ),   Prescription: Piroxicam (Feldene ), Sulindac (Clinoril ), Tolmentin (Tolectin ), Celecoxib (Celebrex ), Diclofenac (Voltaren ), Indomethacin (Indocin ), Nambumetone (Relafen ), Flurbiprofen (Ansaid ), Ketoprofen (Orudis ), Etodolac (Lodine ), Meloxicam (Mobic ), Oxaprozin (Daypro )  Aspirin (including low-dose (81mg) and chewable  baby aspirin )  Warfarin (Coumadin , Jantoven )  When warfarin is restarted (usually 24-48 hrs after surgery), dosing and INR monitoring will be managed by the Bariatric Clinic for 4-6 weeks after your surgery date.   The Day Before Surgery  Diabetes medications: Follow the instructions on the  Diabetes Management for the Bariatric Surgery Patient  form.  The Day of Surgery  Diabetes medications: Follow the instructions on the  Diabetes Management for the Bariatric Surgery Patient  form.      HOME MEDICATION RECOMMENDATIONS:  Below you will see your specific medication instructions.  Please share this information with your  primary care so they can make adjustments to your medications if necessary.    Amoxicillin  If regular release, you can cut or crush the tablets or open up the capsule and pour onto food and take immediately.  Do not cut or crush extended release version so you will need to switch to regular release if pill is too large.  Aripiprazole (Brand Name: Abilify)  Ok to cut/crush regular tablet. This medication may not be absorbed as well after bariatric surgery. Watch for changes in symptom control and talk with your mental health or primary care provider if you notice any changes in how well this medication is working.    Calcium  Ok to continue until surgery.  Do not take the day of surgery.  Do not re-start until date given by dietitian at post-op class. All patients will need to take Calcium Citrate after surgery and this will need to be chewable for the first 3 months following surgery.    Cholecalciferol (Brand Name: Vitamin D)   Ok to cut or crush tablet; if a softgel will likely need to swallow whole if small enough.  If capsule is too large, may need to take tablet form until able to swallow larger pills again.  Do not take the morning of surgery and don t restart again until instructed by the dietitian.  This must be 5000 units (125 mcg) daily after surgery.    Desvenlafaxine (Brand Name: Pristiq)    Tablet cannot be cut or crushed.  Talk with your primary care or mental health provider about an alternative medication option. Also, this medication may not be absorbed as well after bariatric surgery, especially since it is an extended release tablet.  Watch for changes in symptom control if you continue on this medication. Talk with primary care or mental health provider if you notice any changes in how well this medication is working after surgery.    Multivitamin with Iron   Continue your MVI with at least 18mg of iron and 10-15 mg of zinc twice a day and resume the day after surgery for life. Will need to be  chewable for the first 3 months after surgery. Do not take the morning of surgery.    Naltrexone  Stop 2 weeks prior to surgery.  Check with surgeon and Bariatrician after surgery prior to restarting.  If you are able to restart, ok to cut/crush tablet.     Trazodone (Brand Name: Desyrell)   Ok to cut/crush.     Triamcinolone cream (Kenalog)   Ok to use. You are encouraged to bring to the hospital on the day of surgery if needed.    Vitamin B12   Continue your vitamin B12 up until surgery. Do not take the morning of surgery.  After surgery it will need to be 1000mcg daily sublingual (under the tongue) daily, or injections monthly.      Packing for the Hospital  When packing for the hospital, anticipate staying overnight. Make a checklist so that you don't forget things you really want to have with you.    Bring a folder with your printed patient handouts to keep handy and add discharge paperwork to if you want.  Bring your insurance card.  Bring a current medication list OR update list in [employer] (including vitamins, over-the-counter medication, eye drops, inhalers, patches, ointments and herbal products). Include the name, correct dose and the times you take them each day. List all allergies.  If you use a CPAP or BIPAP, bring it with you.  You may also want to bring a water bottle of the water you use in your machine.  Bring a copy of your health care or advanced directive document if you have one.  You may bring your own gown/pajamas and robe if you don't want to wear the hospital-supplied items once IV is disconnected.  Slippers or shoes should have a non-slip sole.  You may bring your own personal care items such as toothbrush, toothpaste, shampoo, denture cleanser, comb, skin care products, deodorant, make-up, and hair dryer.  If you wear a hearing aid, bring a labeled storage container and extra batteries.  If you wear glasses or contacts, bring a labeled case and saline for contacts. Do not plan to wear  "contact lenses the day of surgery. It is best to bring your glasses so that you can wear them in the recovery room. You cannot wear contact lenses during surgery.  Bring loose-fitting clothing to wear home. Bring telephone numbers (including work numbers) of family and friends.  You may want to bring a journal to document your thoughts and feelings.  You may not wear any jewelry on or in any area of your body to the operating room.   Leave money, jewelry, or any valuables at home.        DAY BEFORE SURGERY CHECKLIST      Clear liquid diet until 4 hours prior to your surgery     2.  Nothing to eat or drink 4 hours prior to your surgery time.       3.  Pack a couple preferred protein shakes to have available in the hospital -See approved list of liquids from dietitian      4.  Hibiclens or Exidine shower. Use a fresh, clean towel to dry off. See \"Showering Before Surgery handout\" below.       MORNING OF SURGERY CHECKLIST      Hibiclens or Exidine shower. Use a fresh, clean towel to dry off. See \"Showering Before Surgery handout\" below.     2.   Remove all jewelry and piercings.      3.   Take any medications you have been advised to take the morning of surgery with a sip of water. (See med list above)     4.   Arrive 2 hours prior to your scheduled surgery time.    Showering Before Surgery  [email or web address]     Preparing Your Skin  [email or web address]    HOSPITAL STAY    Park in the [facility] Lot in [place] and check in at the information desk where they will escort you to the NEIL.     You will be allowed to have 2 support people with you in the pre-operative area at the hospital.  After surgery on the recovery unit, you can have up to 4 visitors and they must leave when visitor hours are over.     Meet your surgical team which includes an RN, CRNA, anesthesiologist and your surgeon.    IV will be started for fluid management, pain medication and possible " antibiotics.    Anticoagulant therapy (blood thinner) will be given and continued until you are discharged.    Pneumatic compression stockings will be placed on your legs before surgery to help prevent blood clots. You can also move your feet up and down frequently to aid in circulation.    An incentive spirometer will be used to promote good lung expansion and prevent pneumonia. This must be within arm's reach of you, and you should be doing it ten times every hour while awake.      PAIN MEDICATION IN THE HOSPITAL  Treating pain and discomfort is important to your recovery.  Your surgeon will order pain medication for you in the hospital.  You will also get a take home prescription for pain medication after surgery.  Our goal is not pain free, but an acceptable level of discomfort; you should be able to move without pain limiting your activity    In the Preoperative area, you will receive additional pain treatment   Intrathecal Spinae Block (Duramorph)- which is an ultrasound guided injection for pain medication   Ketorolac (Toradol )  This is an anti-inflammatory medication used to treat swelling and moderate pain  It is used only for 24 hours after surgery to decrease inflammation and pain  Given through your IV  Takes about 30 minutes to take effect  Common side effects: nausea, upset stomach  If your pain is not well controlled with this, you may be given a narcotic pain medication in addition to ketorolac    In Recovery, you will receive additional IV pain medication as needed and then you will be switched over to things you can take by mouth in preparation for going home.   Fentanyl   Hydromorphone (Dilaudid )    Common side effects: sleepiness, dizziness, upset stomach, constipation   Narcotic medications can alter your judgment, coordination and reaction time.  Do NOT drive or do anything that requires clear thinking and coordination until you have been completely off narcotic medications for at least 24  hours.    Use only to treat moderate to severe pain.  Ultram (Tramadol)  This is a non-narcotic prescription pain medication used for moderate to severe pain if needed.  Immediate release tablets can be cut or crushed.  Tramadol can cause or worsen seizures. Your risk of seizures is higher if you're taking other certain medications. These drugs include other opioid pain drugs or certain medications for depression, other mood disorders, or psychosis.  Tramadol oral tablet may cause drowsiness. You should not drive, use heavy machinery, or perform any dangerous activities until you know how this drug affects you.  Acetaminophen (Tylenol)   This will be giving by IV to start and then by mouth once you are ready  Use for mild pain or discomfort  Available without a prescription and comes in liquid, tablets, capsules and powder.  The adult strength powder packs can be helpful right after surgery and can be found online.  Maximum daily dosage: 3,000mg per 24 hours  Works best as a scheduled dose for the first three days once you get home in addition to the gabapentin as the inflammation goes down  Gabapentin  Used together with the acetaminophen to provide pain relief.  Can be taken every 8 hours as needed  The medication works by calming the nerve cells that transmit pain signals to the brain.  This is best in liquid form right after surgery but can have a unpleasant taste.    * Important Reminder: Do NOT take NSAID or aspirin medications that are available without a prescription (examples: Ibuprofen, Motrin , Advil , Aleve , Naproxen)      OPERATING ROOM    Hover Mat will be used to move you from one surface to another.    Benji Hugger Gowns/Blankets are used to keep you warm.    Urinary catheters are not usually needed.    Surgery takes 45 minutes-3 hours depending on the procedure.    *Remember: How you go to sleep tends to be how you wake up - so pleasant thoughts are important!    RECOVERY ROOM    The surgeon will  "give an update to your family or support people as you are on your way to the recovery room.    You will wake up with a blood pressure cuff, oxygen and pulse monitor (pulse oximeter).    Frequent vital signs.    Pain Scale.      HOSPITAL ROOM    You will stay overnight on Patient Care Unit P2.    You will have a private room.     has been awarded an \"American College of Surgeons Center of Excellence\" partnered with the American Society for Metabolic and Bariatric Surgery and has a proven track records for quality care and good outcomes.     The staff has specialty training in the care of weight-loss surgery patients.       WHAT TO EXPECT AFTER SURGERY    You will start walking the day of surgery and continue several times a day, especially once you get home.    Drinking and eating will follow plan discussed with the dietitian.    Shower in the hospital.    Incentive Spirometer use and deep breathing/cough    Splinting your incision and wearing the abdominal binder when moving may help with discomfort.    Discharge the day after surgery is expected for laparoscopic procedures after you are seen by your surgeon, nurse practitioner or physician s assistant, anesthesia, and possibly a pharmacist.    A nurse from the clinic will call you within 3 days after discharge.      ONCE YOU ARE HOME CHECKLIST      Continue your liquid intake daily and track     Oral intake:      12-4pm         4pm-8pm         8pm-Midnight         6am-10am       2.   Walks      12-4pm      4pm-8pm      8pm-Midnight      6am-10am       3.   Incentive Spirometer/ Deep Breaths and Coughing      ACTIVITY AFTER SURGERY    Walking several times a day, increasing endurance and energy level over time.    No lifting over 20 lbs. for the first 2 weeks (6 weeks for open procedure) and then let your body be your guide.    Can be the    in terms of chores at home.    Pushing and pulling uses the same core muscles and those activities " may cause pain or discomfort.    Do each exercise 10-15 times, twice a day and increase weight when you can consistently do 12 repetitions of activity.    No swimming, bathing, or hot tub use until incisions are completely healed (scars).    Do not plan to fly or take a road trip within 1 to 3 months after surgery.    See handouts below for exercises that can be done after surgery.    Seated Exercises for Arms and Legs (can be done before or after surgery)  [email or web address]    Exercise Guidelines after Weight Loss Surgery (1st 4-6 weeks)  [email or web address]    Exercises after Weight Loss Surgery (strengthening, when no weight lifting restrictions - after 4-6 weeks)  [email or web address]      MEDICATIONS AFTER SURGERY    Here is a simple graph that might help makes things more clear for when to start certain medications after surgery.  (Zofran, Tylenol, Gabapentin and Hyoscyamine will be sent home with you from the hospital as needed)    Medication Dose When to Start   Vitamin B12  1000 mcg Once a day under the tongue (sublingual), weekly nasal spray, or monthly injections Day after surgery   Chewable Multivitamin with   18 mg Iron  (Must also contain Vitamin A and Zinc)  NO GUMMIES 1 tablet twice daily Day after surgery   Omeprazole  20 mg 1 capsule daily - open and sprinkle on food or swallow with fluid Day after surgery. Take even if no acid reflux symptoms.   Zofran 4 mg  (if ordered) 4mg tablet every 8 hours as needed for nausea As needed after surgery   Tylenol 1000 mg every 6 hours for three days after surgery.  After first three days after surgery, switch to as needed and do not take more than 3000mg daily Once you get home   (you will be sent home from the hospital with this)   Hyoscyamine  (if ordered) 0.125 mg tablet every 4 hours as needed for stomach spasm pain As needed after surgery   Liquid Gabapentin  Must be kept in fridge   250 mg liquid 3 times a day  for at least 3 days after surgery Once you get home   (you will be sent home from the hospital with this)   Actigall (Ursodiol)- 300 mg for gallbladder if you still have Twice daily - swallow whole with warm water Two weeks after surgery   Chewable Calcium Citrate 2 tablets twice daily Three weeks after surgery   Vitamin D - (125 mcg)  5000 units 1 daily Three weeks after surgery        LIFELONG VITAMINS:    First 3 Months after Surgery  Choose chewable, liquid or crushable forms of Multivitamin and Calcium Citrate and then you can switch to tablets you can swallow whole if you would like.    1.)   Sublingual Vitamin B12: Take 5827-8098 mcg 1 time daily    Also available in injectable form monthly.  Discuss with provider if interested.  2.)   Multivitamin with Iron: Take 1 multivitamin 2 times daily  Needs 18 mg of IRON per dose along with Vitamin A and Zinc in them  Generic Children's Chewable multivitamin with iron (Equate, Up & Up brand, etc.)  Centrum   Chewable  Centrum, Women's One-A-Day or Generic (after 3 months)  NO GUMMY Vitamins (these do not contain iron)  3.)   Calcium Citrate with Vitamin D: Take 400-600 mg 2 times daily (Start 3 weeks after surgery)  Bariatric Advantage: Citrate Lozenges (500mg)--2 Daily, or Chewy Bites (250 mg)--4 Daily  www.bariatricadGigyaage.[employer]  or 1-331.705.5984  Celebrate Calcium: Calcium Plus 500 (500mg)--2 Daily, or Calcet Creamy Bites (500 mg)--2 Daily, or Calcium Citrate Chews (250mg) - 4 Daily   www.[employer]  or 1-542.478.3029  UNJURY Opurity: Calcium Citrate Plus (300mg)--3 Daily  www.[employer]  or 1-623.141.7749  Up-Garth D: Nutrition Direct: Flavorless Calcium Powder (500 mg with 250 IU Vitamin D)  www.amazon.com  or 1-511.495.2858--3 Scoops Daily  Wellesse Liquid Calcium Citrate--1 Tbsp.  (500 mg)--2 Times Daily  [employer]  After 3 months post op:  Citracal Petites (or Generic version) (200mg) - 4 daily  Any grocery store or Pharmacy  4.)    Vitamin D3 : Take 5000 IU Daily (Start 3 weeks after surgery)  This can remain a small gel cap    AFTER Surgery Medication Considerations:    The 3 main ideas:  Cut or crush all meds for 6 weeks after surgery  Long acting medications are not the best option anymore  Avoid NSAID medications for the rest of your life    1.  Cutting or crushing meds:  Before surgery, tablet size does not matter.  After surgery there will be swelling around your new stomach pouch or sleeve.  Therefore, it is important to limit the size of medications for the first six weeks after surgery.      What this means for you:  For the first six weeks after surgery, you will need to cut or crush tablets that are larger than   inch (about the size of an eraser on a standard pencil)  Some capsules may be opened and the contents sprinkled onto soft food.  Once sprinkled on food, eat immediately being careful not to chew.    You will be given a pill cutter at the hospital  Refer to your medication list. This list will tell you which medications can be cut or crushed, and which capsules can be opened.     * Important Reminder: Discuss ALL of your medications with your primary care provider.    Your pre-op history and physical appointment is a good time to review your current medications.     2.  Long acting medications are not the best option anymore  Many types of bariatric surgery involve removing a portion of the small intestine. Long acting or extended release medications release slowly throughout the entire small intestine. Because your surgery has changed your stomach and/or intestine, you may not get the full effect of the long acting medication.  Short acting medications may work better for you after surgery. Talk with your doctor at your pre-op history and physical appointment if you are taking long acting medications. Your doctor can change prescriptions for long acting medications to short acting.   3.  Avoid NSAIDs for the rest of your  life  NSAIDs are Non-Steroidal Anti-Inflammatory Drugs  The NSAID class of medications is used to relieve minor aches, pains or to treat fever. They are commonly used to treat pain caused by a cold, flu, sore throat, headache, and arthritis.  Some NSAIDs are available over-the-counter while others need a prescription from your doctor.     NSAIDs should be avoided after bariatric surgery because they can cause stomach ulcers, scarring or bleeding.  You will not be able to take any NSAID mediation for the rest of your life after bariatric surgery.  Below is a list of common NSAID medications:    OVER-THE-COUNTER NSAIDs    Ibuprofen  - Brand names Advil   , Motrin  , Nuprin     Naproxen - Brand names Aleve  , Naprosyn  , Anaprox      PRESCRIPTION NSAIDs   Celebrex   (Celecoxib)                                       Orudis   (Ketoprofen)  Mobic   (Meloxicam)                                           Lodine   (Etodolac)  Voltaren  (Diclofenac)                                        Ansaid   (Flurbiprofen)  Relafen   Nabumetone                                       Clinoril   (Sulindac)  Feldene   (Piroxicam)                                         Tolectin   Tolmentin  Indocin   (Indomethacin)                                    Daypro  (Oxaprozin)       After surgery, the only safe non-prescription pain reliever is acetaminophen (Tylenol ).  Acetaminophen is available in 325mg and 500mg tablets.  If acetaminophen does not control your pain, call your primary care provider to discuss other options.    4. Other medications you may have to avoid  Aspirin  Do not use aspirin for headaches, body aches, or muscle aches after bariatric surgery.  This is because aspirin can also cause stomach ulcers.  However, your primary care provider may tell you to take aspirin for certain conditions.  A maximum of 81mg of enteric coated aspirin (ex: Ecotrin ) once daily is usually okay to take if directed to take aspirin by your primary  care doctor.   Be sure to take with food or milk.    Alendronate (Fosamax ), risedronate (Actonel ) (risedronate), ibandronate (Boniva ):  These are common osteoporosis medications that can cause erosions or ulcers in the esophagus and stomach.   Remind your primary care provider that you have had bariatric surgery and discuss other medication options.    Diuretics ( water pills )  These medications are used to treat high blood pressure.  They can also reduce swelling and water retention caused by various medical conditions.  Diuretics should not be used for patients who are having weight loss surgery.  Your diuretic will not be restarted immediately after surgery.  This is because it is often difficult to drink enough fluids after surgery to keep up with the fluid loss caused by the diuretic/ water pill .  Call your primary care provider to discuss alternative medications to treat high blood pressure.      5. Medications for chronic conditions  It is likely that the need for some of your medications will change after weight loss surgery.    High Blood Pressure Medications   As you lose weight, your blood pressure may change. Talk with your primary care provider about how to manage your high blood pressure after surgery. You may need the dose of your blood pressure medication(s) adjusted.  Keep track of your blood pressure, and call your primary doctor if you have low blood pressure symptoms, such as: dizziness, faintness, weakness, light-headedness (especially when going from sitting to standing)    Diabetes Medications  As you lose weight, your blood sugars may change. Talk with your primary care provider about how to monitor and manage your diabetes after surgery. You may need the dose of your diabetes medication(s) adjusted.  Call your primary doctor if you have any of these symptoms.  Keep track of your blood sugars, and call your primary doctor if you have low blood sugar symptoms, such as: sweating, shaking,  nervousness, headache, light-headedness, dizziness, weakness, or fainting      6. Other information    Medication Absorption  Some medications may not be absorbed as well after bariatric surgery.  Watch for changes in symptoms.  It is important to discuss your medications with your doctor if you think your medications are not working as well as they were before.  You may need to have medication doses adjusted.    Ursodiol (Actigall )  With rapid weight loss, your risk of gallstones increases.  If your still have your gallbladder after surgery, you will be given a prescription for Actigall  that you should begin taking 2 weeks after surgery.  Actigall  will help prevent gallstones from forming.  If you are prescribed this medication, you will usually take it for six months. We encourage you to take this tablet or capsule whole with warm or hot liquid to help it dissolve before it reaches your stomach pouch.  This is medication is bigger than the   inch size restriction, but we will not have you cut/crush it due to it's unpleasant metallic taste. A pharmacist will speak with you more about this medication if it is ordered after surgery.    Common Side Effects: constipation, diarrhea, upset stomach, nausea, dizziness    Non-prescription medications:  You may need medication for seasonal allergies, colds, headaches, or minor aches and pains.  It is important to read the package label carefully.  Below are some helpful hints for choosing the right medication:   Look at the active ingredient(s) list to be sure the medication does not contain caffeine, NSAIDs or aspirin (maximum aspirin dose: 81 mg daily).  Avoid long-acting medication options.  Immediate release medications may work better because they are absorbed better.  It is okay to use liquid medications with the following cautions:  Watch the sugar concentration.  High sugar content in medications could cause dumping syndrome.  Diluting the liquid medication with  an equal amount of water will help prevent dumping syndrome.  Do not use products that contain high fructose corn syrup, corn syrup, sugar, or sorbitol.  Look for sugar-free products as an alternative.  Chewable tablets or tablets that dissolve on your tongue ( oral-dissolving tablet ) are generally safe to use.      Supplements  Refer to the Vitamins & Supplements Handout provided to you by the Bariatric Dietitian for recommended doses and products.       After Bariatric Surgery Discharge Instructions  [facility]     Note: Ask your nurse to order your medications from the pharmacy. Be sure you have your medications with you when you leave.    What should my diet be for the first 2 weeks after surgery?  Follow the bariatric diet the dietitian discussed with you.    How much fluid should I drink?  Strive for 48-64 ounces daily.  Carry a water bottle with you without a straw or sports top. Drink from it often.  Keep track of how much fluid you drink in a day.  Remember:  -Do not use straws, chew gum or suck on hard candies. They may cause painful gas.    -Sip, don't slurp when you drink.    -Practice small sips using a medicine cup for the first week postop.   -No ice or cold drinks. This could cause gas or spasms.   -No coffee, soda pop or drinks with caffeine. These may cause stomach pain.   -No alcohol. It is bad for your liver and will cause stomach pain.    How often should I do my deep breathing and coughing?  Use your incentive spirometer (small plastic breathing device) every hour while awake after you get home. Using the incentive spirometer helps you deep breath. Continuing to cough and deep breath will help prevent fevers and pneumonia.   If you do not have a fever after one week, you can stop using the incentive spirometer and discard it.     You can continue to take deep breaths without the incentive spirometer every one to two hours while awake for the month after  surgery.    What kinds of activity can I do?  Get plenty of rest the first few days after surgery and try to balance rest and activity. You will need some time to recover - you may be more tired than you realize at first. You'll feel better and heal faster if you take good care of yourself.  For 2 weeks after surgery (Please review restrictions at your two week visit, they could change based on how well you are doing):   -Don't lift more than 20 pounds.   -Take 4-5 short walks every day.  -Don't jog, run, or do belly exercises.  Don't swim, bathe or use a hot tub until your cuts are healed (scabs are gone).    You may shower right away after surgery.  Don't plan to fly or take a road trip within the first 1 to 3 months after surgery.  You could get a blood clot in your legs. If you must travel, get up and move around every hour for at least 5 minutes before continuing on your journey.  Your care team can help you decide when it's safe for you to travel.     What can I do for pain control?  You had major belly surgery that involves all layers of your belly muscles. Pain is expected, even for some as far out as 6-8 weeks after surgery. Moving, sneezing, coughing, and breathing will cause discomfort because these activities use your belly muscles.   Please see your after visit summary medication review for what pain medication will be continued, discontinued and newly started for you.    You can take opioid pain medicine if prescribed and if needed. Try to wean off from it as soon as you feel comfortable.   Do not drive while you are taking opioid pain medicine. This is dangerous.  The first three days after surgery, take your acetaminophen (Tylenol) scheduled every 6 hours.  After that you can take it as needed.  -Acetaminophen formulation options:   -Liquid   -Caplet (Cut caplet in half before taking)   -We will have you on a higher dose of Tylenol for the first three days post-op but then but then you should not  exceed 3000mg per day.  -You will also take Tylenol for pain in place of the opioid pain medicine (check with your care team for specifics).   You can apply ice or heat to the affected area(s). Just remember to wrap the ice in something and limit icing sessions to 20 minutes. Excessive icing can irritate the skin or cause skin damage.  You can apply heat with a hot, wet towel or heating pad. Just like cold therapy, limit heat application to 20 minutes. Never sleep with a heating pad on. It could cause severe burns to your skin.  Wear your binder to support your belly muscles if you have one.  Take this off a little more each day and try to be off completely by 2 weeks after surgery. If you don't need your binder for comfort or support, you don't need to wear it.   You may not be able to sleep in a comfortable position for a few weeks after surgery. This is normal. You may be more comfortable sleeping in a recliner or propped up with 3 pillows for the first couple of weeks after surgery.  You may feel gas pains in the upper chest or between your shoulder blades from the laparoscopic surgery which should get better with walking around, warm/cold packs and pain medication.  Do not take NSAID's (Non-Steroidal Anti-Inflammatory Drugs) (examples: ibuprofen, Motrin, Advil, Aleve, and Naproxen), aspirin, or use pain patches with NSAID's. They will increase your risk of bleeding or getting an ulcer.    Please call the clinic for any of the following pain concerns, we would like to talk to you:  -pain that does not improve with rest  -pain that gets worse and worse  -pain that is not controlled by your pain medicine  -a sudden severe increase in pain    What medications will I need to take after surgery?  You may be discharged with the following types of medications: antacids, pain medications, anti-nausea medications, etc.  Please see your after visit summary medication review for what will be continued, discontinued and  newly started.  It is important to reduce the amount of acid in your new stomach for a couple of months after surgery while it is still healing. We will prescribe an acid reducer or antacid. Take it as directed. This will help prevent ulcers, heartburn and acid reflux.  If you took an acid reducer before you had surgery, your care team will let you know what acid reducer you will take after surgery.   It is okay to swallow any medications smaller than   inch in size.   -If it is larger than   inch, it may need to be cut, crushed, or in a liquid form. See the above medication section for what is most appropriate for you and your medications.    What should I know about my incisions (cuts)?  Your incisions are covered with white steri strips or butterfly tape and have band aids or gauze over the top. If you have gauze or band aids, they can come off in the hospital.  Leave your steri strips on until they fall off on their own. If the steri strips don't fall off after 1 week, you can take them off. If they fall off earlier, replace them with clean band aids trying to avoid touching the incision itself.   If you have gauze covered by a clear dressing, remove 2-3 days after surgery or as directed by your care team.  You may shower in the hospital after surgery and can get your incision coverings wet.  Do not submerge in water (e.g. No baths, swimming pools, hot tubs) until your incisions are completely healed (scabs are gone).    Call the clinic if you have any of these signs or symptoms of infection:  -Redness around the site.  -Drainage that smells bad.  -Drainage that is thick yellow or green.  -An increase in pain around the incision site  -An increase in swelling around the incision site  -Heat or warmth around incision site   -Fever of 101.5  F (38.3  C) or higher when taken under the tongue.    -Chills    Will my urine or bowel movements change?   Your first bowel movements (stools) will likely be liquid. You may  also notice old blood or a darker color (black or maroon color) in your bowel movements.  This is not unusual and usually goes away after the first week, if not sooner. You may not have a bowel movement for a week.   If you have not had a bowel movement for at least three days after your surgery date and are passing gas, you can use over the counter stool softeners.  Please stop taking the stool softeners and laxatives if your stools are loose.  Increasing fluids and activity as well as getting off narcotics will help prevent constipation.    Call the clinic if:   -You have stomach pain.   -You continue to have constipation.  -You have excessive bloating after walking and passing gas.  -You are having 3 or more loose stools a day.  You may have an infection that we need to treat.    How can I prevent dehydration if I feel nauseated (sick to my stomach) and vomit (throw up)?   Vomiting is not normal after surgery. If you continue to have nausea and vomiting, call the clinic.   Nausea can be a sign of dehydration. That is why it is very important to track your fluids. Do not nap more than one hour during the day. Set a timer to wake yourself up, if needed. Too much sleep will keep you from drinking enough fluid during the day and lead to dehydration.  No outside activity in hot, humid weather until you can drink 48 to 64 ounces of fluid in 24 hours. If you sweat a lot, your body may lose too much water.  If having difficulty getting in your fluids, try to take a 1 ounce sip of water (one medicine cup) every 15 minutes.  Set a timer to remind yourself.    If you notice any of the following symptoms, please don't delay in calling the clinic.  Early identification gives us more options for treatment:  -Dark colored urine  -Urinating (pass water) less than 2-3 times per day  -Lack of energy  -Nausea  -Dizziness  -Headache  -Metallic taste in your mouth    Call the clinic ANY TIME at [phone] if:  -Your pain medicine  "is not working.  -You have a fever ? 101.5 F.  -You have belly or left shoulder pain that gets worse and worse.  -You have a swollen leg with redness, warmth, or pain behind the knee or calf.  -You have chest pain   -You feel very short of breath.  -You have a sudden severe increase in heart rate.  -You have vomiting that gets worse and worse.  -You have constant nausea (feeling sick to your stomach) that does not go away with medication.  -You have trouble swallowing.  -You have an increasing feeling that \"something is not right\".  -You have hiccups that do not stop.  -You have any questions or concerns.    If you have to go to the Emergency Room, we prefer you go to the hospital that did your surgery. Please let them know that you had bariatric surgery and to notify your surgeon.    When should I go back to the clinic?  Follow up with your care team in 1 week.   If this appointment was not already made, please call: [phone]    IMPORTANT PHONE NUMBERS:    Bariatric Nurse line (M-F 8am to 4:30pm)=[phone]  Main line (after hours/holidays/weekends)=[phone]    These are some additional handouts that are very important to read through and use after surgery.  They are good tools for after your surgery as you recover.      After Your Weight Loss Surgery  [email or web address]      Mindfulness Meditation  [email or web address]    Conscious Breathing  [email or web address]    Keeping Track of Your Fluids (for after surgery)  [email or web address]      AFTER SURGERY APPOINTMENT SCHEDULE    1 week with Dietitian (ESTEVAN)  Dietitian Virtual Group Class scheduled for Tuesday 9/24/2024 from 1-2 pm with one of the dietitians. She will send you an email invitation to join the week of this class and the purpose of it is to check in with you and give you the next set of dietary instructions.  It will be using [employer] Teams, NOT in person or through [employer].    2 weeks " with Surgeon  Surgeon video follow-up visit that will be done through [employer] which is already scheduled for you.    1 month with RD    3 months with RD    3 months with Psychologist    6 months with Bariatrician with labs    9 months with RD     12 months with Bariatrician with labs    18 months with RD or Bariatrician-possible labs    Annually after that with Bariatrician with labs and RD if needed      POP QUIZ    1.  How long do you need to be on full liquids after surgery? ________    2.  Name the 4 vitamin/mineral supplements you ll need to take for the rest of your life.    __________  _________  _________  __________    3.  How many grams of protein should you get in a day? ________    4.   Which meal would give you the most protein?    a.   1 oz. Cheese on 1 saltine cracker and 3 Tbsp applesauce.    b.     cup mashed potatoes and gravy with 2 Tablespoons applesauce    c.   3 Reduced Fat Wheat Thins, 1 oz. green beans, and 2 peeled grapes     5.   Concentrated urine, lack of energy, nausea, dizziness, headache, and a bad taste in your mouth are signs of:    a. Dumping    b.  Dehydration   c. Clogging    d. None of the above    6.     What is the minimum amount of time recommended for exercise?    a.  30 minutes 7 days a week    b.  30 minutes 3 days per week    c.   1 hour 5 days per week    d.  None of the above    7.     Drinking liquids with meals can cause:    a.  Vomiting     b.   Weight gain   c.   Desire to Snack    d.   All of the above    8.     The long-term success of my weight loss surgery depends on:    a.      Me      b.   My Surgeon     c.  My Support Group     d.  My Family    9.      If you receive ice, carbonation or straws in the hospital post-op, you should:    a.      Eat and drink whatever is given to you by the hospital staff    b.      Remind the hospital staff you are not to have ice, straws or carbonation.    c.      Take the ice but not the carbonation or straws    d.      Call  911    10.  Name two possible complications after bariatric surgery:    ________________________   ______________________    11. Name two habits of healthy bariatric surgery patients:    _______________________  ________________________    True or False    12.  This operation for obesity will require routine visits with my surgeon for the first year, and then I will be okay on my own once I lose my weight and change my diet.    13.  Obesity is a disease that can be managed with a variety of tools.  However, some individuals fail to lose weight or regain their weight because they resume snacking, binging, take in high fat or carbohydrate food & lack sufficient liquids and exercise.    14.  Women should avoid becoming pregnant for at least 18 months to 2 years following bariatric surgery.    15.  I can still drink 2-3 cans of soda or carbonated juices, water or other beverages after my surgery, as long as it is in moderation.    16.   Re-operation is sometimes necessary due to bleeding, hernias, ulceration, breakdown of stitches or staples, leakage and blockage of the intestines.    17.   I should call the clinic if I develop increased redness or swelling in or around my incision, an oral temperature of 101.5 or greater, increasing severe abdominal or shoulder pain, increasing heart rate or anytime I just don t feel right.    18.   Gaining weight prior to surgery is dangerous because it can enlarge the liver, increase surgical risk and extend your recovery period.    19.   Once I lose my weight, I can drink alcohol as long as it is in moderation.    20.  It is possible I will have more emotional difficulties after surgery.    21.  I can take Aleve but not Ibuprofen or Aspirin after surgery.    22.  If I eat yogurt and drink milk daily, I don t have to take the recommended dose of calcium supplements    23.  Dumping Syndrome only occurs in gastric sleeve patients.    24.  It is possible you will gain weight or not lose  much weight immediately after surgery.       We wish you the best and please let us know if you have any questions or concerns!    Cecile Aguillon RN and Jadyn Simpson RN    [facility]  [address] [place] [address], [address]  Phone: [phone]  Fax:  [phone]

## 2024-08-27 NOTE — PROGRESS NOTES
Patient attended group class to review pre-op instructions and dietary plan for upcoming surgery.     Discussed admission process, pre-op testing, and hospital course.  Medication recommendations and information about them before and after surgery given and explained.  Patient was given exercises to work on post-op for maintaining muscle mass and strengthening that was created by [employer] [facility] [employer].  Bariatric quiz reviewed together to assess understanding.  Discharge instructions and follow up appointments given and reviewed with pt at this time and patient verbalized understanding. She will have her H&P on 8/29/2024 with GERMÁN Holt NP and do her  pre-op testing during that visit. Prescriptions for vitamins, Omeprazole sent to the patient's pharmacy to be started after surgery.      Cecile Aguillon RN, CBN

## 2024-08-28 ENCOUNTER — ALLIED HEALTH/NURSE VISIT (OUTPATIENT)
Dept: SURGERY | Facility: CLINIC | Age: 59
End: 2024-08-28
Payer: COMMERCIAL

## 2024-08-28 ENCOUNTER — DOCUMENTATION ONLY (OUTPATIENT)
Dept: SURGERY | Facility: CLINIC | Age: 59
End: 2024-08-28

## 2024-08-28 VITALS — BODY MASS INDEX: 42.45 KG/M2 | WEIGHT: 263 LBS

## 2024-08-28 DIAGNOSIS — Z01.818 PRE-OPERATIVE CLEARANCE: Primary | ICD-10-CM

## 2024-08-28 DIAGNOSIS — K90.9 INTESTINAL MALABSORPTION: ICD-10-CM

## 2024-08-28 DIAGNOSIS — K21.9 ACID REFLUX: ICD-10-CM

## 2024-08-28 DIAGNOSIS — Z98.84 S/P BILIOPANCREATIC DIVERSION WITH DUODENAL SWITCH: ICD-10-CM

## 2024-08-28 PROCEDURE — 99207 PR PREOP VISIT IN GLOBAL PKG: CPT | Mod: 25

## 2024-08-28 NOTE — PROGRESS NOTES
Pt attended the pre-surgery class for bariatric surgery class and was educated on the pre and post surgery liquid diets. Patient received information via [facility] for the pre-op liquid diet and the post-op liquid diet. Discussed appropriate liquids and discussed portions. Reviewed appropriate calories, protein, and fluid goals for each stage before and after surgery. Educated on correct vitamins/minerals, dosage, and frequency to take after surgery. Provided grocery list and sample menu plan for each diet stage.      Pt will begin pre-op liquid diet 9/3/2024 and will do clear liquids the day before surgery. Pt is scheduled for DS on 9/17/2027 and will then follow 1 week post-op liquid diet. Pt will follow up with RD 1-week post-op for education on the pureed and soft/regular diet stages.    Sienna Milan RD

## 2024-08-28 NOTE — CONFIDENTIAL NOTE
Patient dropped off [facility] paperwork for her upcoming BDS revision surgery scheduled with Dr. Rios on 9/17/2024.      Cecile Aguillon RN

## 2024-08-29 ENCOUNTER — HOSPITAL ENCOUNTER (OUTPATIENT)
Dept: GENERAL RADIOLOGY | Facility: HOSPITAL | Age: 59
Discharge: HOME | End: 2024-08-29
Attending: FAMILY MEDICINE | Admitting: FAMILY MEDICINE
Payer: COMMERCIAL

## 2024-08-29 ENCOUNTER — OFFICE VISIT (OUTPATIENT)
Dept: INTERNAL MEDICINE | Facility: CLINIC | Age: 59
End: 2024-08-29
Payer: COMMERCIAL

## 2024-08-29 VITALS
OXYGEN SATURATION: 96 % | TEMPERATURE: 98.2 F | BODY MASS INDEX: 41.67 KG/M2 | HEART RATE: 72 BPM | SYSTOLIC BLOOD PRESSURE: 126 MMHG | DIASTOLIC BLOOD PRESSURE: 78 MMHG | WEIGHT: 259.3 LBS | HEIGHT: 66 IN | RESPIRATION RATE: 18 BRPM

## 2024-08-29 DIAGNOSIS — Z01.818 PRE-OPERATIVE CLEARANCE: ICD-10-CM

## 2024-08-29 DIAGNOSIS — G47.33 OSA (OBSTRUCTIVE SLEEP APNEA): ICD-10-CM

## 2024-08-29 DIAGNOSIS — I89.0 LYMPHEDEMA OF ARM: ICD-10-CM

## 2024-08-29 DIAGNOSIS — Z01.818 PRE-OPERATIVE GENERAL PHYSICAL EXAMINATION: Primary | ICD-10-CM

## 2024-08-29 DIAGNOSIS — F33.2 SEVERE RECURRENT MAJOR DEPRESSION WITHOUT PSYCHOTIC FEATURES (H): ICD-10-CM

## 2024-08-29 DIAGNOSIS — E66.01 MORBID OBESITY (H): ICD-10-CM

## 2024-08-29 DIAGNOSIS — H40.033 NARROW ANGLE GLAUCOMA SUSPECT OF BOTH EYES: ICD-10-CM

## 2024-08-29 LAB
ALBUMIN SERPL BCG-MCNC: 4.5 G/DL (ref 3.5–5.2)
ALBUMIN UR-MCNC: NEGATIVE MG/DL
ALP SERPL-CCNC: 102 U/L (ref 40–150)
ALT SERPL W P-5'-P-CCNC: 208 U/L (ref 0–50)
ANION GAP SERPL CALCULATED.3IONS-SCNC: 9 MMOL/L (ref 7–15)
APPEARANCE UR: CLEAR
APTT PPP: 34 SECONDS (ref 22–38)
AST SERPL W P-5'-P-CCNC: 300 U/L (ref 0–45)
ATRIAL RATE - MUSE: 71 BPM
BACTERIA #/AREA URNS HPF: ABNORMAL /HPF
BASOPHILS # BLD AUTO: 0 10E3/UL (ref 0–0.2)
BASOPHILS NFR BLD AUTO: 0 %
BILIRUB SERPL-MCNC: 0.3 MG/DL
BILIRUB UR QL STRIP: NEGATIVE
BUN SERPL-MCNC: 17.3 MG/DL (ref 6–20)
CALCIUM SERPL-MCNC: 9.8 MG/DL (ref 8.8–10.4)
CHLORIDE SERPL-SCNC: 101 MMOL/L (ref 98–107)
COLOR UR AUTO: YELLOW
CREAT SERPL-MCNC: 0.78 MG/DL (ref 0.51–0.95)
DIASTOLIC BLOOD PRESSURE - MUSE: NORMAL MMHG
EGFRCR SERPLBLD CKD-EPI 2021: 88 ML/MIN/1.73M2
EOSINOPHIL # BLD AUTO: 0 10E3/UL (ref 0–0.7)
EOSINOPHIL NFR BLD AUTO: 1 %
ERYTHROCYTE [DISTWIDTH] IN BLOOD BY AUTOMATED COUNT: 12.9 % (ref 10–15)
GLUCOSE SERPL-MCNC: 93 MG/DL (ref 70–99)
GLUCOSE UR STRIP-MCNC: NEGATIVE MG/DL
HCO3 SERPL-SCNC: 27 MMOL/L (ref 22–29)
HCT VFR BLD AUTO: 41.5 % (ref 35–47)
HGB BLD-MCNC: 13.7 G/DL (ref 11.7–15.7)
HGB UR QL STRIP: NEGATIVE
IMM GRANULOCYTES # BLD: 0 10E3/UL
IMM GRANULOCYTES NFR BLD: 0 %
INR PPP: 0.97 (ref 0.85–1.15)
INTERPRETATION ECG - MUSE: NORMAL
KETONES UR STRIP-MCNC: NEGATIVE MG/DL
LEUKOCYTE ESTERASE UR QL STRIP: NEGATIVE
LYMPHOCYTES # BLD AUTO: 1.8 10E3/UL (ref 0.8–5.3)
LYMPHOCYTES NFR BLD AUTO: 24 %
MCH RBC QN AUTO: 30.4 PG (ref 26.5–33)
MCHC RBC AUTO-ENTMCNC: 33 G/DL (ref 31.5–36.5)
MCV RBC AUTO: 92 FL (ref 78–100)
MONOCYTES # BLD AUTO: 0.5 10E3/UL (ref 0–1.3)
MONOCYTES NFR BLD AUTO: 7 %
NEUTROPHILS # BLD AUTO: 5.1 10E3/UL (ref 1.6–8.3)
NEUTROPHILS NFR BLD AUTO: 68 %
NITRATE UR QL: NEGATIVE
P AXIS - MUSE: 55 DEGREES
PH UR STRIP: 7.5 [PH] (ref 5–8)
PLATELET # BLD AUTO: 213 10E3/UL (ref 150–450)
POTASSIUM SERPL-SCNC: 4.1 MMOL/L (ref 3.4–5.3)
PR INTERVAL - MUSE: 134 MS
PROT SERPL-MCNC: 7.2 G/DL (ref 6.4–8.3)
QRS DURATION - MUSE: 90 MS
QT - MUSE: 390 MS
QTC - MUSE: 423 MS
R AXIS - MUSE: 22 DEGREES
RBC # BLD AUTO: 4.5 10E6/UL (ref 3.8–5.2)
RBC #/AREA URNS AUTO: ABNORMAL /HPF
SODIUM SERPL-SCNC: 137 MMOL/L (ref 135–145)
SP GR UR STRIP: 1.01 (ref 1–1.03)
SQUAMOUS #/AREA URNS AUTO: ABNORMAL /LPF
SYSTOLIC BLOOD PRESSURE - MUSE: NORMAL MMHG
T AXIS - MUSE: -5 DEGREES
UROBILINOGEN UR STRIP-ACNC: 0.2 E.U./DL
VENTRICULAR RATE- MUSE: 71 BPM
WBC # BLD AUTO: 7.5 10E3/UL (ref 4–11)
WBC #/AREA URNS AUTO: ABNORMAL /HPF

## 2024-08-29 PROCEDURE — 71046 X-RAY EXAM CHEST 2 VIEWS: CPT

## 2024-08-29 PROCEDURE — 99215 OFFICE O/P EST HI 40 MIN: CPT | Performed by: NURSE PRACTITIONER

## 2024-08-29 PROCEDURE — 80053 COMPREHEN METABOLIC PANEL: CPT | Performed by: NURSE PRACTITIONER

## 2024-08-29 PROCEDURE — 93010 ELECTROCARDIOGRAM REPORT: CPT | Performed by: GENERAL ACUTE CARE HOSPITAL

## 2024-08-29 PROCEDURE — 81001 URINALYSIS AUTO W/SCOPE: CPT | Performed by: NURSE PRACTITIONER

## 2024-08-29 PROCEDURE — 85025 COMPLETE CBC W/AUTO DIFF WBC: CPT | Performed by: NURSE PRACTITIONER

## 2024-08-29 PROCEDURE — 85730 THROMBOPLASTIN TIME PARTIAL: CPT | Performed by: NURSE PRACTITIONER

## 2024-08-29 PROCEDURE — 93005 ELECTROCARDIOGRAM TRACING: CPT | Performed by: NURSE PRACTITIONER

## 2024-08-29 PROCEDURE — 36415 COLL VENOUS BLD VENIPUNCTURE: CPT | Performed by: NURSE PRACTITIONER

## 2024-08-29 PROCEDURE — 85610 PROTHROMBIN TIME: CPT | Performed by: NURSE PRACTITIONER

## 2024-08-29 ASSESSMENT — PAIN SCALES - GENERAL: PAINLEVEL: NO PAIN (0)

## 2024-08-29 NOTE — PROGRESS NOTES
Preoperative Evaluation  [address] [address]  Phone: [phone]  Fax: [phone]  Primary Provider: Donna Holt NP  Pre-op Performing Provider: Donna Holt NP  Aug 29, 2024           8/24/2024   Surgical Information   What procedure is being done? Duodenal switch   Facility or Hospital where procedure/surgery will be performed: [facility]   Who is doing the procedure / surgery? Dr Rios   Date of surgery / procedure: 9/17/24   Time of surgery / procedure: ?   Where do you plan to recover after surgery? at home with family        Fax number for surgical facility: Note does not need to be faxed, will be available electronically in Epic.    Assessment & Plan     The proposed surgical procedure is considered INTERMEDIATE risk.    Problem List Items Addressed This Visit       REBECCA (obstructive sleep apnea)     Bring CPAP to surgery          Narrow angle glaucoma suspect of both eyes     Followed by ophthalmology          Severe recurrent major depression without psychotic features (H)     Followed by psychiatry and well-controlled with current medications. Will forward today's labs to psychiatry provider per patient request          Morbid obesity (H)     Upcoming revision surgery  - Stop naltrexone 2 weeks prior to surgery          Lymphedema of arm, left     Note history of left breast cancer and left arm lymphedema. Avoid using this arm for blood draws, IV, blood pressure readings           Other Visit Diagnoses       Pre-operative general physical examination    -  Primary    Relevant Orders    EKG 12-lead, tracing only (Completed)    Pre-operative clearance        Relevant Orders    UA Microscopic with Reflex to Culture (Completed)             Risks and Recommendations  The patient has the following additional risks and recommendations for perioperative complications:   - Morbid obesity (BMI >40)   - Note history of left arm  lymphedema     Antiplatelet or Anticoagulation Medication Instructions   - Patient is on no antiplatelet or anticoagulation medications.    Additional Medication Instructions  - Follow bariatric clinic advise for medication management prior to surgery.  - Discontinue naltrexone 2 weeks prior to surgery    Recommendation  Approval given to proceed with proposed procedure, without further diagnostic evaluation.    Return in about 8 months (around 4/29/2025) for physical .      Mickey Frank is a 58 year old, presenting for the following:  Pre-Op Exam          8/29/2024    10:04 AM   Additional Questions   Roomed by JAXON Kulkarni   Accompanied by Self     HPI related to upcoming procedure: Monika Franz is here today for preoperative evaluation.  She has a history of a bariatric surgery in 2019, a sleeve gastrectomy.  She initially lost a lot of weight but multiple setbacks led to weight regain to where she is now back to her presurgical weight.  She is now scheduled for a duodenal switch.        8/24/2024   Pre-Op Questionnaire   Have you ever had a heart attack or stroke? No   Have you ever had surgery on your heart or blood vessels, such as a stent placement, a coronary artery bypass, or surgery on an artery in your head, neck, heart, or legs? No   Do you have chest pain with activity? No   Do you have a history of heart failure? No   Do you currently have a cold, bronchitis or symptoms of other infection? No   Do you have a cough, shortness of breath, or wheezing? No   Do you or anyone in your family have previous history of blood clots? No   Do you or does anyone in your family have a serious bleeding problem such as prolonged bleeding following surgeries or cuts? No   Have you ever had problems with anemia or been told to take iron pills? No   Have you had any abnormal blood loss such as black, tarry or bloody stools, or abnormal vaginal bleeding? No   Have you ever had a blood transfusion? No   Are  you willing to have a blood transfusion if it is medically needed before, during, or after your surgery? Yes   Have you or any of your relatives ever had problems with anesthesia? No   Do you have sleep apnea, excessive snoring or daytime drowsiness? (!) YES   Do you have a CPAP machine? Yes   Do you have any artifical heart valves or other implanted medical devices like a pacemaker, defibrillator, or continuous glucose monitor? No   Do you have artificial joints? (!) YES   Are you allergic to latex? No        Health Care Directive  Patient does not have a Health Care Directive or Living Will: Advance Directive received and scanned. Click on Code in the patient header to view.    Preoperative Review of    reviewed - one prescription for hydrocodone 1/2024. No concerning prescribing/refill patterns        Patient Active Problem List    Diagnosis Date Noted    Lymphedema of arm, left 02/06/2024     Priority: Medium    Morbid obesity (H) 03/15/2022     Priority: Medium    Hammer toe of right foot 08/02/2021     Priority: Medium    Eczema 03/01/2021     Priority: Medium    Severe recurrent major depression without psychotic features (H) 12/07/2020     Priority: Medium     Followed by psychiatryLaura       Malignant neoplasm of upper-inner quadrant of left breast in female, estrogen receptor negative (H) 06/25/2020     Priority: Medium     Diagnosed 6/2020.+ invasive ductal carcinoma ER negative NJ negative HER-2/joann 2+ on immunohistochemistry negative on FISH.  It had high-grade features.      She  started on neoadjuvant chemotherapy on 9 July 2020 with dose dense adriamysin cyclophosphamide followed by paclitaxel.   After second cycle she noticed decrease in the size of the breast lesion.  In fact it was not palpable anymore.  She has finished 4 cycles of dose dense Adriamycin and cyclophosphamide and then finished 12 doses of weekly paclitaxel. Last treatment was on the 18th of November.      She had  surgery in the middle of 2020.  She underwent bilateral mastectomy.  She did not have reconstruction but she had initially thought of doing.  Fortunately she had a complete remission noted on the invasive tumor.  There was some DCIS present however.  The 3 sentinel lymph nodes were negative for any invasive tumor.  There was no evidence of any scarring in them either.      Narrow angle glaucoma suspect of both eyes 2020     Priority: Medium     Ophthalmology every 6 months       Bariatric surgery status 2019     Priority: Medium     Dr. Beverly      Adenomatous polyp of colon 2018     Priority: Medium    Drug overdose, intentional (H) 2013     Priority: Medium    REBECCA (obstructive sleep apnea) 2012     Priority: Medium     Formatting of this note might be different from the original.  [facility] 2/10/2012  AHI 36.6, RDI 76 Oxygen Ned 84% APAP [id number]  Formatting of this note might be different from the original.  [facility] 2/10/2012  AHI 36.6, RDI 76 Oxygen Ned 84% APAP [id number]    Last Assessment & Plan:   Formatting of this note might be different from the original.  - Continue CPAP therapy        Past Medical History:   Diagnosis Date    Anxiety     Breast cancer (H) 2020    chemo and bilateral mastectomy    Cataract     Depression     Depressive disorder     Glaucoma     with phentermine    Lymphedema of arm     Sleep apnea     Uses CPAP     Past Surgical History:   Procedure Laterality Date    ARTHROSCOPY KNEE      BACK SURGERY      BARIATRIC SURGERY  2019    BREAST SURGERY  2020    BUNIONECTOMY Right 2021    Procedure: BUNIONECTOMY right foot. Arthroplasty digits two, three, four and five right foot.;  Surgeon: Narinder Talbert DPM;  Location: [facility];  Service: Podiatry     SECTION      x 3    CHOLECYSTECTOMY      COLONOSCOPY  2021    GASTRECTOMY LAPAROSCOPIC SLEEVE  2019    Dr. Beverly wt 262#  BMI 42.34    GYN SURGERY      HYSTERECTOMY, PAP NO LONGER INDICATED  2012    LAPAROSCOPIC ABLATION ENDOMETRIOSIS      MASTECTOMY Bilateral 12/15/2020    DE INSJ TUNNELED CTR VAD W/SUBQ PORT AGE 5 YR/> Right 06/30/2020    Procedure: INSERTION VASCULAR ACCESS PORT UNDER ULTRASOUND AND FLUOROSCOPIC GUIDANCE RIGHT JUGULAR;  Surgeon: Allyson Douglas DO;  Location: [facility] OR;  Service: General    DE MASTECTOMY, SIMPLE, COMPLETE N/A 12/15/2020    Procedure: BILATERAL MASTECTOMY WITH LEFT SENTINEL LYMPH NODE BIOPSY, PORT REMOVAL;  Surgeon: Allyson Douglas DO;  Location: [facility];  Service: General    DE RMVL DARRYL CTR VAD W/SUBQ PORT/ CTR/PRPH INSJ N/A 12/15/2020    Procedure: PORT REMOVAL;  Surgeon: Allyson Douglas DO;  Location: [facility];  Service: General    US BREAST CORE BIOPSY LEFT Left 06/22/2020     SENTINEL NODE INJECTION  12/15/2020     Current Outpatient Medications   Medication Sig Dispense Refill    amoxicillin (AMOXIL) 500 MG tablet TAKE 4 TABLETS BY MOUTH 1 HOUR BEFORE DENTAL APPOINTMENT      ARIPiprazole (ABILIFY) 5 MG tablet Take 5 mg by mouth daily       Calcium Citrate 1040 MG TABS 1200mg-1500mg daily (separate doses 2-3 times daily)      childrens multivitamin chewable tablet Take 1 tablet by mouth 2 times daily. Multivitamin must contain Vitamin A, Zinc and at least 18 mg of iron. 180 tablet 3    cholecalciferol 25 MCG (1000 UT) TABS Take 5,000 Units by mouth      cyanocobalamin (VITAMIN B-12) 1000 MCG sublingual tablet Place 1 tablet (1,000 mcg) under the tongue daily. 90 tablet 3    desvenlafaxine (PRISTIQ) 100 MG 24 hr tablet Take 1 tablet by mouth daily at 2 pm      naltrexone (DEPADE/REVIA) 50 MG tablet Take 1/2 tablet with evening meal 45 tablet 3    [START ON 9/18/2024] omeprazole (PRILOSEC) 20 MG DR capsule Take 1 capsule (20 mg) by mouth daily. Start day after surgery, open contents and sprinkle on food for first 6 weeks. Take daily for 3 months after surgery. Do  "not start before September 18, 2024. 90 capsule 0    Pediatric Multiple Vitamins (MULTIVITAMIN CHILDRENS PO) Take 1 tablet by mouth      traZODone (DESYREL) 150 MG tablet Take 1 tablet by mouth nightly as needed      triamcinolone (KENALOG) 0.1 % external cream Apply topically 2 times daily X 14 days 30 g 0    triamcinolone (KENALOG) 0.5 % external ointment Apply to affected area on the left calf twice daily x 14 days 15 g 0    vitamin B-12 (CYANOCOBALAMIN) 1000 MCG tablet Take 1,000 mcg by mouth         Allergies   Allergen Reactions    Phentermine Other (See Comments)     Narrow angle glaucoma    Nsaids Nausea and Vomiting     After surgery        Social History     Tobacco Use    Smoking status: Never    Smokeless tobacco: Never   Substance Use Topics    Alcohol use: Not Currently     Family History   Problem Relation Age of Onset    Sleep Apnea Sister     Lymphoma Mother 79        Non-Hodgkins    Cancer Mother     Prostate Cancer Father 67.00    Cancer Father     Hypertension Father     Hodgkin's lymphoma Maternal Uncle 20    Brain Cancer Paternal Uncle 82    Lung Cancer Maternal Uncle 77    Breast Cancer Maternal Cousin 46    Leukemia Paternal Cousin 45    No Known Problems Sister     No Known Problems Brother     No Known Problems Son     No Known Problems Son     No Known Problems Daughter      History   Drug Use Unknown             Objective    /78 (BP Location: Right arm, Patient Position: Sitting, Cuff Size: Adult Large)   Pulse 72   Temp 98.2  F (36.8  C) (Oral)   Resp 18   Ht 1.676 m (5' 6\")   Wt 117.6 kg (259 lb 4.8 oz)   LMP  (LMP Unknown)   SpO2 96%   BMI 41.85 kg/m     Estimated body mass index is 41.85 kg/m  as calculated from the following:    Height as of this encounter: 1.676 m (5' 6\").    Weight as of this encounter: 117.6 kg (259 lb 4.8 oz).    Physical Exam  GENERAL: alert and no distress  EYES: Eyes grossly normal to inspection, conjunctivae and sclerae normal  HENT: ear canals " and TM's normal, mouth without ulcers or lesions  RESP: lungs clear to auscultation - no rales, rhonchi or wheezes  CV: regular rate and rhythm, normal S1 S2, no S3 or S4, no murmur, click or rub, no peripheral edema  ABDOMEN: soft, non-tender  MS: no gross musculoskeletal defects noted, no edema  NEURO: Normal strength and tone, mentation intact and speech normal  PSYCH: mentation appears normal, affect normal/bright    Recent Labs   Lab Test 02/06/24  1228 09/28/23  0830   HGB  --  13.4   PLT  --  222   NA  --  138   POTASSIUM  --  4.2   CR  --  0.68   A1C 5.4  --         Diagnostics  Labs pending at this time.  Results will be reviewed when available.   EKG: appears normal, NSR, normal axis, normal intervals, no acute ST/T changes c/w ischemia, no LVH by voltage criteria, unchanged from previous tracings    Revised Cardiac Risk Index (RCRI)  The patient has the following serious cardiovascular risks for perioperative complications:   - No serious cardiac risks = 0 points     RCRI Interpretation: 0 points: Class I (very low risk - 0.4% complication rate)         Signed Electronically by: Donna Holt NP  A copy of this evaluation report is provided to the requesting physician.

## 2024-08-29 NOTE — H&P (VIEW-ONLY)
Preoperative Evaluation  [address] [address]  Phone: [phone]  Fax: [phone]  Primary Provider: Donna Holt NP  Pre-op Performing Provider: Donna Holt NP  Aug 29, 2024           8/24/2024   Surgical Information   What procedure is being done? Duodenal switch   Facility or Hospital where procedure/surgery will be performed: [facility]   Who is doing the procedure / surgery? Dr Rios   Date of surgery / procedure: 9/17/24   Time of surgery / procedure: ?   Where do you plan to recover after surgery? at home with family        Fax number for surgical facility: Note does not need to be faxed, will be available electronically in Epic.    Assessment & Plan     The proposed surgical procedure is considered INTERMEDIATE risk.    Problem List Items Addressed This Visit       REBECCA (obstructive sleep apnea)     Bring CPAP to surgery          Narrow angle glaucoma suspect of both eyes     Followed by ophthalmology          Severe recurrent major depression without psychotic features (H)     Followed by psychiatry and well-controlled with current medications. Will forward today's labs to psychiatry provider per patient request          Morbid obesity (H)     Upcoming revision surgery  - Stop naltrexone 2 weeks prior to surgery          Lymphedema of arm, left     Note history of left breast cancer and left arm lymphedema. Avoid using this arm for blood draws, IV, blood pressure readings           Other Visit Diagnoses       Pre-operative general physical examination    -  Primary    Relevant Orders    EKG 12-lead, tracing only (Completed)    Pre-operative clearance        Relevant Orders    UA Microscopic with Reflex to Culture (Completed)             Risks and Recommendations  The patient has the following additional risks and recommendations for perioperative complications:   - Morbid obesity (BMI >40)   - Note history of left arm  lymphedema     Antiplatelet or Anticoagulation Medication Instructions   - Patient is on no antiplatelet or anticoagulation medications.    Additional Medication Instructions  - Follow bariatric clinic advise for medication management prior to surgery.  - Discontinue naltrexone 2 weeks prior to surgery    Recommendation  Approval given to proceed with proposed procedure, without further diagnostic evaluation.    Return in about 8 months (around 4/29/2025) for physical .      Mickey Frank is a 58 year old, presenting for the following:  Pre-Op Exam          8/29/2024    10:04 AM   Additional Questions   Roomed by JAXON Kulkarni   Accompanied by Self     HPI related to upcoming procedure: Monika Franz is here today for preoperative evaluation.  She has a history of a bariatric surgery in 2019, a sleeve gastrectomy.  She initially lost a lot of weight but multiple setbacks led to weight regain to where she is now back to her presurgical weight.  She is now scheduled for a duodenal switch.        8/24/2024   Pre-Op Questionnaire   Have you ever had a heart attack or stroke? No   Have you ever had surgery on your heart or blood vessels, such as a stent placement, a coronary artery bypass, or surgery on an artery in your head, neck, heart, or legs? No   Do you have chest pain with activity? No   Do you have a history of heart failure? No   Do you currently have a cold, bronchitis or symptoms of other infection? No   Do you have a cough, shortness of breath, or wheezing? No   Do you or anyone in your family have previous history of blood clots? No   Do you or does anyone in your family have a serious bleeding problem such as prolonged bleeding following surgeries or cuts? No   Have you ever had problems with anemia or been told to take iron pills? No   Have you had any abnormal blood loss such as black, tarry or bloody stools, or abnormal vaginal bleeding? No   Have you ever had a blood transfusion? No   Are  you willing to have a blood transfusion if it is medically needed before, during, or after your surgery? Yes   Have you or any of your relatives ever had problems with anesthesia? No   Do you have sleep apnea, excessive snoring or daytime drowsiness? (!) YES   Do you have a CPAP machine? Yes   Do you have any artifical heart valves or other implanted medical devices like a pacemaker, defibrillator, or continuous glucose monitor? No   Do you have artificial joints? (!) YES   Are you allergic to latex? No        Health Care Directive  Patient does not have a Health Care Directive or Living Will: Advance Directive received and scanned. Click on Code in the patient header to view.    Preoperative Review of    reviewed - one prescription for hydrocodone 1/2024. No concerning prescribing/refill patterns        Patient Active Problem List    Diagnosis Date Noted    Lymphedema of arm, left 02/06/2024     Priority: Medium    Morbid obesity (H) 03/15/2022     Priority: Medium    Hammer toe of right foot 08/02/2021     Priority: Medium    Eczema 03/01/2021     Priority: Medium    Severe recurrent major depression without psychotic features (H) 12/07/2020     Priority: Medium     Followed by psychiatryLaura       Malignant neoplasm of upper-inner quadrant of left breast in female, estrogen receptor negative (H) 06/25/2020     Priority: Medium     Diagnosed 6/2020.+ invasive ductal carcinoma ER negative NH negative HER-2/joann 2+ on immunohistochemistry negative on FISH.  It had high-grade features.      She  started on neoadjuvant chemotherapy on 9 July 2020 with dose dense adriamysin cyclophosphamide followed by paclitaxel.   After second cycle she noticed decrease in the size of the breast lesion.  In fact it was not palpable anymore.  She has finished 4 cycles of dose dense Adriamycin and cyclophosphamide and then finished 12 doses of weekly paclitaxel. Last treatment was on the 18th of November.      She had  surgery in the middle of 2020.  She underwent bilateral mastectomy.  She did not have reconstruction but she had initially thought of doing.  Fortunately she had a complete remission noted on the invasive tumor.  There was some DCIS present however.  The 3 sentinel lymph nodes were negative for any invasive tumor.  There was no evidence of any scarring in them either.      Narrow angle glaucoma suspect of both eyes 2020     Priority: Medium     Ophthalmology every 6 months       Bariatric surgery status 2019     Priority: Medium     Dr. Beverly      Adenomatous polyp of colon 2018     Priority: Medium    Drug overdose, intentional (H) 2013     Priority: Medium    REBECCA (obstructive sleep apnea) 2012     Priority: Medium     Formatting of this note might be different from the original.  [facility] 2/10/2012  AHI 36.6, RDI 76 Oxygen Ned 84% APAP [id number]  Formatting of this note might be different from the original.  [facility] 2/10/2012  AHI 36.6, RDI 76 Oxygen Ned 84% APAP [id number]    Last Assessment & Plan:   Formatting of this note might be different from the original.  - Continue CPAP therapy        Past Medical History:   Diagnosis Date    Anxiety     Breast cancer (H) 2020    chemo and bilateral mastectomy    Cataract     Depression     Depressive disorder     Glaucoma     with phentermine    Lymphedema of arm     Sleep apnea     Uses CPAP     Past Surgical History:   Procedure Laterality Date    ARTHROSCOPY KNEE      BACK SURGERY      BARIATRIC SURGERY  2019    BREAST SURGERY  2020    BUNIONECTOMY Right 2021    Procedure: BUNIONECTOMY right foot. Arthroplasty digits two, three, four and five right foot.;  Surgeon: Narinder Talbert DPM;  Location: [facility];  Service: Podiatry     SECTION      x 3    CHOLECYSTECTOMY      COLONOSCOPY  2021    GASTRECTOMY LAPAROSCOPIC SLEEVE  2019    Dr. Beverly wt 262#  BMI 42.34    GYN SURGERY      HYSTERECTOMY, PAP NO LONGER INDICATED  2012    LAPAROSCOPIC ABLATION ENDOMETRIOSIS      MASTECTOMY Bilateral 12/15/2020    RI INSJ TUNNELED CTR VAD W/SUBQ PORT AGE 5 YR/> Right 06/30/2020    Procedure: INSERTION VASCULAR ACCESS PORT UNDER ULTRASOUND AND FLUOROSCOPIC GUIDANCE RIGHT JUGULAR;  Surgeon: Allyson Douglas DO;  Location: [facility] OR;  Service: General    RI MASTECTOMY, SIMPLE, COMPLETE N/A 12/15/2020    Procedure: BILATERAL MASTECTOMY WITH LEFT SENTINEL LYMPH NODE BIOPSY, PORT REMOVAL;  Surgeon: Allyson Douglas DO;  Location: [facility];  Service: General    RI RMVL DARRYL CTR VAD W/SUBQ PORT/ CTR/PRPH INSJ N/A 12/15/2020    Procedure: PORT REMOVAL;  Surgeon: Allyson Douglas DO;  Location: [facility];  Service: General    US BREAST CORE BIOPSY LEFT Left 06/22/2020     SENTINEL NODE INJECTION  12/15/2020     Current Outpatient Medications   Medication Sig Dispense Refill    amoxicillin (AMOXIL) 500 MG tablet TAKE 4 TABLETS BY MOUTH 1 HOUR BEFORE DENTAL APPOINTMENT      ARIPiprazole (ABILIFY) 5 MG tablet Take 5 mg by mouth daily       Calcium Citrate 1040 MG TABS 1200mg-1500mg daily (separate doses 2-3 times daily)      childrens multivitamin chewable tablet Take 1 tablet by mouth 2 times daily. Multivitamin must contain Vitamin A, Zinc and at least 18 mg of iron. 180 tablet 3    cholecalciferol 25 MCG (1000 UT) TABS Take 5,000 Units by mouth      cyanocobalamin (VITAMIN B-12) 1000 MCG sublingual tablet Place 1 tablet (1,000 mcg) under the tongue daily. 90 tablet 3    desvenlafaxine (PRISTIQ) 100 MG 24 hr tablet Take 1 tablet by mouth daily at 2 pm      naltrexone (DEPADE/REVIA) 50 MG tablet Take 1/2 tablet with evening meal 45 tablet 3    [START ON 9/18/2024] omeprazole (PRILOSEC) 20 MG DR capsule Take 1 capsule (20 mg) by mouth daily. Start day after surgery, open contents and sprinkle on food for first 6 weeks. Take daily for 3 months after surgery. Do  "not start before September 18, 2024. 90 capsule 0    Pediatric Multiple Vitamins (MULTIVITAMIN CHILDRENS PO) Take 1 tablet by mouth      traZODone (DESYREL) 150 MG tablet Take 1 tablet by mouth nightly as needed      triamcinolone (KENALOG) 0.1 % external cream Apply topically 2 times daily X 14 days 30 g 0    triamcinolone (KENALOG) 0.5 % external ointment Apply to affected area on the left calf twice daily x 14 days 15 g 0    vitamin B-12 (CYANOCOBALAMIN) 1000 MCG tablet Take 1,000 mcg by mouth         Allergies   Allergen Reactions    Phentermine Other (See Comments)     Narrow angle glaucoma    Nsaids Nausea and Vomiting     After surgery        Social History     Tobacco Use    Smoking status: Never    Smokeless tobacco: Never   Substance Use Topics    Alcohol use: Not Currently     Family History   Problem Relation Age of Onset    Sleep Apnea Sister     Lymphoma Mother 79        Non-Hodgkins    Cancer Mother     Prostate Cancer Father 67.00    Cancer Father     Hypertension Father     Hodgkin's lymphoma Maternal Uncle 20    Brain Cancer Paternal Uncle 82    Lung Cancer Maternal Uncle 77    Breast Cancer Maternal Cousin 46    Leukemia Paternal Cousin 45    No Known Problems Sister     No Known Problems Brother     No Known Problems Son     No Known Problems Son     No Known Problems Daughter      History   Drug Use Unknown             Objective    /78 (BP Location: Right arm, Patient Position: Sitting, Cuff Size: Adult Large)   Pulse 72   Temp 98.2  F (36.8  C) (Oral)   Resp 18   Ht 1.676 m (5' 6\")   Wt 117.6 kg (259 lb 4.8 oz)   LMP  (LMP Unknown)   SpO2 96%   BMI 41.85 kg/m     Estimated body mass index is 41.85 kg/m  as calculated from the following:    Height as of this encounter: 1.676 m (5' 6\").    Weight as of this encounter: 117.6 kg (259 lb 4.8 oz).    Physical Exam  GENERAL: alert and no distress  EYES: Eyes grossly normal to inspection, conjunctivae and sclerae normal  HENT: ear canals " and TM's normal, mouth without ulcers or lesions  RESP: lungs clear to auscultation - no rales, rhonchi or wheezes  CV: regular rate and rhythm, normal S1 S2, no S3 or S4, no murmur, click or rub, no peripheral edema  ABDOMEN: soft, non-tender  MS: no gross musculoskeletal defects noted, no edema  NEURO: Normal strength and tone, mentation intact and speech normal  PSYCH: mentation appears normal, affect normal/bright    Recent Labs   Lab Test 02/06/24  1228 09/28/23  0830   HGB  --  13.4   PLT  --  222   NA  --  138   POTASSIUM  --  4.2   CR  --  0.68   A1C 5.4  --         Diagnostics  Labs pending at this time.  Results will be reviewed when available.   EKG: appears normal, NSR, normal axis, normal intervals, no acute ST/T changes c/w ischemia, no LVH by voltage criteria, unchanged from previous tracings    Revised Cardiac Risk Index (RCRI)  The patient has the following serious cardiovascular risks for perioperative complications:   - No serious cardiac risks = 0 points     RCRI Interpretation: 0 points: Class I (very low risk - 0.4% complication rate)         Signed Electronically by: Donna Holt NP  A copy of this evaluation report is provided to the requesting physician.

## 2024-08-30 ENCOUNTER — TELEPHONE (OUTPATIENT)
Dept: SURGERY | Facility: CLINIC | Age: 59
End: 2024-08-30
Payer: COMMERCIAL

## 2024-08-30 NOTE — ASSESSMENT & PLAN NOTE
Note history of left breast cancer and left arm lymphedema. Avoid using this arm for blood draws, IV, blood pressure readings

## 2024-08-30 NOTE — ASSESSMENT & PLAN NOTE
Followed by psychiatry and well-controlled with current medications. Will forward today's labs to psychiatry provider per patient request

## 2024-09-04 DIAGNOSIS — R74.01 TRANSAMINITIS: Primary | ICD-10-CM

## 2024-09-06 ENCOUNTER — DOCUMENTATION ONLY (OUTPATIENT)
Dept: SURGERY | Facility: CLINIC | Age: 59
End: 2024-09-06
Payer: COMMERCIAL

## 2024-09-06 NOTE — PROGRESS NOTES
Federal Family and Medical Leave Act forms received, completed and signed on providers behalf.    Leave start date: 09/17/2024    Leave end date: 09/31/2024    RTW on 09/31/2024 with no restrictions.     Forms faxed to: [phone], attn: .    Fax successful.     Forms labeled and sent to HIM for scanning.    Parker Palma CMA  [facility]  Surgery Clinic [facility] [place]  Weight Management Clinic [address] [address]  Office: [phone]  Fax: [phone]

## 2024-09-09 ENCOUNTER — LAB (OUTPATIENT)
Dept: LAB | Facility: CLINIC | Age: 59
End: 2024-09-09
Payer: COMMERCIAL

## 2024-09-09 DIAGNOSIS — R74.01 TRANSAMINITIS: ICD-10-CM

## 2024-09-09 PROCEDURE — 36415 COLL VENOUS BLD VENIPUNCTURE: CPT

## 2024-09-09 PROCEDURE — 80076 HEPATIC FUNCTION PANEL: CPT

## 2024-09-10 LAB
ALBUMIN SERPL BCG-MCNC: 4.4 G/DL (ref 3.5–5.2)
ALP SERPL-CCNC: 71 U/L (ref 40–150)
ALT SERPL W P-5'-P-CCNC: 30 U/L (ref 0–50)
AST SERPL W P-5'-P-CCNC: 29 U/L (ref 0–45)
BILIRUB DIRECT SERPL-MCNC: <0.2 MG/DL (ref 0–0.3)
BILIRUB SERPL-MCNC: 0.2 MG/DL
PROT SERPL-MCNC: 7 G/DL (ref 6.4–8.3)

## 2024-09-16 ENCOUNTER — ANESTHESIA EVENT (OUTPATIENT)
Dept: SURGERY | Facility: HOSPITAL | Age: 59
End: 2024-09-16
Payer: COMMERCIAL

## 2024-09-17 ENCOUNTER — ANESTHESIA (OUTPATIENT)
Dept: SURGERY | Facility: HOSPITAL | Age: 59
End: 2024-09-17
Payer: COMMERCIAL

## 2024-09-17 ENCOUNTER — HOSPITAL ENCOUNTER (INPATIENT)
Facility: HOSPITAL | Age: 59
LOS: 1 days | Discharge: HOME | End: 2024-09-18
Attending: SURGERY | Admitting: SURGERY
Payer: COMMERCIAL

## 2024-09-17 DIAGNOSIS — K90.9 INTESTINAL MALABSORPTION: ICD-10-CM

## 2024-09-17 DIAGNOSIS — Z98.84 S/P BILIOPANCREATIC DIVERSION WITH DUODENAL SWITCH: ICD-10-CM

## 2024-09-17 DIAGNOSIS — E66.01 MORBID (SEVERE) OBESITY DUE TO EXCESS CALORIES (H): Primary | ICD-10-CM

## 2024-09-17 LAB
GLUCOSE BLDC GLUCOMTR-MCNC: 174 MG/DL (ref 70–99)
GLUCOSE BLDC GLUCOMTR-MCNC: 94 MG/DL (ref 70–99)

## 2024-09-17 PROCEDURE — 999N000157 HC STATISTIC RCP TIME EA 10 MIN

## 2024-09-17 PROCEDURE — 710N000009 HC RECOVERY PHASE 1, LEVEL 1, PER MIN: Performed by: SURGERY

## 2024-09-17 PROCEDURE — 250N000011 HC RX IP 250 OP 636: Performed by: ANESTHESIOLOGY

## 2024-09-17 PROCEDURE — 250N000011 HC RX IP 250 OP 636: Performed by: SURGERY

## 2024-09-17 PROCEDURE — 49659 UNLSTD LAPS PX HRNAP HRNRPHY: CPT | Mod: AS | Performed by: NURSE PRACTITIONER

## 2024-09-17 PROCEDURE — 0DH63UZ INSERTION OF FEEDING DEVICE INTO STOMACH, PERCUTANEOUS APPROACH: ICD-10-PCS | Performed by: SURGERY

## 2024-09-17 PROCEDURE — 120N000001 HC R&B MED SURG/OB

## 2024-09-17 PROCEDURE — 250N000012 HC RX MED GY IP 250 OP 636 PS 637: Performed by: ANESTHESIOLOGY

## 2024-09-17 PROCEDURE — 250N000013 HC RX MED GY IP 250 OP 250 PS 637: Performed by: SURGERY

## 2024-09-17 PROCEDURE — 258N000003 HC RX IP 258 OP 636: Performed by: ANESTHESIOLOGY

## 2024-09-17 PROCEDURE — 272N000001 HC OR GENERAL SUPPLY STERILE: Performed by: SURGERY

## 2024-09-17 PROCEDURE — 999N000141 HC STATISTIC PRE-PROCEDURE NURSING ASSESSMENT: Performed by: SURGERY

## 2024-09-17 PROCEDURE — 43645 LAP GASTR BYPASS INCL SMLL I: CPT | Performed by: ANESTHESIOLOGY

## 2024-09-17 PROCEDURE — 250N000011 HC RX IP 250 OP 636: Performed by: NURSE PRACTITIONER

## 2024-09-17 PROCEDURE — 49659 UNLSTD LAPS PX HRNAP HRNRPHY: CPT | Mod: 52 | Performed by: SURGERY

## 2024-09-17 PROCEDURE — 250N000025 HC SEVOFLURANE, PER MIN: Performed by: SURGERY

## 2024-09-17 PROCEDURE — 250N000013 HC RX MED GY IP 250 OP 250 PS 637: Performed by: NURSE PRACTITIONER

## 2024-09-17 PROCEDURE — 250N000011 HC RX IP 250 OP 636: Performed by: NURSE ANESTHETIST, CERTIFIED REGISTERED

## 2024-09-17 PROCEDURE — 0D190ZB BYPASS DUODENUM TO ILEUM, OPEN APPROACH: ICD-10-PCS | Performed by: SURGERY

## 2024-09-17 PROCEDURE — 43645 LAP GASTR BYPASS INCL SMLL I: CPT | Performed by: NURSE ANESTHETIST, CERTIFIED REGISTERED

## 2024-09-17 PROCEDURE — 250N000009 HC RX 250: Performed by: NURSE ANESTHETIST, CERTIFIED REGISTERED

## 2024-09-17 PROCEDURE — 8E0W4CZ ROBOTIC ASSISTED PROCEDURE OF TRUNK REGION, PERCUTANEOUS ENDOSCOPIC APPROACH: ICD-10-PCS | Performed by: SURGERY

## 2024-09-17 PROCEDURE — 370N000017 HC ANESTHESIA TECHNICAL FEE, PER MIN: Performed by: SURGERY

## 2024-09-17 PROCEDURE — 360N000080 HC SURGERY LEVEL 7, PER MIN: Performed by: SURGERY

## 2024-09-17 PROCEDURE — 258N000003 HC RX IP 258 OP 636: Performed by: NURSE ANESTHETIST, CERTIFIED REGISTERED

## 2024-09-17 PROCEDURE — 258N000003 HC RX IP 258 OP 636: Performed by: NURSE PRACTITIONER

## 2024-09-17 RX ORDER — NALOXONE HYDROCHLORIDE 0.4 MG/ML
0.2 INJECTION, SOLUTION INTRAMUSCULAR; INTRAVENOUS; SUBCUTANEOUS
Status: DISCONTINUED | OUTPATIENT
Start: 2024-09-17 | End: 2024-09-18 | Stop reason: HOSPADM

## 2024-09-17 RX ORDER — ACETAMINOPHEN 325 MG/10.15ML
975 LIQUID ORAL EVERY 6 HOURS
Status: DISCONTINUED | OUTPATIENT
Start: 2024-09-17 | End: 2024-09-18 | Stop reason: HOSPADM

## 2024-09-17 RX ORDER — PEDI MULTIVIT NO.25/FOLIC ACID 300 MCG
1 TABLET,CHEWABLE ORAL 2 TIMES DAILY
Status: DISCONTINUED | OUTPATIENT
Start: 2024-09-18 | End: 2024-09-17

## 2024-09-17 RX ORDER — ENOXAPARIN SODIUM 100 MG/ML
40 INJECTION SUBCUTANEOUS EVERY 24 HOURS
Status: DISCONTINUED | OUTPATIENT
Start: 2024-09-17 | End: 2024-09-18 | Stop reason: HOSPADM

## 2024-09-17 RX ORDER — PROPOFOL 10 MG/ML
INJECTION, EMULSION INTRAVENOUS CONTINUOUS PRN
Status: DISCONTINUED | OUTPATIENT
Start: 2024-09-17 | End: 2024-09-17

## 2024-09-17 RX ORDER — ONDANSETRON 4 MG/1
4 TABLET, ORALLY DISINTEGRATING ORAL EVERY 30 MIN PRN
Status: DISCONTINUED | OUTPATIENT
Start: 2024-09-17 | End: 2024-09-17 | Stop reason: HOSPADM

## 2024-09-17 RX ORDER — ENALAPRILAT 1.25 MG/ML
1.25 INJECTION INTRAVENOUS EVERY 6 HOURS PRN
Status: DISCONTINUED | OUTPATIENT
Start: 2024-09-17 | End: 2024-09-18 | Stop reason: HOSPADM

## 2024-09-17 RX ORDER — DEXAMETHASONE SODIUM PHOSPHATE 4 MG/ML
4 INJECTION, SOLUTION INTRA-ARTICULAR; INTRALESIONAL; INTRAMUSCULAR; INTRAVENOUS; SOFT TISSUE
Status: DISCONTINUED | OUTPATIENT
Start: 2024-09-17 | End: 2024-09-17 | Stop reason: HOSPADM

## 2024-09-17 RX ORDER — DEXTROSE MONOHYDRATE, SODIUM CHLORIDE, AND POTASSIUM CHLORIDE 50; 1.49; 4.5 G/1000ML; G/1000ML; G/1000ML
INJECTION, SOLUTION INTRAVENOUS CONTINUOUS
Status: DISCONTINUED | OUTPATIENT
Start: 2024-09-17 | End: 2024-09-18 | Stop reason: HOSPADM

## 2024-09-17 RX ORDER — KETOROLAC TROMETHAMINE 15 MG/ML
15 INJECTION, SOLUTION INTRAMUSCULAR; INTRAVENOUS EVERY 6 HOURS
Status: DISCONTINUED | OUTPATIENT
Start: 2024-09-17 | End: 2024-09-18 | Stop reason: HOSPADM

## 2024-09-17 RX ORDER — SODIUM CHLORIDE, SODIUM LACTATE, POTASSIUM CHLORIDE, CALCIUM CHLORIDE 600; 310; 30; 20 MG/100ML; MG/100ML; MG/100ML; MG/100ML
INJECTION, SOLUTION INTRAVENOUS CONTINUOUS
Status: DISCONTINUED | OUTPATIENT
Start: 2024-09-17 | End: 2024-09-17 | Stop reason: HOSPADM

## 2024-09-17 RX ORDER — FENTANYL CITRATE 50 UG/ML
INJECTION, SOLUTION INTRAMUSCULAR; INTRAVENOUS PRN
Status: DISCONTINUED | OUTPATIENT
Start: 2024-09-17 | End: 2024-09-17

## 2024-09-17 RX ORDER — LIDOCAINE 40 MG/G
CREAM TOPICAL
Status: DISCONTINUED | OUTPATIENT
Start: 2024-09-17 | End: 2024-09-17 | Stop reason: HOSPADM

## 2024-09-17 RX ORDER — ACETAMINOPHEN 325 MG/1
975 TABLET ORAL EVERY 6 HOURS
Status: DISCONTINUED | OUTPATIENT
Start: 2024-09-17 | End: 2024-09-18 | Stop reason: HOSPADM

## 2024-09-17 RX ORDER — NALOXONE HYDROCHLORIDE 0.4 MG/ML
0.1 INJECTION, SOLUTION INTRAMUSCULAR; INTRAVENOUS; SUBCUTANEOUS
Status: DISCONTINUED | OUTPATIENT
Start: 2024-09-17 | End: 2024-09-17 | Stop reason: HOSPADM

## 2024-09-17 RX ORDER — GABAPENTIN 300 MG/1
600 CAPSULE ORAL
Status: COMPLETED | OUTPATIENT
Start: 2024-09-17 | End: 2024-09-17

## 2024-09-17 RX ORDER — TRAMADOL HYDROCHLORIDE 50 MG/1
100 TABLET ORAL EVERY 6 HOURS PRN
Status: DISCONTINUED | OUTPATIENT
Start: 2024-09-17 | End: 2024-09-18 | Stop reason: HOSPADM

## 2024-09-17 RX ORDER — ONDANSETRON 2 MG/ML
4 INJECTION INTRAMUSCULAR; INTRAVENOUS EVERY 30 MIN PRN
Status: DISCONTINUED | OUTPATIENT
Start: 2024-09-17 | End: 2024-09-17 | Stop reason: HOSPADM

## 2024-09-17 RX ORDER — ONDANSETRON 4 MG/1
4 TABLET, ORALLY DISINTEGRATING ORAL EVERY 6 HOURS PRN
Status: DISCONTINUED | OUTPATIENT
Start: 2024-09-17 | End: 2024-09-18 | Stop reason: HOSPADM

## 2024-09-17 RX ORDER — ONDANSETRON 2 MG/ML
4 INJECTION INTRAMUSCULAR; INTRAVENOUS
Status: COMPLETED | OUTPATIENT
Start: 2024-09-17 | End: 2024-09-17

## 2024-09-17 RX ORDER — GABAPENTIN 250 MG/5ML
250 SOLUTION ORAL EVERY 8 HOURS SCHEDULED
Status: DISCONTINUED | OUTPATIENT
Start: 2024-09-17 | End: 2024-09-18 | Stop reason: HOSPADM

## 2024-09-17 RX ORDER — ACETAMINOPHEN 325 MG/1
975 TABLET ORAL ONCE
Status: COMPLETED | OUTPATIENT
Start: 2024-09-17 | End: 2024-09-17

## 2024-09-17 RX ORDER — DESVENLAFAXINE 50 MG/1
100 TABLET, FILM COATED, EXTENDED RELEASE ORAL DAILY
Status: DISCONTINUED | OUTPATIENT
Start: 2024-09-17 | End: 2024-09-18 | Stop reason: HOSPADM

## 2024-09-17 RX ORDER — ACETAMINOPHEN 10 MG/ML
1000 INJECTION, SOLUTION INTRAVENOUS EVERY 6 HOURS
Status: DISCONTINUED | OUTPATIENT
Start: 2024-09-17 | End: 2024-09-18 | Stop reason: HOSPADM

## 2024-09-17 RX ORDER — FENTANYL CITRATE 50 UG/ML
50 INJECTION, SOLUTION INTRAMUSCULAR; INTRAVENOUS EVERY 5 MIN PRN
Status: DISCONTINUED | OUTPATIENT
Start: 2024-09-17 | End: 2024-09-17 | Stop reason: HOSPADM

## 2024-09-17 RX ORDER — APREPITANT 40 MG/1
40 CAPSULE ORAL ONCE
Status: COMPLETED | OUTPATIENT
Start: 2024-09-17 | End: 2024-09-17

## 2024-09-17 RX ORDER — CEFAZOLIN SODIUM/WATER 2 G/20 ML
2 SYRINGE (ML) INTRAVENOUS
Status: COMPLETED | OUTPATIENT
Start: 2024-09-17 | End: 2024-09-17

## 2024-09-17 RX ORDER — ARIPIPRAZOLE 5 MG/1
5 TABLET ORAL DAILY
Status: DISCONTINUED | OUTPATIENT
Start: 2024-09-17 | End: 2024-09-18 | Stop reason: HOSPADM

## 2024-09-17 RX ORDER — BUPIVACAINE HYDROCHLORIDE 2.5 MG/ML
INJECTION, SOLUTION EPIDURAL; INFILTRATION; INTRACAUDAL PRN
Status: DISCONTINUED | OUTPATIENT
Start: 2024-09-17 | End: 2024-09-17 | Stop reason: HOSPADM

## 2024-09-17 RX ORDER — HYDROMORPHONE HCL IN WATER/PF 6 MG/30 ML
0.4 PATIENT CONTROLLED ANALGESIA SYRINGE INTRAVENOUS EVERY 5 MIN PRN
Status: DISCONTINUED | OUTPATIENT
Start: 2024-09-17 | End: 2024-09-17 | Stop reason: HOSPADM

## 2024-09-17 RX ORDER — PROPOFOL 10 MG/ML
INJECTION, EMULSION INTRAVENOUS PRN
Status: DISCONTINUED | OUTPATIENT
Start: 2024-09-17 | End: 2024-09-17

## 2024-09-17 RX ORDER — NALOXONE HYDROCHLORIDE 0.4 MG/ML
0.4 INJECTION, SOLUTION INTRAMUSCULAR; INTRAVENOUS; SUBCUTANEOUS
Status: DISCONTINUED | OUTPATIENT
Start: 2024-09-17 | End: 2024-09-18 | Stop reason: HOSPADM

## 2024-09-17 RX ORDER — LIDOCAINE 40 MG/G
CREAM TOPICAL
Status: DISCONTINUED | OUTPATIENT
Start: 2024-09-17 | End: 2024-09-18 | Stop reason: HOSPADM

## 2024-09-17 RX ORDER — LORAZEPAM 2 MG/ML
.5-1 INJECTION INTRAMUSCULAR EVERY 6 HOURS PRN
Status: DISCONTINUED | OUTPATIENT
Start: 2024-09-17 | End: 2024-09-18 | Stop reason: HOSPADM

## 2024-09-17 RX ORDER — FENTANYL CITRATE 50 UG/ML
25 INJECTION, SOLUTION INTRAMUSCULAR; INTRAVENOUS EVERY 5 MIN PRN
Status: DISCONTINUED | OUTPATIENT
Start: 2024-09-17 | End: 2024-09-17 | Stop reason: HOSPADM

## 2024-09-17 RX ORDER — PROCHLORPERAZINE MALEATE 10 MG
10 TABLET ORAL EVERY 6 HOURS PRN
Status: DISCONTINUED | OUTPATIENT
Start: 2024-09-17 | End: 2024-09-18 | Stop reason: HOSPADM

## 2024-09-17 RX ORDER — NALBUPHINE HYDROCHLORIDE 20 MG/ML
5 INJECTION, SOLUTION INTRAMUSCULAR; INTRAVENOUS; SUBCUTANEOUS EVERY 4 HOURS PRN
Status: DISCONTINUED | OUTPATIENT
Start: 2024-09-17 | End: 2024-09-18 | Stop reason: HOSPADM

## 2024-09-17 RX ORDER — LIDOCAINE HYDROCHLORIDE 10 MG/ML
INJECTION, SOLUTION INFILTRATION; PERINEURAL PRN
Status: DISCONTINUED | OUTPATIENT
Start: 2024-09-17 | End: 2024-09-17

## 2024-09-17 RX ORDER — CEFAZOLIN SODIUM/WATER 2 G/20 ML
2 SYRINGE (ML) INTRAVENOUS SEE ADMIN INSTRUCTIONS
Status: DISCONTINUED | OUTPATIENT
Start: 2024-09-17 | End: 2024-09-17 | Stop reason: HOSPADM

## 2024-09-17 RX ORDER — KETOROLAC TROMETHAMINE 30 MG/ML
15 INJECTION, SOLUTION INTRAMUSCULAR; INTRAVENOUS ONCE
Status: COMPLETED | OUTPATIENT
Start: 2024-09-17 | End: 2024-09-17

## 2024-09-17 RX ORDER — MULTIVIT WITH IRON,MINERALS
1 TABLET,CHEWABLE ORAL 2 TIMES DAILY
Status: DISCONTINUED | OUTPATIENT
Start: 2024-09-18 | End: 2024-09-18 | Stop reason: HOSPADM

## 2024-09-17 RX ORDER — KETAMINE HYDROCHLORIDE 10 MG/ML
INJECTION INTRAMUSCULAR; INTRAVENOUS PRN
Status: DISCONTINUED | OUTPATIENT
Start: 2024-09-17 | End: 2024-09-17

## 2024-09-17 RX ORDER — ONDANSETRON 2 MG/ML
4 INJECTION INTRAMUSCULAR; INTRAVENOUS EVERY 6 HOURS PRN
Status: DISCONTINUED | OUTPATIENT
Start: 2024-09-17 | End: 2024-09-18 | Stop reason: HOSPADM

## 2024-09-17 RX ORDER — ENOXAPARIN SODIUM 100 MG/ML
40 INJECTION SUBCUTANEOUS ONCE
Status: COMPLETED | OUTPATIENT
Start: 2024-09-17 | End: 2024-09-17

## 2024-09-17 RX ORDER — TRAZODONE HYDROCHLORIDE 50 MG/1
150 TABLET, FILM COATED ORAL
Status: DISCONTINUED | OUTPATIENT
Start: 2024-09-17 | End: 2024-09-18 | Stop reason: HOSPADM

## 2024-09-17 RX ORDER — DEXAMETHASONE SODIUM PHOSPHATE 10 MG/ML
INJECTION, SOLUTION INTRAMUSCULAR; INTRAVENOUS PRN
Status: DISCONTINUED | OUTPATIENT
Start: 2024-09-17 | End: 2024-09-17

## 2024-09-17 RX ORDER — HYDROMORPHONE HCL IN WATER/PF 6 MG/30 ML
0.2 PATIENT CONTROLLED ANALGESIA SYRINGE INTRAVENOUS EVERY 5 MIN PRN
Status: DISCONTINUED | OUTPATIENT
Start: 2024-09-17 | End: 2024-09-17 | Stop reason: HOSPADM

## 2024-09-17 RX ADMIN — ROCURONIUM BROMIDE 20 MG: 50 INJECTION, SOLUTION INTRAVENOUS at 09:03

## 2024-09-17 RX ADMIN — KETOROLAC TROMETHAMINE 15 MG: 30 INJECTION, SOLUTION INTRAMUSCULAR at 10:17

## 2024-09-17 RX ADMIN — SODIUM CHLORIDE, POTASSIUM CHLORIDE, SODIUM LACTATE AND CALCIUM CHLORIDE: 600; 310; 30; 20 INJECTION, SOLUTION INTRAVENOUS at 06:38

## 2024-09-17 RX ADMIN — GABAPENTIN 600 MG: 300 CAPSULE ORAL at 06:41

## 2024-09-17 RX ADMIN — DEXMEDETOMIDINE HYDROCHLORIDE 12 MCG: 100 INJECTION, SOLUTION INTRAVENOUS at 07:38

## 2024-09-17 RX ADMIN — KETAMINE HYDROCHLORIDE 30 MG: 10 INJECTION INTRAMUSCULAR; INTRAVENOUS at 08:28

## 2024-09-17 RX ADMIN — DEXMEDETOMIDINE HYDROCHLORIDE 8 MCG: 100 INJECTION, SOLUTION INTRAVENOUS at 08:03

## 2024-09-17 RX ADMIN — FENTANYL CITRATE 50 MCG: 50 INJECTION, SOLUTION INTRAMUSCULAR; INTRAVENOUS at 14:49

## 2024-09-17 RX ADMIN — ACETAMINOPHEN 975 MG: 325 TABLET ORAL at 06:41

## 2024-09-17 RX ADMIN — SODIUM CHLORIDE, POTASSIUM CHLORIDE, SODIUM LACTATE AND CALCIUM CHLORIDE: 600; 310; 30; 20 INJECTION, SOLUTION INTRAVENOUS at 10:30

## 2024-09-17 RX ADMIN — FAMOTIDINE 20 MG: 10 INJECTION, SOLUTION INTRAVENOUS at 06:42

## 2024-09-17 RX ADMIN — HYOSCYAMINE SULFATE 125 MCG: 0.12 TABLET SUBLINGUAL at 12:44

## 2024-09-17 RX ADMIN — ENOXAPARIN SODIUM 40 MG: 40 INJECTION SUBCUTANEOUS at 06:42

## 2024-09-17 RX ADMIN — ACETAMINOPHEN 1000 MG: 10 INJECTION, SOLUTION INTRAVENOUS at 12:37

## 2024-09-17 RX ADMIN — ONDANSETRON 4 MG: 2 INJECTION INTRAMUSCULAR; INTRAVENOUS at 06:42

## 2024-09-17 RX ADMIN — ACETAMINOPHEN 975 MG: 325 SOLUTION ORAL at 20:00

## 2024-09-17 RX ADMIN — APREPITANT 40 MG: 40 CAPSULE ORAL at 06:48

## 2024-09-17 RX ADMIN — POTASSIUM CHLORIDE, DEXTROSE MONOHYDRATE AND SODIUM CHLORIDE: 150; 5; 450 INJECTION, SOLUTION INTRAVENOUS at 12:54

## 2024-09-17 RX ADMIN — PROPOFOL 150 MG: 10 INJECTION, EMULSION INTRAVENOUS at 07:38

## 2024-09-17 RX ADMIN — MIDAZOLAM 2 MG: 1 INJECTION INTRAMUSCULAR; INTRAVENOUS at 07:20

## 2024-09-17 RX ADMIN — ENOXAPARIN SODIUM 40 MG: 40 INJECTION SUBCUTANEOUS at 21:08

## 2024-09-17 RX ADMIN — DEXAMETHASONE SODIUM PHOSPHATE 10 MG: 10 INJECTION, SOLUTION INTRAMUSCULAR; INTRAVENOUS at 07:38

## 2024-09-17 RX ADMIN — LIDOCAINE HYDROCHLORIDE 5 ML: 10 INJECTION, SOLUTION INFILTRATION; PERINEURAL at 07:38

## 2024-09-17 RX ADMIN — KETAMINE HYDROCHLORIDE 20 MG: 10 INJECTION INTRAMUSCULAR; INTRAVENOUS at 07:38

## 2024-09-17 RX ADMIN — ROCURONIUM BROMIDE 30 MG: 50 INJECTION, SOLUTION INTRAVENOUS at 08:02

## 2024-09-17 RX ADMIN — Medication 2 G: at 07:20

## 2024-09-17 RX ADMIN — GABAPENTIN 250 MG: 250 SUSPENSION ORAL at 22:25

## 2024-09-17 RX ADMIN — KETOROLAC TROMETHAMINE 15 MG: 15 INJECTION, SOLUTION INTRAMUSCULAR; INTRAVENOUS at 17:31

## 2024-09-17 RX ADMIN — FENTANYL CITRATE 50 MCG: 50 INJECTION INTRAMUSCULAR; INTRAVENOUS at 07:38

## 2024-09-17 RX ADMIN — ROCURONIUM BROMIDE 70 MG: 50 INJECTION, SOLUTION INTRAVENOUS at 07:38

## 2024-09-17 RX ADMIN — SUGAMMADEX 250 MG: 100 INJECTION, SOLUTION INTRAVENOUS at 09:38

## 2024-09-17 RX ADMIN — POTASSIUM CHLORIDE, DEXTROSE MONOHYDRATE AND SODIUM CHLORIDE: 150; 5; 450 INJECTION, SOLUTION INTRAVENOUS at 21:06

## 2024-09-17 RX ADMIN — PROPOFOL 150 MCG/KG/MIN: 10 INJECTION, EMULSION INTRAVENOUS at 07:38

## 2024-09-17 RX ADMIN — FENTANYL CITRATE 50 MCG: 50 INJECTION INTRAMUSCULAR; INTRAVENOUS at 07:45

## 2024-09-17 RX ADMIN — KETOROLAC TROMETHAMINE 15 MG: 15 INJECTION, SOLUTION INTRAMUSCULAR; INTRAVENOUS at 22:24

## 2024-09-17 ASSESSMENT — ACTIVITIES OF DAILY LIVING (ADL)
ADLS_ACUITY_SCORE: 20
ADLS_ACUITY_SCORE: 23
ADLS_ACUITY_SCORE: 23
ADLS_ACUITY_SCORE: 20
ADLS_ACUITY_SCORE: 23
ADLS_ACUITY_SCORE: 20
ADLS_ACUITY_SCORE: 23
ADLS_ACUITY_SCORE: 20
ADLS_ACUITY_SCORE: 23
ADLS_ACUITY_SCORE: 20
ADLS_ACUITY_SCORE: 23
ADLS_ACUITY_SCORE: 23
ADLS_ACUITY_SCORE: 20
ADLS_ACUITY_SCORE: 20
ADLS_ACUITY_SCORE: 35
ADLS_ACUITY_SCORE: 20
ADLS_ACUITY_SCORE: 20
ADLS_ACUITY_SCORE: 22

## 2024-09-17 NOTE — PROGRESS NOTES
.    Care Plan Progress Note:    Name: Monika Franz  :   1965  MRN:   [id number]    Problem: Bariatric Procedure  Goal: Prevent post-op bariatric complications.  Appropriate oral intake.  Discharge needs are met.  Intervention:   Post Op Day 0/1 (progress as patient tolerates)   -Notify MD of excessive nausea   -NPO per MD orders; read exceptions to the order   -Toothette with 1/2 inch warm water at bedside until able to take PO   -Do not give ice chips, straws, or carbonation    -Whenever drinking liquids, patient must be upright with both feet on the floor   -No more than 30mL per sip   -All liquids must be at approximately room temperature (no extreme temperatures).   -After 2 hours of 30mL PO q30min, you may take 30mL as tolerated and advance to a clear liquid diet    -No oral pills until after the patient tolerates the 2 hours of 30mL sips (exceptions: sublingual medications can be given anytime; liquid gabapentin can be given after 1 hours of sips)   -Strict intake and output   -Bladder scan every 4 hours until voiding freely   -If tolerating sips, start bariatric clear liquid diet   -If tolerating bariatric clears, advance to a bariatric full liquid diet  Discharge Planning   -Educate patient about pill size   -Patient should take no pill greater than 1/4 inch   -Send pill cutter home with patient   -Nurse to review home discharge instructions  Outcome:    Shift  Intake:210  Output:100  Bladder Scan: NA  Pain:4/10  Incision: WNL   Nausea: Denies  Activity: 300  Incentive Spirometry:1500  Abdominal Binder: on     Theresa Velasco RN  2024  6:30 PM

## 2024-09-17 NOTE — PROGRESS NOTES
.Dual Skin Assessment Note:    Patient (Transferred )  from (PACU) to P2.     Comprehensive skin inspection completed by myself and (Jacob Claudio)    LDA Initiated for skin breakdown/non-blanchable redness: NA    Provider notified: AICHA    If yes, WOC Consult order obtained: AICHA

## 2024-09-17 NOTE — ANESTHESIA POSTPROCEDURE EVALUATION
Patient: Monika Franz    Procedure: Procedure(s):  DIVERSION, BILIOPANCREATIC, ROBOT-ASSISTED, LAPAROSCOPIC, WITH TRADITIONAL DUODENAL SWITCH       Anesthesia Type:  General    Note:     Postop Pain Control: Uneventful            Sign Out: Well controlled pain   PONV: No   Neuro/Psych: Uneventful            Sign Out: Acceptable/Baseline neuro status   Airway/Respiratory: Uneventful            Sign Out: Acceptable/Baseline resp. status   CV/Hemodynamics: Uneventful            Sign Out: Acceptable CV status; No obvious hypovolemia; No obvious fluid overload   Other NRE: NONE   DID A NON-ROUTINE EVENT OCCUR?            Last vitals:  Vitals Value Taken Time   /59 09/17/24 1200   Temp 36.5  C (97.7  F) 09/17/24 1208   Pulse 69 09/17/24 1208   Resp 12 09/17/24 1208   SpO2 94 % 09/17/24 1208   Vitals shown include unfiled device data.    Electronically Signed By: Danni Flores MD  September 17, 2024  12:10 PM

## 2024-09-17 NOTE — ANESTHESIA CARE TRANSFER NOTE
Patient: Monika Franz    Procedure: Procedure(s):  DIVERSION, BILIOPANCREATIC, ROBOT-ASSISTED, LAPAROSCOPIC, WITH TRADITIONAL DUODENAL SWITCH       Diagnosis: Obesity, Class III, BMI 40-49.9 (morbid obesity) (H) [E66.01]  Diagnosis Additional Information: No value filed.    Anesthesia Type:   General     Note:    Oropharynx: oropharynx clear of all foreign objects and spontaneously breathing  Level of Consciousness: drowsy  Oxygen Supplementation: face mask  Level of Supplemental Oxygen (L/min / FiO2): 8  Independent Airway: airway patency satisfactory and stable  Dentition: dentition unchanged  Vital Signs Stable: post-procedure vital signs reviewed and stable  Report to RN Given: handoff report given  Patient transferred to: PACU    Handoff Report: Identifed the Patient, Identified the Reponsible Provider, Reviewed the pertinent medical history, Discussed the surgical course, Reviewed Intra-OP anesthesia mangement and issues during anesthesia, Set expectations for post-procedure period and Allowed opportunity for questions and acknowledgement of understanding  Vitals:  Vitals Value Taken Time   BP 98/53 09/17/24 1005   Temp 36.3  C (97.34  F) 09/17/24 1006   Pulse 62 09/17/24 1006   Resp 14 09/17/24 1006   SpO2 97 % 09/17/24 1006   Vitals shown include unfiled device data.    Electronically Signed By: FALGUNI Melchor CRNA  September 17, 2024  10:07 AM

## 2024-09-17 NOTE — ANESTHESIA PROCEDURE NOTES
Airway       Patient location during procedure: OR       Procedure Start/Stop Times: 9/17/2024 7:41 AM  Staff -        CRNA: Salud Harmon APRN CRNA       Performed By: CRNA  Consent for Airway        Urgency: elective  Indications and Patient Condition       Indications for airway management: mei-procedural       Induction type:intravenous       Mask difficulty assessment: 2 - vent by mask + OA or adjuvant +/- NMBA    Final Airway Details       Final airway type: endotracheal airway       Successful airway: ETT - single and Oral  Endotracheal Airway Details        ETT size (mm): 7.0       Cuffed: yes       Cuff volume (mL): 5       Successful intubation technique: video laryngoscopy       VL Blade Size: Glidescope 4       Grade View of Cords: 1       Adjucts: stylet       Position: Right       Measured from: gums/teeth       Secured at (cm): 22       Bite block used: None    Post intubation assessment        Placement verified by: capnometry, equal breath sounds and chest rise        Number of attempts at approach: 1       Number of other approaches attempted: 0       Secured with: tape       Ease of procedure: easy       Dentition: Intact and Unchanged    Medication(s) Administered   Medication Administration Time: 9/17/2024 7:41 AM

## 2024-09-17 NOTE — PHARMACY-ADMISSION MEDICATION HISTORY
Pharmacist Admission Medication History    Admission medication history is complete. The information provided in this note is only as accurate as the sources available at the time of the update.    Information Source(s): Patient, Clinic records, and CareEverywhere/SureScripts via in-person    Changes made to PTA medication list:  Added: None  Deleted: naltrexone - completed  Changed: None    Allergies reviewed with patient and updates made in EHR: yes    Medication History Completed By: Francie Nelson [facility] 9/17/2024 6:33 AM    PTA Med List   Medication Sig Last Dose    amoxicillin (AMOXIL) 500 MG tablet TAKE 4 TABLETS BY MOUTH 1 HOUR BEFORE DENTAL APPOINTMENT Unknown    ARIPiprazole (ABILIFY) 5 MG tablet Take 5 mg by mouth daily  9/16/2024 at AM    Calcium Citrate 1040 MG TABS Take 1 tablet by mouth 3 times daily. > 1 WEEK    childrens multivitamin chewable tablet Take 1 tablet by mouth 2 times daily. Multivitamin must contain Vitamin A, Zinc and at least 18 mg of iron.     cholecalciferol 25 MCG (1000 UT) TABS Take 5,000 Units by mouth daily. > 1 WEEK    cyanocobalamin (VITAMIN B-12) 1000 MCG sublingual tablet Place 1 tablet (1,000 mcg) under the tongue daily. > 1 WEEK    desvenlafaxine (PRISTIQ) 100 MG 24 hr tablet Take 1 tablet by mouth daily. 9/16/2024 at AM    [START ON 9/18/2024] omeprazole (PRILOSEC) 20 MG DR capsule Take 1 capsule (20 mg) by mouth daily. Start day after surgery, open contents and sprinkle on food for first 6 weeks. Take daily for 3 months after surgery. Do not start before September 18, 2024. NOT STARTED YET    traZODone (DESYREL) 150 MG tablet Take 1 tablet by mouth nightly as needed 9/16/2024 at HS    triamcinolone (KENALOG) 0.1 % external cream Apply topically 2 times daily X 14 days NOT USING CURRENTLY    triamcinolone (KENALOG) 0.5 % external ointment Apply to affected area on the left calf twice daily x 14 days NOT USING CURRENTLY

## 2024-09-17 NOTE — ANESTHESIA PREPROCEDURE EVALUATION
Anesthesia Pre-Procedure Evaluation    Patient: Monika Franz   MRN: [id number] : 1965        Procedure : Procedure(s):  DIVERSION, BILIOPANCREATIC, ROBOT-ASSISTED, LAPAROSCOPIC, WITH TRADITIONAL DUODENAL SWITCH          Past Medical History:   Diagnosis Date    Adenomatous polyp of colon     Anxiety     Bariatric surgery status     Breast cancer (H) 2020    chemo and bilateral mastectomy    Cataract     Depression     Depressive disorder     Drug overdose, intentional (H) 2013    Eczema     Glaucoma     with phentermine    Lymphedema of arm     left    Morbid obesity (H)     Sleep apnea     Uses CPAP      Past Surgical History:   Procedure Laterality Date    ARTHROSCOPY KNEE      BACK SURGERY      BARIATRIC SURGERY  2019    BREAST SURGERY  2020    BUNIONECTOMY Right 2021    Procedure: BUNIONECTOMY right foot. Arthroplasty digits two, three, four and five right foot.;  Surgeon: Narinder Talbert DPM;  Location: [facility];  Service: Podiatry     SECTION      x 3    CHOLECYSTECTOMY      COLONOSCOPY  2021    GASTRECTOMY LAPAROSCOPIC SLEEVE  2019    Dr. Beverly wt 262# BMI 42.34    GYN SURGERY      HYSTERECTOMY, PAP NO LONGER INDICATED      LAPAROSCOPIC ABLATION ENDOMETRIOSIS      MASTECTOMY Bilateral 12/15/2020    WI INSJ TUNNELED CTR VAD W/SUBQ PORT AGE 5 YR/> Right 2020    Procedure: INSERTION VASCULAR ACCESS PORT UNDER ULTRASOUND AND FLUOROSCOPIC GUIDANCE RIGHT JUGULAR;  Surgeon: Allyson Douglas DO;  Location: [facility] OR;  Service: General    WI MASTECTOMY, SIMPLE, COMPLETE N/A 12/15/2020    Procedure: BILATERAL MASTECTOMY WITH LEFT SENTINEL LYMPH NODE BIOPSY, PORT REMOVAL;  Surgeon: Allyson Douglas DO;  Location: [facility] OR;  Service: General    WI RMVL DARRYL CTR VAD W/SUBQ PORT/ CTR/PRPH INSJ N/A 12/15/2020    Procedure: PORT REMOVAL;  Surgeon: Allyson Douglas DO;  Location: [facility];  Service: General    US BREAST  CORE BIOPSY LEFT Left 06/22/2020     SENTINEL NODE INJECTION  12/15/2020      Allergies   Allergen Reactions    Phentermine Other (See Comments)     Narrow angle glaucoma    Nsaids Nausea and Vomiting     After surgery      Social History     Tobacco Use    Smoking status: Never    Smokeless tobacco: Never   Substance Use Topics    Alcohol use: Not Currently      Wt Readings from Last 1 Encounters:   09/17/24 112 kg (247 lb)        Anesthesia Evaluation            ROS/MED HX  ENT/Pulmonary:     (+) sleep apnea,                                       Neurologic:  - neg neurologic ROS     Cardiovascular:  - neg cardiovascular ROS     METS/Exercise Tolerance:     Hematologic:  - neg hematologic  ROS     Musculoskeletal:  - neg musculoskeletal ROS     GI/Hepatic:  - neg GI/hepatic ROS     Renal/Genitourinary:  - neg Renal ROS     Endo:     (+)               Obesity (BMI 41),       Psychiatric/Substance Use:     (+) psychiatric history depression and anxiety       Infectious Disease:       Malignancy:       Other:            Physical Exam    Airway  airway exam normal      Mallampati: II   TM distance: > 3 FB   Neck ROM: full   Mouth opening: > 3 cm    Respiratory Devices and Support         Dental       (+) Completely normal teeth      Cardiovascular   cardiovascular exam normal          Pulmonary   pulmonary exam normal                OUTSIDE LABS:  CBC:   Lab Results   Component Value Date    WBC 7.5 08/29/2024    WBC 7.1 09/28/2023    HGB 13.7 08/29/2024    HGB 13.4 09/28/2023    HCT 41.5 08/29/2024    HCT 43.0 09/28/2023     08/29/2024     09/28/2023     BMP:   Lab Results   Component Value Date     08/29/2024     09/28/2023    POTASSIUM 4.1 08/29/2024    POTASSIUM 4.2 09/28/2023    CHLORIDE 101 08/29/2024    CHLORIDE 100 09/28/2023    CO2 27 08/29/2024    CO2 27 09/28/2023    BUN 17.3 08/29/2024    BUN 10.0 09/28/2023    CR 0.78 08/29/2024    CR 0.68 09/28/2023    GLC 94 09/17/2024    GLC  "93 08/29/2024     COAGS:   Lab Results   Component Value Date    PTT 34 08/29/2024    INR 0.97 08/29/2024     POC: No results found for: \"BGM\", \"HCG\", \"HCGS\"  HEPATIC:   Lab Results   Component Value Date    ALBUMIN 4.4 09/09/2024    PROTTOTAL 7.0 09/09/2024    ALT 30 09/09/2024    AST 29 09/09/2024    ALKPHOS 71 09/09/2024    BILITOTAL 0.2 09/09/2024     OTHER:   Lab Results   Component Value Date    A1C 5.4 02/06/2024    CECIL 9.8 08/29/2024    TSH 0.88 02/06/2024       Anesthesia Plan    ASA Status:  3    NPO Status:  NPO Appropriate    Anesthesia Type: General.     - Airway: ETT   Induction: Intravenous.   Maintenance: TIVA.        Consents    Anesthesia Plan(s) and associated risks, benefits, and realistic alternatives discussed. Questions answered and patient/representative(s) expressed understanding.     - Discussed:     - Discussed with:  Patient      - Extended Intubation/Ventilatory Support Discussed: No.      - Patient is DNR/DNI Status: No          Postoperative Care    Pain management: Multi-modal analgesia.   PONV prophylaxis: Ondansetron (or other 5HT-3), Dexamethasone or Solumedrol     Comments:    Other Comments:   GETA - TIVA with propofol  Decadron, zofran, emend  Dilaudid prn, fentanyl prn, ketamine prn up to 50mg           Danni Flores MD    I have reviewed the pertinent notes and labs in the chart from the past 30 days and (re)examined the patient.  Any updates or changes from those notes are reflected in this note.              # Obesity: Estimated body mass index is 39.87 kg/m  as calculated from the following:    Height as of 8/29/24: 1.676 m (5' 6\").    Weight as of this encounter: 112 kg (247 lb).      "

## 2024-09-17 NOTE — INTERVAL H&P NOTE
"I have reviewed the surgical (or preoperative) H&P that is linked to this encounter, and examined the patient. There are no significant changes    Dimas Rios MD, [facility]  Office: [phone]  [facility]   General and Bariatric Surgery      Clinical Conditions Present on Arrival:  Clinically Significant Risk Factors Present on Admission                      # Obesity: Estimated body mass index is 39.87 kg/m  as calculated from the following:    Height as of 8/29/24: 1.676 m (5' 6\").    Weight as of this encounter: 112 kg (247 lb).       "

## 2024-09-17 NOTE — OP NOTE
[facility]  Operative Note    Pre-operative diagnosis: Obesity, Class III, BMI 40-49.9 (morbid obesity) (H) [E66.01]   Post-operative diagnosis Severe obesity   Procedure: Procedure(s):  DIVERSION, BILIOPANCREATIC, ROBOT-ASSISTED, LAPAROSCOPIC, WITH TRADITIONAL DUODENAL SWITCH   Surgeon: Dimas Rios MD   Assistants(s): The assistance of Roxie Dupree CNP, was necessary for retraction and exposure during the course of the case.   Anesthesia: General    Estimated blood loss: Less than 50 ml        Operative Indication:  Monika Franz has a Body mass index is 39.87 kg/m . with associated co morbidities including obstructive sleep apnea requiring CPAP, bilateral left upper extremity lymphedema, osteoarthritis of the knees.  He previously underwent a sleeve gastrectomy which did help with some of her weight loss, but unfortunately gained much of this weight back following several surgeries on her feet and bilateral mastectomy secondary to breast cancer.  With the weight regain she was interested in revisional bariatric surgery to assist with losing additional weight.  she is therefore pursuing surgical bariatric surgery as a means of achieving long term weight loss and resolution of her bariatric comorbidities.       Drains: None   Specimens: None       Findings: None.   Complications: None.           Description of procedure:    The patient was brought to the operating room where after induction of general anesthesia with endotracheal intubation, they were positioned with both arms out.  After a procedural pause, we began by injecting quarter percent Marcaine and the skin 20 cm inferior to the xiphoid process and just to the left of midline.  This was then sharply incised and access to the abdomen gained with a  varies needle.  The abdomen was insufflated.  We then proceeded to place 2 8 and 2 12 mm trocar across the mid abdomen   Bilateral TAP blocks were performed under visual guidance  with 0.25% marcaine, 25 cc per side.  The da Fredis X Xi robot was then docked.    We began by identifying the cecum.  From here we identified the terminal ileum, and measured this proximally 300 cm, leaving a payton at the 250 cm payton.  This was then sutured to the omentum just inferior to the pylorus with a loose 2-0 silk suture.  An additional marking stitch was placed on the proximal small bowel limb to indicate the proximal small bowel.    With the previous sleeve gastrectomy already in place, we began by dividing the omentum away from the antrum of the stomach, pylorus and bulb of the duodenum.  We then proceeded to dissect posterior to the duodenum overlying the gastroduodenal artery.    Once this was fully mobilized, we then proceeded to divide the peritoneal lining over the superolateral border of the bulb of the duodenum.  Once this was complete, we were able to pass a grasper posterior to the duodenal bulb, creating a window posterior to the bulb and first portion of the duodenum.  Satisfied with our mobilization of the stomach, we then had our anesthesia colleagues advance a 32 Romanian VISI G-tube into the stomach where was then manipulated until it lay flush against the lesser curvature stomach, terminating distal to the pylorus.      We next turned our attention to division of the duodenum.  A 60 mm white load stapler was brought in through the left upper quadrant port, and utilized to divide the duodenum approximately 3 cm distal to the pylorus.  Utilizing a 3-0 V-Loc suture, the duodenum was then affixed to the small bowel at the 300 cm payton in running fashion, oversewing the staple line.  2 opposing enterotomies were then made with cautery and an end to side duodenal ileostomy created with a single firing of the white load stapler.  The orogastric tube was then advanced across the anastomosis.  The common enterotomy was then closed with a running 2-0 Vicryl suture.  Once the anastomosis was completed  the small bowel proximal to the anastomosis was divided with a white load 60 mm stapler.   ICG was instilled through the orogastric tube for a leak test; no leak was identified.    We then sutured the tip of our biliopancreatic limb to our small bowel on the common channel at the previously marked 250 cm site.  Opposing enterotomies were then created and a side-to-side enteroenterostomy created with a single firing of the white load 60 mm stapler.  The common enterotomy was then closed with a 2-0 Vicryl suture.  The mesenteric defects were then closed with running 2-0 V-Loc suture.  Once this was completed all sutures and needles were removed.  The ports were then removed and the skin closed with interrupted 4-0 Vicryl sutures.      Dimas Rios MD, FACS  Office: [phone]  [facility]   General and Bariatric Surgery

## 2024-09-18 VITALS
BODY MASS INDEX: 40.22 KG/M2 | DIASTOLIC BLOOD PRESSURE: 58 MMHG | RESPIRATION RATE: 16 BRPM | OXYGEN SATURATION: 97 % | SYSTOLIC BLOOD PRESSURE: 98 MMHG | HEART RATE: 61 BPM | TEMPERATURE: 98 F | WEIGHT: 249.2 LBS

## 2024-09-18 LAB — PLATELET # BLD AUTO: 153 10E3/UL (ref 150–450)

## 2024-09-18 PROCEDURE — 250N000011 HC RX IP 250 OP 636: Performed by: NURSE PRACTITIONER

## 2024-09-18 PROCEDURE — 258N000003 HC RX IP 258 OP 636: Performed by: NURSE PRACTITIONER

## 2024-09-18 PROCEDURE — 999N000157 HC STATISTIC RCP TIME EA 10 MIN

## 2024-09-18 PROCEDURE — 250N000013 HC RX MED GY IP 250 OP 250 PS 637: Performed by: NURSE PRACTITIONER

## 2024-09-18 PROCEDURE — 36415 COLL VENOUS BLD VENIPUNCTURE: CPT | Performed by: SURGERY

## 2024-09-18 PROCEDURE — 85049 AUTOMATED PLATELET COUNT: CPT | Performed by: SURGERY

## 2024-09-18 RX ORDER — GABAPENTIN 250 MG/5ML
250 SOLUTION ORAL EVERY 8 HOURS
Qty: 60 ML | Refills: 0 | Status: SHIPPED | OUTPATIENT
Start: 2024-09-18 | End: 2024-10-01

## 2024-09-18 RX ORDER — TRAMADOL HYDROCHLORIDE 100 MG/1
100 TABLET, COATED ORAL EVERY 6 HOURS PRN
Qty: 10 TABLET | Refills: 0 | Status: SHIPPED | OUTPATIENT
Start: 2024-09-18 | End: 2024-10-01

## 2024-09-18 RX ADMIN — KETOROLAC TROMETHAMINE 15 MG: 15 INJECTION, SOLUTION INTRAMUSCULAR; INTRAVENOUS at 11:05

## 2024-09-18 RX ADMIN — Medication 1000 MCG: at 08:59

## 2024-09-18 RX ADMIN — ARIPIPRAZOLE 5 MG: 5 TABLET ORAL at 08:59

## 2024-09-18 RX ADMIN — DESVENLAFAXINE 100 MG: 50 TABLET, FILM COATED, EXTENDED RELEASE ORAL at 08:59

## 2024-09-18 RX ADMIN — ACETAMINOPHEN 975 MG: 325 SOLUTION ORAL at 06:51

## 2024-09-18 RX ADMIN — TRAMADOL HYDROCHLORIDE 100 MG: 50 TABLET, COATED ORAL at 01:19

## 2024-09-18 RX ADMIN — ACETAMINOPHEN 975 MG: 325 SOLUTION ORAL at 01:20

## 2024-09-18 RX ADMIN — POTASSIUM CHLORIDE, DEXTROSE MONOHYDRATE AND SODIUM CHLORIDE: 150; 5; 450 INJECTION, SOLUTION INTRAVENOUS at 04:31

## 2024-09-18 RX ADMIN — GABAPENTIN 250 MG: 250 SUSPENSION ORAL at 13:45

## 2024-09-18 RX ADMIN — OMEPRAZOLE 20 MG: 20 CAPSULE, DELAYED RELEASE ORAL at 06:51

## 2024-09-18 RX ADMIN — GABAPENTIN 250 MG: 250 SUSPENSION ORAL at 06:51

## 2024-09-18 RX ADMIN — ACETAMINOPHEN 975 MG: 325 TABLET ORAL at 13:45

## 2024-09-18 RX ADMIN — Medication 1 TABLET: at 08:59

## 2024-09-18 RX ADMIN — KETOROLAC TROMETHAMINE 15 MG: 15 INJECTION, SOLUTION INTRAMUSCULAR; INTRAVENOUS at 04:31

## 2024-09-18 ASSESSMENT — ACTIVITIES OF DAILY LIVING (ADL)
ADLS_ACUITY_SCORE: 23
ADLS_ACUITY_SCORE: 22
ADLS_ACUITY_SCORE: 23
ADLS_ACUITY_SCORE: 22
ADLS_ACUITY_SCORE: 23

## 2024-09-18 NOTE — PROGRESS NOTES
Time in: 1:30 pm  /Time out: 2:00 pm   Pt presents for 1 week post op dietitian follow up. Educated pt on pureed and soft to bariatric regular diets. Provided grocery list and sample meal plan for each diet stage.  Pt will begin softs/bariatric regular diet on 9/25/2024. Instructed pt to begin 500 mg calcium citrate BID and 5000IU Vitamin D3 3 weeks post op. Pt to follow up with RD at 3 months post op.     Sienna Milan RD

## 2024-09-18 NOTE — PROGRESS NOTES
Patient using own CPAP unit here. System checked and in working condition. Currently on RA. Patient to put interface on by herself. Writer/RT will be available if assistance with CPAP system is needed.     /67 (BP Location: Right arm)   Pulse 61   Temp 97.4  F (36.3  C) (Oral)   Resp 15   Wt 113 kg (249 lb 3.2 oz)   LMP  (LMP Unknown)   SpO2 93%   BMI 40.22 kg/m       Bright Moralez, RRT

## 2024-09-18 NOTE — PLAN OF CARE
Goal Outcome Evaluation:             Care Plan Progress Note:    Name: Monika Franz  :   1965  MRN:   [id number]    Problem: Bariatric Procedure  Goal: Prevent post-op bariatric complications.  Appropriate oral intake.  Discharge needs are met.  Intervention:   Post Op Day 0/1 (progress as patient tolerates)   -Notify MD of excessive nausea   -NPO per MD orders; read exceptions to the order   -Toothette with 1/2 inch warm water at bedside until able to take PO   -Do not give ice chips, straws, or carbonation    -Whenever drinking liquids, patient must be upright with both feet on the floor   -No more than 30mL per sip   -All liquids must be at approximately room temperature (no extreme temperatures).   -After 2 hours of 30mL PO q30min, you may take 30mL as tolerated and advance to a clear liquid diet    -No oral pills until after the patient tolerates the 2 hours of 30mL sips (exceptions: sublingual medications can be given anytime; liquid gabapentin can be given after 1 hours of sips)   -Strict intake and output   -Bladder scan every 4 hours until voiding freely   -If tolerating sips, start bariatric clear liquid diet   -If tolerating bariatric clears, advance to a bariatric full liquid diet  Discharge Planning   -Educate patient about pill size   -Patient should take no pill greater than 1/4 inch   -Send pill cutter home with patient   -Nurse to review home discharge instructions  Outcome:    Shift  Intake: good intake, tolerated full liquids, per Roxie H, does not need to use med cups  Output: 400 ml   Bladder Scan: N/A  Pain: 4/10, taking scheduled meds  Incision: 4 lap sites with steri strips and bandaid, mid left with dried drainage  Nausea: denies   Activity: up ind  Incentive Spirometry: getting to , good tolerance  Abdominal Binder: on    Gala Byrd RN  2024  3:04 PM

## 2024-09-18 NOTE — PROGRESS NOTES
Bariatric Surgery Progress Note    1 Day Post-Op    Procedure(s):  DIVERSION, BILIOPANCREATIC, ROBOT-ASSISTED, LAPAROSCOPIC, WITH TRADITIONAL DUODENAL SWITCH    Subjective:   Patient reports pain is controlled with scheduled medications and tramadol as needed. Patient is tolerating bariatric full liquid diet, ambulating and voiding. Patient denies fever, chills, dizziness, blurred vision, headache, nausea, vomiting, SOB, CP, and leg pain.    Patient Vitals for the past 24 hrs:   BP Temp Temp src Pulse Resp SpO2 Weight   09/18/24 0809 -- -- -- -- 16 97 % --   09/18/24 0747 98/58 98  F (36.7  C) Axillary -- 16 100 % --   09/18/24 0348 114/67 97.4  F (36.3  C) Oral 61 15 93 % --   09/17/24 2346 106/57 97.7  F (36.5  C) Oral 66 14 93 % --   09/17/24 2009 121/75 98  F (36.7  C) Oral 75 14 94 % --   09/17/24 1615 112/73 97.9  F (36.6  C) Oral 75 14 92 % --   09/17/24 1517 129/72 98.4  F (36.9  C) Oral 79 14 95 % --   09/17/24 1512 -- -- -- -- -- -- 113 kg (249 lb 3.2 oz)   09/17/24 1400 116/59 97.7  F (36.5  C) -- 78 12 -- --   09/17/24 1330 115/57 97.7  F (36.5  C) -- 74 13 93 % --   09/17/24 1300 116/61 98.1  F (36.7  C) -- 78 -- -- --   09/17/24 1230 113/58 97.9  F (36.6  C) -- 73 10 94 % --   09/17/24 1200 109/59 97.7  F (36.5  C) -- 70 10 95 % --   09/17/24 1145 -- 97.7  F (36.5  C) -- 67 -- 94 % --   09/17/24 1130 105/55 97.7  F (36.5  C) -- 65 10 94 % --       Physical Exam:  General: A/O, NAD  Ab:       Soft, + BS, expected ttp POD 1; The wound/ dressings are clean, dry, and intact  :      Patient is voiding adequate amount    Admission on 09/17/2024   Component Date Value    GLUCOSE BY METER POCT 09/17/2024 94     GLUCOSE BY METER POCT 09/17/2024 174 (H)     Platelet Count 09/18/2024 153        Assessment/Plan:   Patient is progressing well after surgery. Once she is meeting oral intake goals, she should be able to discharge home today. Discharge orders and instructions are placed    Discussed discharge  instructions with the patient along with signs and symptoms to watch for post-op.  Patient verbalized understanding of the plan, including:    - Use of incentive spirometer and walking as tolerated   - Use of abdominal binder if needed   - Importance of getting 48-64 ounces of water in daily for hydration    - Use of medication cups for measuring out sips for the next 3-4 days.   - Bariatric full liquid diet for the next week.   - Drink a protein shake providing 20-30 grams of protein in addition to meals daily; aim for a total of 40 grams of protein daily   - Attend the post-op dietary class next week   - Take omeprazole daily for the next 3 months and avoid/eliminate NSAID usage   - Take chewable MVI with 18mg iron twice a day and SL B12 1,000-2,500 mcg daily.    - Call Bariatric clinic with any questions or concerns   - If patient needs to be seen in the emergency department for any reason, she needs to return to [facility].    Pt is scheduled to follow up at Bariatric Clinic next week.  Patient verbalized understanding of the plan. Bariatric nurse clinician will call her in a couple days when she gets home to check in with her.    Greater than 45 minutes were spent with this patient with more than 50% of that time spent on education.    Roxie Dupree, CNP  [phone]  [facility] General and Bariatric Surgery

## 2024-09-18 NOTE — DISCHARGE INSTRUCTIONS
If you are worried about whether or not you need to be seen or if something is wrong, please call your bariatric nurse line at [phone] or the bariatric clinic at [phone]. If you need to be seen in the emergency department for any reason in the next 30 days, please return to [facility]. The bariatric team should be involved in your care/diet even if the problem is unrelated to your surgery.

## 2024-09-18 NOTE — DISCHARGE SUMMARY
Bariatric Surgery Discharge Summary    Primary Care Physician:  Donna Holt    Discharge Provider: FALGUNI Bryan CNP  Admission Date: 9/17/2024  Discharge Date: September 18, 2024     Primary Diagnosis at Discharge:   Patient Active Problem List   Diagnosis    REBECCA (obstructive sleep apnea)    Bariatric surgery status    Eczema    Drug overdose, intentional (H)    Malignant neoplasm of upper-inner quadrant of left breast in female, estrogen receptor negative (H)    Narrow angle glaucoma suspect of both eyes    Severe recurrent major depression without psychotic features (H)    Adenomatous polyp of colon    Hammer toe of right foot    Morbid obesity (H)    Lymphedema of arm, left    Morbid (severe) obesity due to excess calories (H)      Disposition: Home   Condition at Discharge: Stable     Surgery: Robotic Assisted Duodenal Switch     Hospital Summary:   Monika Franz was admitted for morbid obesity.  she underwent an uncomplicated robotic assisted duodenal switch.  Following a brief recovery in the PACU, she was transferred to the Med/Surg floor for the remainder of her stay.  her post operative course was uneventful.  On POD # 1 she was discharged home tolerating a full liquid diet, ambulating without assistance, and pain controlled with oral analgesics.        Discharge Medications:      Medication List        Started      gabapentin 250 MG/5ML solution  Commonly known as: NEURONTIN  250 mg, Oral, EVERY 8 HOURS     traMADol HCl 100 MG Tabs  100 mg, Oral, EVERY 6 HOURS PRN     Tylenol Dissolve Packs 500 MG Pack  Generic drug: Acetaminophen  1,000 mg, Oral, EVERY 6 HOURS              Discharge Instructions:  Follow up appointment with Primary Care Physician: Donna Holt  Follow up appointment with Dr. Rios in 2 weeks  Attend the post-op dietary class as scheduled    Post operative instructions:   Diet:     For one week following your surgery or until you meet with the dietician, you will be on a full  liquid diet.  It is extremely important to follow the liquid diet to allow for optimal healing and to prevent food from becoming lodged at the gastric outlet.    Protein is an important part of the diet after surgery; therefore, it is necessary to drink fluids that are high in protein.  Proteins are building blocks that help you heal after surgery and help preserve your muscle mass while you are losing weight.      Including carbohydrates in the diet is also important after surgery to prevent your body from burning fat for energy (Ketosis).  These carbohydrates include: unsweetened applesauce, blended canned fruit (in its own juice), Stage I baby food fruit, thin cream of wheat, light yogurt (no fruit chunks), or 100% fruit juice diluted (50% juice/50% water).     Remember to take 1 Multivitamin with Iron 2 times daily and 1000 mcg of Sublingual B-12 daily.       Aim for 40-50 grams of protein daily during this week.    Sip 48-64 ounces of liquid per day in addition to full liquid meals/supplements.    After 2 weeks of the full liquid diet, you will advance to the pureed diet, as instructed.    Activity: You should continue to be active at home including ambulating frequently.  If possible try to limit the amount of time spent in bed.    Restrictions: you should avoid lifting anything more than 20 pounds or strenuous physical activity for a total of 2 weeks.        FALGUNI Vazquez CNP  [phone]  [facility] General and Bariatric Surgery

## 2024-09-18 NOTE — PLAN OF CARE
Care Plan Progress Note:    Name: Monika Franz  :   1965  MRN:   [id number]    Problem: Bariatric Procedure  Goal: Prevent post-op bariatric complications.  Appropriate oral intake.  Discharge needs are met.  Intervention:   Post Op Day 0/1 (progress as patient tolerates)   -Notify MD of excessive nausea   -NPO per MD orders; read exceptions to the order   -Toothette with 1/2 inch warm water at bedside until able to take PO   -Do not give ice chips, straws, or carbonation    -Whenever drinking liquids, patient must be upright with both feet on the floor   -No more than 30mL per sip   -All liquids must be at approximately room temperature (no extreme temperatures).   -After 2 hours of 30mL PO q30min, you may take 30mL as tolerated and advance to a clear liquid diet    -No oral pills until after the patient tolerates the 2 hours of 30mL sips (exceptions: sublingual medications can be given anytime; liquid gabapentin can be given after 1 hours of sips)   -Strict intake and output   -Bladder scan every 4 hours until voiding freely   -If tolerating sips, start bariatric clear liquid diet   -If tolerating bariatric clears, advance to a bariatric full liquid diet  Discharge Planning   -Educate patient about pill size   -Patient should take no pill greater than 1/4 inch   -Send pill cutter home with patient   -Nurse to review home discharge instructions  Outcome:    Shift  Intake: 180 ml oral, 1062ml IV  Output: 200 ml voided  Bladder Scan: N/A  Pain: Up to 6/10 abdominal, given tramadol x1 with relief.  Incision: Lower left side lap site with small amount of bleeding, rest  of sitesC/D/I.  Nausea: Denies  Activity: Up with SBA  Incentive Spirometry: Not in use while asleep  Abdominal Binder: On overnight    Indio Hernandez RN  2024  7:05 AM

## 2024-09-18 NOTE — PLAN OF CARE
Problem: Adult Inpatient Plan of Care  Goal: Optimal Comfort and Wellbeing  Intervention: Monitor Pain and Promote Comfort  Recent Flowsheet Documentation  Taken 9/18/2024 1119 by Gala Byrd RN  Pain Management Interventions: emotional support  Taken 9/18/2024 1105 by Gala Byrd RN  Pain Management Interventions: medication (see MAR)  Taken 9/18/2024 0809 by Gala Byrd RN  Pain Management Interventions: emotional support     Problem: Adult Inpatient Plan of Care  Goal: Absence of Hospital-Acquired Illness or Injury  Intervention: Prevent Skin Injury  Recent Flowsheet Documentation  Taken 9/18/2024 0810 by Gala Byrd RN  Body Position: position changed independently      Goal Outcome Evaluation:         Pt admitted for duodenal switch with Dr Rios  A/Ox4  VSS on RA  C/o 4/10 pain, taking scheduled meds.   Up ind. Walking halls.   Richard full liq diet.   R PIV SL.   Up to BR, voiding adequately this shift.   4 lap sites with steri-strips and bandaid, L mid with dried drainage.   Discharge pending to home. Discharge meds here.   Nursing to continue to monitor.

## 2024-09-18 NOTE — PROGRESS NOTES
Care Plan Progress Note:    Name: Monika Franz  :   1965  MRN:   [id number]    Problem: Bariatric Procedure  Goal: Prevent post-op bariatric complications.  Appropriate oral intake.  Discharge needs are met.  Intervention:   Post Op Day 0/1 (progress as patient tolerates)   -Notify MD of excessive nausea   -NPO per MD orders; read exceptions to the order   -Toothette with 1/2 inch warm water at bedside until able to take PO   -Do not give ice chips, straws, or carbonation    -Whenever drinking liquids, patient must be upright with both feet on the floor   -No more than 30mL per sip   -All liquids must be at approximately room temperature (no extreme temperatures).   -After 2 hours of 30mL PO q30min, you may take 30mL as tolerated and advance to a clear liquid diet    -No oral pills until after the patient tolerates the 2 hours of 30mL sips (exceptions: sublingual medications can be given anytime; liquid gabapentin can be given after 1 hours of sips)   -Strict intake and output   -Bladder scan every 4 hours until voiding freely   -If tolerating sips, start bariatric clear liquid diet   -If tolerating bariatric clears, advance to a bariatric full liquid diet  Discharge Planning   -Educate patient about pill size   -Patient should take no pill greater than 1/4 inch   -Send pill cutter home with patient   -Nurse to review home discharge instructions  Outcome:    Shift  Intake:po 300mL, 1009mL IV  Output: 200mL  Bladder Scan: voiding freely  Pain: controlled with scheduled meds  Incision: CDI  Nausea: none  Activity: ad gayle, ambulating in driscoll  Incentive Spirometry: 1500  Abdominal Binder:on    Nate Rapp RN  2024  11:18 PM

## 2024-09-19 ENCOUNTER — PATIENT OUTREACH (OUTPATIENT)
Dept: INTERNAL MEDICINE | Facility: CLINIC | Age: 59
End: 2024-09-19
Payer: COMMERCIAL

## 2024-09-19 RX ORDER — TRAMADOL HYDROCHLORIDE 50 MG/1
100 TABLET ORAL EVERY 6 HOURS PRN
COMMUNITY
Start: 2024-09-18 | End: 2024-10-01

## 2024-09-19 NOTE — TELEPHONE ENCOUNTER
Transitions of Care Outreach  Chief Complaint   Patient presents with    Hospital F/U       Most Recent Admission Date: 9/17/2024   Most Recent Admission Diagnosis: Obesity, Class III, BMI 40-49.9 (morbid obesity) (H) - E66.01     Most Recent Discharge Date: 9/18/2024   Most Recent Discharge Diagnosis: Morbid (severe) obesity due to excess calories (H) - E66.01  S/P biliopancreatic diversion with duodenal switch - Z98.84  Intestinal malabsorption - K90.9     Transitions of Care Assessment    Discharge Assessment  How are you doing now that you are home?: Doing good  How are your symptoms? (Red Flag symptoms escalate to triage hotline per guidelines): Other (post op)  Do you know how to contact your clinic care team if you have future questions or changes to your health status? : Yes  Does the patient have their discharge instructions? : Yes  Does the patient have questions regarding their discharge instructions? : No  Were you started on any new medications or were there changes to any of your previous medications? : Yes  Does the patient have all of their medications?: Yes  Do you have questions regarding any of your medications? : No  Do you have all of your needed medical supplies or equipment (DME)?  (i.e. oxygen tank, CPAP, cane, etc.): Yes    Follow up Plan     Discharge Follow-Up  Discharge follow up appointment scheduled in alignment with recommended follow up timeframe or Transitions of Risk Category? (Low = within 30 days; Moderate= within 14 days; High= within 7 days): Yes  Discharge Follow Up Appointment Date: 09/24/24  Discharge Follow Up Appointment Scheduled with?: Specialty Care Provider    Future Appointments   Date Time Provider Department Center   9/24/2024  1:30 PM [facility]   10/1/2024  1:00 PM [facility]   10/3/2024  2:30 [facility]   10/3/2024  3:00 PM Amando Monson MD [facility]   10/31/2024 10:30 AM  [facility]   11/20/2024  3:30 [facility]   12/17/2024  8:30 AM [facility]   12/18/2024  8:30 AM [facility]   3/18/2025  3:00 PM [facility]   6/17/2025  8:00 AM [facility]   9/17/2025  8:00 AM [facility]       Outpatient Plan as outlined on AVS reviewed with patient.    For any urgent concerns, please contact our 24 hour nurse triage line: [phone] ([phone])       David Drummond RN

## 2024-09-20 ENCOUNTER — TELEPHONE (OUTPATIENT)
Dept: SURGERY | Facility: CLINIC | Age: 59
End: 2024-09-20
Payer: COMMERCIAL

## 2024-09-20 NOTE — TELEPHONE ENCOUNTER
Post-Op Phone Call  [facility]    Surgeon: Dimas Rios M.D.  Date of Surgery: 9/17/2024  Discharge Date: 9/18/2024    Date/Time Called:   Date: 9/20/2024 Time: 3:23 PM   Attempt: First    Patient unavailable, message left to call [facility] with questions/problems? Yes    Date/Time Called:   Date: 9/20/2024 Time: 3:46 PM   Attempt: Second    Pain Control:  Intensity: No Pain (0)  Duration/Location/Explain:   What makes it better/worse?     Medications:  Narcotic Use - No  Drug type:   Frequency:     Non-prescription pain control: today is last day for Tylenol.    Other medications currently taking: see med list.     Complete Multivitamin + Iron BID? Yes    Vitamin B12? sublingual daily    Incisions:  Drainage? clean and dry  Comment: Steri strips on.     Intake/Output:  Fluid Intake(oz/day)? 64 ounces  Fluid type? Water, propel and vitamin water    Heartburn? No  Counseling: Omeprazole daily  Nausea? No  Vomiting? No  Explain:     Voiding Frequency? 4 or more/day     Voiding-Color/Amount? Good.     Flatus? Yes    Bowel Movement?No     Are you using your incentive spirometer? If yes, how often? 3+/day    Any fever type symptoms? No  Explain:     Walking activity?   Frequency/Type: walking around the house, walks around the block for 30 min.     In Preparation for Surgery:  On a scale of 1-5, with 5 being the highest, how well did the pre-op class prepare you for what actually happened in the hospital? 5  If you were unable to give us a 5, what could we have done to earn a 5?     Is there anything that you wish you would have known prior to surgery that you did not know? If yes, what? Something were a little different d/t her being a revision.    On a scale of 1-5, with 5 being the highest, how was the service while you were in the hospital? 5  If you were unable to give us a 5, what could we have done to earn a 5?     Is/was there anyone in particular; nurse, aide, hospital staff, that  did a great job and you would like us to recognize? If yes, whom. Roxie Dupree CNP  What did they do? She is awesome!!    Would you recommend [facility] to others? Yes  If no, can you explain why?     Thank you for your time. Please do not hesitate to call us with any questions or concerns.    Call completed by:   Jadyn Simpson RN, SILAS [facility]  P [phone]  F [phone]            Call completed by:   Jadyn Simpson RN, SILAS  [facility]  P [phone]  F [phone]

## 2024-09-24 ENCOUNTER — VIRTUAL VISIT (OUTPATIENT)
Dept: SURGERY | Facility: CLINIC | Age: 59
End: 2024-09-24
Payer: COMMERCIAL

## 2024-09-24 DIAGNOSIS — Z98.84 BARIATRIC SURGERY STATUS: Primary | ICD-10-CM

## 2024-09-24 NOTE — PROGRESS NOTES
Return to work form completed.     RTW on 10/01/2024 with no restrictions.    Forms faxed to: [phone], [place] [address]    [facility] [place]  [facility] [place] [address] [address]  Office: [phone]  Fax: [phone]

## 2024-09-24 NOTE — LETTER
9/24/2024      Monika Franz  [address] [address]      Dear Colleague,    Thank you for referring your patient, Monika Franz, to the [facility] [place]. Please see a copy of my visit note below.    Time in: 1:30 pm  /Time out: 2:00 pm   Pt presents for 1 week post op dietitian follow up. Educated pt on pureed and soft to bariatric regular diets. Provided grocery list and sample meal plan for each diet stage.  Pt will begin softs/bariatric regular diet on 9/25/2024. Instructed pt to begin 500 mg calcium citrate BID and 5000IU Vitamin D3 3 weeks post op. Pt to follow up with RD at 3 months post op.     Sienna Milan RD       Again, thank you for allowing me to participate in the care of your patient.        Sincerely,        Sienna Milan RD

## 2024-10-01 ENCOUNTER — VIRTUAL VISIT (OUTPATIENT)
Dept: SURGERY | Facility: CLINIC | Age: 59
End: 2024-10-01
Payer: COMMERCIAL

## 2024-10-01 VITALS — BODY MASS INDEX: 37.77 KG/M2 | WEIGHT: 235 LBS | HEIGHT: 66 IN

## 2024-10-01 DIAGNOSIS — Z98.890 POST-OPERATIVE STATE: Primary | ICD-10-CM

## 2024-10-01 PROCEDURE — 99024 POSTOP FOLLOW-UP VISIT: CPT | Mod: 95 | Performed by: SURGERY

## 2024-10-01 NOTE — LETTER
10/1/2024      Monika Franz  [address] [id number]      Dear Colleague,    Thank you for referring your patient, Monika Franz, to the [facility] AND [facility]RICS C[facility] [place]. Please see a copy of my visit note below.    Monika Franz is 58 year old  female who presents for a billable video visit today.    How would you like to obtain your AVS? MyChart  If dropped from the video visit, the video invitation should be resent by: Text to cell phone: [phone]  Will anyone else be joining your video visit? No      Video Start Time: 1:05 PM      Provider Notes: HPI: Pt is here for follow up of a traditional duodenal switch. She is doing well. Taking po well, estimating she is getting in 50+ oz of fluid. No vomiting.  No pain with drinking or eating purees. No fevers or chills. Ambulating without problems.     Current Outpatient Medications   Medication Sig Dispense Refill     ARIPiprazole (ABILIFY) 5 MG tablet Take 5 mg by mouth daily        Calcium Citrate 1040 MG TABS Take 1 tablet by mouth 3 times daily.       childrens multivitamin chewable tablet Take 1 tablet by mouth 2 times daily. Multivitamin must contain Vitamin A, Zinc and at least 18 mg of iron. 180 tablet 3     cholecalciferol 25 MCG (1000 UT) TABS Take 5,000 Units by mouth daily.       cyanocobalamin (VITAMIN B-12) 1000 MCG sublingual tablet Place 1 tablet (1,000 mcg) under the tongue daily. 90 tablet 3     desvenlafaxine (PRISTIQ) 100 MG 24 hr tablet Take 1 tablet by mouth daily.       omeprazole (PRILOSEC) 20 MG DR capsule Take 1 capsule (20 mg) by mouth daily. Start day after surgery, open contents and sprinkle on food for first 6 weeks. Take daily for 3 months after surgery. Do not start before September 18, 2024. 90 capsule 0     traZODone (DESYREL) 150 MG tablet Take 1 tablet by mouth nightly as needed       triamcinolone (KENALOG) 0.1 % external cream Apply topically 2 times daily X 14 days 30 g 0  "    triamcinolone (KENALOG) 0.5 % external ointment Apply to affected area on the left calf twice daily x 14 days 15 g 0     amoxicillin (AMOXIL) 500 MG tablet TAKE 4 TABLETS BY MOUTH 1 HOUR BEFORE DENTAL APPOINTMENT (Patient not taking: Reported on 10/1/2024)           Ht 1.676 m (5' 6\")   Wt 106.6 kg (235 lb)   LMP  (LMP Unknown)   BMI 37.93 kg/m    Wt Readings from Last 4 Encounters:   10/01/24 106.6 kg (235 lb)   09/17/24 113 kg (249 lb 3.2 oz)   08/29/24 117.6 kg (259 lb 4.8 oz)   08/28/24 119.3 kg (263 lb)         Body mass index is 37.93 kg/m .    EXAM:  GENERAL:Appears well  ABDOMEN: Incisions healing well      Assessment/Plan: Pt s/p traditional duodenal switch. Doing well. Diet and activity discussed. She will f/u with us at the [facility] in 1 month to meet with our dietician, and again at 3 months for additional follow up.    Dimas Rios MD, FACS  Office: [phone]  [facility]   General and Bariatric Surgery      Video-Visit Details    Type of service:  Video Visit    Platform used for Video Visit: ClicData    Video End Time (time video stopped):  1311    Originating Location (pt. Location): Home      Distant Location (provider location):  On-site    Distant Location (provider location):  [facility] CL[facility]RICS [facility] [place]        Again, thank you for allowing me to participate in the care of your patient.        Sincerely,        Dimas Rios MD  "

## 2024-10-01 NOTE — PROGRESS NOTES
Monika Franz is 58 year old  female who presents for a billable video visit today.    How would you like to obtain your AVS? MyChart  If dropped from the video visit, the video invitation should be resent by: Text to cell phone: [phone]  Will anyone else be joining your video visit? No      Video Start Time: 1:05 PM      Provider Notes: HPI: Pt is here for follow up of a traditional duodenal switch. She is doing well. Taking po well, estimating she is getting in 50+ oz of fluid. No vomiting.  No pain with drinking or eating purees. No fevers or chills. Ambulating without problems.     Current Outpatient Medications   Medication Sig Dispense Refill    ARIPiprazole (ABILIFY) 5 MG tablet Take 5 mg by mouth daily       Calcium Citrate 1040 MG TABS Take 1 tablet by mouth 3 times daily.      childrens multivitamin chewable tablet Take 1 tablet by mouth 2 times daily. Multivitamin must contain Vitamin A, Zinc and at least 18 mg of iron. 180 tablet 3    cholecalciferol 25 MCG (1000 UT) TABS Take 5,000 Units by mouth daily.      cyanocobalamin (VITAMIN B-12) 1000 MCG sublingual tablet Place 1 tablet (1,000 mcg) under the tongue daily. 90 tablet 3    desvenlafaxine (PRISTIQ) 100 MG 24 hr tablet Take 1 tablet by mouth daily.      omeprazole (PRILOSEC) 20 MG DR capsule Take 1 capsule (20 mg) by mouth daily. Start day after surgery, open contents and sprinkle on food for first 6 weeks. Take daily for 3 months after surgery. Do not start before September 18, 2024. 90 capsule 0    traZODone (DESYREL) 150 MG tablet Take 1 tablet by mouth nightly as needed      triamcinolone (KENALOG) 0.1 % external cream Apply topically 2 times daily X 14 days 30 g 0    triamcinolone (KENALOG) 0.5 % external ointment Apply to affected area on the left calf twice daily x 14 days 15 g 0    amoxicillin (AMOXIL) 500 MG tablet TAKE 4 TABLETS BY MOUTH 1 HOUR BEFORE DENTAL APPOINTMENT (Patient not taking: Reported on 10/1/2024)           Ht 1.676  "m (5' 6\")   Wt 106.6 kg (235 lb)   LMP  (LMP Unknown)   BMI 37.93 kg/m    Wt Readings from Last 4 Encounters:   10/01/24 106.6 kg (235 lb)   09/17/24 113 kg (249 lb 3.2 oz)   08/29/24 117.6 kg (259 lb 4.8 oz)   08/28/24 119.3 kg (263 lb)         Body mass index is 37.93 kg/m .    EXAM:  GENERAL:Appears well  ABDOMEN: Incisions healing well      Assessment/Plan: Pt s/p traditional duodenal switch. Doing well. Diet and activity discussed. She will f/u with us at the B[facility] Center in 1 month to meet with our dietician, and again at 3 months for additional follow up.    Dimas Rios MD, FACS  Office: [phone]  [facility]   General and Bariatric Surgery      Video-Visit Details    Type of service:  Video Visit    Platform used for Video Visit: griddig    Video End Time (time video stopped):  1311    Originating Location (pt. Location): Home      Distant Location (provider location):  On-site    Distant Location (provider location):  [facility] CL[facility]ATRICS C[facility] [place]    "

## 2024-10-03 ENCOUNTER — LAB (OUTPATIENT)
Dept: INFUSION THERAPY | Facility: HOSPITAL | Age: 59
End: 2024-10-03
Attending: INTERNAL MEDICINE
Payer: COMMERCIAL

## 2024-10-03 ENCOUNTER — ONCOLOGY VISIT (OUTPATIENT)
Dept: ONCOLOGY | Facility: HOSPITAL | Age: 59
End: 2024-10-03
Attending: INTERNAL MEDICINE
Payer: COMMERCIAL

## 2024-10-03 VITALS
SYSTOLIC BLOOD PRESSURE: 106 MMHG | DIASTOLIC BLOOD PRESSURE: 55 MMHG | TEMPERATURE: 98.3 F | RESPIRATION RATE: 18 BRPM | HEIGHT: 66 IN | BODY MASS INDEX: 38.25 KG/M2 | OXYGEN SATURATION: 98 % | HEART RATE: 65 BPM | WEIGHT: 238 LBS

## 2024-10-03 DIAGNOSIS — C50.212 MALIGNANT NEOPLASM OF UPPER-INNER QUADRANT OF LEFT BREAST IN FEMALE, ESTROGEN RECEPTOR NEGATIVE (H): Primary | ICD-10-CM

## 2024-10-03 DIAGNOSIS — Z17.1 MALIGNANT NEOPLASM OF UPPER-INNER QUADRANT OF LEFT BREAST IN FEMALE, ESTROGEN RECEPTOR NEGATIVE (H): Primary | ICD-10-CM

## 2024-10-03 DIAGNOSIS — Z17.1 MALIGNANT NEOPLASM OF UPPER-INNER QUADRANT OF LEFT BREAST IN FEMALE, ESTROGEN RECEPTOR NEGATIVE (H): ICD-10-CM

## 2024-10-03 DIAGNOSIS — C50.212 MALIGNANT NEOPLASM OF UPPER-INNER QUADRANT OF LEFT BREAST IN FEMALE, ESTROGEN RECEPTOR NEGATIVE (H): ICD-10-CM

## 2024-10-03 DIAGNOSIS — I89.0 LYMPHEDEMA OF LEFT UPPER EXTREMITY: ICD-10-CM

## 2024-10-03 LAB
ALBUMIN SERPL BCG-MCNC: 4.3 G/DL (ref 3.5–5.2)
ALP SERPL-CCNC: 110 U/L (ref 40–150)
ALT SERPL W P-5'-P-CCNC: 40 U/L (ref 0–50)
ANION GAP SERPL CALCULATED.3IONS-SCNC: 9 MMOL/L (ref 7–15)
AST SERPL W P-5'-P-CCNC: 26 U/L (ref 0–45)
BASOPHILS # BLD AUTO: 0.1 10E3/UL (ref 0–0.2)
BASOPHILS NFR BLD AUTO: 1 %
BILIRUB SERPL-MCNC: 0.2 MG/DL
BUN SERPL-MCNC: 10.4 MG/DL (ref 6–20)
CALCIUM SERPL-MCNC: 9.2 MG/DL (ref 8.8–10.4)
CHLORIDE SERPL-SCNC: 98 MMOL/L (ref 98–107)
CREAT SERPL-MCNC: 0.64 MG/DL (ref 0.51–0.95)
EGFRCR SERPLBLD CKD-EPI 2021: >90 ML/MIN/1.73M2
EOSINOPHIL # BLD AUTO: 0.1 10E3/UL (ref 0–0.7)
EOSINOPHIL NFR BLD AUTO: 1 %
ERYTHROCYTE [DISTWIDTH] IN BLOOD BY AUTOMATED COUNT: 13.4 % (ref 10–15)
GLUCOSE SERPL-MCNC: 94 MG/DL (ref 70–99)
HCO3 SERPL-SCNC: 27 MMOL/L (ref 22–29)
HCT VFR BLD AUTO: 39.2 % (ref 35–47)
HGB BLD-MCNC: 12.7 G/DL (ref 11.7–15.7)
IMM GRANULOCYTES # BLD: 0 10E3/UL
IMM GRANULOCYTES NFR BLD: 0 %
LYMPHOCYTES # BLD AUTO: 2.3 10E3/UL (ref 0.8–5.3)
LYMPHOCYTES NFR BLD AUTO: 27 %
MCH RBC QN AUTO: 29.7 PG (ref 26.5–33)
MCHC RBC AUTO-ENTMCNC: 32.4 G/DL (ref 31.5–36.5)
MCV RBC AUTO: 92 FL (ref 78–100)
MONOCYTES # BLD AUTO: 0.6 10E3/UL (ref 0–1.3)
MONOCYTES NFR BLD AUTO: 7 %
NEUTROPHILS # BLD AUTO: 5.4 10E3/UL (ref 1.6–8.3)
NEUTROPHILS NFR BLD AUTO: 64 %
NRBC # BLD AUTO: 0 10E3/UL
NRBC BLD AUTO-RTO: 0 /100
PLATELET # BLD AUTO: 225 10E3/UL (ref 150–450)
POTASSIUM SERPL-SCNC: 3.9 MMOL/L (ref 3.4–5.3)
PROT SERPL-MCNC: 7.1 G/DL (ref 6.4–8.3)
RBC # BLD AUTO: 4.27 10E6/UL (ref 3.8–5.2)
SODIUM SERPL-SCNC: 134 MMOL/L (ref 135–145)
WBC # BLD AUTO: 8.4 10E3/UL (ref 4–11)

## 2024-10-03 PROCEDURE — G2211 COMPLEX E/M VISIT ADD ON: HCPCS | Performed by: INTERNAL MEDICINE

## 2024-10-03 PROCEDURE — 82040 ASSAY OF SERUM ALBUMIN: CPT

## 2024-10-03 PROCEDURE — 99214 OFFICE O/P EST MOD 30 MIN: CPT | Performed by: INTERNAL MEDICINE

## 2024-10-03 PROCEDURE — 85004 AUTOMATED DIFF WBC COUNT: CPT

## 2024-10-03 PROCEDURE — 36591 DRAW BLOOD OFF VENOUS DEVICE: CPT

## 2024-10-03 ASSESSMENT — PAIN SCALES - GENERAL: PAINLEVEL: NO PAIN (0)

## 2024-10-03 NOTE — LETTER
10/3/2024      Monika Franz  [address] [facility] [address]      Dear Colleague,    Thank you for referring your patient, Monika Franz, to the [facility] [place]. Please see a copy of my visit note below.        [facility] cancer Care Progress Note  Patient: Monika Franz   MRN:  [id number]   Date of Service : Oct 3, 2024           Reason for visit      Problem List Items Addressed This Visit          Other    Malignant neoplasm of upper-inner quadrant of left breast in female, estrogen receptor negative (H) - Primary    Relevant Orders    Comprehensive metabolic panel    CBC with platelets    Compression Sleeve/Stocking Order for DME - ONLY FOR DME     Other Visit Diagnoses       Lymphedema of left upper extremity        Relevant Orders    Comprehensive metabolic panel    CBC with platelets    Compression Sleeve/Stocking Order for DME - ONLY FOR DME             Assessment     1.  A very pleasant 58 year old  woman with left-sided breast cancer at least T2 N0 M0 ER DE negative HER-2/joann negative. Started on sreekanth adjuvant chemotherapy. Responded well to treatment.  She finished 4 cycles of dose dense Adriamycin Cytoxan followed by 12 doses of of weekly paclitaxel.  Last chemotherapy in November 2020.  Had surgery in December 2020.  Complete pathological remission obtained in the invasive tumor.  There was some DCIS present however.  Lymph nodes negative.  Current evaluation negative for any recurrence.  4.  Mild peripheral neuropathy.  2.  Slightly overweight.  3.  Status post gastric sleeve in 2019. Had some kind of revision of the procedure so she could loose weight.  4.  Slight lymphedema in the left arm.    Plan and medical decision making     Patient presenting with triple negative breast cancer on active surveillance.Reviewed notes from each unique source.  Reviewed each unique test.    Ordered tests.    Independently interpreted lab tests and radiological exams  performed by other physicians.  Personally reviewed the images of the chest x-ray she had in August.    As far as the breast cancer is concerned continue with active surveillance.  We will see her back in about a years timeframe.  Discussed with the patient that typically for triple negative breast cancers especially the ones who have had complete response the risk of recurrence is significantly decreased after 4 years.  Continue with the lymphedema management.  Follow-up with the bariatric people.  Cautioned that the people after bariatric surgery especially if it has some features of Billroth type of surgery or Luz Marina-en-Y those patients are definitely at risk of B12 and iron deficiency.  She needs to be monitored for that for the rest of her life.  Diet rich in calcium and vitamin D.  She should get her flu shot if she has not already.  Follow-up with regular doctor.    Clinical stage      Cancer Staging  Invasive ductal carcinoma of breast, left (H)  Staging form: Breast, AJCC 8th Edition  - Clinical stage from 6/22/2020: Stage IIB (cT2, cN0, cM0, G3, ER-, HI-, HER2: Equivocal) - Signed by Sue Dougherty CNP on 7/9/2020      History     Monika Franz is a very pleasant 58 year old  female with a history of triple negative left breast cancer.  The patient found this lump located in her left breast measuring approximately 2 cm in size, at 10 o'clock position, presenting with some stretching-like symptoms in her breast in the month of June 2020.  She was then seen by her primary care physician and had a mammogram done which was very suspicious.  A biopsy confirmed the presence of invasive ductal carcinoma ER negative HI negative HER-2/joann 2+ on immunohistochemistry negative on FISH.  It had high-grade features.  The ultrasound measures this mass to be 1.6 cm in size.  It was located 11 cm from the nipple.  No nipple inversion or any other symptoms.    The patient was seen by Dr. Allyson Douglas.  She had a  "Port-A-Cath placed for neoadjuvant chemotherapy.     She has past medical history of gastric sleeve surgery about a year ago.  She is taking some vitamin B12 and other supplements.     She is status post hysterectomy.     As for the breast cancer risk factors are concerned she is G4, P3.  First child at age 29.  Breast-feeding for all 3 of them.  No prior breast biopsies.  No significant family history.    She  started on neoadjuvant chemotherapy on 9 July 2020 with dose dense adriamysin cyclophosphamide followed by paclitaxel.   After second cycle she noticed decrease in the size of the breast lesion.  In fact it was not palpable anymore.  She has finished 4 cycles of dose dense Adriamycin and cyclophosphamide and then finished 12 doses of weekly paclitaxel. Last treatment was on the 18th of November 2020.     She had surgery in the middle of December 2020.  She underwent bilateral mastectomy.  She did not have reconstruction.  Fortunately she had a complete remission noted on the invasive tumor.  There was some DCIS present however.  The 3 sentinel lymph nodes were negative for any invasive tumor.  There was no evidence of any scarring in them either.    Now she is on surveillance. Seems to be doing well. Had bunion surgery on her right foot in March 2021 along with hammer toe repair.  She had a revision of her gastric sleeve surgery in September 2024 the form of robot-assisted duodenal switch      Past Medical History     Past Medical History:   Diagnosis Date     Anxiety      Breast cancer (H)      Depression      Sleep apnea     Uses CPAP       Review of system      Details noted in the history of present illness.  A detailed review of systems is otherwise negative.      Physical exam        /55   Pulse 65   Temp 98.3  F (36.8  C)   Resp 18   Ht 1.676 m (5' 6\")   Wt 108 kg (238 lb)   LMP  (LMP Unknown)   SpO2 98%   BMI 38.41 kg/m      GENERAL: No acute distress. Cooperative in conversation. "   HEENT:  Pupils are equal, round and reactive. Oral mucosa is clean and intact. No ulcerations or mucositis noted. No bleeding noted.  RESP:Chest symmetric lungs are clear bilaterally per auscultation. Regular respiratory rate. No wheezes or rhonchi.  CV: Normal S1 S2 Regular, rate and rhythm.     ABD: Nondistended, soft, nontender. Positive bowel sounds. No organomegaly.   EXTREMITIES: No lower extremity edema.   NEURO: Non- focal. Alert and oriented x3.  Cranial nerves appear intact.  PSYCH: Within normal limits. No depression or anxiety.  SKIN: Warm dry intact.      Lab results Reviewed      Recent Results (from the past 168 hour(s))   Comprehensive metabolic panel   Result Value Ref Range    Sodium 134 (L) 135 - 145 mmol/L    Potassium 3.9 3.4 - 5.3 mmol/L    Carbon Dioxide (CO2) 27 22 - 29 mmol/L    Anion Gap 9 7 - 15 mmol/L    Urea Nitrogen 10.4 6.0 - 20.0 mg/dL    Creatinine 0.64 0.51 - 0.95 mg/dL    GFR Estimate >90 >60 mL/min/1.73m2    Calcium 9.2 8.8 - 10.4 mg/dL    Chloride 98 98 - 107 mmol/L    Glucose 94 70 - 99 mg/dL    Alkaline Phosphatase 110 40 - 150 U/L    AST 26 0 - 45 U/L    ALT 40 0 - 50 U/L    Protein Total 7.1 6.4 - 8.3 g/dL    Albumin 4.3 3.5 - 5.2 g/dL    Bilirubin Total 0.2 <=1.2 mg/dL   CBC with platelets and differential   Result Value Ref Range    WBC Count 8.4 4.0 - 11.0 10e3/uL    RBC Count 4.27 3.80 - 5.20 10e6/uL    Hemoglobin 12.7 11.7 - 15.7 g/dL    Hematocrit 39.2 35.0 - 47.0 %    MCV 92 78 - 100 fL    MCH 29.7 26.5 - 33.0 pg    MCHC 32.4 31.5 - 36.5 g/dL    RDW 13.4 10.0 - 15.0 %    Platelet Count 225 150 - 450 10e3/uL    % Neutrophils 64 %    % Lymphocytes 27 %    % Monocytes 7 %    % Eosinophils 1 %    % Basophils 1 %    % Immature Granulocytes 0 %    NRBCs per 100 WBC 0 <1 /100    Absolute Neutrophils 5.4 1.6 - 8.3 10e3/uL    Absolute Lymphocytes 2.3 0.8 - 5.3 10e3/uL    Absolute Monocytes 0.6 0.0 - 1.3 10e3/uL    Absolute Eosinophils 0.1 0.0 - 0.7 10e3/uL    Absolute  "Basophils 0.1 0.0 - 0.2 10e3/uL    Absolute Immature Granulocytes 0.0 <=0.4 10e3/uL    Absolute NRBCs 0.0 10e3/uL       Imaging results Reviewed        No results found.    Total time spent was 35 minutes, more than half of it was in face-to-face counseling regarding disease state, treatment, side effects, documentation, review of clinical data and coordination of care   Signed by: Amando Monson MD      This note has been dictated using voice recognition software. Any grammatical or context distortions are unintentional and inherent to the software    Oncology Rooming Note    October 3, 2024 2:56 PM   Monika Franz is a 58 year old female who presents for:    Chief Complaint   Patient presents with     Oncology Clinic Visit     Malignant neoplasm of upper-inner quadrant of left breast in female, estrogen receptor negative (H)     Initial Vitals: /55   Pulse 65   Temp 98.3  F (36.8  C)   Resp 18   Ht 1.676 m (5' 6\")   Wt 108 kg (238 lb)   LMP  (LMP Unknown)   SpO2 98%   BMI 38.41 kg/m   Estimated body mass index is 38.41 kg/m  as calculated from the following:    Height as of this encounter: 1.676 m (5' 6\").    Weight as of this encounter: 108 kg (238 lb). Body surface area is 2.24 meters squared.  No Pain (0) Comment: Data Unavailable   No LMP recorded (lmp unknown). Patient has had a hysterectomy.  Allergies reviewed: Yes  Medications reviewed: Yes    Medications: Medication refills not needed today.  Pharmacy name entered into [facility]:    [id number] [place] [address] [facility] [address]    Frailty Screening:   Is the patient here for a new oncology consult visit in cancer care? 2. No      Clinical concerns: None      Tosha Cazares LPN                 Again, thank you for allowing me to participate in the care of your patient.        Sincerely,        Amando Monson MD  "

## 2024-10-03 NOTE — PROGRESS NOTES
"Oncology Rooming Note    October 3, 2024 2:56 PM   Monika Franz is a 58 year old female who presents for:    Chief Complaint   Patient presents with    Oncology Clinic Visit     Malignant neoplasm of upper-inner quadrant of left breast in female, estrogen receptor negative (H)     Initial Vitals: /55   Pulse 65   Temp 98.3  F (36.8  C)   Resp 18   Ht 1.676 m (5' 6\")   Wt 108 kg (238 lb)   LMP  (LMP Unknown)   SpO2 98%   BMI 38.41 kg/m   Estimated body mass index is 38.41 kg/m  as calculated from the following:    Height as of this encounter: 1.676 m (5' 6\").    Weight as of this encounter: 108 kg (238 lb). Body surface area is 2.24 meters squared.  No Pain (0) Comment: Data Unavailable   No LMP recorded (lmp unknown). Patient has had a hysterectomy.  Allergies reviewed: Yes  Medications reviewed: Yes    Medications: Medication refills not needed today.  Pharmacy name entered into [facility]:    [id number] [place] [address] [facility] [address]    Frailty Screening:   Is the patient here for a new oncology consult visit in cancer care? 2. No      Clinical concerns: None      Tosha Cazares LPN             "

## 2024-10-03 NOTE — PROGRESS NOTES
[facility] cancer Care Progress Note  Patient: Monika Franz   MRN:  [id number]   Date of Service : Oct 3, 2024           Reason for visit      Problem List Items Addressed This Visit          Other    Malignant neoplasm of upper-inner quadrant of left breast in female, estrogen receptor negative (H) - Primary    Relevant Orders    Comprehensive metabolic panel    CBC with platelets    Compression Sleeve/Stocking Order for DME - ONLY FOR DME     Other Visit Diagnoses       Lymphedema of left upper extremity        Relevant Orders    Comprehensive metabolic panel    CBC with platelets    Compression Sleeve/Stocking Order for DME - ONLY FOR DME             Assessment     1.  A very pleasant 58 year old  woman with left-sided breast cancer at least T2 N0 M0 ER FL negative HER-2/joann negative. Started on sreekanth adjuvant chemotherapy. Responded well to treatment.  She finished 4 cycles of dose dense Adriamycin Cytoxan followed by 12 doses of of weekly paclitaxel.  Last chemotherapy in November 2020.  Had surgery in December 2020.  Complete pathological remission obtained in the invasive tumor.  There was some DCIS present however.  Lymph nodes negative.  Current evaluation negative for any recurrence.  4.  Mild peripheral neuropathy.  2.  Slightly overweight.  3.  Status post gastric sleeve in 2019. Had some kind of revision of the procedure so she could loose weight.  4.  Slight lymphedema in the left arm.    Plan and medical decision making     Patient presenting with triple negative breast cancer on active surveillance.Reviewed notes from each unique source.  Reviewed each unique test.    Ordered tests.    Independently interpreted lab tests and radiological exams performed by other physicians.  Personally reviewed the images of the chest x-ray she had in August.    As far as the breast cancer is concerned continue with active surveillance.  We will see her back in about a years timeframe.  Discussed with  the patient that typically for triple negative breast cancers especially the ones who have had complete response the risk of recurrence is significantly decreased after 4 years.  Continue with the lymphedema management.  Follow-up with the bariatric people.  Cautioned that the people after bariatric surgery especially if it has some features of Billroth type of surgery or Luz Marina-en-Y those patients are definitely at risk of B12 and iron deficiency.  She needs to be monitored for that for the rest of her life.  Diet rich in calcium and vitamin D.  She should get her flu shot if she has not already.  Follow-up with regular doctor.    Clinical stage      Cancer Staging  Invasive ductal carcinoma of breast, left (H)  Staging form: Breast, AJCC 8th Edition  - Clinical stage from 6/22/2020: Stage IIB (cT2, cN0, cM0, G3, ER-, SD-, HER2: Equivocal) - Signed by Sue Dougherty CNP on 7/9/2020      History     Monika Franz is a very pleasant 58 year old  female with a history of triple negative left breast cancer.  The patient found this lump located in her left breast measuring approximately 2 cm in size, at 10 o'clock position, presenting with some stretching-like symptoms in her breast in the month of June 2020.  She was then seen by her primary care physician and had a mammogram done which was very suspicious.  A biopsy confirmed the presence of invasive ductal carcinoma ER negative SD negative HER-2/joann 2+ on immunohistochemistry negative on FISH.  It had high-grade features.  The ultrasound measures this mass to be 1.6 cm in size.  It was located 11 cm from the nipple.  No nipple inversion or any other symptoms.    The patient was seen by Dr. Allyson Douglas.  She had a Port-A-Cath placed for neoadjuvant chemotherapy.     She has past medical history of gastric sleeve surgery about a year ago.  She is taking some vitamin B12 and other supplements.     She is status post hysterectomy.     As for the breast  "cancer risk factors are concerned she is G4, P3.  First child at age 29.  Breast-feeding for all 3 of them.  No prior breast biopsies.  No significant family history.    She  started on neoadjuvant chemotherapy on 9 July 2020 with dose dense adriamysin cyclophosphamide followed by paclitaxel.   After second cycle she noticed decrease in the size of the breast lesion.  In fact it was not palpable anymore.  She has finished 4 cycles of dose dense Adriamycin and cyclophosphamide and then finished 12 doses of weekly paclitaxel. Last treatment was on the 18th of November 2020.     She had surgery in the middle of December 2020.  She underwent bilateral mastectomy.  She did not have reconstruction.  Fortunately she had a complete remission noted on the invasive tumor.  There was some DCIS present however.  The 3 sentinel lymph nodes were negative for any invasive tumor.  There was no evidence of any scarring in them either.    Now she is on surveillance. Seems to be doing well. Had bunion surgery on her right foot in March 2021 along with hammer toe repair.  She had a revision of her gastric sleeve surgery in September 2024 the form of robot-assisted duodenal switch      Past Medical History     Past Medical History:   Diagnosis Date    Anxiety     Breast cancer (H)     Depression     Sleep apnea     Uses CPAP       Review of system      Details noted in the history of present illness.  A detailed review of systems is otherwise negative.      Physical exam        /55   Pulse 65   Temp 98.3  F (36.8  C)   Resp 18   Ht 1.676 m (5' 6\")   Wt 108 kg (238 lb)   LMP  (LMP Unknown)   SpO2 98%   BMI 38.41 kg/m      GENERAL: No acute distress. Cooperative in conversation.   HEENT:  Pupils are equal, round and reactive. Oral mucosa is clean and intact. No ulcerations or mucositis noted. No bleeding noted.  RESP:Chest symmetric lungs are clear bilaterally per auscultation. Regular respiratory rate. No wheezes or " rhonchi.  CV: Normal S1 S2 Regular, rate and rhythm.     ABD: Nondistended, soft, nontender. Positive bowel sounds. No organomegaly.   EXTREMITIES: No lower extremity edema.   NEURO: Non- focal. Alert and oriented x3.  Cranial nerves appear intact.  PSYCH: Within normal limits. No depression or anxiety.  SKIN: Warm dry intact.      Lab results Reviewed      Recent Results (from the past 168 hour(s))   Comprehensive metabolic panel   Result Value Ref Range    Sodium 134 (L) 135 - 145 mmol/L    Potassium 3.9 3.4 - 5.3 mmol/L    Carbon Dioxide (CO2) 27 22 - 29 mmol/L    Anion Gap 9 7 - 15 mmol/L    Urea Nitrogen 10.4 6.0 - 20.0 mg/dL    Creatinine 0.64 0.51 - 0.95 mg/dL    GFR Estimate >90 >60 mL/min/1.73m2    Calcium 9.2 8.8 - 10.4 mg/dL    Chloride 98 98 - 107 mmol/L    Glucose 94 70 - 99 mg/dL    Alkaline Phosphatase 110 40 - 150 U/L    AST 26 0 - 45 U/L    ALT 40 0 - 50 U/L    Protein Total 7.1 6.4 - 8.3 g/dL    Albumin 4.3 3.5 - 5.2 g/dL    Bilirubin Total 0.2 <=1.2 mg/dL   CBC with platelets and differential   Result Value Ref Range    WBC Count 8.4 4.0 - 11.0 10e3/uL    RBC Count 4.27 3.80 - 5.20 10e6/uL    Hemoglobin 12.7 11.7 - 15.7 g/dL    Hematocrit 39.2 35.0 - 47.0 %    MCV 92 78 - 100 fL    MCH 29.7 26.5 - 33.0 pg    MCHC 32.4 31.5 - 36.5 g/dL    RDW 13.4 10.0 - 15.0 %    Platelet Count 225 150 - 450 10e3/uL    % Neutrophils 64 %    % Lymphocytes 27 %    % Monocytes 7 %    % Eosinophils 1 %    % Basophils 1 %    % Immature Granulocytes 0 %    NRBCs per 100 WBC 0 <1 /100    Absolute Neutrophils 5.4 1.6 - 8.3 10e3/uL    Absolute Lymphocytes 2.3 0.8 - 5.3 10e3/uL    Absolute Monocytes 0.6 0.0 - 1.3 10e3/uL    Absolute Eosinophils 0.1 0.0 - 0.7 10e3/uL    Absolute Basophils 0.1 0.0 - 0.2 10e3/uL    Absolute Immature Granulocytes 0.0 <=0.4 10e3/uL    Absolute NRBCs 0.0 10e3/uL       Imaging results Reviewed        No results found.    Total time spent was 35 minutes, more than half of it was in face-to-face  counseling regarding disease state, treatment, side effects, documentation, review of clinical data and coordination of care   Signed by: Amando Monson MD      This note has been dictated using voice recognition software. Any grammatical or context distortions are unintentional and inherent to the software

## 2024-10-03 NOTE — NURSING NOTE
DME orders have been automatically faxed to [facility]. Attempted to call patient to schedule follow-up as she got a new CPAP. No answer. Left message on voicemail to call and schedule follow-up.  Macie Joseph, CMA

## 2024-10-09 ENCOUNTER — PATIENT OUTREACH (OUTPATIENT)
Dept: ONCOLOGY | Facility: HOSPITAL | Age: 59
End: 2024-10-09
Payer: COMMERCIAL

## 2024-10-09 NOTE — PROGRESS NOTES
"[facility]: Cancer Care                                                                                            Situation: Patient chart reviewed by care coordinator.    Background: Patient with a diagnosis of triple negative left breast cancer.  Patient is status post bilateral mastectomies in December 2020.  She has been on observation since then.    Assessment: Patient was in the clinic on 10/3/2024 to follow-up with Dr Monson.    Plan/Recommendations: He recommends that she follow-up on a yearly basis.  I have \"resolved\" her from care coordination and \"graduated\" her from enrollment.  I have removed myself from her care team.    We will of course assist patient as needed.    Signature:  Allyson Tomlin RN    "

## 2024-10-18 ENCOUNTER — VIRTUAL VISIT (OUTPATIENT)
Dept: SURGERY | Facility: CLINIC | Age: 59
End: 2024-10-18
Payer: COMMERCIAL

## 2024-10-18 DIAGNOSIS — Z98.84 BARIATRIC SURGERY STATUS: Primary | ICD-10-CM

## 2024-10-18 NOTE — LETTER
10/18/2024      Monika Franz  [address] [facility] [facility] [address]      Dear Colleague,    Thank you for referring your patient, Monika Franz, to the [facility] [place]. Please see a copy of my visit note below.    Patient presents for 1 month post op dietitian follow up visit (Revision to BDS 9/17/2024  GF). Reports tolerating soft food diet well. Denies reflux with spice, nausea/vomiting, constipation (taking stool soften)/diarrhea, or significant pain. Taking MVI and Vitamin B12 appropriately. Instructed patient to begin taking 500 mg calcium citrate BID and 5000 IU Vitamin D3. Educated patient on soft to bariatric regular diets, medications, developing an exercise regimen, and other dietary points of care. Provided education handout on items discussed. Patient to follow up with RD at 3 months post op.     Have you been to the hospital or seen by a doctor for any reason since your surgery? No      Sienna Milan RD, LD, CD      Again, thank you for allowing me to participate in the care of your patient.        Sincerely,        Sienna Milan RD

## 2024-10-18 NOTE — PROGRESS NOTES
Patient presents for 1 month post op dietitian follow up visit (Revision to BDS 9/17/2024  GF). Reports tolerating soft food diet well. Denies reflux with spice, nausea/vomiting, constipation (taking stool soften)/diarrhea, or significant pain. Taking MVI and Vitamin B12 appropriately. Instructed patient to begin taking 500 mg calcium citrate BID and 5000 IU Vitamin D3. Educated patient on soft to bariatric regular diets, medications, developing an exercise regimen, and other dietary points of care. Provided education handout on items discussed. Patient to follow up with RD at 3 months post op.     Have you been to the hospital or seen by a doctor for any reason since your surgery? No      Sienna Milan, ESTEVAN, LD, CD

## 2024-11-19 ENCOUNTER — OFFICE VISIT (OUTPATIENT)
Dept: INTERNAL MEDICINE | Facility: CLINIC | Age: 59
End: 2024-11-19
Payer: COMMERCIAL

## 2024-11-19 VITALS
OXYGEN SATURATION: 98 % | TEMPERATURE: 97.5 F | HEIGHT: 66 IN | RESPIRATION RATE: 18 BRPM | SYSTOLIC BLOOD PRESSURE: 112 MMHG | BODY MASS INDEX: 37.78 KG/M2 | DIASTOLIC BLOOD PRESSURE: 74 MMHG | WEIGHT: 235.1 LBS | HEART RATE: 79 BPM

## 2024-11-19 DIAGNOSIS — Z98.84 BARIATRIC SURGERY STATUS: ICD-10-CM

## 2024-11-19 DIAGNOSIS — Z98.84 STATUS POST BILIOPANCREATIC DIVERSION WITH DUODENAL SWITCH: ICD-10-CM

## 2024-11-19 DIAGNOSIS — R10.32 LLQ ABDOMINAL PAIN: Primary | ICD-10-CM

## 2024-11-19 LAB
ERYTHROCYTE [DISTWIDTH] IN BLOOD BY AUTOMATED COUNT: 13.5 % (ref 10–15)
HCT VFR BLD AUTO: 40.1 % (ref 35–47)
HGB BLD-MCNC: 12.9 G/DL (ref 11.7–15.7)
MCH RBC QN AUTO: 29.9 PG (ref 26.5–33)
MCHC RBC AUTO-ENTMCNC: 32.2 G/DL (ref 31.5–36.5)
MCV RBC AUTO: 93 FL (ref 78–100)
PLATELET # BLD AUTO: 197 10E3/UL (ref 150–450)
RBC # BLD AUTO: 4.31 10E6/UL (ref 3.8–5.2)
WBC # BLD AUTO: 8.9 10E3/UL (ref 4–11)

## 2024-11-19 PROCEDURE — 36415 COLL VENOUS BLD VENIPUNCTURE: CPT | Performed by: NURSE PRACTITIONER

## 2024-11-19 PROCEDURE — 85027 COMPLETE CBC AUTOMATED: CPT | Performed by: NURSE PRACTITIONER

## 2024-11-19 PROCEDURE — 80053 COMPREHEN METABOLIC PANEL: CPT | Performed by: NURSE PRACTITIONER

## 2024-11-19 PROCEDURE — G2211 COMPLEX E/M VISIT ADD ON: HCPCS | Performed by: NURSE PRACTITIONER

## 2024-11-19 PROCEDURE — 86140 C-REACTIVE PROTEIN: CPT | Performed by: NURSE PRACTITIONER

## 2024-11-19 PROCEDURE — 99213 OFFICE O/P EST LOW 20 MIN: CPT | Performed by: NURSE PRACTITIONER

## 2024-11-19 PROCEDURE — 83735 ASSAY OF MAGNESIUM: CPT | Performed by: NURSE PRACTITIONER

## 2024-11-19 ASSESSMENT — PATIENT HEALTH QUESTIONNAIRE - PHQ9
SUM OF ALL RESPONSES TO PHQ QUESTIONS 1-9: 1
SUM OF ALL RESPONSES TO PHQ QUESTIONS 1-9: 1
10. IF YOU CHECKED OFF ANY PROBLEMS, HOW DIFFICULT HAVE THESE PROBLEMS MADE IT FOR YOU TO DO YOUR WORK, TAKE CARE OF THINGS AT HOME, OR GET ALONG WITH OTHER PEOPLE: NOT DIFFICULT AT ALL

## 2024-11-19 ASSESSMENT — PAIN SCALES - GENERAL: PAINLEVEL_OUTOF10: MODERATE PAIN (5)

## 2024-11-19 NOTE — PROGRESS NOTES
"  Assessment & Plan   Problem List Items Addressed This Visit       Bariatric surgery status     Other Visit Diagnoses       LLQ abdominal pain    -  Primary    Relevant Orders    US Pelvic Complete with Transvaginal    Magnesium    Comprehensive metabolic panel    CBC with platelets (Completed)    CRP, inflammation    Status post biliopancreatic diversion with duodenal switch            Left iliac and hypogastric pressure and tenderness to palpation x 2-3 weeks along with loose stools. S/p duodenal switch surgery 9/17. No s/s of acute/surgical abdomen  - Loose stools likely related to recent bariatric surgery, continue to work with dietician and she is encouraged to schedule an appointment with surgeon   - Low suspicion for diverticulitis given length of symptoms and mild symptoms. Will check a CRP and WBC, if markedly elevated consider CT abdomen/pelvis  - No urinary symptoms that would suggest a urologic cause for symptoms   - Ovarian source of pain considered. Will evaluate with transvaginal ultrasound   - Reviewed return precautions including fever/chills, melena/hematochezia, worsening abdominal pain          BMI  Estimated body mass index is 37.95 kg/m  as calculated from the following:    Height as of this encounter: 1.676 m (5' 6\").    Weight as of this encounter: 106.6 kg (235 lb 1.6 oz).       Return if symptoms worsen or fail to improve or dependent on lab/imaging results.      Mickey Frank is a 59 year old, presenting for the following health issues:  Abdominal Pain (Lower left side abdominal pain: Patient states that there is pressure that flares up on the left side of her abdomin. /Patient states pain has been going on for a week and a half. /Patient states she has had bad stomach issue. Going to the restroom many times a day./)        11/19/2024     3:18 PM   Additional Questions   Roomed by Waldemar CAMACHO MA     History of Present Illness       Reason for visit:  Abdominal Pain (Left Side)  Symptom " "onset:  1-2 weeks ago  Symptoms include:  Pressure and a little Pain.  Symptom intensity:  Mild  Symptom progression:  Staying the same  Had these symptoms before:  No  What makes it worse:  None  What makes it better:  None   She is taking medications regularly.     Pertinent medical history of hysterectomy, s/p gastrectomy laparoscopic sleeve, and recent duodenal switch surgery. For the past 3 weeks she has had left lumbar abdominal pain, it feels like a gas pain or pressure. She has had diarrhea 4-5 stools per day. Stools look light in color. She denies dysuria, urinary frequency, hematuria.           Objective    /74 (BP Location: Right arm, Patient Position: Sitting, Cuff Size: Adult Regular)   Pulse 79   Temp 97.5  F (36.4  C) (Oral)   Resp 18   Ht 1.676 m (5' 6\")   Wt 106.6 kg (235 lb 1.6 oz)   LMP  (LMP Unknown)   SpO2 98%   BMI 37.95 kg/m    Body mass index is 37.95 kg/m .    Wt Readings from Last 5 Encounters:   11/19/24 106.6 kg (235 lb 1.6 oz)   10/03/24 108 kg (238 lb)   10/01/24 106.6 kg (235 lb)   09/17/24 113 kg (249 lb 3.2 oz)   08/29/24 117.6 kg (259 lb 4.8 oz)       Physical Exam   GENERAL: alert and no distress  EYES: Eyes grossly normal to inspection, non-icteric   RESP: lungs clear to auscultation - no rales, rhonchi or wheezes  CV: regular rate and rhythm, normal S1 S2, no S3 or S4, no murmur  ABDOMEN: soft, mild tenderness to palpation left iliac and hypogastric areas. Normal bowel sounds. No rebound tenderness or guarding             The longitudinal plan of care for the diagnosis(es)/condition(s) as documented were addressed during this visit. Due to the added complexity in care, I will continue to support Monika in the subsequent management and with ongoing continuity of care.  Signed Electronically by: Donna Holt NP  "

## 2024-11-20 ENCOUNTER — HOSPITAL ENCOUNTER (OUTPATIENT)
Dept: ULTRASOUND IMAGING | Facility: HOSPITAL | Age: 59
Discharge: HOME | End: 2024-11-20
Attending: NURSE PRACTITIONER
Payer: COMMERCIAL

## 2024-11-20 DIAGNOSIS — R10.32 LLQ ABDOMINAL PAIN: ICD-10-CM

## 2024-11-20 LAB
ALBUMIN SERPL BCG-MCNC: 4.3 G/DL (ref 3.5–5.2)
ALP SERPL-CCNC: 85 U/L (ref 40–150)
ALT SERPL W P-5'-P-CCNC: 81 U/L (ref 0–50)
ANION GAP SERPL CALCULATED.3IONS-SCNC: 9 MMOL/L (ref 7–15)
AST SERPL W P-5'-P-CCNC: 42 U/L (ref 0–45)
BILIRUB SERPL-MCNC: 0.2 MG/DL
BUN SERPL-MCNC: 13.4 MG/DL (ref 8–23)
CALCIUM SERPL-MCNC: 9.2 MG/DL (ref 8.8–10.4)
CHLORIDE SERPL-SCNC: 102 MMOL/L (ref 98–107)
CREAT SERPL-MCNC: 0.68 MG/DL (ref 0.51–0.95)
CRP SERPL-MCNC: 3.07 MG/L
EGFRCR SERPLBLD CKD-EPI 2021: >90 ML/MIN/1.73M2
GLUCOSE SERPL-MCNC: 87 MG/DL (ref 70–99)
HCO3 SERPL-SCNC: 27 MMOL/L (ref 22–29)
MAGNESIUM SERPL-MCNC: 1.9 MG/DL (ref 1.7–2.3)
POTASSIUM SERPL-SCNC: 4.1 MMOL/L (ref 3.4–5.3)
PROT SERPL-MCNC: 6.6 G/DL (ref 6.4–8.3)
SODIUM SERPL-SCNC: 138 MMOL/L (ref 135–145)

## 2024-11-20 PROCEDURE — 76856 US EXAM PELVIC COMPLETE: CPT

## 2024-12-17 ENCOUNTER — VIRTUAL VISIT (OUTPATIENT)
Dept: SURGERY | Facility: CLINIC | Age: 59
End: 2024-12-17
Payer: COMMERCIAL

## 2024-12-17 DIAGNOSIS — Z98.84 S/P BILIOPANCREATIC DIVERSION WITH DUODENAL SWITCH: Primary | ICD-10-CM

## 2024-12-17 DIAGNOSIS — K90.9 INTESTINAL MALABSORPTION: ICD-10-CM

## 2024-12-17 PROCEDURE — 99024 POSTOP FOLLOW-UP VISIT: CPT | Mod: 95

## 2024-12-17 NOTE — PATIENT INSTRUCTIONS
[facility]  Nutritional Guidelines  Duodenal Switch 3 Months Post Op    General Guidelines and Helpful Hints:  Eat 3 meals per day + protein supplement(s). No snacks between meals.  Do not skip meals.  This can cause overeating at the next meal and will prevent adequate protein and nutritional intake.  Aim for  grams of protein per day.   Always eat your protein first. This assists with optimal nutrition and helps you stay full longer.  To achieve daily protein goals, it is recommended to drink approved protein supplement between meals.  Follow appropriate portion size: Use measuring cups to be accurate.  Months Post Op Portion Size per Meal   3 months   cup -   cup   3-6 months   cup -   cup     Continue to use saucer/salad plates, infant/toddler silverware to keep portion sizes small and take small bites.  Eat S-L-O-W-L-Y to make each meal last 20-30 minutes. Always stop eating when satisfied.  Use caution with foods containing skins, peels or membranes. Chew well!  Aim for 64 oz. of calorie-free fluids daily.  Continue to avoid caffeine, carbonation and alcohol.  Remember to avoid drinking during meals, 15-30 minutes before and 30 minutes after.  Aim for 30-60 minutes of physical activity most days of the week.  If having trouble tolerating meat, try using a crock-pot, tinfoil tent, steamer or other moist cooking method to create tender meats. Add broth or low-fat gravy to help meat stay moist.   Avoid high sugar and high fat foods to prevent high calorie intake. This will reduce your rate of weight loss. High fat items can also cause upset stomach and diarrhea.  Check nutrition labels for less than 10 grams of sugar and less than 10 grams of fat per serving.  Continue Taking Vitamins/Minerals:  4469-7597 mcg of Sublingual B-12 daily  1 Multivitamin with Iron twice daily (chewable or swallow tabs)  500-600 mg Calcium Citrate twice daily (chewable or swallow tabs)  5000 IU Vitamin D3  daily    Grocery List  Protein:  Fat free Greek or light yogurt (less than 10 grams sugar)  Fat free or low-fat cottage cheese  String cheese or reduced fat cheese slices  Tuna, salmon, crab, egg, or chicken salad made with light or fat free mayonnaise  Egg or Egg Substitute  Lean/extra lean turkey, beef, bison, venison (ground, sirloin, round, flank)  Pork loin or tenderloin (grilled, baked, broiled)  Fish such as salmon, tuna, trout, tilapia, etc. (grilled, baked, broiled)  Tender cuts of lean (skinless) turkey or chicken  Lean deli meats: turkey, lean ham, chicken, lean roast beef  Beans such as kidney, garbanzo, black, quiles, or low-fat/fat free refried beans  Peanut butter (natural preferred). Limit to 1 Tbsp. per day.  Low-fat meatloaf (made with lean ground beef or turkey)  Sloppy Joes made with low-sugar ketchup and lean ground beef or turkey  Soy or vegetable protein (i.e. vegan crumbles, soy/veggie burger, tofu)  Hummus    Vegetables:  Fresh: cooked or raw (as tolerated)  Frozen vegetables  Canned vegetables (low sodium or no salt added, rinse before cooking/eating)  (Ok to have skins/peels/membranes/seeds - just chew well)    Fruits:  Fresh fruit  Frozen fruit (no sugar added)  Canned fruit (packed in its own juice, NOT syrup)  (Ok to have skins/peels/membranes/seeds - just chew well)    Starch:  Unsweetened whole-grain hot cereal (or high fiber cold cereal, dry)  Toasted whole wheat bread or [place] Thins  Whole grain crackers  Baked/boiled/mashed potato/sweet potato  Cooked whole grain pasta, brown rice, or other cooked whole grains  Starchy vegetables: corn, peas, winter squash    Protein Supplement:   Ready to drink protein shake with:  15-30 grams protein per serving  Less than 10 grams total carbohydrate per serving   Protein powder mixed with:   Skim or 1% milk  Low fat or fat free Lactaid milk, plain or no sugar added soymilk  Water     Fats: (use in moderation)  1 teaspoon of soft tub  margarine  1 teaspoon olive oil, canola oil, or peanut oil  1 tablespoon of low-fat montero or salad dressing     Sample Menu for 3 months after Duodenal Switch    You do NOT need to eat/drink the full portion sizes listed below  Always stop when you are satisfied    Breakfast   cup of 1% cottage cheese    Lunch 7 Tbsp. tuna salad made with 1 tsp. light montero  1 Tbsp. green beans   Supplement Approved Protein Shake   (Have between meals throughout the day)   Dinner 2 oz moist cooked chicken breast   1 Tbsp. canned peaches in light syrup     Breakfast 2 egg or   cup egg substitute, scrambled    Lunch 2 oz sirloin steak  1 Tbsp. baked potato   Supplement Approved Protein Shake   (Have between meals throughout the day)   Dinner 2 oz grilled, broiled, or baked lemon pepper salmon  1 Tbsp. asparagus     Breakfast   cup light yogurt with 1-2 tsp. whole grain cereal   Lunch   cup thick lentil soup   Dinner 2 oz pork loin made in a crock pot for added moisture  1 Tbsp. cooked broccoli   Supplement Approved Protein Shake   (Have between meals throughout the day)     Breakfast 2 oz. turkey or low fat breakfast sausage   Lunch 7 Tbsp. 1% cottage cheese  1 Tbsp. peaches   Dinner   cup chili made with ground turkey breast and beans   Supplement Approved Protein Shake   (Have between meals throughout the day)     Breakfast 2 eggs, scrambled with 1-2 tsp. shredded low fat cheese   Lunch 2 oz lean lunch meat  2-3 Wheat Thins   Supplement Approved Protein Shake   (Have between meals throughout the day)   Dinner 2 oz. tender skinless/boneless chicken breast     Breakfast 1 light string cheese    piece whole wheat toast with 1 Tbsp. natural peanut butter   Lunch   cup lean hamburger mixed with marinara sauce (10 grams of sugar or less per serving)   Dinner 7 Tbsp. low fat turkey meat loaf   1 Tbsp. cooked sweet potato   Supplement Approved Protein Shake   (Have between meals throughout the day)     Breakfast   cup light Greek yogurt with  1 strawberry cut into slices    Lunch 7 Tbsp. canned (in water) chicken made with light mayonnaise  3  Wheat  Thins   Supplement Approved Protein Shake   (Have between meals throughout the day)   Dinner 7 Tbsp. low-fat sloppy joes (made with low sugar ketchup)  1 Tbsp. squash        Concentrated Sweets and Fats    Most of the foods and drinks containing refined sugar and fats are filled with  empty  calories and little nutrition.  After your surgery your body needs important nutrients like protein, fiber, vitamins and minerals so you can receive proper nutrition.  If you choose to eat foods that are high in sugar or fat there is less room for optimal protein and nutrients, and the following may occur:      For Gastric Bypass: DUMPING SYNDROME may occur  Dumping syndrome occurs when food passes rapidly from the stomach into the small intestine.  The partially digested food in the small intestine draws water out of the blood vessels and into the small intestine, which can then  dump  out.     Symptoms may include:   Sweating  Rapid heart beat   Nausea  Dizziness or faintness  Cramping  Weakness  Diarrhea (typically within   hour after eating)     Causes of Dumping Syndrome:   Eating foods high in sugar or fat   Drinking with meals  Eating too large of meal  Eating too fast    For Duodenal Switch:   This procedure causes more malabsorption, especially of fat. While technical Dumping Syndrome will not occur, eating foods high in fat increases malabsorption and can lead to upset stomach, diarrhea, cramping, and nausea.       For Gastric Sleeve and Lap Band:   Every bite counts. While Dumping Syndrome will not occur, filling up on foods high in sugar or fat can decrease energy, slows your weight loss, and can lead to gaining back those pounds you worked so hard to lose.       Avoid foods with more than 5-10 grams of sugar and 5-10 grams of fat per serving  Artificial sweeteners like Truvia, Splenda and Sweet 'N Low are  safe to use and are sugar free.    Sorbitol, Manitol, and Xylitol are sugar alcohols that will cause bloating, cramping, and gas if eaten in excess.   Watch for hidden sugars in ingredients:  sugar, honey, dextrose, corn syrup, brown rice syrup, fructose, molasses, and sucrose  Remember sugar-free and fat-free do not mean calorie-free!    Obvious and hidden sources of fats and sugars in our foods:    Desserts                                                 Sherbet/Sorbet   Candy/Candy bars   Cookies/Pies   Cake  Juice bars  Ice cream/Ice cream bars/Popsicle  Frozen Yogurt  Pastries     Beverages   Justus-Aid/Lemonade/Powerade/Gaterade   Boost/Ensure   Chocolate milk/Flavored soy or almond milk   Hot cocoa   Sweet Tea    Regular Soda    Fruit Drinks (i.e. Regular Snapple, Naked Juice)                                 2% or whole milk                                                        Eggnog                                                                          Fruit Juice    Food and Snack Items  Fried foods   Muffins/Scones  Sweetened cereals  Granola   Granola bars   Protein/Energy bars  Doughnuts  Potato chips  Smoothies                                                                                      Raisins or dried fruit         Pancake syrup   100 Calorie packs                          Condiments    BBQ sauce    Jelly/Jam    Honey/Agave nectar    Butter/Margarine   Sugar/Brown sugar                                                               Gravy/Amilcar sauce    Spaghetti Sauce                                                                                                                 Mayonnaise/ [place]aise     Salad dressings/vegetable dips                                       Sour cream   Salsa                Reactive Hypoglycemia After Bariatric Surgery    What is reactive hypoglycemia?  After bariatric surgery you may experience reactive hypoglycemia:   Hypoglycemia means low blood sugar    Reactive hypoglycemia is having low blood sugar after eating a meal or snack     This may happen after eating foods that are high in sugar or simple carbohydrates. It is thought to be related to dumping syndrome.    How do I know if I have reactive hypoglycemia?  You have reactive hypoglycemia if you have:   any symptoms listed below a few hours after having a meal or snack and   these symptoms go away after eating or drinking    What are the symptoms of reactive hypoglycemia?  You may feel one or more of these:   hungry    sweaty   shaky    anxious   dizzy    weak   sleepy    confused    What should I do if I think I have reactive hypoglycemia after having a meal or snack?  Having low blood sugar is not good for your overall health and can be life-threatening.    If you think you have reactive hypoglycemia, check your blood sugar.    If your blood sugar is less than 70, you need to treat it to bring your sugar above 70     If you do not have a meter, talk to your family doctor, health care provider or the diabetes educator in the Bariatric Clinic to get a meter.    When your blood sugar is less than 70:  1. Take 15 grams of a fast acting carbohydrate right away. This will raise your blood sugar quickly.   Examples of fast acting carbohydrate include:   Chewing 3 to 4 dextrose or glucose tablets (read the label)    Drinking   cup (175 ml) of juice    2. Wait 15 minutes and check your blood sugar again.  3. If your blood sugar is still below 70 treat again with one of the fast acting carbohydrates listed above.  4. Repeat these steps until your blood sugar is above 70.  5. If your next meal or snack is more than 1 hour away, you need to have a snack that contains carbohydrate and protein that fits into the stage of diet you are at.     You may feel like eating sweet foods like cookies, cake and candy. Even though these foods are high in sugar and can raise your blood sugar, your blood sugar will go too high too fast  which is not safe. This can then lead to another low blood sugar because too much insulin is released.    How can I prevent reactive hypoglycemia?  You can help prevent reactive hypoglycemia by following your diet guidelines for bariatric surgery.     eat 3 healthy meals and 2 healthy snacks each day   space meals and snacks 2 to 3 hours apart   eat protein at each meal and snack time   avoid skipping meals and snacks   avoid or limit alcohol depending on what stage of diet your are at   avoid or limit caffeine depending on what stage of diet your are at   avoid sweets like cookies, cakes, candy, pop, juice and sweet drinks    Instead of sugars and simple carbohydrates, eat complex carbohydrates because they release less sugar over a longer period of time. Having a complex carbohydrate with protein will slow this release even more.    Try putting any of these together when they fit the stage of bariatric diet you are at:  Complex Carbohydrates   whole wheat crackers   whole wheat bread   whole wheat ovidio   whole grain rice   potatoes   cereal   Protein   nuts   cheese   meat   lentils   peanut butter   eggs   yogurt    Here are some snack ideas to have after treating   a low blood sugar:    crackers and cheese   ovidio and hummus   nuts and yogurt   eddy toast and peanut butter

## 2024-12-17 NOTE — LETTER
12/17/2024      Monika Franz  [address] [place] [id number]      Dear Colleague,    Thank you for referring your patient, Monika Franz, to the [facility]. Please see a copy of my visit note below.    Monika Franz is a 59 year old who is being evaluated via a billable video visit.      How would you like to obtain your AVS? MyChart  If the video visit is dropped, the invitation should be resent by: Text to cell phone: [phone]  Will anyone else be joining your video visit? No      Post-op Surgical Weight Loss Diet Evaluation     Assessment:  Pt presents for 3 months post-op RD visit, s/p revision to DS on 9/17/2024 with Dr. Rios. Today we reviewed current eating habits and level of physical activity, and instructed on the changes that are required for successful bariatric outcomes.    Patient Progress: Patient reports she is doing well. Noted certain foods have caused some heartburn (spicy). She has been baking cookies for the holidays and thinks this is what is leading to her dumping syndrome. Patient has started drinking diet Mt Dew again and is wanting to stop.     Initial weight: 247 lbs - day of surgery   Current weight: 228 lbs   Weight change: 19 lbs down, 7.7% loss     There is no height or weight on file to calculate BMI.    Patient Active Problem List   Diagnosis     REBECCA (obstructive sleep apnea)     Bariatric surgery status     Eczema     Drug overdose, intentional (H)     Malignant neoplasm of upper-inner quadrant of left breast in female, estrogen receptor negative (H)     Narrow angle glaucoma suspect of both eyes     Severe recurrent major depression without psychotic features (H)     Adenomatous polyp of colon     Hammer toe of right foot     Morbid obesity (H)     Lymphedema of arm, left     Morbid (severe) obesity due to excess calories (H)       Diabetes: none    Vitamins   Multi Vit with Iron: x2 yes - chewable   Calcium  "Citrate: yes  B12: yes  D3: yes    Do you experience hunger? 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 Yes   Do you experience any reflux or discomfort with eating? Spice  Nausea: yes  Vomiting: no  Diarrhea: yes  Constipation: no  Hair loss: yes    Diet Recall/Time:   Breakfast: Kashi dry cereal with 2 good greek yogurt  Lunch: 1/2 sandwich on wheat with meat ,cheese and spinach   Dinner: tacos, chicken with vegetables or salad of whole wheat pasta     Typical Snacks: protein shake ([employer]), carries, holiday cookies     Proteins/Veg/Fruits/CHO (NOT well tolerated): none    Estimated protein intake: 60-80 grams    Estimated portion size per meals:1 cup/meal    Meals per week away from home: rarely    Meal Duration:15-20 minutes     Fluid-meal separation:  Fluids are  30min before and 30 minutes after meals.    Fluid Intake  Water: 50+ oz  Caffeine: diet Mt Dew  Alcohol: none  Carbonation: Diet Mt Dew    Exercise: 45 minutes on the treadmill 3x per week       PES statement:      (NC-1.4) Altered GI Function related to Alteration in gastrointestinal tract structure and/or function/ Decreased functional length of the GI tract as evidenced by Weight loss of 7.7% initial body weight; Gastric bypass surgery      Intervention    Discussion  Discussed 3 month Post-Op Nutritional Guidelines for DS  Recommended to consume 15-20gm protein at 3 meals daily, along with protein supplement/\"planned protein containing snack\" of 15-30gm protein, to reach goal of 60-80 gm protein daily.  Educated on post-op vitamin regimen: Multi Vit + iron 2x/day, calcium citrate 400-600 mg 2x/day, 6905-5932 mcg of Sublingual B-12 daily, and 5000 IU Vitamin D3 daily (MVI and calcium can be taken at the same time BID)  Reviewed lean protein sources  Bariatric Plate Method-  including lean/low fat protein at each meal, including a vegetable/fruit, and limiting carbohydrate intake to less than 25% of plate volume.     Instructions  Include 15-20gm " "protein at each meal, along with protein supplement/\"planned protein containing snack\" of 15-30gm protein, to reach goal of 60-80 gm protein daily.  Increase fluid intake to 64oz daily: choose plain or calorie/carbonation-free beverages.  Incorporate daily structured activity, 30-60 minutes most days of the week  Recommended pt to start taking: Multi Vit + iron 2x/day, calcium citrate 400-600 mg 2x/day, 9060-0793 mcg of Sublingual B-12 daily, and 5000 IU Vitamin D3 daily. (MVI and calcium can be taken at the same time)  Read food labels more consistently: keeping total fat grams <10, total sugar grams <10, fiber >3gm per serving.  Increase vegetable/fruit intake, by having a vegetable or fruit with each meal daily.  Practice plate method: 1/2 plate lean/low fat protein source, vegetable/fruit, <25% of plate complex carbohydrates.  Separate fluids 30 minutes before/after meal times.  Practice eating off of smaller plates/bowls, chewing to applesauce consistency, taking 20-30 minutes to eat in a calm/relaxed environment without distractions of tv/email/cell phone.    Handouts provided:  3 months Post-Op Nutritional Guidelines for DS  Reactive hypoglycemia and dumping syndrome information     Assessment/Plan:    Pt to follow up for 6 months post-op visit with bariatrician       Video-Visit Details    Type of service:  Video Visit    Video Start Time (time video started):  8:28 AM    Video End Time (time video stopped): 8:52 AM    Originating Location (pt. Location): Other office      Distant Location (provider location):  Off-site    Mode of Communication:  Video Conference via [facility]    Physician has received verbal consent for a Video Visit from the patient? Yes    Shayla Bonilla RD             Again, thank you for allowing me to participate in the care of your patient.        Sincerely,        Shayla Bonilla RD  "

## 2024-12-17 NOTE — PROGRESS NOTES
"Monika Franz is a 59 year old who is being evaluated via a billable video visit.      How would you like to obtain your AVS? MyChart  If the video visit is dropped, the invitation should be resent by: Text to cell phone: [phone]  Will anyone else be joining your video visit? No      Post-op Surgical Weight Loss Diet Evaluation     Assessment:  Pt presents for 3 months post-op RD visit, s/p revision to DS on 9/17/2024 with Dr. Rios. Today we reviewed current eating habits and level of physical activity, and instructed on the changes that are required for successful bariatric outcomes.    Patient Progress: Patient reports she is doing well. Noted certain foods have caused some heartburn (spicy). She has been baking cookies for the holidays and thinks this is what is leading to her dumping syndrome. Patient has started drinking diet Mt Dew again and is wanting to stop.     Initial weight: 247 lbs - day of surgery   Current weight: 228 lbs   Weight change: 19 lbs down, 7.7% loss     There is no height or weight on file to calculate BMI.    Patient Active Problem List   Diagnosis    REBECCA (obstructive sleep apnea)    Bariatric surgery status    Eczema    Drug overdose, intentional (H)    Malignant neoplasm of upper-inner quadrant of left breast in female, estrogen receptor negative (H)    Narrow angle glaucoma suspect of both eyes    Severe recurrent major depression without psychotic features (H)    Adenomatous polyp of colon    Hammer toe of right foot    Morbid obesity (H)    Lymphedema of arm, left    Morbid (severe) obesity due to excess calories (H)       Diabetes: none    Vitamins   Multi Vit with Iron: x2 yes - chewable   Calcium Citrate: yes  B12: yes  D3: yes    Do you experience hunger? 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 Yes   Do you experience any reflux or discomfort with eating? Spice  Nausea: yes  Vomiting: no  Diarrhea: yes  Constipation: no  Hair loss: yes    Diet Recall/Time:   Breakfast: Kashi " "dry cereal with 2 good greek yogurt  Lunch: 1/2 sandwich on wheat with meat ,cheese and spinach   Dinner: tacos, chicken with vegetables or salad of whole wheat pasta     Typical Snacks: protein shake (Fairlife), carries, holiday cookies     Proteins/Veg/Fruits/CHO (NOT well tolerated): none    Estimated protein intake: 60-80 grams    Estimated portion size per meals:1 cup/meal    Meals per week away from home: rarely    Meal Duration:15-20 minutes     Fluid-meal separation:  Fluids are  30min before and 30 minutes after meals.    Fluid Intake  Water: 50+ oz  Caffeine: diet Mt Dew  Alcohol: none  Carbonation: Diet Mt Dew    Exercise: 45 minutes on the treadmill 3x per week       PES statement:      (NC-1.4) Altered GI Function related to Alteration in gastrointestinal tract structure and/or function/ Decreased functional length of the GI tract as evidenced by Weight loss of 7.7% initial body weight; Gastric bypass surgery      Intervention    Discussion  Discussed 3 month Post-Op Nutritional Guidelines for DS  Recommended to consume 15-20gm protein at 3 meals daily, along with protein supplement/\"planned protein containing snack\" of 15-30gm protein, to reach goal of 60-80 gm protein daily.  Educated on post-op vitamin regimen: Multi Vit + iron 2x/day, calcium citrate 400-600 mg 2x/day, 5777-6976 mcg of Sublingual B-12 daily, and 5000 IU Vitamin D3 daily (MVI and calcium can be taken at the same time BID)  Reviewed lean protein sources  Bariatric Plate Method-  including lean/low fat protein at each meal, including a vegetable/fruit, and limiting carbohydrate intake to less than 25% of plate volume.     Instructions  Include 15-20gm protein at each meal, along with protein supplement/\"planned protein containing snack\" of 15-30gm protein, to reach goal of 60-80 gm protein daily.  Increase fluid intake to 64oz daily: choose plain or calorie/carbonation-free beverages.  Incorporate daily structured activity, " 30-60 minutes most days of the week  Recommended pt to start taking: Multi Vit + iron 2x/day, calcium citrate 400-600 mg 2x/day, 2159-9131 mcg of Sublingual B-12 daily, and 5000 IU Vitamin D3 daily. (MVI and calcium can be taken at the same time)  Read food labels more consistently: keeping total fat grams <10, total sugar grams <10, fiber >3gm per serving.  Increase vegetable/fruit intake, by having a vegetable or fruit with each meal daily.  Practice plate method: 1/2 plate lean/low fat protein source, vegetable/fruit, <25% of plate complex carbohydrates.  Separate fluids 30 minutes before/after meal times.  Practice eating off of smaller plates/bowls, chewing to applesauce consistency, taking 20-30 minutes to eat in a calm/relaxed environment without distractions of tv/email/cell phone.    Handouts provided:  3 months Post-Op Nutritional Guidelines for DS  Reactive hypoglycemia and dumping syndrome information     Assessment/Plan:    Pt to follow up for 6 months post-op visit with bariatrician       Video-Visit Details    Type of service:  Video Visit    Video Start Time (time video started):  8:28 AM    Video End Time (time video stopped): 8:52 AM    Originating Location (pt. Location): Other office      Distant Location (provider location):  Off-site    Mode of Communication:  Video Conference via [employer]    Physician has received verbal consent for a Video Visit from the patient? Yes    Shayla Bonilla RD

## 2024-12-18 ENCOUNTER — VIRTUAL VISIT (OUTPATIENT)
Dept: SURGERY | Facility: CLINIC | Age: 59
End: 2024-12-18
Payer: COMMERCIAL

## 2024-12-18 DIAGNOSIS — E66.01 MORBID OBESITY (H): Primary | ICD-10-CM

## 2024-12-18 NOTE — PROGRESS NOTES
Virtual Visit Details    Type of service:  Video Visit     Originating Location (pt. Location): Home    Distant Location (provider location):  Off-site  Platform used for Video Visit: Kelsy    Start Time: 8:16 AM  End Time: 8:40 AM    Monika is 3 months s/p revision to RNY with Dr. Rios. She has generally had no complications, but has pushed the envelope on her portion sizes and what she is eating which has caused some dumping. She now feels dedicated to get back on track. She was also encouraged to attend support group meetings in the near future. She will follow up as needed. F32.9; F41.9; E66.01

## 2025-02-18 ENCOUNTER — MYC MEDICAL ADVICE (OUTPATIENT)
Dept: SURGERY | Facility: CLINIC | Age: 60
End: 2025-02-18
Payer: COMMERCIAL

## 2025-02-18 DIAGNOSIS — Z98.84 S/P BILIOPANCREATIC DIVERSION WITH DUODENAL SWITCH: Primary | ICD-10-CM

## 2025-02-18 DIAGNOSIS — K90.9 INTESTINAL MALABSORPTION: ICD-10-CM

## 2025-02-18 NOTE — TELEPHONE ENCOUNTER
6 months post-op BDS  lab orders needed and will be done at a [facility] lab  in preparation for appointment with Samreen Pickard MD on 3/18/2025.     Cecile Aguillon RN, CBN   [facility]

## 2025-02-24 ENCOUNTER — MYC MEDICAL ADVICE (OUTPATIENT)
Dept: INTERNAL MEDICINE | Facility: CLINIC | Age: 60
End: 2025-02-24
Payer: COMMERCIAL

## 2025-02-24 DIAGNOSIS — R21 RASH AND NONSPECIFIC SKIN ERUPTION: Primary | ICD-10-CM

## 2025-02-27 ENCOUNTER — LAB (OUTPATIENT)
Dept: LAB | Facility: CLINIC | Age: 60
End: 2025-02-27
Payer: COMMERCIAL

## 2025-02-27 DIAGNOSIS — Z98.84 S/P BILIOPANCREATIC DIVERSION WITH DUODENAL SWITCH: ICD-10-CM

## 2025-02-27 DIAGNOSIS — K90.9 INTESTINAL MALABSORPTION: ICD-10-CM

## 2025-02-27 LAB
ALBUMIN SERPL BCG-MCNC: 4.3 G/DL (ref 3.5–5.2)
ALP SERPL-CCNC: 100 U/L (ref 40–150)
ALT SERPL W P-5'-P-CCNC: 93 U/L (ref 0–50)
ANION GAP SERPL CALCULATED.3IONS-SCNC: 11 MMOL/L (ref 7–15)
AST SERPL W P-5'-P-CCNC: 49 U/L (ref 0–45)
BILIRUB SERPL-MCNC: <0.2 MG/DL
BUN SERPL-MCNC: 11.9 MG/DL (ref 8–23)
CALCIUM SERPL-MCNC: 9.6 MG/DL (ref 8.8–10.4)
CHLORIDE SERPL-SCNC: 99 MMOL/L (ref 98–107)
CREAT SERPL-MCNC: 0.61 MG/DL (ref 0.51–0.95)
EGFRCR SERPLBLD CKD-EPI 2021: >90 ML/MIN/1.73M2
ERYTHROCYTE [DISTWIDTH] IN BLOOD BY AUTOMATED COUNT: 13.1 % (ref 10–15)
FERRITIN SERPL-MCNC: 150 NG/ML (ref 11–328)
FOLATE SERPL-MCNC: 15.1 NG/ML (ref 4.6–34.8)
GLUCOSE SERPL-MCNC: 80 MG/DL (ref 70–99)
HCO3 SERPL-SCNC: 25 MMOL/L (ref 22–29)
HCT VFR BLD AUTO: 42.4 % (ref 35–47)
HGB BLD-MCNC: 13.8 G/DL (ref 11.7–15.7)
MCH RBC QN AUTO: 30.1 PG (ref 26.5–33)
MCHC RBC AUTO-ENTMCNC: 32.5 G/DL (ref 31.5–36.5)
MCV RBC AUTO: 93 FL (ref 78–100)
PLATELET # BLD AUTO: 214 10E3/UL (ref 150–450)
POTASSIUM SERPL-SCNC: 4 MMOL/L (ref 3.4–5.3)
PROT SERPL-MCNC: 6.9 G/DL (ref 6.4–8.3)
PTH-INTACT SERPL-MCNC: 47 PG/ML (ref 15–65)
RBC # BLD AUTO: 4.58 10E6/UL (ref 3.8–5.2)
SODIUM SERPL-SCNC: 135 MMOL/L (ref 135–145)
VIT B12 SERPL-MCNC: 1721 PG/ML (ref 232–1245)
WBC # BLD AUTO: 7.7 10E3/UL (ref 4–11)

## 2025-02-27 PROCEDURE — 84446 ASSAY OF VITAMIN E: CPT | Mod: 90

## 2025-02-27 PROCEDURE — 82607 VITAMIN B-12: CPT

## 2025-02-27 PROCEDURE — 84425 ASSAY OF VITAMIN B-1: CPT | Mod: 90

## 2025-02-27 PROCEDURE — 99000 SPECIMEN HANDLING OFFICE-LAB: CPT

## 2025-02-27 PROCEDURE — 82525 ASSAY OF COPPER: CPT | Mod: 90

## 2025-02-27 PROCEDURE — 82746 ASSAY OF FOLIC ACID SERUM: CPT

## 2025-02-27 PROCEDURE — 84630 ASSAY OF ZINC: CPT | Mod: 90

## 2025-02-27 PROCEDURE — 83970 ASSAY OF PARATHORMONE: CPT

## 2025-02-27 PROCEDURE — 80053 COMPREHEN METABOLIC PANEL: CPT

## 2025-02-27 PROCEDURE — 85027 COMPLETE CBC AUTOMATED: CPT

## 2025-02-27 PROCEDURE — 82728 ASSAY OF FERRITIN: CPT

## 2025-02-27 PROCEDURE — 84590 ASSAY OF VITAMIN A: CPT | Mod: 90

## 2025-02-27 PROCEDURE — 84597 ASSAY OF VITAMIN K: CPT | Mod: 90

## 2025-02-27 PROCEDURE — 36415 COLL VENOUS BLD VENIPUNCTURE: CPT

## 2025-02-28 LAB
COPPER SERPL-MCNC: 101.3 UG/DL
ZINC SERPL-MCNC: 62.4 UG/DL

## 2025-03-02 LAB — VIT B1 PYROPHOSHATE BLD-SCNC: 136 NMOL/L

## 2025-03-04 LAB — PHYTONADIONE SERPL-MCNC: 0.57 NMOL/L

## 2025-03-05 LAB
A-TOCOPHEROL VIT E SERPL-MCNC: 7.6 MG/L
ANNOTATION COMMENT IMP: NORMAL
BETA+GAMMA TOCOPHEROL SERPL-MCNC: 0.5 MG/L
RETINYL PALMITATE SERPL-MCNC: <0.02 MG/L
VIT A SERPL-MCNC: 0.52 MG/L

## 2025-03-11 ENCOUNTER — PATIENT OUTREACH (OUTPATIENT)
Dept: CARE COORDINATION | Facility: CLINIC | Age: 60
End: 2025-03-11
Payer: COMMERCIAL

## 2025-03-13 LAB
DEPRECATED CALCIDIOL+CALCIFEROL SERPL-MC: <65 UG/L (ref 20–75)
VITAMIN D2 SERPL-MCNC: <5 UG/L
VITAMIN D3 SERPL-MCNC: 60 UG/L

## 2025-03-18 ENCOUNTER — OFFICE VISIT (OUTPATIENT)
Dept: SURGERY | Facility: CLINIC | Age: 60
End: 2025-03-18
Payer: COMMERCIAL

## 2025-03-18 VITALS
SYSTOLIC BLOOD PRESSURE: 128 MMHG | BODY MASS INDEX: 37.61 KG/M2 | WEIGHT: 234 LBS | DIASTOLIC BLOOD PRESSURE: 80 MMHG | HEIGHT: 66 IN

## 2025-03-18 DIAGNOSIS — K91.2 POSTOPERATIVE MALABSORPTION: Primary | ICD-10-CM

## 2025-03-18 DIAGNOSIS — E66.01 MORBID OBESITY (H): ICD-10-CM

## 2025-03-18 DIAGNOSIS — R63.8 ABNORMAL CRAVING: ICD-10-CM

## 2025-03-18 PROCEDURE — 3074F SYST BP LT 130 MM HG: CPT | Performed by: FAMILY MEDICINE

## 2025-03-18 PROCEDURE — 3079F DIAST BP 80-89 MM HG: CPT | Performed by: FAMILY MEDICINE

## 2025-03-18 PROCEDURE — 99214 OFFICE O/P EST MOD 30 MIN: CPT | Performed by: FAMILY MEDICINE

## 2025-03-18 RX ORDER — NALTREXONE HYDROCHLORIDE 50 MG/1
TABLET, FILM COATED ORAL
Qty: 45 TABLET | Refills: 3 | Status: SHIPPED | OUTPATIENT
Start: 2025-03-18

## 2025-03-18 NOTE — PROGRESS NOTES
Bariatric Follow Up Visit with a History of Previous Bariatric Surgery     Date of visit: 3/18/2025  Physician: Samreen Pickard MD, MD  Primary Care Provider:  Donna Holtmoisés Franz   59 year old  female    Date of Surgery: 9/17/2024  Initial Weight: 264#  Initial BMI: 42.61  Today's Weight:   Wt Readings from Last 1 Encounters:   03/18/25 106.1 kg (234 lb)     Body mass index is 37.77 kg/m .      Assessment and Plan     Assessment: Monika is a 59 year old year old female who is 6 months s/p   MARIELA  with Dr. Rios. This was a revision from sleeve done by Dr. Beverly.  Monika Franz feels as if she has achieved the goals she hoped to accomplish through bariatric surgery and weight loss.    Encounter Diagnoses   Name Primary?    Postoperative malabsorption Yes    Morbid obesity (H)     Abnormal craving          Current Outpatient Medications:     amoxicillin (AMOXIL) 500 MG tablet, TAKE 4 TABLETS BY MOUTH 1 HOUR BEFORE DENTAL APPOINTMENT, Disp: , Rfl:     ARIPiprazole (ABILIFY) 5 MG tablet, Take 5 mg by mouth daily , Disp: , Rfl:     Calcium Citrate 1040 MG TABS, Take 1 tablet by mouth 3 times daily., Disp: , Rfl:     childrens multivitamin chewable tablet, Take 1 tablet by mouth 2 times daily. Multivitamin must contain Vitamin A, Zinc and at least 18 mg of iron., Disp: 180 tablet, Rfl: 3    cholecalciferol 25 MCG (1000 UT) TABS, Take 5,000 Units by mouth daily., Disp: , Rfl:     cyanocobalamin (VITAMIN B-12) 1000 MCG sublingual tablet, Place 1 tablet (1,000 mcg) under the tongue daily., Disp: 90 tablet, Rfl: 3    desvenlafaxine (PRISTIQ) 100 MG 24 hr tablet, Take 1 tablet by mouth daily., Disp: , Rfl:     naltrexone (DEPADE/REVIA) 50 MG tablet, Take 1/2 tablet with evening meal, Disp: 45 tablet, Rfl: 3    traZODone (DESYREL) 150 MG tablet, Take 1 tablet by mouth nightly as needed, Disp: , Rfl:     triamcinolone (KENALOG) 0.1 % external cream, Apply topically 2 times daily X 14 days, Disp: 30 g,  "Rfl: 0    triamcinolone (KENALOG) 0.5 % external ointment, Apply to affected area on the left calf twice daily x 14 days, Disp: 15 g, Rfl: 0     Plan: Ask Laura Burns if OK to add Wellbutrin to Pristiq, consider MTM referral.   Check with ophthalmologist regarding Phentermine with Glaucoma. OK to start naltrexone without Wellbutrin. Continue vitamins with consistency.     Follow up in 3 months with RD then 1 yr with labs.    Bariatric Surgery Review     Interim History/LifeChanges: Maintainig a 46# down from her high of 280#. She is 30# down from her revision. She is taking vitamins with consistency. Weight has gone up and down. Fairlife shake daily. B: egg bites or cottage cheese and berries. L yogurt with granola and fruit, salad with chicken. D: egg bake. Snacking on cookies.     Patient Concerns: appetite.  Appetite (1-10): snacking  GERD: no    Medication changes: no    Vitamin Intake:   B-12   SL   MVI  2/d   Vitamin D  5,000   Calcium   Citrate 3     Other                LABS: \"Reviewed  perfect  Nausea no  Vomiting no  Constipation no  Diarrhea resolved  Rashes new rashes  Hair Loss no  Calf tenderness no  Breathing difficulty no  Reactive Hypoglycemia no  Light Headedness no   Moods were down    12 point ROS as above and otherwise negative      Habits:  Alcohol: no  Tobacco: no  Caffeine no  NSAIDS no  Exercise Routine: walking, has weights  3 meals/day yes  Protein first yes  80+grams/day  Water Separate from meals yes  Calorie Containing Beverages no  Restaurant eating/wk <1  Sleeping with trazodone and CPAP  Stress low  CPAP: yes  Contraception: hyst  DEXA:NA    Social History     Social History     Socioeconomic History    Marital status:      Spouse name: Not on file    Number of children: Not on file    Years of education: Not on file    Highest education level: Not on file   Occupational History    Not on file   Tobacco Use    Smoking status: Never    Smokeless tobacco: Never   Vaping Use    " Vaping status: Never Used   Substance and Sexual Activity    Alcohol use: Not Currently    Drug use: Never    Sexual activity: Yes     Partners: Male     Birth control/protection: Female Surgical, None   Other Topics Concern    Parent/sibling w/ CABG, MI or angioplasty before 65F 55M? No   Social History Narrative    . 3 children 29 daughter, 26 son, 20 son. Lives with her  and 2 boys. Working FT  at [employer] in [place].  is .     Social Drivers of Health     Financial Resource Strain: Low Risk  (9/17/2024)    Financial Resource Strain     Within the past 12 months, have you or your family members you live with been unable to get utilities (heat, electricity) when it was really needed?: No   Food Insecurity: Low Risk  (9/17/2024)    Food Insecurity     Within the past 12 months, did you worry that your food would run out before you got money to buy more?: No     Within the past 12 months, did the food you bought just not last and you didn t have money to get more?: No   Transportation Needs: Low Risk  (9/17/2024)    Transportation Needs     Within the past 12 months, has lack of transportation kept you from medical appointments, getting your medicines, non-medical meetings or appointments, work, or from getting things that you need?: No   Physical Activity: Sufficiently Active (4/3/2024)    Exercise Vital Sign     Days of Exercise per Week: 5 days     Minutes of Exercise per Session: 50 min   Stress: No Stress Concern Present (4/3/2024)    [employer] [employer] - Occupational Stress Questionnaire     Feeling of Stress : Not at all   Social Connections: Unknown (4/3/2024)    Social Connection and Isolation Panel [NHANES]     Frequency of Communication with Friends and Family: Not on file     Frequency of Social Gatherings with Friends and Family: Patient declined     Attends Orthodox Services: Not on file     Active Member of Clubs or Organizations: Not on  file     Attends Club or Organization Meetings: Not on file     Marital Status: Not on file   Interpersonal Safety: Low Risk  (9/17/2024)    Interpersonal Safety     Do you feel physically and emotionally safe where you currently live?: Yes     Within the past 12 months, have you been hit, slapped, kicked or otherwise physically hurt by someone?: No     Within the past 12 months, have you been humiliated or emotionally abused in other ways by your partner or ex-partner?: No   Housing Stability: Low Risk  (9/17/2024)    Housing Stability     Do you have housing? : Yes     Are you worried about losing your housing?: No       Past Medical History     Past Medical History:   Diagnosis Date    Adenomatous polyp of colon     Anxiety     Bariatric surgery status     Breast cancer (H) 06/2020    chemo and bilateral mastectomy    Cataract     Depression     Depressive disorder     Drug overdose, intentional (H) 01/05/2013    Eczema     Glaucoma     with phentermine    Lymphedema of arm     left    Morbid obesity (H)     Sleep apnea     Uses CPAP     Problem List     Patient Active Problem List   Diagnosis    REBECCA (obstructive sleep apnea)    Bariatric surgery status    Eczema    Drug overdose, intentional (H)    Malignant neoplasm of upper-inner quadrant of left breast in female, estrogen receptor negative (H)    Narrow angle glaucoma suspect of both eyes    Severe recurrent major depression without psychotic features (H)    Adenomatous polyp of colon    Hammer toe of right foot    Morbid obesity (H)    Lymphedema of arm, left    Morbid (severe) obesity due to excess calories (H)     Medications       Current Outpatient Medications:     amoxicillin (AMOXIL) 500 MG tablet, TAKE 4 TABLETS BY MOUTH 1 HOUR BEFORE DENTAL APPOINTMENT, Disp: , Rfl:     ARIPiprazole (ABILIFY) 5 MG tablet, Take 5 mg by mouth daily , Disp: , Rfl:     Calcium Citrate 1040 MG TABS, Take 1 tablet by mouth 3 times daily., Disp: , Rfl:     childrens  "multivitamin chewable tablet, Take 1 tablet by mouth 2 times daily. Multivitamin must contain Vitamin A, Zinc and at least 18 mg of iron., Disp: 180 tablet, Rfl: 3    cholecalciferol 25 MCG (1000 UT) TABS, Take 5,000 Units by mouth daily., Disp: , Rfl:     cyanocobalamin (VITAMIN B-12) 1000 MCG sublingual tablet, Place 1 tablet (1,000 mcg) under the tongue daily., Disp: 90 tablet, Rfl: 3    desvenlafaxine (PRISTIQ) 100 MG 24 hr tablet, Take 1 tablet by mouth daily., Disp: , Rfl:     naltrexone (DEPADE/REVIA) 50 MG tablet, Take 1/2 tablet with evening meal, Disp: 45 tablet, Rfl: 3    traZODone (DESYREL) 150 MG tablet, Take 1 tablet by mouth nightly as needed, Disp: , Rfl:     triamcinolone (KENALOG) 0.1 % external cream, Apply topically 2 times daily X 14 days, Disp: 30 g, Rfl: 0    triamcinolone (KENALOG) 0.5 % external ointment, Apply to affected area on the left calf twice daily x 14 days, Disp: 15 g, Rfl: 0   Surgical History     Past Surgical History  She has a past surgical history that includes  section; Laparoscopic ablation endometriosis; Bariatric Surgery (2019); Us Breast Core Biopsy Left (Left, 2020); Cholecystectomy; back surgery; Arthroscopy knee; Gastrectomy Laparoscopic Sleeve (2019); Pr Insj Tunneled Ctr Vad W/Subq Port Age 5 Yr/> (Right, 2020); US [place] Node Injection (12/15/2020); Pr Mastectomy, Simple, Complete (N/A, 12/15/2020); Pr Rmvl Carlos Ctr Vad W/Subq Port/ Ctr/Prph Insj (N/A, 12/15/2020); Mastectomy (Bilateral, 12/15/2020); Bunionectomy (Right, 2021); Hysterectomy, Pap No Longer Indicated (); Breast surgery (2020); colonoscopy (2021); GYN surgery; and Diversion, Biliopancreatic, Robot-Assisted, Laparoscopic, With Duodenal Switch, Using Da Fredis Xi (N/A, 2024).    Objective-Exam     Constitutional:  /80 (BP Location: Right arm, Patient Position: Sitting, Cuff Size: Adult Large)   Ht 1.676 m (5' 6\")   Wt 106.1 kg (234 lb)   " LMP  (LMP Unknown)   BMI 37.77 kg/m      General:  Pleasant and in no acute distress   Eyes:  EOMI  ENT:  Airway 2+  Moist mucous membranes  Neck:  Supple, No LAD, No thyromegaly, No carotid bruits appreciated  Respiratory: Normal respiratory effort, no cough, wheezes or crackles  CV:  Regular rate and Rhythm,no murmurs, pulses 2+, no calf tenderness, trace LE edema  Gastrointestinal: Abdomen NT/ND, BS+  Musculoskeletal: muscle mass WNL  Skin: color pink hair full, incisions nicely healed  Neurological: No tremor, normal gait  Psychiatric: alert and oriented X3, mood and affect normal    Counseling     We reviewed the important post op bariatric recommendations:  -eating 3 meals daily  -eating protein first, getting >60gm protein daily  -eating slowly, chewing food well  -avoiding/limiting calorie containing beverages  -drinking water 15-30 minutes before or after meals  -choosing wheat, not white with breads, crackers, pastas, ovidio, bagels, tortillas, rice  -limiting restaurant or cafeteria eating to twice a week or less    We discussed the importance of restorative sleep and stress management in maintaining a healthy weight.  We discussed the National Weight Control Registry healthy weight maintenance strategies and ways to optimize metabolism.  We discussed the importance of physical activity including cardiovascular and strength training in maintaining a healthier weight.    We discussed the importance of life-long vitamin supplementation and life-long  follow-up.    Monika was reminded that, to avoid marginal ulcers she should avoid tobacco at all, alcohol in excess, caffeine in excess, and NSAIDS (unless indicated for cardioprotection or othewise and opposed by a PPI).    Samreen Pickard MD, MD, FAAFP  [facility].  3/18/2025  3:36 PM      No images are attached to the encounter.  Total time spent on the date of this encounter doing: chart review, review of test results,  patient visit, physical exam, education, counseling, developing plan of care, and documenting = 30 minutes.

## 2025-03-18 NOTE — PATIENT INSTRUCTIONS
Ask Laura Burns if OK to add Wellbutrin to Pristiq.  ?Phentermine with Glaucoma?          MEDICATIONS FOR WEIGHT LOSS    PHENTERMINE (Adipex): approved in 1959 for appetite suppression.  It has stimulant effects and cannot be used with Ritalin, Concerta, or other stimulants.  It is not addictive although it's chemically related to amphetamines.  Amphetamines are addictive. The most common side effects are dry mouth, increased energy and concentration, increased pulse, and constipation.  You should not take phentermine if you have glaucoma, hyperthyroidism, or uncontrolled/untreated hypertension.  $24-$30 for 90 tablets    PHENDIMETRAZINE (Bontril): Appetite suppressant/sympathomimetic.  Controlled substance.  Side effects and contraindications similar to phentermine.  $45-$60 for 3 month supply    TOPIRAMATE (Topamax): Anti-seizure medication, also used to prevent migraines.  Side effects include paresthesia, glaucoma, altered concentration, attention difficulties, memory and speech problems, metabolic acidosis, depression, increase in body temperature and decrease sweating, kidney stones, and weight loss.  Do not take Topamax while taking Depakote as this can cause high ammonia levels.  You must have reliable birth control as Topamax can cause birth defects.  Discontinue slowly to avoid seizure.  Insurance usually covers Topiramate.    QSYMIA (Phentermine + Topamax):  See above information about phentermine and Topamax.  Most common side effects are paresthesia, dizziness, distortion of taste, insomnia, constipation, and dry mouth.  $150-$220 per month    DIETHYLPROPION (Tenuate): Sympathomimetic amine.  Appetite suppressant.  Doses 25 mg before meals or 75 mg per day.  Most common side effects are hypertension, palpitations, EKG changes, and increased seizures in epileptics.  There can be a possible adverse reaction with alcohol.  $70-$90 per 3 months    XENICAL(Orlistat) ([facility]iden OTC): Approved in 1999.  A  fat-blocker.  It reduces absorption of fat by approximately 30%.  It has beneficial effects on lipid levels.  Side effects include diarrhea, abdominal cramping, fecal incontinence, oily spotting, and flatus with discharge.  Side effects are minimized if the patient limits their dietary fat to no more than 30% of their diet.  Patients must take a multivitamin daily to avoid vitamin D, E, A, and K deficiency.  $120 per month    CONTRAVE (Naltrexone/Bupropion): Approved in 2014.  It is a combination pill including an opioid receptor blocker and a long-standing antidepressant.  Most common side effects include nausea, constipation, headache, vomiting, dizziness, trouble sleeping, dry mouth, and diarrhea.  With all antidepressants watch for mood changes and suicide ideation.  Bupropion has been known to lower the seizure threshold in those prone to seizures.  It should not be used in a patient with a recent history of bulimia. It has been associated with liver damage from taking higher than recommended doses.  Do not use countrave if you have taken opioid medications or opioid street drugs in the past 7-10 days, if you are currently on opioids, methadone, or if you are pregnant.  Do not use contrave if you have recently stopped using alcohol or benzodiazepines.  Taper off contrave slowly.  Dosing: titrate up to 2 tablets twice daily of the Naltrexone 8 mg/ Bupropion 90 mg tablets.  $200 for 90 tablets    SAXENDA (Liraglutide (called Victoza for Type 2 Diabetes): A daily injectable (3mg daily) medication used for type 2 diabetes (Victoza). Glucagon-like peptide-1 (GLP-1) agonist. Contraindications include personal or family history of medullary thyroid cancer or MEN type 2. Acute pancreatitis has been observed in patients taking liraglutide. Liraglutide causes C-cell tumors in rats and mice. It is unknown whether liraglutide causes tumors in humans. Start at 0.6mg, increasing the dose weekly up to 3mg.     VYVANSE  (Lisdexamfetamine dimesylate): a CNS stimulant used to treat ADHD. Indicated for the treatment of moderate to severe Binge Eating Disorder in Adults. Contraindicated in patients with known heart disease, structural abnormalities of the heart, serious heart arrhythmias or unexplained syncope. CNS stimulants such as vyvanse may cause manic or psychotic symptoms in patients with BPAD or pre-existing psychosis. Use with caution in patients with Raynaud's phenomenon. Most common side effects include dry mouth, insomnia, decreased appetite, increased heart rate, jittery feeling, constipation and anxiety.     WEGOVY (Semaglutide (called Ozempic for Type 2 Diabetes): A weekly injectable (2.4mg weekly) medication used for type 2 diabetes (Ozempic). Glucagon-like peptide-1 (GLP-1) agonist. Contraindications include personal or family history of medullary thyroid cancer or MEN type 2. Acute pancreatitis has been observed in patients taking Semaglutide. Semaglutide causes C-cell tumors in rats and mice. It is unknown whether Semaglutide causes tumors in humans. Start at 0.25mg, increasing to 0.5, 1.0, 1.7 then 2.4 monthly. $1200/mo    ZEPBOUND (Tirzepatide (called Mounjaro for Type 2 Diabetes): A weekly injectable medication indicated for type 2 diabetes in 2022 and for weight loss in 2023.. Glucagon-like-peptide-1 (GLP-1) agonist and Glucose-Dependent Insulinotropic Polypeptide (GIP) agonist. Contraindications include personal or family history of medullary thyroid cancer or MEN type 2. Acute pancreatitis has been observed in patients taking Tirzepatide. Tirzepatide causes C-cell tumors in rats and mice. It is unknown whether Tirzepatide causes tumors in humans. Watch for neck mass, difficulty swallowing, persistent hoarseness, and epigastric abdominal pain. Most common side effects include nausea, diarrhea, vomiting, constipation, dyspepsia, and abdominal pain. Start at 2.5mg, increasing to 5.0, 7.5, 10, 12.5 then 15mg  monthly. $1,000/month     https://www.[employer].gov/wp-content/uploads/2020/03/hq_deskfit_booklet_6.10.2020.pdf?emrc=30b8c0     Nutritional Guidelines  Duodenal Switch 6 Months Post Op    General Guidelines and Helpful Hints:  Eat 3 meals per day + protein supplement(s). No snacks between meals.  Do not skip meals.  This can cause overeating at the next meal and will prevent adequate protein and nutritional intake.  Aim for  grams of protein per day.   Always eat your protein first. This assists with optimal nutrition and helps you stay full longer.  To achieve daily protein goals, it is recommended to drink approved protein supplement between meals.  Follow appropriate portion size: Use measuring cups to be accurate.    Months Post Op Portion Size per Meal   6 months 3/4 cup   6-9 months 3/4 cup - 1 cup   9-12 months and beyond 1 cup maximum     Continue to use saucer/salad plates, infant/toddler silverware to keep portion sizes small and take small bites.  Eat [id number] to make each meal last 20-30 minutes. Always stop eating when satisfied.  Continue to use caution with foods containing skins, peels or membranes. Chew well!  Aim for 64 oz. of calorie-free fluids daily.  Continue to avoid caffeine, carbonation and alcohol.  Remember to avoid drinking during meals, 15-30 minutes before and 30 minutes after.  Aim for 30-60 minutes of physical activity most days of the week.  If having trouble tolerating meat, try using a crock-pot, tinfoil tent, steamer or other moist cooking method to create tender meats. Add broth or low-fat gravy to help meat stay moist.   Avoid high sugar and high fat foods to prevent high calorie intake. This will reduce your rate of weight loss. High fat items can also cause upset stomach and diarrhea.   Check nutrition labels for less than 10 grams of sugar and less than 10 grams of fat per serving.  Continue Taking Vitamins/Minerals:  9953-8511 mcg of Sublingual B-12 daily  1 Multivitamin  with Iron twice daily (chewable or swallow tabs)  500-600 mg Calcium Citrate twice daily (chewable or swallow tabs)      Sample Grocery List    Protein:  Fat free Greek or light yogurt (less than 10 grams sugar)  Fat free or low-fat cottage cheese  String cheese or reduced fat cheese slices  Tuna, salmon, crab, egg, or chicken salad made with light or fat free mayonnaise  Egg or Egg Substitute  Lean/extra lean turkey, beef, bison, venison (ground, sirloin, round, flank)  Pork loin or tenderloin (grilled, baked, broiled)  Fish such as salmon, tuna, trout, tilapia, etc. (grilled, baked, broiled)  Tender cuts of lean (skinless) turkey or chicken  Lean deli meats: turkey, lean ham, chicken, lean roast beef  Beans such as kidney, garbanzo, black, quiles, or low-fat/fat free refried beans  Peanut butter (natural preferred). Limit to 1 Tbsp. per day.  Low-fat meatloaf (made with lean ground beef or turkey)  Sloppy Joes made with low-sugar ketchup and lean ground beef or turkey  Soy or vegetable protein (i.e. vegan crumbles, soy/veggie burger, tofu)  Hummus    Vegetables:  Fresh: cooked or raw (as tolerated)  Frozen vegetables  Canned vegetables (low sodium or no salt added, rinse before cooking/eating)  (Ok to have skins/peels/membranes/seeds - just chew well)    Fruits:  Fresh fruit  Frozen fruit (no sugar added)  Canned fruit (packed in its own juice, NOT syrup)  (Ok to have skins/peels/membranes/seeds - just chew well)    Starch:  Unsweetened whole-grain hot cereal (or high fiber cold cereal, dry)  Toasted whole wheat bread or [place] Thins  Whole grain crackers  Baked/boiled/mashed potato/sweet potato  Cooked whole grain pasta, brown rice, or other cooked whole grains  Starchy vegetables: corn, peas, winter squash    Protein Supplement:   Ready to drink protein shake with:  15-30 grams protein per serving  Less than 10 grams total carbohydrate per serving   Protein powder mixed with:   Skim or 1% milk  Low fat or fat  free Lactaid milk, plain or no sugar added soymilk  Water     Fats: (use sparingly)  1 teaspoon of soft tub margarine  1 teaspoon olive oil, canola oil, or peanut oil  1 tablespoon of low-fat montero or salad dressing     Sample Menu for 6 months after Duodenal Switch  Menu portions total   cup (12 Tbsp.) per meal.  You may gradually increase to 1 cup (16 Tbsp.) per meal by 9 months post op.    You do NOT need to eat/drink the full portion sizes listed below  Always stop when you are satisfied    Breakfast   cup 1% cottage cheese     cup peaches    Lunch 2-3 oz. lean hamburger or veggie burger  1-2 tsp. salsa   Supplement Approved Protein Shake   (Have between meals throughout the day)   Dinner 2 oz chicken breast    cup green beans       Breakfast 3 eggs or   cup egg substitute    Lunch   cup low-fat Sloppy Chris mixture    Supplement Approved Protein Shake   (Have between meals throughout the day)   Dinner 2 oz grilled, broiled, or baked lemon pepper salmon    cup asparagus       Breakfast 6 Tbsp. light Greek yogurt with 2 Tbsp. Grape Nuts or high fiber cereal    cup blueberries    Lunch   cup of chili made with extra lean ground beef and kidney beans    cup salad with 1 tsp. fat free salad dressing   Dinner 2 oz pork loin made in a crock pot  2 Tbsp. steamed broccoli  2 Tbsp. baked potato with 1-2 spritzes of spray margarine    Supplement Approved Protein Shake   (Have between meals throughout the day)     Breakfast 2 oz. lean ham    cup seedless melon   Lunch 3 oz diced turkey with 1 tsp. low fat gravy   Dinner   cup extra lean ground beef mixed with 1 Tbsp. spaghetti sauce  3 Tbsp. whole wheat pasta   Supplement Approved Protein Shake   (Have between meals throughout the day)     Breakfast 3 oz turkey or soy based sausage pam    Lunch   cup low-fat cottage cheese    cup pineapple   Supplement Approved Protein Shake   (Have between meals throughout the day)   Dinner 2 oz beef tenderloin    cup asparagus      Breakfast 2 string cheese    of a whole wheat English Muffin (toasted)  1 Tbsp. natural peanut butter   Lunch   cup of black bean soup    Dinner 2 oz low fat turkey meat loaf     cup cooked carrots   Supplement Approved Protein Shake   (Have between meals throughout the day)     Breakfast   cup scrambled egg or egg substitute  1 Tbsp. finely chopped bell pepper  3 Tbsp. low fat shredded cheese   Lunch 3 oz. diced lean ham   Supplement Approved Protein Shake   (Have between meals throughout the day)   Dinner 2 oz  broiled fish     cup mashed sweet potato

## 2025-03-18 NOTE — LETTER
3/18/2025      Monika Franz  [address] [id number]      Dear Colleague,    Thank you for referring your patient, Monika Franz, to the [facility] AND [facility] [place]. Please see a copy of my visit note below.    Bariatric Follow Up Visit with a History of Previous Bariatric Surgery     Date of visit: 3/18/2025  Physician: Samreen Pickard MD, MD  Primary Care Provider:  Donna Holt  Monika Franz   59 year old  female    Date of Surgery: 9/17/2024  Initial Weight: 264#  Initial BMI: 42.61  Today's Weight:   Wt Readings from Last 1 Encounters:   03/18/25 106.1 kg (234 lb)     Body mass index is 37.77 kg/m .      Assessment and Plan     Assessment: Monika is a 59 year old year old female who is 6 months s/p   MARIELA  with Dr. Rios. This was a revision from sleeve done by Dr. Beverly.  Monika Franz feels as if she has achieved the goals she hoped to accomplish through bariatric surgery and weight loss.    Encounter Diagnoses   Name Primary?     Postoperative malabsorption Yes     Morbid obesity (H)      Abnormal craving          Current Outpatient Medications:      amoxicillin (AMOXIL) 500 MG tablet, TAKE 4 TABLETS BY MOUTH 1 HOUR BEFORE DENTAL APPOINTMENT, Disp: , Rfl:      ARIPiprazole (ABILIFY) 5 MG tablet, Take 5 mg by mouth daily , Disp: , Rfl:      Calcium Citrate 1040 MG TABS, Take 1 tablet by mouth 3 times daily., Disp: , Rfl:      childrens multivitamin chewable tablet, Take 1 tablet by mouth 2 times daily. Multivitamin must contain Vitamin A, Zinc and at least 18 mg of iron., Disp: 180 tablet, Rfl: 3     cholecalciferol 25 MCG (1000 UT) TABS, Take 5,000 Units by mouth daily., Disp: , Rfl:      cyanocobalamin (VITAMIN B-12) 1000 MCG sublingual tablet, Place 1 tablet (1,000 mcg) under the tongue daily., Disp: 90 tablet, Rfl: 3     desvenlafaxine (PRISTIQ) 100 MG 24 hr tablet, Take 1 tablet by mouth daily., Disp: , Rfl:      naltrexone  "(DEPADE/REVIA) 50 MG tablet, Take 1/2 tablet with evening meal, Disp: 45 tablet, Rfl: 3     traZODone (DESYREL) 150 MG tablet, Take 1 tablet by mouth nightly as needed, Disp: , Rfl:      triamcinolone (KENALOG) 0.1 % external cream, Apply topically 2 times daily X 14 days, Disp: 30 g, Rfl: 0     triamcinolone (KENALOG) 0.5 % external ointment, Apply to affected area on the left calf twice daily x 14 days, Disp: 15 g, Rfl: 0     Plan: Ask Laura Burns if OK to add Wellbutrin to Pristiq, consider MTM referral.   Check with ophthalmologist regarding Phentermine with Glaucoma. OK to start naltrexone without Wellbutrin. Continue vitamins with consistency.     Follow up in 3 months with RD then 1 yr with labs.    Bariatric Surgery Review     Interim History/LifeChanges: Maintainig a 46# down from her high of 280#. She is 30# down from her revision. She is taking vitamins with consistency. Weight has gone up and down. Fairlife shake daily. B: egg bites or cottage cheese and berries. L yogurt with granola and fruit, salad with chicken. D: egg bake. Snacking on cookies.     Patient Concerns: appetite.  Appetite (1-10): snacking  GERD: no    Medication changes: no    Vitamin Intake:   B-12   SL   MVI  2/d   Vitamin D  5,000   Calcium   Citrate 3     Other                LABS: \"Reviewed  perfect  Nausea no  Vomiting no  Constipation no  Diarrhea resolved  Rashes new rashes  Hair Loss no  Calf tenderness no  Breathing difficulty no  Reactive Hypoglycemia no  Light Headedness no   Moods were down    12 point ROS as above and otherwise negative      Habits:  Alcohol: no  Tobacco: no  Caffeine no  NSAIDS no  Exercise Routine: walking, has weights  3 meals/day yes  Protein first yes  80+grams/day  Water Separate from meals yes  Calorie Containing Beverages no  Restaurant eating/wk <1  Sleeping with trazodone and CPAP  Stress low  CPAP: yes  Contraception: hyst  DEXA:NA    Social History     Social History     Socioeconomic History "     Marital status:      Spouse name: Not on file     Number of children: Not on file     Years of education: Not on file     Highest education level: Not on file   Occupational History     Not on file   Tobacco Use     Smoking status: Never     Smokeless tobacco: Never   Vaping Use     Vaping status: Never Used   Substance and Sexual Activity     Alcohol use: Not Currently     Drug use: Never     Sexual activity: Yes     Partners: Male     Birth control/protection: Female Surgical, None   Other Topics Concern     Parent/sibling w/ CABG, MI or angioplasty before 65F 55M? No   Social History Narrative    . 3 children 29 daughter, 26 son, 20 son. Lives with her  and 2 boys. Working FT  at [employer] in [place].  is .     Social Drivers of Health     Financial Resource Strain: Low Risk  (9/17/2024)    Financial Resource Strain      Within the past 12 months, have you or your family members you live with been unable to get utilities (heat, electricity) when it was really needed?: No   Food Insecurity: Low Risk  (9/17/2024)    Food Insecurity      Within the past 12 months, did you worry that your food would run out before you got money to buy more?: No      Within the past 12 months, did the food you bought just not last and you didn t have money to get more?: No   Transportation Needs: Low Risk  (9/17/2024)    Transportation Needs      Within the past 12 months, has lack of transportation kept you from medical appointments, getting your medicines, non-medical meetings or appointments, work, or from getting things that you need?: No   Physical Activity: Sufficiently Active (4/3/2024)    Exercise Vital Sign      Days of Exercise per Week: 5 days      Minutes of Exercise per Session: 50 min   Stress: No Stress Concern Present (4/3/2024)    [place] [employer] - Occupational Stress Questionnaire      Feeling of Stress : Not at all   Social Connections:  Unknown (4/3/2024)    Social Connection and Isolation Panel [NHANES]      Frequency of Communication with Friends and Family: Not on file      Frequency of Social Gatherings with Friends and Family: Patient declined      Attends Tenriism Services: Not on file      Active Member of Clubs or Organizations: Not on file      Attends Club or Organization Meetings: Not on file      Marital Status: Not on file   Interpersonal Safety: Low Risk  (9/17/2024)    Interpersonal Safety      Do you feel physically and emotionally safe where you currently live?: Yes      Within the past 12 months, have you been hit, slapped, kicked or otherwise physically hurt by someone?: No      Within the past 12 months, have you been humiliated or emotionally abused in other ways by your partner or ex-partner?: No   Housing Stability: Low Risk  (9/17/2024)    Housing Stability      Do you have housing? : Yes      Are you worried about losing your housing?: No       Past Medical History     Past Medical History:   Diagnosis Date     Adenomatous polyp of colon      Anxiety      Bariatric surgery status      Breast cancer (H) 06/2020    chemo and bilateral mastectomy     Cataract      Depression      Depressive disorder      Drug overdose, intentional (H) 01/05/2013     Eczema      Glaucoma     with phentermine     Lymphedema of arm     left     Morbid obesity (H)      Sleep apnea     Uses CPAP     Problem List     Patient Active Problem List   Diagnosis     REBECCA (obstructive sleep apnea)     Bariatric surgery status     Eczema     Drug overdose, intentional (H)     Malignant neoplasm of upper-inner quadrant of left breast in female, estrogen receptor negative (H)     Narrow angle glaucoma suspect of both eyes     Severe recurrent major depression without psychotic features (H)     Adenomatous polyp of colon     Hammer toe of right foot     Morbid obesity (H)     Lymphedema of arm, left     Morbid (severe) obesity due to excess calories (H)      Medications       Current Outpatient Medications:      amoxicillin (AMOXIL) 500 MG tablet, TAKE 4 TABLETS BY MOUTH 1 HOUR BEFORE DENTAL APPOINTMENT, Disp: , Rfl:      ARIPiprazole (ABILIFY) 5 MG tablet, Take 5 mg by mouth daily , Disp: , Rfl:      Calcium Citrate 1040 MG TABS, Take 1 tablet by mouth 3 times daily., Disp: , Rfl:      childrens multivitamin chewable tablet, Take 1 tablet by mouth 2 times daily. Multivitamin must contain Vitamin A, Zinc and at least 18 mg of iron., Disp: 180 tablet, Rfl: 3     cholecalciferol 25 MCG (1000 UT) TABS, Take 5,000 Units by mouth daily., Disp: , Rfl:      cyanocobalamin (VITAMIN B-12) 1000 MCG sublingual tablet, Place 1 tablet (1,000 mcg) under the tongue daily., Disp: 90 tablet, Rfl: 3     desvenlafaxine (PRISTIQ) 100 MG 24 hr tablet, Take 1 tablet by mouth daily., Disp: , Rfl:      naltrexone (DEPADE/REVIA) 50 MG tablet, Take 1/2 tablet with evening meal, Disp: 45 tablet, Rfl: 3     traZODone (DESYREL) 150 MG tablet, Take 1 tablet by mouth nightly as needed, Disp: , Rfl:      triamcinolone (KENALOG) 0.1 % external cream, Apply topically 2 times daily X 14 days, Disp: 30 g, Rfl: 0     triamcinolone (KENALOG) 0.5 % external ointment, Apply to affected area on the left calf twice daily x 14 days, Disp: 15 g, Rfl: 0   Surgical History     Past Surgical History  She has a past surgical history that includes  section; Laparoscopic ablation endometriosis; Bariatric Surgery (2019); Us Breast Core Biopsy Left (Left, 2020); Cholecystectomy; back surgery; Arthroscopy knee; Gastrectomy Laparoscopic Sleeve (2019); Pr Insj Tunneled Ctr Vad W/Subq Port Age 5 Yr/> (Right, 2020); US [place] Node Injection (12/15/2020); Pr Mastectomy, Simple, Complete (N/A, 12/15/2020); Pr Rmvl Carlos Ctr Vad W/Subq Port/ Ctr/Prph Insj (N/A, 12/15/2020); Mastectomy (Bilateral, 12/15/2020); Bunionectomy (Right, 2021); Hysterectomy, Pap No Longer Indicated ();  "Breast surgery (12/2020); colonoscopy (June 2021); GYN surgery; and Diversion, Biliopancreatic, Robot-Assisted, Laparoscopic, With Duodenal Switch, Using Da Fredis Xi (N/A, 9/17/2024).    Objective-Exam     Constitutional:  /80 (BP Location: Right arm, Patient Position: Sitting, Cuff Size: Adult Large)   Ht 1.676 m (5' 6\")   Wt 106.1 kg (234 lb)   LMP  (LMP Unknown)   BMI 37.77 kg/m      General:  Pleasant and in no acute distress   Eyes:  EOMI  ENT:  Airway 2+  Moist mucous membranes  Neck:  Supple, No LAD, No thyromegaly, No carotid bruits appreciated  Respiratory: Normal respiratory effort, no cough, wheezes or crackles  CV:  Regular rate and Rhythm,no murmurs, pulses 2+, no calf tenderness, trace LE edema  Gastrointestinal: Abdomen NT/ND, BS+  Musculoskeletal: muscle mass WNL  Skin: color pink hair full, incisions nicely healed  Neurological: No tremor, normal gait  Psychiatric: alert and oriented X3, mood and affect normal    Counseling     We reviewed the important post op bariatric recommendations:  -eating 3 meals daily  -eating protein first, getting >60gm protein daily  -eating slowly, chewing food well  -avoiding/limiting calorie containing beverages  -drinking water 15-30 minutes before or after meals  -choosing wheat, not white with breads, crackers, pastas, ovidio, bagels, tortillas, rice  -limiting restaurant or cafeteria eating to twice a week or less    We discussed the importance of restorative sleep and stress management in maintaining a healthy weight.  We discussed the National Weight Control Registry healthy weight maintenance strategies and ways to optimize metabolism.  We discussed the importance of physical activity including cardiovascular and strength training in maintaining a healthier weight.    We discussed the importance of life-long vitamin supplementation and life-long  follow-up.    Monika was reminded that, to avoid marginal ulcers she should avoid tobacco at all, alcohol " in excess, caffeine in excess, and NSAIDS (unless indicated for cardioprotection or othewise and opposed by a PPI).    Samreen Pickard MD, MD, [facility].  3/18/2025  3:36 PM      No images are attached to the encounter.  Total time spent on the date of this encounter doing: chart review, review of test results, patient visit, physical exam, education, counseling, developing plan of care, and documenting = 30 minutes.      Again, thank you for allowing me to participate in the care of your patient.        Sincerely,        Samreen Pickard MD    Electronically signed

## 2025-03-27 ENCOUNTER — LAB (OUTPATIENT)
Dept: LAB | Facility: CLINIC | Age: 60
End: 2025-03-27
Payer: COMMERCIAL

## 2025-03-27 DIAGNOSIS — K91.2 POSTOPERATIVE MALABSORPTION: ICD-10-CM

## 2025-03-27 LAB
CHOLEST SERPL-MCNC: 123 MG/DL
EST. AVERAGE GLUCOSE BLD GHB EST-MCNC: 100 MG/DL
FASTING STATUS PATIENT QL REPORTED: NO
HBA1C MFR BLD: 5.1 % (ref 0–5.6)
HDLC SERPL-MCNC: 59 MG/DL
LDLC SERPL CALC-MCNC: 54 MG/DL
NONHDLC SERPL-MCNC: 64 MG/DL
TRIGL SERPL-MCNC: 50 MG/DL

## 2025-04-07 ENCOUNTER — TRANSFERRED RECORDS (OUTPATIENT)
Dept: HEALTH INFORMATION MANAGEMENT | Facility: CLINIC | Age: 60
End: 2025-04-07
Payer: COMMERCIAL

## 2025-07-10 ENCOUNTER — VIRTUAL VISIT (OUTPATIENT)
Dept: SURGERY | Facility: CLINIC | Age: 60
End: 2025-07-10
Payer: COMMERCIAL

## 2025-07-10 DIAGNOSIS — Z98.84 S/P BILIOPANCREATIC DIVERSION WITH DUODENAL SWITCH: ICD-10-CM

## 2025-07-10 DIAGNOSIS — K90.9 INTESTINAL MALABSORPTION: ICD-10-CM

## 2025-07-10 DIAGNOSIS — K91.2 POSTOPERATIVE MALABSORPTION: Primary | ICD-10-CM

## 2025-07-10 NOTE — LETTER
7/10/2025      Monika Franz  [address] [facility] [facility] [id number]      Dear Colleague,    Thank you for referring your patient, Monika Franz, to the [facility] SURGERY CLINIC AND BARIATRICS CARE [place]. Please see a copy of my visit note below.    Monika Franz is a 59 year old who is being evaluated via a billable video visit.      How would you like to obtain your AVS? MyChart  If the video visit is dropped, the invitation should be resent by: Text to cell phone: [phone]  Will anyone else be joining your video visit? No      Post-op Surgical Weight Loss Diet Evaluation     Assessment:  Pt presents for 10 months post-op RD visit, s/p revision to DS on 9/17/2024 with Dr. Rios. Today we reviewed current eating habits and level of physical activity, and instructed on the changes that are required for successful bariatric outcomes.    Patient Progress: Patient reports she is not seeing the results she was hoping for. Tracking per protein intake via jazmín and avoiding excessive carb intake. Eating her protein first.  - No longer taking Naltrexone         Initial weight: 247 lbs - day of surgery   Current weight: 229 lbs   Weight change: 20 lbs down, 7.7% loss     There is no height or weight on file to calculate BMI.    Patient Active Problem List   Diagnosis     REBECCA (obstructive sleep apnea)     Bariatric surgery status     Eczema     Drug overdose, intentional (H)     Malignant neoplasm of upper-inner quadrant of left breast in female, estrogen receptor negative (H)     Narrow angle glaucoma suspect of both eyes     Severe recurrent major depression without psychotic features (H)     Adenomatous polyp of colon     Hammer toe of right foot     Morbid obesity (H)     Lymphedema of arm, left     Morbid (severe) obesity due to excess calories (H)       Diabetes: none    Vitamins   Multi Vit with Iron: x2 yes - chewable   Calcium Citrate: yes  B12: yes  D3: yes    Do you experience hunger? No  Do  "you have \"dumping\" syndrome? Yes   Do you experience any reflux or discomfort with eating? Spice or greasy   Nausea: no  Vomiting: no  Diarrhea: yes - not often  Constipation: no  Hair loss: no    Diet Recall/Time:   Protein shake   Breakfast: greek yogurt with granola   Lunch: salad with added chicken and added chopped vegetables with ranch   Dinner: chicken, burger with no bun with side of vegetables or fruit or tacos with a side   Typical Snacks: mixed nuts 1/4 cups     Proteins/Veg/Fruits/CHO (NOT well tolerated): none    Estimated protein intake: 60-80 grams    Estimated portion size per meals:1 cup/meal    Meals per week away from home: rarely    Meal Duration:15-20 minutes     Fluid-meal separation:  Fluids are  30min before and 30 minutes after meals.    Fluid Intake  Water: 50+ oz or sparkling water   Caffeine: diet Mt Dew or diet pepsi  Alcohol: none  Carbonation: yes    Exercise: 45 minutes walk about 5 times per week and joined a club for circuit training       PES statement:      (NC-1.4) Altered GI Function related to Alteration in gastrointestinal tract structure and/or function/ Decreased functional length of the GI tract as evidenced by Weight loss of 7.7% initial body weight; Gastric bypass surgery      Intervention    Discussion  Discussed 9 month Post-Op Nutritional Guidelines for DS  Recommended to consume 15-20gm protein at 3 meals daily, along with protein supplement/\"planned protein containing snack\" of 15-30gm protein, to reach goal of 60-80 gm protein daily.  Educated on post-op vitamin regimen: Multi Vit + iron 2x/day, calcium citrate 400-600 mg 2x/day, 2333-8006 mcg of Sublingual B-12 daily, and 5000 IU Vitamin D3 daily (MVI and calcium can be taken at the same time BID)  Reviewed lean protein sources  Bariatric Plate Method-  including lean/low fat protein at each meal, including a vegetable/fruit, and limiting carbohydrate intake to less than 25% of plate volume. " "    Instructions  Include 15-20gm protein at each meal, along with protein supplement/\"planned protein containing snack\" of 15-30gm protein, to reach goal of 60-80 gm protein daily.  Increase fluid intake to 64oz daily: choose plain or calorie/carbonation-free beverages.  Incorporate daily structured activity, 30-60 minutes most days of the week  Recommended pt to start taking: Multi Vit + iron 2x/day, calcium citrate 400-600 mg 2x/day, 7739-5420 mcg of Sublingual B-12 daily, and 5000 IU Vitamin D3 daily. (MVI and calcium can be taken at the same time)  Read food labels more consistently: keeping total fat grams <10, total sugar grams <10, fiber >3gm per serving.  Increase vegetable/fruit intake, by having a vegetable or fruit with each meal daily.  Practice plate method: 1/2 plate lean/low fat protein source, vegetable/fruit, <25% of plate complex carbohydrates.  Separate fluids 30 minutes before/after meal times.  Practice eating off of smaller plates/bowls, chewing to applesauce consistency, taking 20-30 minutes to eat in a calm/relaxed environment without distractions of tv/email/cell phone.    Handouts provided:  9 months Post-Op Nutritional Guidelines for DS      Assessment/Plan:    Pt to follow up for 1 year post-op visit with bariatrician       Video-Visit Details    Type of service:  Video Visit    Video Start Time (time video started): 1:30 pm    Video End Time (time video stopped):1:48 pm    Originating Location (pt. Location): Other office      Distant Location (provider location):  Off-site    Mode of Communication:  Video Conference via [facility]    Physician has received verbal consent for a Video Visit from the patient? Yes    Shayla Bonilla RD           Again, thank you for allowing me to participate in the care of your patient.        Sincerely,        Shayla Bonilla RD    Electronically signed"

## 2025-07-10 NOTE — PATIENT INSTRUCTIONS
[facility] Bariatric Care  Nutritional Guidelines  Duodenal Switch 9 Months Post Op    General Guidelines and Helpful Hints:  Eat 3 meals per day + protein supplement(s). No snacks between meals.  Do not skip meals.  This can cause overeating at the next meal and will prevent adequate protein and nutritional intake.  Aim for  grams of protein per day.  Always eat your protein first. This assists with optimal nutrition and helps you stay full longer.  Depending on your portion size, you may need to drink approved protein supplement between meals to achieve protein goals. Follow recommendations of your Dietitian.   Follow appropriate portion size: Use measuring cups to be accurate.    Months Post Op Portion Size per Meal   9 months 1 cup   12 months and beyond 1 cup maximum     Continue to use saucer/salad plates, infant/toddler silverware to keep portion sizes small and take small bites.  Eat S-L-[id number]-L-Y to make each meal last 20-30 minutes. Always stop eating when satisfied.  Continue to use caution with foods containing skins, peels or membranes. Chew well!  Aim for 64 oz. of calorie-free fluids daily.  Continue to avoid caffeine, carbonation and alcohol.  Remember to avoid drinking during meals, 15-30 minutes before and 30 minutes after.  Aim for 30-60 minutes of physical activity most days of the week.  If having trouble tolerating meat, try using a crock-pot, tinfoil tent, steamer or other moist cooking method to create tender meats. Add broth or low-fat gravy to help meat stay moist.   Avoid high sugar and high fat foods to prevent high calorie intake. This will reduce your rate of weight loss and can cause weight regain. High fat items can also cause upset stomach and diarrhea.  Check nutrition labels for less than 10 grams of sugar and less than 10 grams of fat per serving.  Continue Taking Vitamins/Minerals:  1929-7264 mcg of Sublingual B-12 daily  1 Multivitamin with Iron twice daily (chewable  or swallow tabs)  500-600 mg Calcium Citrate twice daily (chewable or swallow tabs)  5000 IU Vitamin D3 daily    Grocery List  Protein:  Fat free Greek or light yogurt (less than 10 grams sugar)  Fat free or low-fat cottage cheese  String cheese or reduced fat cheese slices  Tuna, salmon, crab, egg, or chicken salad made with light or fat free mayonnaise  Egg or Egg Substitute  Lean/extra lean turkey, beef, bison, venison (ground, sirloin, round, flank)  Pork loin or tenderloin (grilled, baked, broiled)  Fish such as salmon, tuna, trout, tilapia, etc. (grilled, baked, broiled)  Tender cuts of lean (skinless) turkey or chicken  Lean deli meats: turkey, lean ham, chicken, lean roast beef  Beans such as kidney, garbanzo, black, qulies, or low-fat/fat free refried beans  Peanut butter (natural preferred). Limit to 1 Tbsp. per day.  Low-fat meatloaf (made with lean ground beef or turkey)  Sloppy Joes made with low-sugar ketchup and lean ground beef or turkey  Soy or vegetable protein (i.e. vegan crumbles, soy/veggie burger, tofu)  Hummus    Vegetables:  Fresh: cooked or raw (as tolerated)  Frozen vegetables  Canned vegetables (low sodium or no salt added, rinse before cooking/eating)  (Ok to have skins/peels/membranes/seeds - just chew well)    Fruits:  Fresh fruit  Frozen fruit (no sugar added)  Canned fruit (packed in its own juice, NOT syrup)  (Ok to have skins/peels/membranes/seeds - just chew well)    Starch:  Unsweetened whole-grain hot cereal (or high fiber cold cereal, dry)  Toasted whole wheat bread or [place] Thins  Whole grain crackers  Baked/boiled/mashed potato/sweet potato  Cooked whole grain pasta, brown rice, or other cooked whole grains  Starchy vegetables: corn, peas, winter squash    Protein Supplement:   Ready to drink protein shake with:  15-30 grams protein per serving  Less than 10 grams total carbohydrate per serving   Protein powder mixed with:   Skim or 1% milk  Low fat or fat free Lactaid milk,  plain or no sugar added soymilk  Water   Fats: (use in moderation)  1 teaspoon of soft tub margarine  1 teaspoon olive oil, canola oil, or peanut oil  1 tablespoon of low-fat montero or salad dressing    Sample Menu for 9 months after Gastric Sleeve    You do NOT need to eat/drink the full portion sizes listed below  Always stop when you are satisfied    Breakfast 3  diameter whole-wheat waffle with 1 Tbsp. of peanut butter  1 string cheese    Lunch   cup low-fat tuna salad (made with light mayonnaise)  3-4 small strips red pepper   Supplement Approved Protein Supplement  (Have between meals throughout the day)   Dinner   cup low-fat chicken stew      Breakfast   cup egg substitute, scrambled     cup sautéed vegetables in olive oil   Lunch   cup low fat or fat free cottage cheese  4-5  whole wheat crackers   Supplement Approved Protein Supplement   (Have between meals throughout the day)   Dinner 3 oz  grilled, broiled, or baked lemon pepper salmon    cup grilled asparagus     Breakfast   cup diced lean ham    cup mandarin oranges (canned in light juice)    Lunch 3 oz lean turkey lunchmeat    cup fresh tomatoes   Dinner 3 oz pork loin made in a crock pot seasoned with a spice rub    cup unsweetened applesauce   Supplement Approved Protein Supplement  (Have between meals throughout the day)     Breakfast   cup breakfast casserole made with egg substitute & turkey sausage    cup seedless melon   Lunch 3 oz  skinless chicken sautéed in 1 tsp. olive oil and herb seasonings    cup sliced zucchini   Dinner   cup chili made with ground turkey/sirloin or veggie crumbles and beans   3 whole-wheat saltine crackers   Supplement Approved Protein Supplement  (Have between meals throughout the day)     Breakfast 2 Tbsp. whole-grain cereal with   cup plain Greek yogurt   2 Tbsp. fresh berries   Lunch   cup crab mixed with 1-2 tsp. low-fat mayonnaise  4-5 Wheat Thins   Supplement Approved Protein Supplement   (Have between meals  throughout the day)   Dinner 3 oz. tender turkey breast (made in crock pot for extra moisture)  2 Tbsp. green beans  2 Tbsp. sweet potato     Breakfast   whole wheat English muffin, toasted  1 Tbsp. natural peanut butter    cup flavored light Greek yogurt   Lunch 3/4 cup black bean soup   Dinner 3 oz low-fat turkey meat loaf   2 Tbsp. mashed potato   2 Tbsp. cooked carrots   Supplement Approved Protein Supplement   (Have between meals throughout the day)     Breakfast   cup egg substitute  2 Tbsp. chopped bell pepper or mushrooms  2 Tbsp. kiwi   Lunch   cup canned chicken (in water) mixed with light mayonnaise  4-5 Wheat Thins    Supplement Approved Protein Supplement   (Have between meals throughout the day)   Dinner 8 medium shrimp  1 Tbsp. cocktail sauce  3 Tbsp. green beans

## 2025-07-10 NOTE — PROGRESS NOTES
"Monika Franz is a 59 year old who is being evaluated via a billable video visit.      How would you like to obtain your AVS? MyChart  If the video visit is dropped, the invitation should be resent by: Text to cell phone: [phone]  Will anyone else be joining your video visit? No      Post-op Surgical Weight Loss Diet Evaluation     Assessment:  Pt presents for 10 months post-op RD visit, s/p revision to DS on 9/17/2024 with Dr. Rios. Today we reviewed current eating habits and level of physical activity, and instructed on the changes that are required for successful bariatric outcomes.    Patient Progress: Patient reports she is not seeing the results she was hoping for. Tracking per protein intake via jazmín and avoiding excessive carb intake. Eating her protein first.  - No longer taking Naltrexone         Initial weight: 247 lbs - day of surgery   Current weight: 229 lbs   Weight change: 20 lbs down, 7.7% loss     There is no height or weight on file to calculate BMI.    Patient Active Problem List   Diagnosis    REBECCA (obstructive sleep apnea)    Bariatric surgery status    Eczema    Drug overdose, intentional (H)    Malignant neoplasm of upper-inner quadrant of left breast in female, estrogen receptor negative (H)    Narrow angle glaucoma suspect of both eyes    Severe recurrent major depression without psychotic features (H)    Adenomatous polyp of colon    Hammer toe of right foot    Morbid obesity (H)    Lymphedema of arm, left    Morbid (severe) obesity due to excess calories (H)       Diabetes: none    Vitamins   Multi Vit with Iron: x2 yes - chewable   Calcium Citrate: yes  B12: yes  D3: yes    Do you experience hunger? 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 Yes   Do you experience any reflux or discomfort with eating? Spice or greasy   Nausea: no  Vomiting: no  Diarrhea: yes - not often  Constipation: no  Hair loss: no    Diet Recall/Time:   Protein shake   Breakfast: greek yogurt with granola   Lunch: " "salad with added chicken and added chopped vegetables with ranch   Dinner: chicken, burger with no bun with side of vegetables or fruit or tacos with a side   Typical Snacks: mixed nuts 1/4 cups     Proteins/Veg/Fruits/CHO (NOT well tolerated): none    Estimated protein intake: 60-80 grams    Estimated portion size per meals:1 cup/meal    Meals per week away from home: rarely    Meal Duration:15-20 minutes     Fluid-meal separation:  Fluids are  30min before and 30 minutes after meals.    Fluid Intake  Water: 50+ oz or sparkling water   Caffeine: diet Mt Dew or diet pepsi  Alcohol: none  Carbonation: yes    Exercise: 45 minutes walk about 5 times per week and joined a club for circuit training       PES statement:      (NC-1.4) Altered GI Function related to Alteration in gastrointestinal tract structure and/or function/ Decreased functional length of the GI tract as evidenced by Weight loss of 7.7% initial body weight; Gastric bypass surgery      Intervention    Discussion  Discussed 9 month Post-Op Nutritional Guidelines for DS  Recommended to consume 15-20gm protein at 3 meals daily, along with protein supplement/\"planned protein containing snack\" of 15-30gm protein, to reach goal of 60-80 gm protein daily.  Educated on post-op vitamin regimen: Multi Vit + iron 2x/day, calcium citrate 400-600 mg 2x/day, 9162-5676 mcg of Sublingual B-12 daily, and 5000 IU Vitamin D3 daily (MVI and calcium can be taken at the same time BID)  Reviewed lean protein sources  Bariatric Plate Method-  including lean/low fat protein at each meal, including a vegetable/fruit, and limiting carbohydrate intake to less than 25% of plate volume.     Instructions  Include 15-20gm protein at each meal, along with protein supplement/\"planned protein containing snack\" of 15-30gm protein, to reach goal of 60-80 gm protein daily.  Increase fluid intake to 64oz daily: choose plain or calorie/carbonation-free beverages.  Incorporate daily " structured activity, 30-60 minutes most days of the week  Recommended pt to start taking: Multi Vit + iron 2x/day, calcium citrate 400-600 mg 2x/day, 3550-4105 mcg of Sublingual B-12 daily, and 5000 IU Vitamin D3 daily. (MVI and calcium can be taken at the same time)  Read food labels more consistently: keeping total fat grams <10, total sugar grams <10, fiber >3gm per serving.  Increase vegetable/fruit intake, by having a vegetable or fruit with each meal daily.  Practice plate method: 1/2 plate lean/low fat protein source, vegetable/fruit, <25% of plate complex carbohydrates.  Separate fluids 30 minutes before/after meal times.  Practice eating off of smaller plates/bowls, chewing to applesauce consistency, taking 20-30 minutes to eat in a calm/relaxed environment without distractions of tv/email/cell phone.    Handouts provided:  9 months Post-Op Nutritional Guidelines for DS      Assessment/Plan:    Pt to follow up for 1 year post-op visit with bariatrician       Video-Visit Details    Type of service:  Video Visit    Video Start Time (time video started): 1:30 pm    Video End Time (time video stopped):1:48 pm    Originating Location (pt. Location): Other office      Distant Location (provider location):  Off-site    Mode of Communication:  Video Conference via [facility]    Physician has received verbal consent for a Video Visit from the patient? Yes    Shayla Bonilla RD

## 2025-08-18 ENCOUNTER — MYC MEDICAL ADVICE (OUTPATIENT)
Dept: SURGERY | Facility: CLINIC | Age: 60
End: 2025-08-18
Payer: COMMERCIAL

## 2025-08-18 DIAGNOSIS — K90.9 INTESTINAL MALABSORPTION: ICD-10-CM

## 2025-08-18 DIAGNOSIS — Z98.84 S/P BILIOPANCREATIC DIVERSION WITH DUODENAL SWITCH: Primary | ICD-10-CM

## 2025-08-28 ENCOUNTER — LAB (OUTPATIENT)
Dept: LAB | Facility: CLINIC | Age: 60
End: 2025-08-28
Payer: COMMERCIAL

## 2025-08-28 DIAGNOSIS — K90.9 INTESTINAL MALABSORPTION: ICD-10-CM

## 2025-08-28 DIAGNOSIS — Z98.84 S/P BILIOPANCREATIC DIVERSION WITH DUODENAL SWITCH: ICD-10-CM

## 2025-08-28 LAB
ALBUMIN SERPL BCG-MCNC: 4.3 G/DL (ref 3.5–5.2)
ALP SERPL-CCNC: 91 U/L (ref 40–150)
ALT SERPL W P-5'-P-CCNC: 67 U/L (ref 0–50)
ANION GAP SERPL CALCULATED.3IONS-SCNC: 12 MMOL/L (ref 7–15)
AST SERPL W P-5'-P-CCNC: 46 U/L (ref 0–45)
BILIRUB SERPL-MCNC: 0.4 MG/DL
BUN SERPL-MCNC: 13.4 MG/DL (ref 8–23)
CALCIUM SERPL-MCNC: 9.7 MG/DL (ref 8.8–10.4)
CHLORIDE SERPL-SCNC: 103 MMOL/L (ref 98–107)
CHOLEST SERPL-MCNC: 116 MG/DL
CREAT SERPL-MCNC: 0.77 MG/DL (ref 0.51–0.95)
EGFRCR SERPLBLD CKD-EPI 2021: 88 ML/MIN/1.73M2
ERYTHROCYTE [DISTWIDTH] IN BLOOD BY AUTOMATED COUNT: 13.1 % (ref 10–15)
EST. AVERAGE GLUCOSE BLD GHB EST-MCNC: 103 MG/DL
FASTING STATUS PATIENT QL REPORTED: ABNORMAL
FASTING STATUS PATIENT QL REPORTED: NORMAL
FERRITIN SERPL-MCNC: 132 NG/ML (ref 11–328)
FOLATE SERPL-MCNC: 26.3 NG/ML (ref 4.6–34.8)
GLUCOSE SERPL-MCNC: 100 MG/DL (ref 70–99)
HBA1C MFR BLD: 5.2 % (ref 0–5.6)
HCO3 SERPL-SCNC: 26 MMOL/L (ref 22–29)
HCT VFR BLD AUTO: 41.7 % (ref 35–47)
HDLC SERPL-MCNC: 60 MG/DL
HGB BLD-MCNC: 13.7 G/DL (ref 11.7–15.7)
LDLC SERPL CALC-MCNC: 45 MG/DL
MCH RBC QN AUTO: 30.4 PG (ref 26.5–33)
MCHC RBC AUTO-ENTMCNC: 32.9 G/DL (ref 31.5–36.5)
MCV RBC AUTO: 92.7 FL (ref 78–100)
NONHDLC SERPL-MCNC: 56 MG/DL
PLATELET # BLD AUTO: 209 10E3/UL (ref 150–450)
POTASSIUM SERPL-SCNC: 4.2 MMOL/L (ref 3.4–5.3)
PREALB SERPL-MCNC: 22.7 MG/DL (ref 20–40)
PROT SERPL-MCNC: 7 G/DL (ref 6.4–8.3)
PTH-INTACT SERPL-MCNC: 41 PG/ML (ref 15–65)
RBC # BLD AUTO: 4.5 10E6/UL (ref 3.8–5.2)
SODIUM SERPL-SCNC: 141 MMOL/L (ref 135–145)
TRIGL SERPL-MCNC: 53 MG/DL
VIT B12 SERPL-MCNC: 1893 PG/ML (ref 232–1245)
WBC # BLD AUTO: 7.91 10E3/UL (ref 4–11)

## 2025-08-31 LAB
A-TOCOPHEROL VIT E SERPL-MCNC: 6.2 MG/L
ANNOTATION COMMENT IMP: NORMAL
BETA+GAMMA TOCOPHEROL SERPL-MCNC: 0.3 MG/L
RETINYL PALMITATE SERPL-MCNC: 0.02 MG/L
VIT A SERPL-MCNC: 0.54 MG/L

## 2025-09-01 LAB
DEPRECATED CALCIDIOL+CALCIFEROL SERPL-MC: <72 UG/L (ref 20–75)
VIT B1 PYROPHOSHATE BLD-SCNC: 156 NMOL/L
VITAMIN D2 SERPL-MCNC: <5 UG/L
VITAMIN D3 SERPL-MCNC: 67 UG/L

## (undated) DEVICE — PREP CHLORAPREP 26ML TINTED HI-LITE ORANGE 930815

## (undated) DEVICE — Device

## (undated) DEVICE — ANTIFOG SOLUTION SEE SHARP 150M TROCAR SWABS 30978 (COI)

## (undated) DEVICE — DAVINCI XI REDUCER 8-12MM 470381

## (undated) DEVICE — STPL DAVINCI SUREFORM 60MM RELOAD BLUE 48360B

## (undated) DEVICE — DECANTER VIAL 2006S

## (undated) DEVICE — SUTURE VICRYL+ 2-0 27 VIO VCP317H

## (undated) DEVICE — NDL INSUFFLATION 13GA 120MM C2201

## (undated) DEVICE — TUBE RECTAL 32FR 30IN NS DC742

## (undated) DEVICE — LUBRICANT INST ELECTROLUBE EL101

## (undated) DEVICE — SUTURE VICRYL+ 2-0 27IN SH UND VCP417H

## (undated) DEVICE — SYR 30ML LL W/O NDL 302832

## (undated) DEVICE — SU SILK 0 SH 30" K834H

## (undated) DEVICE — GOWN IMPERVIOUS BREATHABLE 2XL/XLONG

## (undated) DEVICE — GLOVE BIOGEL PI ULTRATOUCH G SZ 6.5 42165

## (undated) DEVICE — DRAPE SHEET TABLE COVER KC 42301*

## (undated) DEVICE — GLOVE BIOGEL PI SZ 8.0 40880

## (undated) DEVICE — DAVINCI XI HANDPIECE ESU VESSEL SEALER 8MM EXT 480422

## (undated) DEVICE — DAVINCI XI DRAPE COLUMN 470341

## (undated) DEVICE — SOL WATER IRRIG 1000ML BOTTLE 2F7114

## (undated) DEVICE — SU SILK 2-0 SH 30" K833H

## (undated) DEVICE — SYR 50ML SLIP TIP W/O NDL 309654

## (undated) DEVICE — STPL DAVINCI SUREFORM 60MM STR 480460

## (undated) DEVICE — DAVINCI XI OBTURATOR BLADELESS 8MM 470359

## (undated) DEVICE — DAVINCI XI SEAL UNIVERSAL 5-12MM 470500

## (undated) DEVICE — SUCTION MANIFOLD NEPTUNE 2 SYS 1 PORT 702-025-000

## (undated) DEVICE — SYR 50ML CATH TIP W/O NDL 309620

## (undated) DEVICE — SU WND CLOSURE VLOC 180 ABS 3-0 6" V-20 VLOCL0604

## (undated) DEVICE — DAVINCI XI DRAPE ARM 470015

## (undated) DEVICE — SU VICRYL+ 0 27 UR6 VLT VCP603H

## (undated) DEVICE — SU VICRYL+ 4-0 UNDYED PS-2 VCP496ZH

## (undated) DEVICE — TUBING SMOKE EVAC PNEUMOCLEAR HIGH FLOW 0620050250

## (undated) DEVICE — STPL DAVINCI SUREFORM 60MM RELOAD WHITE 48360W

## (undated) RX ORDER — PROPOFOL 10 MG/ML
INJECTION, EMULSION INTRAVENOUS
Status: DISPENSED
Start: 2024-09-17

## (undated) RX ORDER — GLYCOPYRROLATE 0.2 MG/ML
INJECTION, SOLUTION INTRAMUSCULAR; INTRAVENOUS
Status: DISPENSED
Start: 2024-09-17

## (undated) RX ORDER — INDOCYANINE GREEN AND WATER 25 MG
KIT INJECTION
Status: DISPENSED
Start: 2024-09-17

## (undated) RX ORDER — FENTANYL CITRATE 50 UG/ML
INJECTION, SOLUTION INTRAMUSCULAR; INTRAVENOUS
Status: DISPENSED
Start: 2024-09-17

## (undated) RX ORDER — BUPIVACAINE HYDROCHLORIDE 2.5 MG/ML
INJECTION, SOLUTION EPIDURAL; INFILTRATION; INTRACAUDAL
Status: DISPENSED
Start: 2024-09-17